# Patient Record
Sex: FEMALE | Race: WHITE | Employment: OTHER | URBAN - NONMETROPOLITAN AREA
[De-identification: names, ages, dates, MRNs, and addresses within clinical notes are randomized per-mention and may not be internally consistent; named-entity substitution may affect disease eponyms.]

---

## 2017-02-27 RX ORDER — CONJUGATED ESTROGENS 0.62 MG/1
TABLET, FILM COATED ORAL
Qty: 90 TABLET | Refills: 3 | Status: SHIPPED | OUTPATIENT
Start: 2017-02-27 | End: 2017-10-19 | Stop reason: SDUPTHER

## 2017-04-11 ENCOUNTER — HOSPITAL ENCOUNTER (OUTPATIENT)
Dept: WOMENS IMAGING | Age: 62
Discharge: HOME | End: 2017-04-11
Payer: COMMERCIAL

## 2017-04-11 ENCOUNTER — OFFICE VISIT (OUTPATIENT)
Dept: OBGYN | Age: 62
End: 2017-04-11
Payer: COMMERCIAL

## 2017-04-11 VITALS
DIASTOLIC BLOOD PRESSURE: 98 MMHG | BODY MASS INDEX: 26.5 KG/M2 | HEIGHT: 62 IN | SYSTOLIC BLOOD PRESSURE: 142 MMHG | WEIGHT: 144 LBS | TEMPERATURE: 99.1 F | HEART RATE: 80 BPM

## 2017-04-11 DIAGNOSIS — Z12.31 ENCOUNTER FOR SCREENING MAMMOGRAM FOR BREAST CANCER: ICD-10-CM

## 2017-04-11 DIAGNOSIS — Z01.419 ENCOUNTER FOR GYNECOLOGICAL EXAMINATION WITHOUT ABNORMAL FINDING: Primary | ICD-10-CM

## 2017-04-11 PROCEDURE — 77063 BREAST TOMOSYNTHESIS BI: CPT

## 2017-04-11 PROCEDURE — 99396 PREV VISIT EST AGE 40-64: CPT

## 2017-04-11 RX ORDER — LOSARTAN POTASSIUM 25 MG/1
25 TABLET ORAL DAILY
COMMUNITY
End: 2017-10-19 | Stop reason: SDUPTHER

## 2017-04-11 RX ORDER — CYCLOBENZAPRINE HYDROCHLORIDE 15 MG/1
CAPSULE, EXTENDED RELEASE ORAL
COMMUNITY
Start: 2017-02-06 | End: 2017-10-19 | Stop reason: CLARIF

## 2017-04-11 ASSESSMENT — ENCOUNTER SYMPTOMS
COLOR CHANGE: 0
SHORTNESS OF BREATH: 0
COUGH: 0
TROUBLE SWALLOWING: 0
BLOOD IN STOOL: 0
CONSTIPATION: 0
BACK PAIN: 0
DIARRHEA: 0

## 2017-06-08 RX ORDER — FLUCONAZOLE 150 MG/1
TABLET ORAL
Qty: 2 TABLET | Refills: 0 | Status: SHIPPED | OUTPATIENT
Start: 2017-06-08 | End: 2017-10-19 | Stop reason: CLARIF

## 2017-10-12 ENCOUNTER — TRANSCRIBE ORDERS (OUTPATIENT)
Dept: LAB | Facility: CLINIC | Age: 62
End: 2017-10-12

## 2017-10-12 DIAGNOSIS — N18.30 CHRONIC KIDNEY DISEASE, STAGE III (MODERATE) (HCC): Primary | ICD-10-CM

## 2017-10-12 PROCEDURE — 84550 ASSAY OF BLOOD/URIC ACID: CPT | Performed by: FAMILY MEDICINE

## 2017-10-12 PROCEDURE — 83970 ASSAY OF PARATHORMONE: CPT | Performed by: FAMILY MEDICINE

## 2017-10-12 PROCEDURE — 83735 ASSAY OF MAGNESIUM: CPT | Performed by: FAMILY MEDICINE

## 2017-10-12 PROCEDURE — 85025 COMPLETE CBC W/AUTO DIFF WBC: CPT | Performed by: FAMILY MEDICINE

## 2017-10-12 PROCEDURE — 83036 HEMOGLOBIN GLYCOSYLATED A1C: CPT | Performed by: FAMILY MEDICINE

## 2017-10-12 PROCEDURE — 80069 RENAL FUNCTION PANEL: CPT | Performed by: FAMILY MEDICINE

## 2017-10-12 PROCEDURE — 82306 VITAMIN D 25 HYDROXY: CPT | Performed by: FAMILY MEDICINE

## 2017-10-12 PROCEDURE — 80048 BASIC METABOLIC PNL TOTAL CA: CPT | Performed by: FAMILY MEDICINE

## 2017-10-19 ENCOUNTER — OFFICE VISIT (OUTPATIENT)
Dept: FAMILY MEDICINE CLINIC | Age: 62
End: 2017-10-19
Payer: COMMERCIAL

## 2017-10-19 VITALS
WEIGHT: 150 LBS | SYSTOLIC BLOOD PRESSURE: 128 MMHG | HEIGHT: 62 IN | BODY MASS INDEX: 27.6 KG/M2 | DIASTOLIC BLOOD PRESSURE: 82 MMHG | OXYGEN SATURATION: 97 % | HEART RATE: 68 BPM | TEMPERATURE: 98.1 F | RESPIRATION RATE: 18 BRPM

## 2017-10-19 DIAGNOSIS — E03.8 HYPOTHYROIDISM DUE TO HASHIMOTO'S THYROIDITIS: ICD-10-CM

## 2017-10-19 DIAGNOSIS — R30.0 DYSURIA: ICD-10-CM

## 2017-10-19 DIAGNOSIS — F51.04 CHRONIC INSOMNIA: ICD-10-CM

## 2017-10-19 DIAGNOSIS — E06.3 HYPOTHYROIDISM DUE TO HASHIMOTO'S THYROIDITIS: ICD-10-CM

## 2017-10-19 DIAGNOSIS — I10 ESSENTIAL (PRIMARY) HYPERTENSION: Primary | ICD-10-CM

## 2017-10-19 DIAGNOSIS — J30.1 CHRONIC SEASONAL ALLERGIC RHINITIS DUE TO POLLEN: ICD-10-CM

## 2017-10-19 DIAGNOSIS — G89.29 CHRONIC MIDLINE LOW BACK PAIN WITHOUT SCIATICA: ICD-10-CM

## 2017-10-19 DIAGNOSIS — M54.50 CHRONIC MIDLINE LOW BACK PAIN WITHOUT SCIATICA: ICD-10-CM

## 2017-10-19 DIAGNOSIS — E83.42 HYPOMAGNESEMIA: ICD-10-CM

## 2017-10-19 DIAGNOSIS — R73.9 HYPERGLYCEMIA: ICD-10-CM

## 2017-10-19 DIAGNOSIS — E83.52 HYPERCALCEMIA: ICD-10-CM

## 2017-10-19 DIAGNOSIS — E78.01 FAMILIAL HYPERCHOLESTEROLEMIA: ICD-10-CM

## 2017-10-19 DIAGNOSIS — N18.30 STAGE 3 CHRONIC KIDNEY DISEASE (HCC): ICD-10-CM

## 2017-10-19 DIAGNOSIS — I12.9 BENIGN HYPERTENSIVE KIDNEY DISEASE WITH CHRONIC KIDNEY DISEASE STAGE I THROUGH STAGE IV, OR UNSPECIFIED: ICD-10-CM

## 2017-10-19 DIAGNOSIS — K21.9 GASTROESOPHAGEAL REFLUX DISEASE WITHOUT ESOPHAGITIS: ICD-10-CM

## 2017-10-19 DIAGNOSIS — N95.1 SYMPTOMS, SUCH AS FLUSHING, SLEEPLESSNESS, HEADACHE, LACK OF CONCENTRATION, ASSOCIATED WITH THE MENOPAUSE: ICD-10-CM

## 2017-10-19 DIAGNOSIS — Z23 INFLUENZA VACCINE NEEDED: ICD-10-CM

## 2017-10-19 DIAGNOSIS — F41.1 GENERALIZED ANXIETY DISORDER: ICD-10-CM

## 2017-10-19 PROBLEM — G56.03 BILATERAL CARPAL TUNNEL SYNDROME: Status: ACTIVE | Noted: 2017-10-19

## 2017-10-19 LAB
BACTERIA: ABNORMAL /HPF
BILIRUBIN URINE: NEGATIVE
BLOOD, URINE: NEGATIVE
CLARITY: ABNORMAL
COLOR: YELLOW
EPITHELIAL CELLS, UA: 1 /HPF (ref 0–5)
GLUCOSE URINE: NEGATIVE MG/DL
HYALINE CASTS: 0 /HPF (ref 0–8)
KETONES, URINE: NEGATIVE MG/DL
LEUKOCYTE ESTERASE, URINE: NEGATIVE
NITRITE, URINE: NEGATIVE
PH UA: 7
PROTEIN UA: NEGATIVE MG/DL
RBC UA: 1 /HPF (ref 0–4)
SPECIFIC GRAVITY UA: 1.01
UROBILINOGEN, URINE: 0.2 E.U./DL
WBC UA: 2 /HPF (ref 0–5)

## 2017-10-19 PROCEDURE — 99214 OFFICE O/P EST MOD 30 MIN: CPT | Performed by: FAMILY MEDICINE

## 2017-10-19 PROCEDURE — 90471 IMMUNIZATION ADMIN: CPT | Performed by: FAMILY MEDICINE

## 2017-10-19 PROCEDURE — 90630 INFLUENZA, QUADV, 18-64 YRS, ID, PF, MICRO INJ, 0.1ML (FLUZONE QUADV, PF): CPT | Performed by: FAMILY MEDICINE

## 2017-10-19 RX ORDER — PROPRANOLOL HYDROCHLORIDE 80 MG/1
CAPSULE, EXTENDED RELEASE ORAL
Qty: 90 CAPSULE | Refills: 3 | Status: SHIPPED | OUTPATIENT
Start: 2017-10-19 | End: 2017-10-19 | Stop reason: SDUPTHER

## 2017-10-19 RX ORDER — LOSARTAN POTASSIUM 25 MG/1
25 TABLET ORAL DAILY
Qty: 90 TABLET | Refills: 3 | Status: SHIPPED | OUTPATIENT
Start: 2017-10-19 | End: 2018-10-03 | Stop reason: SDUPTHER

## 2017-10-19 RX ORDER — PROPRANOLOL HYDROCHLORIDE 80 MG/1
CAPSULE, EXTENDED RELEASE ORAL
Qty: 90 CAPSULE | Refills: 3 | Status: SHIPPED | OUTPATIENT
Start: 2017-10-19 | End: 2019-01-01 | Stop reason: SDUPTHER

## 2017-10-19 RX ORDER — CELECOXIB 200 MG/1
200 CAPSULE ORAL 2 TIMES DAILY
Qty: 180 CAPSULE | Refills: 3 | Status: SHIPPED | OUTPATIENT
Start: 2017-10-19 | End: 2018-10-03 | Stop reason: SDUPTHER

## 2017-10-19 RX ORDER — AMITRIPTYLINE HYDROCHLORIDE 25 MG/1
TABLET, FILM COATED ORAL
Qty: 90 TABLET | Refills: 3 | Status: SHIPPED | OUTPATIENT
Start: 2017-10-19 | End: 2017-10-19 | Stop reason: SDUPTHER

## 2017-10-19 RX ORDER — TRIAMTERENE AND HYDROCHLOROTHIAZIDE 75; 50 MG/1; MG/1
1 TABLET ORAL DAILY
Qty: 90 TABLET | Refills: 3 | Status: SHIPPED | OUTPATIENT
Start: 2017-10-19 | End: 2017-12-30 | Stop reason: SDUPTHER

## 2017-10-19 RX ORDER — MONTELUKAST SODIUM 10 MG/1
10 TABLET ORAL NIGHTLY
Qty: 90 TABLET | Refills: 3 | Status: SHIPPED | OUTPATIENT
Start: 2017-10-19 | End: 2019-01-01 | Stop reason: SDUPTHER

## 2017-10-19 RX ORDER — ALPRAZOLAM 0.25 MG/1
0.25 TABLET ORAL NIGHTLY PRN
Qty: 90 TABLET | Refills: 1 | Status: SHIPPED | OUTPATIENT
Start: 2017-10-19 | End: 2018-07-13 | Stop reason: SDUPTHER

## 2017-10-19 RX ORDER — AMITRIPTYLINE HYDROCHLORIDE 25 MG/1
TABLET, FILM COATED ORAL
Qty: 90 TABLET | Refills: 3 | Status: SHIPPED | OUTPATIENT
Start: 2017-10-19 | End: 2018-04-17 | Stop reason: SDUPTHER

## 2017-10-19 RX ORDER — TRAMADOL HYDROCHLORIDE 50 MG/1
50 TABLET ORAL EVERY 12 HOURS
Qty: 60 TABLET | Refills: 0
Start: 2017-10-19

## 2017-10-19 NOTE — PROGRESS NOTES
currently stable.           HPI    Past Medical History:   Diagnosis Date    Allergic rhinitis     Anxiety     Back pain     DDD (degenerative disc disease)     Hypertension     Hyperthyroidism     Kidney disease     Stage 3- Dr Janett Astudillo Osteopenia       Past Surgical History:   Procedure Laterality Date    ANKLE SURGERY      APPENDECTOMY      BREAST BIOPSY  3/21/13    left breast mammotome biopsy    BREAST SURGERY Right 2002    right excisional biopsy - cyst    Bernie Covert COLONOSCOPY      Dr. Angelica Costa in [facility] with BSO       Family History   Problem Relation Age of Onset    High Blood Pressure Mother     Diabetes Mother     Stroke Mother     High Cholesterol Mother     High Blood Pressure Father     Heart Disease Father     High Cholesterol Father     Cancer Father 72     prostate    High Blood Pressure Sister     High Blood Pressure Brother     Cancer Brother      prostate    Cancer Maternal Aunt 54     breast    Cancer Brother      prostate    Cancer Maternal Cousin 58     breast       Social History   Substance Use Topics    Smoking status: Current Every Day Smoker     Packs/day: 1.00     Years: 25.00    Smokeless tobacco: Never Used    Alcohol use No      Current Outpatient Prescriptions   Medication Sig Dispense Refill    propranolol (INDERAL LA) 80 MG extended release capsule TAKE 1 CAPSULE DAILY 90 capsule 3    estrogens, conjugated, (PREMARIN) 0.625 MG tablet TAKE 1 TABLET DAILY 90 tablet 3    amitriptyline (ELAVIL) 25 MG tablet TAKE 1 TABLET DAILY 90 tablet 3    triamterene-hydrochlorothiazide (MAXZIDE) 75-50 MG per tablet Take 1 tablet by mouth daily 90 tablet 3    traMADol (ULTRAM) 50 MG tablet Take 1 tablet by mouth every 12 hours 60 tablet 0    montelukast (SINGULAIR) 10 MG tablet Take 1 tablet by mouth nightly 90 tablet 3    losartan (COZAAR) 25 MG tablet Take 1 tablet by mouth daily 90 sounds are normal. He exhibits no distension. There is no tenderness. There is no rebound and no guarding. Musculoskeletal: exhibits no edema. Neurological: alert. Psychiatric: normal mood and affect. behavior is normal.       Recent Results (from the past 672 hour(s))   Urinalysis with Microscopic    Collection Time: 10/19/17 12:22 PM   Result Value Ref Range    Color, UA YELLOW Straw/Yellow    Clarity, UA CLOUDY (A) Clear    Glucose, Ur Negative Negative mg/dL    Bilirubin Urine Negative Negative    Ketones, Urine Negative Negative mg/dL    Specific Gravity, UA 1.008 1.005 - 1.030    Blood, Urine Negative Negative    pH, UA 7.0 5.0 - 8.0    Protein, UA Negative Negative mg/dL    Urobilinogen, Urine 0.2 <2.0 E.U./dL    Nitrite, Urine Negative Negative    Leukocyte Esterase, Urine Negative Negative    Bacteria, UA 1+ /HPF    Hyaline Casts, UA 0 0 - 8 /HPF    WBC, UA 2 0 - 5 /HPF    RBC, UA 1 0 - 4 /HPF    Epi Cells 1 0 - 5 /HPF                                                                               Assessment & Plan: The following diagnoses and conditions are stable with no further orders unless indicated:  1. Essential (primary) hypertension  Blood pressure is at goal today. Continue with current medication  - triamterene-hydrochlorothiazide (MAXZIDE) 75-50 MG per tablet; Take 1 tablet by mouth daily  Dispense: 90 tablet; Refill: 3  - CBC Auto Differential; Future    2. Influenza vaccine needed    - INFLUENZA, QUADV, 18-64 YRS, ID, PF, MICRO INJ, 0.1ML (FLUZONE QUADV, PF)    3. Generalized anxiety disorder  Discussed risk and benefits of taking benzodiazepines. Risks include addiction and tolerance. If develops impairment or over sedation with medication, discontinue and contact me. Do not take medication with alcohol. Advised to take sparingly. Advised to keep medication hidden and locked up as theft is high with these medications as well. - ALPRAZolam (XANAX) 0.25 MG tablet;  Take 1 tablet by mouth nightly as needed for Sleep or Anxiety  Dispense: 90 tablet; Refill: 1    4. Benign hypertensive kidney disease with chronic kidney disease stage I through stage IV, or unspecified  Renal function has improved  - propranolol (INDERAL LA) 80 MG extended release capsule; TAKE 1 CAPSULE DAILY  Dispense: 90 capsule; Refill: 3  - losartan (COZAAR) 25 MG tablet; Take 1 tablet by mouth daily  Dispense: 90 tablet; Refill: 3    5. Stage 3 chronic kidney disease  Improved she also sees nephrology    6. Gastroesophageal reflux disease without esophagitis  Symptoms are stable    7. Hypercalcemia  Continue to monitor. Recommend she not take any calcium-containing supplements    8. Hyperglycemia  Blood sugar remains elevated but improved over the past 2 years. Discussed lifestyle changes such as diet and exercise. Recommended eliminate processed food from diet such as sugar and fried foods. Recommended exercising at least 150 minutes/week. Try to do full body resistance training twice a week as well. Offered suggestions for calorie counting such as phone apps and online resources such as My fitness pal and Lose it. Discussed healthy weight.    - Hemoglobin A1C; Future    9. Familial hypercholesterolemia  Cholesterol at goal continue his current medication  - Comprehensive Metabolic Panel; Future  - Lipid Panel; Future    10. Hypomagnesemia  Recommend she increase her magnesium intake. Discussed potential side effects of diarrhea. She is currently taking 400 mg may benefit from taking 200 mg  - Magnesium; Future    11. Hypothyroidism due to Hashimoto's thyroiditis  At the following lab studies prior to next visit  - TSH without Reflex; Future    12. Chronic insomnia  Stable  - amitriptyline (ELAVIL) 25 MG tablet; TAKE 1 TABLET DAILY  Dispense: 90 tablet; Refill: 3    13. Chronic midline low back pain without sciatica  Stable  - celecoxib (CELEBREX) 200 MG capsule;  Take 1 capsule by mouth 2 times daily

## 2017-10-24 ENCOUNTER — TELEPHONE (OUTPATIENT)
Dept: FAMILY MEDICINE CLINIC | Age: 62
End: 2017-10-24

## 2017-10-25 RX ORDER — POTASSIUM CHLORIDE 750 MG/1
10 TABLET, EXTENDED RELEASE ORAL 2 TIMES DAILY
COMMUNITY
End: 2018-02-20 | Stop reason: SDUPTHER

## 2018-02-02 ENCOUNTER — TELEPHONE (OUTPATIENT)
Dept: FAMILY MEDICINE CLINIC | Age: 63
End: 2018-02-02

## 2018-02-20 RX ORDER — POTASSIUM CHLORIDE 750 MG/1
10 TABLET, EXTENDED RELEASE ORAL 3 TIMES DAILY
Qty: 360 TABLET | Refills: 3 | Status: SHIPPED | OUTPATIENT
Start: 2018-02-20 | End: 2018-02-27 | Stop reason: SDUPTHER

## 2018-02-20 RX ORDER — CYCLOBENZAPRINE HCL 10 MG
10 TABLET ORAL NIGHTLY
Qty: 90 TABLET | Refills: 3 | Status: SHIPPED | OUTPATIENT
Start: 2018-02-20 | End: 2019-02-05 | Stop reason: SDUPTHER

## 2018-02-27 RX ORDER — POTASSIUM CHLORIDE 750 MG/1
10 TABLET, EXTENDED RELEASE ORAL 3 TIMES DAILY
Qty: 42 TABLET | Refills: 0 | Status: SHIPPED | OUTPATIENT
Start: 2018-02-27 | End: 2019-02-22 | Stop reason: SDUPTHER

## 2018-04-17 ENCOUNTER — HOSPITAL ENCOUNTER (OUTPATIENT)
Dept: WOMENS IMAGING | Age: 63
Discharge: HOME | End: 2018-04-17
Payer: COMMERCIAL

## 2018-04-17 ENCOUNTER — OFFICE VISIT (OUTPATIENT)
Dept: FAMILY MEDICINE CLINIC | Age: 63
End: 2018-04-17
Payer: COMMERCIAL

## 2018-04-17 ENCOUNTER — OFFICE VISIT (OUTPATIENT)
Dept: OBGYN | Age: 63
End: 2018-04-17
Payer: COMMERCIAL

## 2018-04-17 VITALS
HEIGHT: 62 IN | OXYGEN SATURATION: 92 % | BODY MASS INDEX: 27.86 KG/M2 | HEART RATE: 53 BPM | TEMPERATURE: 98.1 F | SYSTOLIC BLOOD PRESSURE: 124 MMHG | WEIGHT: 151.38 LBS | DIASTOLIC BLOOD PRESSURE: 72 MMHG

## 2018-04-17 VITALS
SYSTOLIC BLOOD PRESSURE: 140 MMHG | DIASTOLIC BLOOD PRESSURE: 84 MMHG | HEIGHT: 62 IN | WEIGHT: 148 LBS | HEART RATE: 59 BPM | BODY MASS INDEX: 27.23 KG/M2

## 2018-04-17 DIAGNOSIS — Z78.0 POSTMENOPAUSAL: ICD-10-CM

## 2018-04-17 DIAGNOSIS — E06.3 HYPOTHYROIDISM DUE TO HASHIMOTO'S THYROIDITIS: ICD-10-CM

## 2018-04-17 DIAGNOSIS — E78.01 FAMILIAL HYPERCHOLESTEROLEMIA: ICD-10-CM

## 2018-04-17 DIAGNOSIS — M85.80 OSTEOPENIA, UNSPECIFIED LOCATION: ICD-10-CM

## 2018-04-17 DIAGNOSIS — I10 ESSENTIAL (PRIMARY) HYPERTENSION: Primary | ICD-10-CM

## 2018-04-17 DIAGNOSIS — Z90.710 SCREENING FOR MALIGNANT NEOPLASM OF VAGINA AFTER TOTAL HYSTERECTOMY: ICD-10-CM

## 2018-04-17 DIAGNOSIS — R73.9 HYPERGLYCEMIA: ICD-10-CM

## 2018-04-17 DIAGNOSIS — F41.1 GENERALIZED ANXIETY DISORDER: ICD-10-CM

## 2018-04-17 DIAGNOSIS — Z12.31 ENCOUNTER FOR SCREENING MAMMOGRAM FOR BREAST CANCER: Primary | ICD-10-CM

## 2018-04-17 DIAGNOSIS — Z01.419 ENCOUNTER FOR GYNECOLOGICAL EXAMINATION WITHOUT ABNORMAL FINDING: Primary | ICD-10-CM

## 2018-04-17 DIAGNOSIS — N18.30 STAGE 3 CHRONIC KIDNEY DISEASE (HCC): ICD-10-CM

## 2018-04-17 DIAGNOSIS — E03.8 HYPOTHYROIDISM DUE TO HASHIMOTO'S THYROIDITIS: ICD-10-CM

## 2018-04-17 DIAGNOSIS — Z12.31 SCREENING MAMMOGRAM, ENCOUNTER FOR: ICD-10-CM

## 2018-04-17 DIAGNOSIS — Z12.72 SCREENING FOR MALIGNANT NEOPLASM OF VAGINA AFTER TOTAL HYSTERECTOMY: ICD-10-CM

## 2018-04-17 DIAGNOSIS — F51.04 CHRONIC INSOMNIA: ICD-10-CM

## 2018-04-17 DIAGNOSIS — Z12.31 VISIT FOR SCREENING MAMMOGRAM: ICD-10-CM

## 2018-04-17 DIAGNOSIS — Z79.899 LONG-TERM USE OF HIGH-RISK MEDICATION: ICD-10-CM

## 2018-04-17 PROBLEM — Z12.4 SCREENING FOR CERVICAL CANCER: Status: RESOLVED | Noted: 2018-04-17 | Resolved: 2018-04-17

## 2018-04-17 PROBLEM — Z12.4 SCREENING FOR CERVICAL CANCER: Status: ACTIVE | Noted: 2018-04-17

## 2018-04-17 LAB
ALBUMIN SERPL-MCNC: 4.6 G/DL (ref 3.5–5.2)
AMPHETAMINE SCREEN, URINE: NEGATIVE
ANION GAP SERPL CALCULATED.3IONS-SCNC: 17 MMOL/L (ref 7–19)
BARBITURATE SCREEN, URINE: NEGATIVE
BASOPHILS ABSOLUTE: 0 K/UL (ref 0–0.2)
BASOPHILS RELATIVE PERCENT: 0.4 % (ref 0–1)
BENZODIAZEPINE SCREEN, URINE: POSITIVE
BILIRUBIN URINE: NEGATIVE
BLOOD, URINE: NEGATIVE
BUN BLDV-MCNC: 23 MG/DL (ref 8–23)
CALCIUM SERPL-MCNC: 10.2 MG/DL (ref 8.8–10.2)
CHLORIDE BLD-SCNC: 96 MMOL/L (ref 98–111)
CLARITY: CLEAR
CO2: 26 MMOL/L (ref 22–29)
COCAINE METABOLITE SCREEN URINE: NEGATIVE
COLOR: YELLOW
CREAT SERPL-MCNC: 1.1 MG/DL (ref 0.5–0.9)
CREATININE URINE: 34 MG/DL (ref 4.2–622)
EOSINOPHILS ABSOLUTE: 0.3 K/UL (ref 0–0.6)
EOSINOPHILS RELATIVE PERCENT: 2.8 % (ref 0–5)
GFR NON-AFRICAN AMERICAN: 50
GLUCOSE BLD-MCNC: 93 MG/DL (ref 74–109)
GLUCOSE URINE: NEGATIVE MG/DL
HCT VFR BLD CALC: 48.4 % (ref 37–47)
HEMOGLOBIN: 15.8 G/DL (ref 12–16)
KETONES, URINE: NEGATIVE MG/DL
LEUKOCYTE ESTERASE, URINE: NEGATIVE
LYMPHOCYTES ABSOLUTE: 3 K/UL (ref 1.1–4.5)
LYMPHOCYTES RELATIVE PERCENT: 28.7 % (ref 20–40)
MAGNESIUM: 1.7 MG/DL (ref 1.6–2.4)
MCH RBC QN AUTO: 30.6 PG (ref 27–31)
MCHC RBC AUTO-ENTMCNC: 32.6 G/DL (ref 33–37)
MCV RBC AUTO: 93.8 FL (ref 81–99)
MDMA URINE: NEGATIVE
METHADONE SCREEN, URINE: NEGATIVE
METHAMPHETAMINE, URINE: NEGATIVE
MONOCYTES ABSOLUTE: 0.8 K/UL (ref 0–0.9)
MONOCYTES RELATIVE PERCENT: 7.6 % (ref 0–10)
NEUTROPHILS ABSOLUTE: 6.2 K/UL (ref 1.5–7.5)
NEUTROPHILS RELATIVE PERCENT: 60.1 % (ref 50–65)
NITRITE, URINE: NEGATIVE
OPIATE SCREEN URINE: NEGATIVE
OXYCODONE SCREEN URINE: NEGATIVE
PARATHYROID HORMONE INTACT: 34.9 PG/ML (ref 15–65)
PDW BLD-RTO: 12.5 % (ref 11.5–14.5)
PH UA: 7
PHENCYCLIDINE SCREEN URINE: NEGATIVE
PHOSPHORUS: 3.7 MG/DL (ref 2.5–4.5)
PLATELET # BLD: 273 K/UL (ref 130–400)
PMV BLD AUTO: 10.2 FL (ref 9.4–12.3)
POTASSIUM SERPL-SCNC: 4 MMOL/L (ref 3.5–5)
PROPOXYPHENE SCREEN, URINE: NEGATIVE
PROTEIN PROTEIN: 9 MG/DL (ref 15–45)
PROTEIN UA: NEGATIVE MG/DL
RBC # BLD: 5.16 M/UL (ref 4.2–5.4)
SODIUM BLD-SCNC: 139 MMOL/L (ref 136–145)
SPECIFIC GRAVITY UA: 1.01
THC: NEGATIVE
TRICYCLIC ANTIDEPRESSANTS, UR: NEGATIVE
URIC ACID, SERUM: 6.3 MG/DL (ref 2.4–5.7)
UROBILINOGEN, URINE: 0.2 E.U./DL
VITAMIN D 25-HYDROXY: >60 NG/ML
WBC # BLD: 10.3 K/UL (ref 4.8–10.8)

## 2018-04-17 PROCEDURE — 99213 OFFICE O/P EST LOW 20 MIN: CPT | Performed by: NURSE PRACTITIONER

## 2018-04-17 PROCEDURE — 99396 PREV VISIT EST AGE 40-64: CPT

## 2018-04-17 PROCEDURE — 80305 DRUG TEST PRSMV DIR OPT OBS: CPT | Performed by: NURSE PRACTITIONER

## 2018-04-17 PROCEDURE — 77067 SCR MAMMO BI INCL CAD: CPT

## 2018-04-17 RX ORDER — ALPRAZOLAM 0.25 MG/1
0.25 TABLET ORAL DAILY PRN
Qty: 30 TABLET | Refills: 2 | Status: SHIPPED | OUTPATIENT
Start: 2018-04-17 | End: 2018-05-17

## 2018-04-17 RX ORDER — AMITRIPTYLINE HYDROCHLORIDE 25 MG/1
25 TABLET, FILM COATED ORAL NIGHTLY
Qty: 90 TABLET | Refills: 1 | Status: SHIPPED | OUTPATIENT
Start: 2018-04-17 | End: 2018-10-03 | Stop reason: SDUPTHER

## 2018-04-17 ASSESSMENT — ENCOUNTER SYMPTOMS
COUGH: 0
BACK PAIN: 0
DIARRHEA: 0
COUGH: 0
CHEST TIGHTNESS: 0
TROUBLE SWALLOWING: 0
SHORTNESS OF BREATH: 0
BLOOD IN STOOL: 0
NAUSEA: 0
DIARRHEA: 0
COLOR CHANGE: 0
SHORTNESS OF BREATH: 0
BACK PAIN: 1
WHEEZING: 0
SORE THROAT: 0
ABDOMINAL PAIN: 0
CONSTIPATION: 0

## 2018-04-23 ENCOUNTER — HOSPITAL ENCOUNTER (OUTPATIENT)
Dept: WOMENS IMAGING | Age: 63
Discharge: HOME | End: 2018-04-23
Payer: COMMERCIAL

## 2018-04-23 DIAGNOSIS — M85.80 OSTEOPENIA, UNSPECIFIED LOCATION: ICD-10-CM

## 2018-04-23 DIAGNOSIS — Z78.0 POSTMENOPAUSAL: ICD-10-CM

## 2018-04-23 PROCEDURE — 77080 DXA BONE DENSITY AXIAL: CPT

## 2018-05-03 ENCOUNTER — TRANSCRIBE ORDERS (OUTPATIENT)
Dept: LAB | Facility: CLINIC | Age: 63
End: 2018-05-03

## 2018-05-03 ENCOUNTER — LAB (OUTPATIENT)
Dept: LAB | Facility: CLINIC | Age: 63
End: 2018-05-03

## 2018-05-03 DIAGNOSIS — E78.01 ESSENTIAL FAMILIAL HYPERCHOLESTEROLEMIA: ICD-10-CM

## 2018-05-03 DIAGNOSIS — R73.9 BLOOD GLUCOSE ELEVATED: ICD-10-CM

## 2018-05-03 DIAGNOSIS — E03.8 HYPOTHYROIDISM DUE TO HASHIMOTO'S THYROIDITIS: ICD-10-CM

## 2018-05-03 DIAGNOSIS — E78.01 ESSENTIAL FAMILIAL HYPERCHOLESTEROLEMIA: Primary | ICD-10-CM

## 2018-05-03 DIAGNOSIS — E06.3 HYPOTHYROIDISM DUE TO HASHIMOTO'S THYROIDITIS: ICD-10-CM

## 2018-05-03 DIAGNOSIS — E83.42 HYPOMAGNESEMIA: ICD-10-CM

## 2018-05-03 DIAGNOSIS — E78.01 FAMILIAL HYPERCHOLESTEREMIA: ICD-10-CM

## 2018-05-03 PROCEDURE — 83735 ASSAY OF MAGNESIUM: CPT | Performed by: FAMILY MEDICINE

## 2018-05-03 PROCEDURE — 80053 COMPREHEN METABOLIC PANEL: CPT | Performed by: FAMILY MEDICINE

## 2018-05-03 PROCEDURE — 80061 LIPID PANEL: CPT | Performed by: FAMILY MEDICINE

## 2018-05-03 PROCEDURE — 84443 ASSAY THYROID STIM HORMONE: CPT | Performed by: FAMILY MEDICINE

## 2018-05-03 PROCEDURE — 36415 COLL VENOUS BLD VENIPUNCTURE: CPT | Performed by: FAMILY MEDICINE

## 2018-05-03 PROCEDURE — 83036 HEMOGLOBIN GLYCOSYLATED A1C: CPT | Performed by: FAMILY MEDICINE

## 2018-05-04 LAB
ALBUMIN SERPL-MCNC: 4 G/DL (ref 3.4–4.8)
ALBUMIN/GLOB SERPL: 1.5 G/DL (ref 1.1–1.8)
ALP SERPL-CCNC: 45 U/L (ref 38–126)
ALT SERPL W P-5'-P-CCNC: 24 U/L (ref 9–52)
ANION GAP SERPL CALCULATED.3IONS-SCNC: 10 MMOL/L (ref 5–15)
ARTICHOKE IGE QN: 110 MG/DL (ref 1–129)
AST SERPL-CCNC: 15 U/L (ref 14–36)
BILIRUB SERPL-MCNC: 0.4 MG/DL (ref 0.2–1.3)
BUN BLD-MCNC: 22 MG/DL (ref 7–21)
BUN/CREAT SERPL: 21.2 (ref 7–25)
CALCIUM SPEC-SCNC: 9.8 MG/DL (ref 8.4–10.2)
CHLORIDE SERPL-SCNC: 103 MMOL/L (ref 95–110)
CHOLEST SERPL-MCNC: 202 MG/DL (ref 0–199)
CO2 SERPL-SCNC: 26 MMOL/L (ref 22–31)
CREAT BLD-MCNC: 1.04 MG/DL (ref 0.5–1)
GFR SERPL CREATININE-BSD FRML MDRD: 54 ML/MIN/1.73 (ref 45–104)
GLOBULIN UR ELPH-MCNC: 2.7 GM/DL (ref 2.3–3.5)
GLUCOSE BLD-MCNC: 92 MG/DL (ref 60–100)
HBA1C MFR BLD: 6 % (ref 4–5.6)
HDLC SERPL-MCNC: 40 MG/DL (ref 60–200)
LDLC/HDLC SERPL: 2.59 {RATIO} (ref 0–3.22)
MAGNESIUM SERPL-MCNC: 1.8 MG/DL (ref 1.6–2.3)
POTASSIUM BLD-SCNC: 4.3 MMOL/L (ref 3.5–5.1)
PROT SERPL-MCNC: 6.7 G/DL (ref 6.3–8.6)
SODIUM BLD-SCNC: 139 MMOL/L (ref 137–145)
TRIGL SERPL-MCNC: 293 MG/DL (ref 20–199)
TSH SERPL DL<=0.05 MIU/L-ACNC: 0.04 MIU/ML (ref 0.46–4.68)

## 2018-05-17 PROBLEM — Z90.710 SCREENING FOR MALIGNANT NEOPLASM OF VAGINA AFTER TOTAL HYSTERECTOMY: Status: RESOLVED | Noted: 2018-04-17 | Resolved: 2018-05-17

## 2018-05-17 PROBLEM — Z12.72 SCREENING FOR MALIGNANT NEOPLASM OF VAGINA AFTER TOTAL HYSTERECTOMY: Status: RESOLVED | Noted: 2018-04-17 | Resolved: 2018-05-17

## 2018-07-13 ENCOUNTER — OFFICE VISIT (OUTPATIENT)
Dept: FAMILY MEDICINE CLINIC | Age: 63
End: 2018-07-13
Payer: COMMERCIAL

## 2018-07-13 VITALS
HEART RATE: 69 BPM | OXYGEN SATURATION: 98 % | BODY MASS INDEX: 27.25 KG/M2 | WEIGHT: 149 LBS | TEMPERATURE: 98 F | DIASTOLIC BLOOD PRESSURE: 78 MMHG | SYSTOLIC BLOOD PRESSURE: 152 MMHG

## 2018-07-13 DIAGNOSIS — R05.9 COUGH: ICD-10-CM

## 2018-07-13 DIAGNOSIS — F41.1 GENERALIZED ANXIETY DISORDER: Primary | ICD-10-CM

## 2018-07-13 DIAGNOSIS — F51.04 CHRONIC INSOMNIA: ICD-10-CM

## 2018-07-13 DIAGNOSIS — F41.1 GENERALIZED ANXIETY DISORDER: ICD-10-CM

## 2018-07-13 PROCEDURE — 99214 OFFICE O/P EST MOD 30 MIN: CPT | Performed by: CLINICAL NURSE SPECIALIST

## 2018-07-13 RX ORDER — ALPRAZOLAM 0.25 MG/1
0.25 TABLET ORAL NIGHTLY PRN
Qty: 30 TABLET | Refills: 0 | Status: SHIPPED | OUTPATIENT
Start: 2018-07-13 | End: 2018-07-16 | Stop reason: SDUPTHER

## 2018-07-13 ASSESSMENT — ENCOUNTER SYMPTOMS
COLOR CHANGE: 0
TROUBLE SWALLOWING: 0
WHEEZING: 0
SORE THROAT: 0
COUGH: 1
SINUS PRESSURE: 0
BACK PAIN: 1
CHEST TIGHTNESS: 0
SHORTNESS OF BREATH: 1

## 2018-07-13 NOTE — PROGRESS NOTES
SUBJECTIVE:  Peg Isaacs is a 58 y.o. who presents today for Medication Refill (Patient is needing a refill on her Alprazolam.)      HPI    Ms Patt Zayas presents today for medication refill and follow up on BRUNO and primary insomnia. She remains on xanax 0.25mg at night as well as elavil 25mg at night for sleep. At times she will take 1/4 xanax prn anxiety during the day with adequate relief of symptoms. She has good rest at night without any day time side effects noted. She has been on same regimen for some time. Her Vandana Simi is noted from April, UDS is UTD and appropriate as well as her controlled contract. She has been having cough with clear sputum that is blood streaked. She is a smoker. No other colored sputum. No fever or chills.      Past Medical History:   Diagnosis Date    Allergic rhinitis     Anxiety     Back pain     DDD (degenerative disc disease)     Hypertension     Hyperthyroidism     Kidney disease     Stage 3- Dr Lalo Ta Osteopenia      Past Surgical History:   Procedure Laterality Date    ANKLE SURGERY      APPENDECTOMY      BREAST BIOPSY  3/21/13    left breast mammotome biopsy    BREAST SURGERY Right 2002    right excisional biopsy - cyst    CARPAL TUNNEL RELEASE      COLONOSCOPY  2016    Dr. Sherryle Kale in [facility] with BSO, fibroids     Family History   Problem Relation Age of Onset    High Blood Pressure Mother     Diabetes Mother     Stroke Mother     High Cholesterol Mother     High Blood Pressure Father     Heart Disease Father     High Cholesterol Father     Cancer Father 72        prostate    High Blood Pressure Sister     High Blood Pressure Brother     Cancer Brother         prostate    Cancer Maternal Aunt 54        breast    Cancer Maternal Cousin 58        breast    Cancer Brother         prostate     Social History   Substance Use Topics    Smoking status: Current Every Day Smoker pain. Negative for arthralgias, joint swelling and neck pain. Skin: Negative for color change and rash. Neurological: Negative for dizziness, light-headedness and headaches. Psychiatric/Behavioral: Positive for sleep disturbance. Negative for confusion and suicidal ideas. The patient is nervous/anxious. OBJECTIVE:  BP (!) 152/78   Pulse 69   Temp 98 °F (36.7 °C) (Tympanic)   Wt 149 lb (67.6 kg)   LMP  (LMP Unknown)   SpO2 98%   BMI 27.25 kg/m²    Physical Exam   Constitutional: She is oriented to person, place, and time. She appears well-developed and well-nourished. HENT:   Head: Normocephalic and atraumatic. Mouth/Throat: Oropharynx is clear and moist.   Eyes: Conjunctivae are normal. Pupils are equal, round, and reactive to light. Right eye exhibits no discharge. Left eye exhibits no discharge. Cardiovascular: Normal rate and regular rhythm. No murmur heard. Pulmonary/Chest: Effort normal. No respiratory distress. She has no wheezes. She has rhonchi in the left middle field and the left lower field. She has no rales. Musculoskeletal: Normal range of motion. She exhibits no deformity. Neurological: She is alert and oriented to person, place, and time. No cranial nerve deficit. Skin: Skin is warm and dry. No rash noted. No erythema. Psychiatric: She has a normal mood and affect. Thought content normal.   Vitals reviewed. ASSESSMENT/PLAN:  1. Generalized anxiety disorder  Refer to [facility]  Follow up 3 months for compliance  - ALPRAZolam (XANAX) 0.25 MG tablet; Take 1 tablet by mouth nightly as needed for Sleep or Anxiety for up to 30 days. .  Dispense: 30 tablet; Refill: 0    2. Chronic insomnia  Refer to [facility]  Follow up in 3 months for compliance  - ALPRAZolam (XANAX) 0.25 MG tablet; Take 1 tablet by mouth nightly as needed for Sleep or Anxiety for up to 30 days. .  Dispense: 30 tablet; Refill: 0    3. Cough  With scant hemoptysis  - XR CHEST STANDARD (2 VW);  Future

## 2018-07-13 NOTE — PROGRESS NOTES
HPI:        HPI  Subjective:      Review of Systems      Objective:     Physical Exam    Assessment & Plan:

## 2018-07-14 NOTE — TELEPHONE ENCOUNTER
I sent in her xanax for 30 days to local pharmacy. If we could do a 90 day supply with 1 rf for Dr Elba Cummings to sign to her mail order please.

## 2018-07-16 RX ORDER — ALPRAZOLAM 0.25 MG/1
0.25 TABLET ORAL NIGHTLY PRN
Qty: 90 TABLET | Refills: 1 | Status: SHIPPED | OUTPATIENT
Start: 2018-07-16 | End: 2019-01-21 | Stop reason: SDUPTHER

## 2018-07-24 ENCOUNTER — HOSPITAL ENCOUNTER (OUTPATIENT)
Dept: GENERAL RADIOLOGY | Age: 63
Discharge: HOME | End: 2018-07-24
Payer: COMMERCIAL

## 2018-07-24 DIAGNOSIS — R05.9 COUGH: ICD-10-CM

## 2018-07-24 PROCEDURE — 71046 X-RAY EXAM CHEST 2 VIEWS: CPT

## 2018-10-03 DIAGNOSIS — M54.50 CHRONIC MIDLINE LOW BACK PAIN WITHOUT SCIATICA: ICD-10-CM

## 2018-10-03 DIAGNOSIS — G89.29 CHRONIC MIDLINE LOW BACK PAIN WITHOUT SCIATICA: ICD-10-CM

## 2018-10-03 DIAGNOSIS — F51.04 CHRONIC INSOMNIA: ICD-10-CM

## 2018-10-03 DIAGNOSIS — I12.9 BENIGN HYPERTENSIVE KIDNEY DISEASE WITH CHRONIC KIDNEY DISEASE STAGE I THROUGH STAGE IV, OR UNSPECIFIED: ICD-10-CM

## 2018-10-03 DIAGNOSIS — N95.1 SYMPTOMS, SUCH AS FLUSHING, SLEEPLESSNESS, HEADACHE, LACK OF CONCENTRATION, ASSOCIATED WITH THE MENOPAUSE: ICD-10-CM

## 2018-10-03 RX ORDER — AMITRIPTYLINE HYDROCHLORIDE 25 MG/1
TABLET, FILM COATED ORAL
Qty: 90 TABLET | Refills: 1 | Status: SHIPPED | OUTPATIENT
Start: 2018-10-03 | End: 2019-04-01 | Stop reason: SDUPTHER

## 2018-10-04 RX ORDER — CELECOXIB 200 MG/1
CAPSULE ORAL
Qty: 180 CAPSULE | Refills: 3 | Status: SHIPPED | OUTPATIENT
Start: 2018-10-04 | End: 2020-08-24 | Stop reason: SDUPTHER

## 2018-10-04 RX ORDER — CONJUGATED ESTROGENS 0.62 MG/1
TABLET, FILM COATED ORAL
Qty: 90 TABLET | Refills: 3 | Status: SHIPPED | OUTPATIENT
Start: 2018-10-04 | End: 2019-10-25 | Stop reason: SDUPTHER

## 2018-10-04 RX ORDER — LOSARTAN POTASSIUM 25 MG/1
TABLET ORAL
Qty: 90 TABLET | Refills: 3 | Status: SHIPPED | OUTPATIENT
Start: 2018-10-04 | End: 2020-09-01 | Stop reason: SDUPTHER

## 2018-10-09 ENCOUNTER — LAB (OUTPATIENT)
Dept: LAB | Facility: HOSPITAL | Age: 63
End: 2018-10-09

## 2018-10-09 ENCOUNTER — TRANSCRIBE ORDERS (OUTPATIENT)
Dept: LAB | Facility: HOSPITAL | Age: 63
End: 2018-10-09

## 2018-10-09 DIAGNOSIS — N18.30 CHRONIC KIDNEY DISEASE, STAGE III (MODERATE) (HCC): ICD-10-CM

## 2018-10-09 DIAGNOSIS — N18.30 CHRONIC KIDNEY DISEASE, STAGE III (MODERATE) (HCC): Primary | ICD-10-CM

## 2018-10-09 LAB
BILIRUB UR QL STRIP: NEGATIVE
CLARITY UR: ABNORMAL
COLOR UR: ABNORMAL
GLUCOSE UR STRIP-MCNC: NEGATIVE MG/DL
HGB UR QL STRIP.AUTO: ABNORMAL
KETONES UR QL STRIP: ABNORMAL
LEUKOCYTE ESTERASE UR QL STRIP.AUTO: NEGATIVE
NITRITE UR QL STRIP: POSITIVE
PH UR STRIP.AUTO: 7.5 [PH] (ref 5–8)
PROT UR QL STRIP: ABNORMAL
SP GR UR STRIP: 1 (ref 1–1.03)
UROBILINOGEN UR QL STRIP: ABNORMAL

## 2018-10-09 PROCEDURE — 85025 COMPLETE CBC W/AUTO DIFF WBC: CPT

## 2018-10-09 PROCEDURE — 82652 VIT D 1 25-DIHYDROXY: CPT

## 2018-10-09 PROCEDURE — 80069 RENAL FUNCTION PANEL: CPT

## 2018-10-09 PROCEDURE — 84156 ASSAY OF PROTEIN URINE: CPT | Performed by: FAMILY MEDICINE

## 2018-10-09 PROCEDURE — 83735 ASSAY OF MAGNESIUM: CPT

## 2018-10-09 PROCEDURE — 83970 ASSAY OF PARATHORMONE: CPT

## 2018-10-09 PROCEDURE — 82570 ASSAY OF URINE CREATININE: CPT | Performed by: FAMILY MEDICINE

## 2018-10-10 LAB
ALBUMIN SERPL-MCNC: 4.2 G/DL (ref 3.4–4.8)
ANION GAP SERPL CALCULATED.3IONS-SCNC: 13 MMOL/L (ref 5–15)
BASOPHILS # BLD AUTO: 0.03 10*3/MM3 (ref 0–0.2)
BASOPHILS NFR BLD AUTO: 0.3 % (ref 0–2)
BUN BLD-MCNC: 20 MG/DL (ref 7–21)
BUN/CREAT SERPL: 12.4 (ref 7–25)
CALCIUM SPEC-SCNC: 10.4 MG/DL (ref 8.4–10.2)
CHLORIDE SERPL-SCNC: 96 MMOL/L (ref 95–110)
CO2 SERPL-SCNC: 28 MMOL/L (ref 22–31)
CREAT BLD-MCNC: 1.61 MG/DL (ref 0.5–1)
DEPRECATED RDW RBC AUTO: 43.8 FL (ref 36.4–46.3)
EOSINOPHIL # BLD AUTO: 0.21 10*3/MM3 (ref 0–0.7)
EOSINOPHIL NFR BLD AUTO: 2.2 % (ref 0–7)
ERYTHROCYTE [DISTWIDTH] IN BLOOD BY AUTOMATED COUNT: 12.8 % (ref 11.5–14.5)
GFR SERPL CREATININE-BSD FRML MDRD: 32 ML/MIN/1.73 (ref 45–104)
GLUCOSE BLD-MCNC: 105 MG/DL (ref 60–100)
HCT VFR BLD AUTO: 44.4 % (ref 35–45)
HGB BLD-MCNC: 14.8 G/DL (ref 12–15.5)
IMM GRANULOCYTES # BLD: 0.03 10*3/MM3 (ref 0–0.02)
IMM GRANULOCYTES NFR BLD: 0.3 % (ref 0–0.5)
LYMPHOCYTES # BLD AUTO: 2.4 10*3/MM3 (ref 0.6–4.2)
LYMPHOCYTES NFR BLD AUTO: 24.9 % (ref 10–50)
MAGNESIUM SERPL-MCNC: 1.7 MG/DL (ref 1.6–2.3)
MCH RBC QN AUTO: 31.1 PG (ref 26.5–34)
MCHC RBC AUTO-ENTMCNC: 33.3 G/DL (ref 31.4–36)
MCV RBC AUTO: 93.3 FL (ref 80–98)
MONOCYTES # BLD AUTO: 0.91 10*3/MM3 (ref 0–0.9)
MONOCYTES NFR BLD AUTO: 9.4 % (ref 0–12)
NEUTROPHILS # BLD AUTO: 6.05 10*3/MM3 (ref 2–8.6)
NEUTROPHILS NFR BLD AUTO: 62.9 % (ref 37–80)
PHOSPHATE SERPL-MCNC: 5.5 MG/DL (ref 2.4–4.4)
PLATELET # BLD AUTO: 259 10*3/MM3 (ref 150–450)
PMV BLD AUTO: 10.7 FL (ref 8–12)
POTASSIUM BLD-SCNC: 4.4 MMOL/L (ref 3.5–5.1)
PTH-INTACT SERPL-MCNC: 20.9 PG/ML (ref 10–65)
RBC # BLD AUTO: 4.76 10*6/MM3 (ref 3.77–5.16)
SODIUM BLD-SCNC: 137 MMOL/L (ref 137–145)
WBC NRBC COR # BLD: 9.63 10*3/MM3 (ref 3.2–9.8)

## 2018-10-12 LAB — 1,25(OH)2D3 SERPL-MCNC: 42.1 PG/ML (ref 19.9–79.3)

## 2018-10-16 ENCOUNTER — LAB (OUTPATIENT)
Dept: LAB | Facility: HOSPITAL | Age: 63
End: 2018-10-16

## 2018-10-16 ENCOUNTER — TRANSCRIBE ORDERS (OUTPATIENT)
Dept: LAB | Facility: HOSPITAL | Age: 63
End: 2018-10-16

## 2018-10-16 DIAGNOSIS — I10 ESSENTIAL HYPERTENSION, MALIGNANT: ICD-10-CM

## 2018-10-16 DIAGNOSIS — N18.30 CHRONIC KIDNEY DISEASE, STAGE III (MODERATE) (HCC): ICD-10-CM

## 2018-10-16 DIAGNOSIS — N18.30 CHRONIC KIDNEY DISEASE, STAGE III (MODERATE) (HCC): Primary | ICD-10-CM

## 2018-10-16 PROCEDURE — 84443 ASSAY THYROID STIM HORMONE: CPT

## 2018-10-16 PROCEDURE — 80053 COMPREHEN METABOLIC PANEL: CPT

## 2018-10-16 PROCEDURE — 80061 LIPID PANEL: CPT

## 2018-10-16 PROCEDURE — 85025 COMPLETE CBC W/AUTO DIFF WBC: CPT

## 2018-10-16 PROCEDURE — 83036 HEMOGLOBIN GLYCOSYLATED A1C: CPT

## 2018-10-17 LAB
ALBUMIN SERPL-MCNC: 4.1 G/DL (ref 3.4–4.8)
ALBUMIN/GLOB SERPL: 1.4 G/DL (ref 1.1–1.8)
ALP SERPL-CCNC: 55 U/L (ref 38–126)
ALT SERPL W P-5'-P-CCNC: 26 U/L (ref 9–52)
ANION GAP SERPL CALCULATED.3IONS-SCNC: 9 MMOL/L (ref 5–15)
ARTICHOKE IGE QN: 110 MG/DL (ref 1–129)
AST SERPL-CCNC: 20 U/L (ref 14–36)
BASOPHILS # BLD AUTO: 0.02 10*3/MM3 (ref 0–0.2)
BASOPHILS NFR BLD AUTO: 0.2 % (ref 0–2)
BILIRUB SERPL-MCNC: 0.5 MG/DL (ref 0.2–1.3)
BUN BLD-MCNC: 22 MG/DL (ref 7–21)
BUN/CREAT SERPL: 18.5 (ref 7–25)
CALCIUM SPEC-SCNC: 10.2 MG/DL (ref 8.4–10.2)
CHLORIDE SERPL-SCNC: 99 MMOL/L (ref 95–110)
CHOLEST SERPL-MCNC: 216 MG/DL (ref 0–199)
CO2 SERPL-SCNC: 28 MMOL/L (ref 22–31)
CREAT BLD-MCNC: 1.19 MG/DL (ref 0.5–1)
DEPRECATED RDW RBC AUTO: 43.9 FL (ref 36.4–46.3)
EOSINOPHIL # BLD AUTO: 0.18 10*3/MM3 (ref 0–0.7)
EOSINOPHIL NFR BLD AUTO: 1.9 % (ref 0–7)
ERYTHROCYTE [DISTWIDTH] IN BLOOD BY AUTOMATED COUNT: 12.8 % (ref 11.5–14.5)
GFR SERPL CREATININE-BSD FRML MDRD: 46 ML/MIN/1.73 (ref 45–104)
GLOBULIN UR ELPH-MCNC: 2.9 GM/DL (ref 2.3–3.5)
GLUCOSE BLD-MCNC: 104 MG/DL (ref 60–100)
HBA1C MFR BLD: 6.1 % (ref 4–5.6)
HCT VFR BLD AUTO: 48.1 % (ref 35–45)
HDLC SERPL-MCNC: 40 MG/DL (ref 60–200)
HGB BLD-MCNC: 15.7 G/DL (ref 12–15.5)
IMM GRANULOCYTES # BLD: 0.03 10*3/MM3 (ref 0–0.02)
IMM GRANULOCYTES NFR BLD: 0.3 % (ref 0–0.5)
LDLC/HDLC SERPL: ABNORMAL {RATIO} (ref 0–3.22)
LYMPHOCYTES # BLD AUTO: 2.64 10*3/MM3 (ref 0.6–4.2)
LYMPHOCYTES NFR BLD AUTO: 28 % (ref 10–50)
MCH RBC QN AUTO: 30.9 PG (ref 26.5–34)
MCHC RBC AUTO-ENTMCNC: 32.6 G/DL (ref 31.4–36)
MCV RBC AUTO: 94.7 FL (ref 80–98)
MONOCYTES # BLD AUTO: 0.82 10*3/MM3 (ref 0–0.9)
MONOCYTES NFR BLD AUTO: 8.7 % (ref 0–12)
NEUTROPHILS # BLD AUTO: 5.73 10*3/MM3 (ref 2–8.6)
NEUTROPHILS NFR BLD AUTO: 60.9 % (ref 37–80)
PLATELET # BLD AUTO: 261 10*3/MM3 (ref 150–450)
PMV BLD AUTO: 10.8 FL (ref 8–12)
POTASSIUM BLD-SCNC: 5 MMOL/L (ref 3.5–5.1)
PROT SERPL-MCNC: 7 G/DL (ref 6.3–8.6)
RBC # BLD AUTO: 5.08 10*6/MM3 (ref 3.77–5.16)
SODIUM BLD-SCNC: 136 MMOL/L (ref 137–145)
TRIGL SERPL-MCNC: 424 MG/DL (ref 20–199)
TSH SERPL DL<=0.05 MIU/L-ACNC: 0.45 MIU/ML (ref 0.46–4.68)
WBC NRBC COR # BLD: 9.42 10*3/MM3 (ref 3.2–9.8)

## 2018-10-18 LAB
BASOPHILS ABSOLUTE: NORMAL /ΜL
BASOPHILS RELATIVE PERCENT: NORMAL %
CHOLESTEROL, TOTAL: 216 MG/DL
CHOLESTEROL/HDL RATIO: NORMAL
EOSINOPHILS ABSOLUTE: NORMAL /ΜL
EOSINOPHILS RELATIVE PERCENT: NORMAL %
HCT VFR BLD CALC: NORMAL % (ref 36–46)
HDLC SERPL-MCNC: 40 MG/DL (ref 35–70)
HEMOGLOBIN: NORMAL G/DL (ref 12–16)
LDL CHOLESTEROL CALCULATED: 110 MG/DL (ref 0–160)
LYMPHOCYTES ABSOLUTE: NORMAL /ΜL
LYMPHOCYTES RELATIVE PERCENT: NORMAL %
MCH RBC QN AUTO: NORMAL PG
MCHC RBC AUTO-ENTMCNC: NORMAL G/DL
MCV RBC AUTO: NORMAL FL
MONOCYTES ABSOLUTE: NORMAL /ΜL
MONOCYTES RELATIVE PERCENT: NORMAL %
NEUTROPHILS ABSOLUTE: NORMAL /ΜL
NEUTROPHILS RELATIVE PERCENT: NORMAL %
PLATELET # BLD: NORMAL K/ΜL
PMV BLD AUTO: NORMAL FL
RBC # BLD: NORMAL 10^6/ΜL
TRIGL SERPL-MCNC: 424 MG/DL
VLDLC SERPL CALC-MCNC: NORMAL MG/DL
WBC # BLD: NORMAL 10^3/ML

## 2018-10-23 ENCOUNTER — OFFICE VISIT (OUTPATIENT)
Dept: FAMILY MEDICINE CLINIC | Age: 63
End: 2018-10-23
Payer: COMMERCIAL

## 2018-10-23 VITALS
SYSTOLIC BLOOD PRESSURE: 132 MMHG | WEIGHT: 151 LBS | DIASTOLIC BLOOD PRESSURE: 88 MMHG | HEART RATE: 66 BPM | TEMPERATURE: 97.8 F | BODY MASS INDEX: 27.62 KG/M2 | RESPIRATION RATE: 16 BRPM | OXYGEN SATURATION: 97 %

## 2018-10-23 DIAGNOSIS — E78.01 FAMILIAL HYPERCHOLESTEROLEMIA: ICD-10-CM

## 2018-10-23 DIAGNOSIS — I12.9 BENIGN HYPERTENSIVE KIDNEY DISEASE WITH CHRONIC KIDNEY DISEASE STAGE I THROUGH STAGE IV, OR UNSPECIFIED: ICD-10-CM

## 2018-10-23 DIAGNOSIS — R73.9 HYPERGLYCEMIA: Primary | ICD-10-CM

## 2018-10-23 DIAGNOSIS — M54.50 CHRONIC MIDLINE LOW BACK PAIN WITHOUT SCIATICA: ICD-10-CM

## 2018-10-23 DIAGNOSIS — E03.8 HYPOTHYROIDISM DUE TO HASHIMOTO'S THYROIDITIS: ICD-10-CM

## 2018-10-23 DIAGNOSIS — I10 ESSENTIAL (PRIMARY) HYPERTENSION: ICD-10-CM

## 2018-10-23 DIAGNOSIS — G89.29 CHRONIC MIDLINE LOW BACK PAIN WITHOUT SCIATICA: ICD-10-CM

## 2018-10-23 DIAGNOSIS — R73.9 HYPERGLYCEMIA: ICD-10-CM

## 2018-10-23 DIAGNOSIS — R30.0 DYSURIA: ICD-10-CM

## 2018-10-23 DIAGNOSIS — F51.04 CHRONIC INSOMNIA: ICD-10-CM

## 2018-10-23 DIAGNOSIS — N18.30 STAGE 3 CHRONIC KIDNEY DISEASE (HCC): ICD-10-CM

## 2018-10-23 DIAGNOSIS — E06.3 HYPOTHYROIDISM DUE TO HASHIMOTO'S THYROIDITIS: ICD-10-CM

## 2018-10-23 DIAGNOSIS — Z23 NEEDS FLU SHOT: ICD-10-CM

## 2018-10-23 LAB
APPEARANCE FLUID: ABNORMAL
BILIRUBIN, POC: ABNORMAL
BLOOD URINE, POC: ABNORMAL
CLARITY, POC: CLEAR
COLOR, POC: YELLOW
GLUCOSE URINE, POC: ABNORMAL
KETONES, POC: ABNORMAL
LEUKOCYTE EST, POC: ABNORMAL
NITRITE, POC: ABNORMAL
PH, POC: 7
PROTEIN, POC: ABNORMAL
SPECIFIC GRAVITY, POC: 1.01
UROBILINOGEN, POC: 0.2

## 2018-10-23 PROCEDURE — 90471 IMMUNIZATION ADMIN: CPT | Performed by: FAMILY MEDICINE

## 2018-10-23 PROCEDURE — 90686 IIV4 VACC NO PRSV 0.5 ML IM: CPT | Performed by: FAMILY MEDICINE

## 2018-10-23 PROCEDURE — 99214 OFFICE O/P EST MOD 30 MIN: CPT | Performed by: FAMILY MEDICINE

## 2018-10-23 PROCEDURE — 81002 URINALYSIS NONAUTO W/O SCOPE: CPT | Performed by: FAMILY MEDICINE

## 2018-10-23 ASSESSMENT — ENCOUNTER SYMPTOMS
CONSTIPATION: 0
ABDOMINAL PAIN: 0
COUGH: 0
BACK PAIN: 1
DIARRHEA: 0
SHORTNESS OF BREATH: 0
NAUSEA: 0
ANAL BLEEDING: 0
CHEST TIGHTNESS: 0

## 2018-10-23 ASSESSMENT — PATIENT HEALTH QUESTIONNAIRE - PHQ9
SUM OF ALL RESPONSES TO PHQ QUESTIONS 1-9: 0
1. LITTLE INTEREST OR PLEASURE IN DOING THINGS: 0
2. FEELING DOWN, DEPRESSED OR HOPELESS: 0
SUM OF ALL RESPONSES TO PHQ QUESTIONS 1-9: 0
SUM OF ALL RESPONSES TO PHQ9 QUESTIONS 1 & 2: 0

## 2018-10-23 NOTE — PROGRESS NOTES
Vaccine Information Sheet, \"Influenza - Inactivated\"   given to Edmund Ames, or parent/legal guardian of  Edmund Ames and verbalized understanding. Patient responses:    Have you ever had a reaction to a flu vaccine? No  Are you able to eat eggs without adverse effects? Yes  Do you have any current illness? No  Have you ever had [place] Syndrome? No    Flu vaccine given per order. Please see immunization tab.

## 2018-11-29 ENCOUNTER — CLINICAL SUPPORT (OUTPATIENT)
Dept: FAMILY MEDICINE CLINIC | Facility: CLINIC | Age: 63
End: 2018-11-29

## 2018-11-29 ENCOUNTER — TRANSCRIBE ORDERS (OUTPATIENT)
Dept: LAB | Facility: HOSPITAL | Age: 63
End: 2018-11-29

## 2018-11-29 ENCOUNTER — LAB (OUTPATIENT)
Dept: LAB | Facility: HOSPITAL | Age: 63
End: 2018-11-29

## 2018-11-29 DIAGNOSIS — Z01.818 PRE-OP TESTING: Primary | ICD-10-CM

## 2018-11-29 DIAGNOSIS — M75.120 COMPLETE TEAR OF ROTATOR CUFF, UNSPECIFIED LATERALITY: ICD-10-CM

## 2018-11-29 DIAGNOSIS — M75.120 COMPLETE TEAR OF ROTATOR CUFF, UNSPECIFIED LATERALITY: Primary | ICD-10-CM

## 2018-11-29 PROCEDURE — 82570 ASSAY OF URINE CREATININE: CPT

## 2018-11-29 PROCEDURE — 85610 PROTHROMBIN TIME: CPT

## 2018-11-29 PROCEDURE — 82306 VITAMIN D 25 HYDROXY: CPT

## 2018-11-29 PROCEDURE — 93010 ELECTROCARDIOGRAM REPORT: CPT | Performed by: INTERNAL MEDICINE

## 2018-11-29 PROCEDURE — 85007 BL SMEAR W/DIFF WBC COUNT: CPT

## 2018-11-29 PROCEDURE — 83970 ASSAY OF PARATHORMONE: CPT

## 2018-11-29 PROCEDURE — 85025 COMPLETE CBC W/AUTO DIFF WBC: CPT

## 2018-11-29 PROCEDURE — 84100 ASSAY OF PHOSPHORUS: CPT

## 2018-11-29 PROCEDURE — 80053 COMPREHEN METABOLIC PANEL: CPT

## 2018-11-29 PROCEDURE — 83735 ASSAY OF MAGNESIUM: CPT

## 2018-11-29 PROCEDURE — 84156 ASSAY OF PROTEIN URINE: CPT

## 2018-11-29 PROCEDURE — 93005 ELECTROCARDIOGRAM TRACING: CPT | Performed by: NURSE PRACTITIONER

## 2018-11-30 LAB
25(OH)D3 SERPL-MCNC: 46.6 NG/ML (ref 30–100)
ALBUMIN SERPL-MCNC: 4.7 G/DL (ref 3.4–4.8)
ALBUMIN/GLOB SERPL: 1.7 G/DL (ref 1.1–1.8)
ALP SERPL-CCNC: 60 U/L (ref 38–126)
ALT SERPL W P-5'-P-CCNC: 23 U/L (ref 9–52)
ANION GAP SERPL CALCULATED.3IONS-SCNC: 14 MMOL/L (ref 5–15)
AST SERPL-CCNC: 24 U/L (ref 14–36)
BASOPHILS # BLD AUTO: 0.02 10*3/MM3 (ref 0–0.2)
BASOPHILS NFR BLD AUTO: 0.2 % (ref 0–2)
BILIRUB SERPL-MCNC: 0.4 MG/DL (ref 0.2–1.3)
BILIRUB UR QL STRIP: NEGATIVE
BUN BLD-MCNC: 18 MG/DL (ref 7–21)
BUN/CREAT SERPL: 12.8 (ref 7–25)
CALCIUM SPEC-SCNC: 10.4 MG/DL (ref 8.4–10.2)
CHLORIDE SERPL-SCNC: 96 MMOL/L (ref 95–110)
CLARITY UR: ABNORMAL
CO2 SERPL-SCNC: 27 MMOL/L (ref 22–31)
COLOR UR: ABNORMAL
CREAT BLD-MCNC: 1.41 MG/DL (ref 0.5–1)
CREAT UR-MCNC: 92 MG/DL
DEPRECATED RDW RBC AUTO: 42.7 FL (ref 36.4–46.3)
EOSINOPHIL # BLD AUTO: 0.17 10*3/MM3 (ref 0–0.7)
EOSINOPHIL NFR BLD AUTO: 1.6 % (ref 0–7)
ERYTHROCYTE [DISTWIDTH] IN BLOOD BY AUTOMATED COUNT: 12.9 % (ref 11.5–14.5)
GFR SERPL CREATININE-BSD FRML MDRD: 38 ML/MIN/1.73 (ref 45–104)
GLOBULIN UR ELPH-MCNC: 2.8 GM/DL (ref 2.3–3.5)
GLUCOSE BLD-MCNC: 97 MG/DL (ref 60–100)
GLUCOSE UR STRIP-MCNC: NEGATIVE MG/DL
HCT VFR BLD AUTO: 46.2 % (ref 35–45)
HGB BLD-MCNC: 15.8 G/DL (ref 12–15.5)
HGB UR QL STRIP.AUTO: ABNORMAL
IMM GRANULOCYTES # BLD: 0.05 10*3/MM3 (ref 0–0.02)
IMM GRANULOCYTES NFR BLD: 0.5 % (ref 0–0.5)
INR PPP: 0.98 (ref 0.8–1.2)
KETONES UR QL STRIP: ABNORMAL
LEUKOCYTE ESTERASE UR QL STRIP.AUTO: NEGATIVE
LYMPHOCYTES # BLD AUTO: 2.24 10*3/MM3 (ref 0.6–4.2)
LYMPHOCYTES NFR BLD AUTO: 20.9 % (ref 10–50)
MAGNESIUM SERPL-MCNC: 1.7 MG/DL (ref 1.6–2.3)
MCH RBC QN AUTO: 31.3 PG (ref 26.5–34)
MCHC RBC AUTO-ENTMCNC: 34.2 G/DL (ref 31.4–36)
MCV RBC AUTO: 91.5 FL (ref 80–98)
MONOCYTES # BLD AUTO: 1.12 10*3/MM3 (ref 0–0.9)
MONOCYTES NFR BLD AUTO: 10.4 % (ref 0–12)
NEUTROPHILS # BLD AUTO: 7.12 10*3/MM3 (ref 2–8.6)
NEUTROPHILS NFR BLD AUTO: 66.4 % (ref 37–80)
NITRITE UR QL STRIP: POSITIVE
PH UR STRIP.AUTO: 5 [PH] (ref 5–8)
PHOSPHATE SERPL-MCNC: 4.5 MG/DL (ref 2.4–4.4)
PLAT MORPH BLD: NORMAL
PLATELET # BLD AUTO: 263 10*3/MM3 (ref 150–450)
PMV BLD AUTO: 10.3 FL (ref 8–12)
POTASSIUM BLD-SCNC: 4.5 MMOL/L (ref 3.5–5.1)
PROT SERPL-MCNC: 7.5 G/DL (ref 6.3–8.6)
PROT UR QL STRIP: ABNORMAL
PROT UR-MCNC: 11.6 MG/DL
PROT/CREAT UR: 126.1 MG/G CREA (ref 0–200)
PROTHROMBIN TIME: 12.8 SECONDS (ref 11.1–15.3)
PTH-INTACT SERPL-MCNC: 11.7 PG/ML (ref 10–65)
RBC # BLD AUTO: 5.05 10*6/MM3 (ref 3.77–5.16)
RBC MORPH BLD: NORMAL
SODIUM BLD-SCNC: 137 MMOL/L (ref 137–145)
SP GR UR STRIP: 1.03 (ref 1–1.03)
UROBILINOGEN UR QL STRIP: ABNORMAL
WBC MORPH BLD: NORMAL
WBC NRBC COR # BLD: 10.72 10*3/MM3 (ref 3.2–9.8)

## 2018-11-30 RX ORDER — MONTELUKAST SODIUM 10 MG/1
10 TABLET ORAL NIGHTLY
COMMUNITY

## 2018-11-30 RX ORDER — CELECOXIB 200 MG/1
200 CAPSULE ORAL 2 TIMES DAILY
COMMUNITY
End: 2022-01-05

## 2018-11-30 RX ORDER — PROPRANOLOL HYDROCHLORIDE 80 MG/1
80 CAPSULE, EXTENDED RELEASE ORAL DAILY
COMMUNITY

## 2018-11-30 RX ORDER — ACETAMINOPHEN 500 MG
500 TABLET ORAL EVERY 6 HOURS PRN
COMMUNITY
End: 2018-12-04 | Stop reason: HOSPADM

## 2018-11-30 RX ORDER — LOSARTAN POTASSIUM 25 MG/1
25 TABLET ORAL DAILY
COMMUNITY

## 2018-11-30 RX ORDER — ALPRAZOLAM 0.25 MG/1
0.25 TABLET ORAL NIGHTLY
COMMUNITY

## 2018-11-30 RX ORDER — LANSOPRAZOLE 15 MG/1
15 CAPSULE, DELAYED RELEASE ORAL DAILY
COMMUNITY

## 2018-11-30 RX ORDER — OMEGA-3S/DHA/EPA/FISH OIL/D3 300MG-1000
1 CAPSULE ORAL 3 TIMES DAILY
COMMUNITY
End: 2022-01-05

## 2018-11-30 RX ORDER — TRIAMTERENE AND HYDROCHLOROTHIAZIDE 75; 50 MG/1; MG/1
1 TABLET ORAL DAILY
COMMUNITY
End: 2022-01-05

## 2018-11-30 RX ORDER — TRAMADOL HYDROCHLORIDE 50 MG/1
50 TABLET ORAL EVERY 8 HOURS PRN
COMMUNITY
End: 2018-12-04 | Stop reason: HOSPADM

## 2018-11-30 RX ORDER — CYCLOBENZAPRINE HCL 10 MG
10 TABLET ORAL NIGHTLY
COMMUNITY

## 2018-11-30 RX ORDER — AMITRIPTYLINE HYDROCHLORIDE 25 MG/1
25 TABLET, FILM COATED ORAL NIGHTLY
COMMUNITY
End: 2022-01-05

## 2018-12-01 PROBLEM — S46.012A TRAUMATIC COMPLETE TEAR OF LEFT ROTATOR CUFF: Status: ACTIVE | Noted: 2018-12-01

## 2018-12-01 NOTE — OP NOTE
Patient Name: Rody  : 1955  MRN: [id number]    DATE of SURGERY: 18    SURGEON: Dionicio Araiza MD    ASSISTANT: NONE    PREOPERATIVE DIAGNOSIS:  1) Acute traumatic complete MASSIVE rotator cuff tear of the Left shoulder  2) Superior glenoid labral lesion (SLAP tear), proximal biceps tendinitis/partial tear, Left Shoulder    3) Subacromial impingement syndrome, Left Shoulder     POSTOPERATIVE DIAGNOSIS:  1) Acute traumatic complete MASSIVE rotator cuff tear of the Left shoulder  2) Superior glenoid labral lesion (SLAP tear), proximal biceps tendinitis/partial tear, Left Shoulder    3) Subacromial impingement syndrome, Left Shoulder     PROCEDURE PERFORMED:  1) Left Shoulder arthroscopic rotator cuff repair   2) Left Shoulder arthroscopic biceps tenotomy, labral debridement  3) Left Shoulder arthroscopic subacromial decompression    IMPLANTS: Arthrex 4.75 mm bioswivelock anchors x 4 (speedbridge)    ANESTHESIA USED: General endotrachial anesthesia, interscalene block    ESTIMATED BLOOD LOSS: minimal    DRAINS: none     COMPLICATIONS: none    SPECIMENS: none      OPERATIVE INDICATIONS: This patient is a 63 y.o. female presented to my clinic with complaints of progressive shoulder pain after a traumatic injury to the shoulder that occurred approximately 3 weeks ago after falling down a hill while walking in a parking lot. An MRI was obtained which showed a massive superior rotator cuff tear. Pain was not relieved with conservative treatment consisting of anti-inflammatories, corticosteroid injections, physical therapy.  Due to progressive pain and loss of function, surgical evaluation was discussed and the patient wished to proceed understanding risks, benefits, and alternatives. The surgical indication were to relieve pain, improve function, and prevent future disability in regards to the shoulder pathology dictated in the aboved diagnoses.  Risks included, but were not limited to, that of  anesthesia, bleeding, infection, pain, damage to local structures, need for further surgery, failure of repair, stiffness, failure of implants, and loss of function.      OPERATIVE FINDINGS:  1) Exam under anesthesia:  Full passive ROM, joint stable without laxity, skin intact  2) Glenohumeral Joint:       A) Chondral surfaces: Intact         B) Labrum: SLAP tear, unstable superior labrum       C) Biceps: Hypertrophy with synovitis, Partial tear         D) Rotator Cuff:  Complete full thickness tear of supraspinatus and infraspinatus with retraction to joint space, bone moderate, tissue good, good excursion  3) Subacromial space:         A) Large amount of dense vascular bursa         B) Type 3 acromium, hypertrophic coracoacromial ligament         C) Bursal surface rotator cuff: Complete tear as above         D) AC joint:  Intact without arthritic change    PROCEDURE in DETAIL:  The patient was seen in the preoperative holding room, once again the informed consent was reviewed with the patient and signed.  The site of surgery was marked with the patient's agreement.  After being transported to the operating room, a timeout was performed identifying the correct patient as well as the operative site.  Dose appropriate IV antibiotics were given prior to incision.  The patient was positioned in the beach chair position, all bony prominences were protected and a sterile prep and drape was performed.  A standard posterior arthroscopy portal was established and an outside-in technique was utilized to establish an anterior portal within the rotator interval.  An arthroscopic probe was inserted and a thorough exam of the glenohumeral joint was performed.    Attention was first turned to the biceps-labral complex and the probe was used to identify a SLAP lesion of the superior glenoid that was unstable to probing.  A ramp test was performed on the biceps tendon as well idenitifying synovitis, partial tearing, and hypertrophy  of the tendon.  A biceps tenotomy was then performed at the biceps-labral anchor with a cautery device.  The surrounding labrum was debrided with a shaver and the probe was reinserted showing the remaining labrum to be stable.    Attention was then turned to the intraarticular portion of the rotator cuff.  A probe was used to identify a complete massive tear of the rotator cuff and a shaver was used to debride the tendon.    The arthroscope was then transitioned to the subacromial space and an outside in technique was used to establish a lateral portal.  The arthroscopic shaver was introduced in the subacromial space and a complete bursectomy was performed with this device.  The underlying rotator cuff was inspected showing a massive tear of the supraspinatus and infraspinatus with retraction to the joint space.    Attention was turned to the anterior and lateral edge of the acromium where soft tissue was removed with a cautery device.  Due to a prominent acromial spur causing impingement, an acromioplasty was performed with an arthroscopic ambar converting this to a flat type 1 morphology.  The coracoaromial ligament was incised with a cautery device completing the subacromial decompression.    Attention was then turned to the rotator cuff.  Due to the large tear, a double row technique was utilized.  An additional portal was created adjacent to the lateral aspect of the acromium.  4.75 mm bioswivelock anchors, prethreaded with fibertape suture, were inserted adjacent to the articular surface of the humeral head both anteriorly and posteriorly.  Sutures were passed through the anterior and posterior aspects of the tear and taken out the anterior portal, then cut into four strands.  A suture from the anterior anchor and one from the posterior anchor were then retrieved out the lateral portal and fixation was performed in knotless fashion the lateral aspect of the greater tuberosity with a knotless 4.75 bioswivelock  anchor.  An identical procedure was performed for the remaining sutures completing the repair.    Portals were then closed with monocryl and a sterile dressing was applied followed by a sling.  The patient was awakened from anesthesia, transported to the recovery room in stable condition.    POSTOP PLAN:    1) Discharge home with family  2) Follow up in 1 week for a clinical check  3) Follow the following protocol: Small/Medium RCR       Electronically signed by Dionicio Araiza MD on 12/4/2018 at 3:47 PM

## 2018-12-01 NOTE — DISCHARGE INSTRUCTIONS
YOUR NEXT PAIN MEDICATION IS DUE AT______________         General Anesthesia, Adult, Care After  Refer to this sheet in the next few weeks. These instructions provide you with information on caring for yourself after your procedure. Your health care provider may also give you more specific instructions. Your treatment has been planned according to current medical practices, but problems sometimes occur. Call your health care provider if you have any problems or questions after your procedure.  WHAT TO EXPECT AFTER THE PROCEDURE  After the procedure, it is typical to experience:  · Sleepiness.  · Nausea and vomiting.  HOME CARE INSTRUCTIONS  · For the first 24 hours after general anesthesia:  ¨ Have a responsible person with you.  ¨ Do not drive a car. If you are alone, do not take public transportation.  ¨ Do not drink alcohol.  ¨ Do not take medicine that has not been prescribed by your health care provider.  ¨ Do not sign important papers or make important decisions.  ¨ You may resume a normal diet and activities as directed by your health care provider.  · Change bandages (dressings) as directed.  · If you have questions or problems that seem related to general anesthesia, call the hospital and ask for the anesthetist or anesthesiologist on call.  SEEK MEDICAL CARE IF:  · You have nausea and vomiting that continue the day after anesthesia.  · You develop a rash.  SEEK IMMEDIATE MEDICAL CARE IF:    · You have difficulty breathing.  · You have chest pain.  · You have any allergic problems.     This information is not intended to replace advice given to you by your health care provider. Make sure you discuss any questions you have with your health care provider.     Document Released: 03/26/2002 Document Revised: 01/08/2016 Document Reviewed: 07/03/2014  [employer] Interactive Patient Education ©2016 [employer]    CALL YOUR PHYSICIAN IF YOU EXPERIENCE  INCREASED PAIN NOT HELPED BY YOUR PAIN MEDICATION.    .                                               Fall Prevention in the Home      Falls can cause injuries. They can happen to people of all ages. There are many things you can do to make your home safe and to help prevent falls.    WHAT CAN I DO ON THE OUTSIDE OF MY HOME?  · Regularly fix the edges of walkways and driveways and fix any cracks.  · Remove anything that might make you trip as you walk through a door, such as a raised step or threshold.  · Trim any bushes or trees on the path to your home.  · Use bright outdoor lighting.  · Clear any walking paths of anything that might make someone trip, such as rocks or tools.  · Regularly check to see if handrails are loose or broken. Make sure that both sides of any steps have handrails.  · Any raised decks and porches should have guardrails on the edges.  · Have any leaves, snow, or ice cleared regularly.  · Use sand or salt on walking paths during winter.  · Clean up any spills in your garage right away. This includes oil or grease spills.  WHAT CAN I DO IN THE BATHROOM?    · Use night lights.  · Install grab bars by the toilet and in the tub and shower. Do not use towel bars as grab bars.  · Use non-skid mats or decals in the tub or shower.  · If you need to sit down in the shower, use a plastic, non-slip stool.  · Keep the floor dry. Clean up any water that spills on the floor as soon as it happens.  · Remove soap buildup in the tub or shower regularly.  · Attach bath mats securely with double-sided non-slip rug tape.  · Do not have throw rugs and other things on the floor that can make you trip.  WHAT CAN I DO IN THE BEDROOM?  · Use night lights.  · Make sure that you have a light by your bed that is easy to reach.  · Do not use any sheets or blankets that are too big for your bed. They should not hang down onto the floor.  · Have a firm chair that has side arms. You can use this for support while you get dressed.  · Do not have throw rugs and other things on the floor  that can make you trip.  WHAT CAN I DO IN THE KITCHEN?  · Clean up any spills right away.  · Avoid walking on wet floors.  · Keep items that you use a lot in easy-to-reach places.  · If you need to reach something above you, use a strong step stool that has a grab bar.  · Keep electrical cords out of the way.  · Do not use floor polish or wax that makes floors slippery. If you must use wax, use non-skid floor wax.  · Do not have throw rugs and other things on the floor that can make you trip.  WHAT CAN I DO WITH MY STAIRS?  · Do not leave any items on the stairs.  · Make sure that there are handrails on both sides of the stairs and use them. Fix handrails that are broken or loose. Make sure that handrails are as long as the stairways.  · Check any carpeting to make sure that it is firmly attached to the stairs. Fix any carpet that is loose or worn.  · Avoid having throw rugs at the top or bottom of the stairs. If you do have throw rugs, attach them to the floor with carpet tape.  · Make sure that you have a light switch at the top of the stairs and the bottom of the stairs. If you do not have them, ask someone to add them for you.  WHAT ELSE CAN I DO TO HELP PREVENT FALLS?  · Wear shoes that:  ¨ Do not have high heels.  ¨ Have rubber bottoms.  ¨ Are comfortable and fit you well.  ¨ Are closed at the toe. Do not wear sandals.  · If you use a stepladder:  ¨ Make sure that it is fully opened. Do not climb a closed stepladder.  ¨ Make sure that both sides of the stepladder are locked into place.  ¨ Ask someone to hold it for you, if possible.  · Clearly rick and make sure that you can see:  ¨ Any grab bars or handrails.  ¨ First and last steps.  ¨ Where the edge of each step is.  · Use tools that help you move around (mobility aids) if they are needed. These include:  ¨ Canes.  ¨ Walkers.  ¨ Scooters.  ¨ Crutches.  · Turn on the lights when you go into a dark area. Replace any light bulbs as soon as they burn  out.  · Set up your furniture so you have a clear path. Avoid moving your furniture around.  · If any of your floors are uneven, fix them.  · If there are any pets around you, be aware of where they are.  · Review your medicines with your doctor. Some medicines can make you feel dizzy. This can increase your chance of falling.  Ask your doctor what other things that you can do to help prevent falls.     This information is not intended to replace advice given to you by your health care provider. Make sure you discuss any questions you have with your health care provider.     Document Released: 10/14/2010 Document Revised: 05/03/2016 Document Reviewed: 01/22/2016  [employer] Interactive Patient Education ©2016 [employer]     PATIENT/FAMILY/RESPONSIBLE PARTY VERBALIZES UNDERSTANDING OF ABOVE EDUCATION.  COPY OF PAIN SCALE GIVEN AND REVIEWED WITH VERBALIZED UNDERSTANDING.                              UPPER EXTREMITY POST-OP INSTRUCTIONS - DR. ATKINSON    IMPORTANT PHONE NUMBERS:  • For emergencies, please call 911  • You may reach Dr. Atkinson and clinical staff at [phone]- M-N 8:00 am-5:00 pm  • After 5pm or on the weekends, please call [phone]  • Call immediately if you have any of the following symptoms:     Elevated temperature above 101.5 degrees for more than 48 hours after surgery     Persistent drainage from wound     Severe pain around surgical site    Sling use: The sling is provided for your comfort and to ensure proper healing of your repair following surgery. Please place the abduction pillow with the curved side against your side and the sling on the side of the pillow. Your surgery requires that you wear the sling if noted below.  ____ For comfort. Remove sling 24 hours and begin range of motion exercises  __X__ At all times except bathing, dressing, and therapy. Also wear the sling during sleep.  ____ No sling required    Bathing:  ___No bandages, no restrictions!!  _X__You may remove you  dressing and shower on the 3rd day after surgery (Ex. Tues surgery, shower on Friday)  ** if you are told to it is ok to remove your dressing and shower, DO NOT SOAK your incisions in a tub.  ___Keep splint clean, dry, and intact. DO NOT place foreign objects into your splint.      Dressings: Keep dressing/splint intact unless instructed otherwise below. SOME DRAINAGE IS NORMAL!    • DO NOT touch or apply ointment to the incision.    • DO NOT remove the steri-strips over the incisions (if you have steri-strips). They will         generally fall off on their own or can be removed 1 weeksafter surgery.    • If you have yellow gauze and it comes off, do not worry about it. Leave them off.   • Signs of infection that warrant a phone call to our clinical line:     o Excessive drainage or redness     o Red streaking coming away from the incision  o Increased pain  o Increased temperature above 101 degrees      Physical Therapy:        *  Your physical therapy status will be discussed with you postoperatively and at your first post-op appointment. Some injuries will not require physical therapy.      *  If you have a shoulder manipulation, please schedule therapy for the next day      Medications: You will be discharged with the appropriate medications following your surgery. Fill these at the pharmacy and take them as directed on the label. Not all of the medications below may be prescribed. Occasionally, other medications may be prescribed with specific instructions.    Percocet/Lortab (oxycodone/hydrocodone with tylenol) - Pain Medication, will cause drowsiness, possibly itchiness (this is NOT an allergy - use benadryl or an over the counter allergy medication such as Claritin or Zyrtec)     o Take 1-2 tablets every 4-6 hours. DO NOT EXCEED 4,000mg of Tylenol in 24 hours.  **DO NOT MIX WITH ALCOHOL, DRIVE WHILE TAKING, OR TAKE with extra TYLENOL**    Colace (Docusate) - stool softener, used for constipation. Take this  only if you feel constipated.      Zofran (Ondansetron) or Phenergan - Anti-nausea medication, will cause drowsiness      **If you are running low on pain medications, please notify us if you need a refill 24-48 hours prior to when you run out, so we can make arrangements to refill the prescription for you if we determine is necessary

## 2018-12-04 ENCOUNTER — ANESTHESIA EVENT (OUTPATIENT)
Dept: PERIOP | Facility: HOSPITAL | Age: 63
End: 2018-12-04

## 2018-12-04 ENCOUNTER — HOSPITAL ENCOUNTER (OUTPATIENT)
Facility: HOSPITAL | Age: 63
Setting detail: HOSPITAL OUTPATIENT SURGERY
Discharge: HOME | End: 2018-12-04
Attending: ORTHOPAEDIC SURGERY | Admitting: ORTHOPAEDIC SURGERY

## 2018-12-04 ENCOUNTER — ANESTHESIA (OUTPATIENT)
Dept: PERIOP | Facility: HOSPITAL | Age: 63
End: 2018-12-04

## 2018-12-04 VITALS
RESPIRATION RATE: 18 BRPM | WEIGHT: 145.72 LBS | TEMPERATURE: 98.2 F | OXYGEN SATURATION: 96 % | DIASTOLIC BLOOD PRESSURE: 76 MMHG | HEART RATE: 60 BPM | SYSTOLIC BLOOD PRESSURE: 128 MMHG | BODY MASS INDEX: 28.61 KG/M2 | HEIGHT: 60 IN

## 2018-12-04 DIAGNOSIS — S46.012A TRAUMATIC COMPLETE TEAR OF LEFT ROTATOR CUFF, INITIAL ENCOUNTER: Primary | ICD-10-CM

## 2018-12-04 PROCEDURE — 25010000002 ONDANSETRON PER 1 MG: Performed by: NURSE ANESTHETIST, CERTIFIED REGISTERED

## 2018-12-04 PROCEDURE — 25010000002 PHENYLEPHRINE PER 1 ML: Performed by: NURSE ANESTHETIST, CERTIFIED REGISTERED

## 2018-12-04 PROCEDURE — 25010000002 SUCCINYLCHOLINE PER 20 MG: Performed by: NURSE ANESTHETIST, CERTIFIED REGISTERED

## 2018-12-04 PROCEDURE — 25010000002 DEXAMETHASONE PER 1 MG: Performed by: NURSE ANESTHETIST, CERTIFIED REGISTERED

## 2018-12-04 PROCEDURE — 25010000002 NEOSTIGMINE PER 0.5 MG: Performed by: NURSE ANESTHETIST, CERTIFIED REGISTERED

## 2018-12-04 PROCEDURE — 25010000002 MIDAZOLAM PER 1 MG: Performed by: ANESTHESIOLOGY

## 2018-12-04 PROCEDURE — 25010000002 PROPOFOL 10 MG/ML EMULSION: Performed by: NURSE ANESTHETIST, CERTIFIED REGISTERED

## 2018-12-04 PROCEDURE — 25010000002 EPINEPHRINE PER 0.1 MG: Performed by: ORTHOPAEDIC SURGERY

## 2018-12-04 PROCEDURE — 25010000002 ROPIVACAINE PER 1 MG: Performed by: ANESTHESIOLOGY

## 2018-12-04 PROCEDURE — 25010000002 FENTANYL CITRATE (PF) 100 MCG/2ML SOLUTION: Performed by: ANESTHESIOLOGY

## 2018-12-04 PROCEDURE — C1713 ANCHOR/SCREW BN/BN,TIS/BN: HCPCS | Performed by: ORTHOPAEDIC SURGERY

## 2018-12-04 PROCEDURE — 25010000002 DEXAMETHASONE PER 1 MG: Performed by: ANESTHESIOLOGY

## 2018-12-04 DEVICE — SYS SUT/ANCH BIOCOMP SPEEDBRIDGE SWIVELK 4.75X19.1: Type: IMPLANTABLE DEVICE | Status: FUNCTIONAL

## 2018-12-04 RX ORDER — FENTANYL CITRATE 50 UG/ML
25 INJECTION, SOLUTION INTRAMUSCULAR; INTRAVENOUS AS NEEDED
Status: DISCONTINUED | OUTPATIENT
Start: 2018-12-04 | End: 2018-12-04 | Stop reason: HOSPADM

## 2018-12-04 RX ORDER — ONDANSETRON 2 MG/ML
4 INJECTION INTRAMUSCULAR; INTRAVENOUS ONCE AS NEEDED
Status: DISCONTINUED | OUTPATIENT
Start: 2018-12-04 | End: 2018-12-04 | Stop reason: HOSPADM

## 2018-12-04 RX ORDER — IPRATROPIUM BROMIDE AND ALBUTEROL SULFATE 2.5; .5 MG/3ML; MG/3ML
3 SOLUTION RESPIRATORY (INHALATION) ONCE AS NEEDED
Status: DISCONTINUED | OUTPATIENT
Start: 2018-12-04 | End: 2018-12-04 | Stop reason: HOSPADM

## 2018-12-04 RX ORDER — SUCCINYLCHOLINE CHLORIDE 20 MG/ML
INJECTION INTRAMUSCULAR; INTRAVENOUS AS NEEDED
Status: DISCONTINUED | OUTPATIENT
Start: 2018-12-04 | End: 2018-12-04 | Stop reason: SURG

## 2018-12-04 RX ORDER — GLYCOPYRROLATE 0.2 MG/ML
INJECTION INTRAMUSCULAR; INTRAVENOUS AS NEEDED
Status: DISCONTINUED | OUTPATIENT
Start: 2018-12-04 | End: 2018-12-04 | Stop reason: SURG

## 2018-12-04 RX ORDER — NALOXONE HCL 0.4 MG/ML
0.4 VIAL (ML) INJECTION AS NEEDED
Status: DISCONTINUED | OUTPATIENT
Start: 2018-12-04 | End: 2018-12-04 | Stop reason: HOSPADM

## 2018-12-04 RX ORDER — VASOPRESSIN 20 U/ML
INJECTION PARENTERAL AS NEEDED
Status: DISCONTINUED | OUTPATIENT
Start: 2018-12-04 | End: 2018-12-04 | Stop reason: SURG

## 2018-12-04 RX ORDER — DEXAMETHASONE SODIUM PHOSPHATE 4 MG/ML
INJECTION, SOLUTION INTRA-ARTICULAR; INTRALESIONAL; INTRAMUSCULAR; INTRAVENOUS; SOFT TISSUE AS NEEDED
Status: DISCONTINUED | OUTPATIENT
Start: 2018-12-04 | End: 2018-12-04 | Stop reason: SURG

## 2018-12-04 RX ORDER — LABETALOL HYDROCHLORIDE 5 MG/ML
5 INJECTION, SOLUTION INTRAVENOUS
Status: DISCONTINUED | OUTPATIENT
Start: 2018-12-04 | End: 2018-12-04 | Stop reason: HOSPADM

## 2018-12-04 RX ORDER — HYDROCODONE BITARTRATE AND ACETAMINOPHEN 5; 325 MG/1; MG/1
1 TABLET ORAL ONCE AS NEEDED
Status: DISCONTINUED | OUTPATIENT
Start: 2018-12-04 | End: 2018-12-04 | Stop reason: HOSPADM

## 2018-12-04 RX ORDER — SODIUM CHLORIDE, SODIUM LACTATE, POTASSIUM CHLORIDE, CALCIUM CHLORIDE 600; 310; 30; 20 MG/100ML; MG/100ML; MG/100ML; MG/100ML
1000 INJECTION, SOLUTION INTRAVENOUS CONTINUOUS
Status: DISCONTINUED | OUTPATIENT
Start: 2018-12-04 | End: 2018-12-04 | Stop reason: HOSPADM

## 2018-12-04 RX ORDER — ROPIVACAINE HYDROCHLORIDE 5 MG/ML
INJECTION, SOLUTION EPIDURAL; INFILTRATION; PERINEURAL
Status: COMPLETED | OUTPATIENT
Start: 2018-12-04 | End: 2018-12-04

## 2018-12-04 RX ORDER — MEPERIDINE HYDROCHLORIDE 25 MG/ML
12.5 INJECTION INTRAMUSCULAR; INTRAVENOUS; SUBCUTANEOUS
Status: DISCONTINUED | OUTPATIENT
Start: 2018-12-04 | End: 2018-12-04 | Stop reason: HOSPADM

## 2018-12-04 RX ORDER — MIDAZOLAM HYDROCHLORIDE 1 MG/ML
2 INJECTION INTRAMUSCULAR; INTRAVENOUS
Status: DISCONTINUED | OUTPATIENT
Start: 2018-12-04 | End: 2018-12-04 | Stop reason: HOSPADM

## 2018-12-04 RX ORDER — MAGNESIUM HYDROXIDE 1200 MG/15ML
LIQUID ORAL AS NEEDED
Status: DISCONTINUED | OUTPATIENT
Start: 2018-12-04 | End: 2018-12-04 | Stop reason: HOSPADM

## 2018-12-04 RX ORDER — ONDANSETRON 4 MG/1
4 TABLET, FILM COATED ORAL EVERY 8 HOURS PRN
Qty: 10 TABLET | Refills: 0 | Status: SHIPPED | OUTPATIENT
Start: 2018-12-04 | End: 2022-01-05

## 2018-12-04 RX ORDER — MIDAZOLAM HYDROCHLORIDE 1 MG/ML
1 INJECTION INTRAMUSCULAR; INTRAVENOUS
Status: DISCONTINUED | OUTPATIENT
Start: 2018-12-04 | End: 2018-12-04 | Stop reason: HOSPADM

## 2018-12-04 RX ORDER — SODIUM CHLORIDE 0.9 % (FLUSH) 0.9 %
1-10 SYRINGE (ML) INJECTION AS NEEDED
Status: DISCONTINUED | OUTPATIENT
Start: 2018-12-04 | End: 2018-12-04 | Stop reason: HOSPADM

## 2018-12-04 RX ORDER — SODIUM CHLORIDE, SODIUM LACTATE, POTASSIUM CHLORIDE, CALCIUM CHLORIDE 600; 310; 30; 20 MG/100ML; MG/100ML; MG/100ML; MG/100ML
9 INJECTION, SOLUTION INTRAVENOUS CONTINUOUS
Status: DISCONTINUED | OUTPATIENT
Start: 2018-12-04 | End: 2018-12-04 | Stop reason: HOSPADM

## 2018-12-04 RX ORDER — METOCLOPRAMIDE HYDROCHLORIDE 5 MG/ML
5 INJECTION INTRAMUSCULAR; INTRAVENOUS
Status: DISCONTINUED | OUTPATIENT
Start: 2018-12-04 | End: 2018-12-04 | Stop reason: HOSPADM

## 2018-12-04 RX ORDER — SCOLOPAMINE TRANSDERMAL SYSTEM 1 MG/1
1 PATCH, EXTENDED RELEASE TRANSDERMAL CONTINUOUS
Status: DISCONTINUED | OUTPATIENT
Start: 2018-12-04 | End: 2018-12-04 | Stop reason: HOSPADM

## 2018-12-04 RX ORDER — ACETAMINOPHEN 500 MG
1000 TABLET ORAL ONCE
Status: COMPLETED | OUTPATIENT
Start: 2018-12-04 | End: 2018-12-04

## 2018-12-04 RX ORDER — ROCURONIUM BROMIDE 10 MG/ML
INJECTION, SOLUTION INTRAVENOUS AS NEEDED
Status: DISCONTINUED | OUTPATIENT
Start: 2018-12-04 | End: 2018-12-04 | Stop reason: SURG

## 2018-12-04 RX ORDER — DEXTROSE MONOHYDRATE 25 G/50ML
12.5 INJECTION, SOLUTION INTRAVENOUS AS NEEDED
Status: DISCONTINUED | OUTPATIENT
Start: 2018-12-04 | End: 2018-12-04 | Stop reason: HOSPADM

## 2018-12-04 RX ORDER — OXYCODONE AND ACETAMINOPHEN 10; 325 MG/1; MG/1
1 TABLET ORAL ONCE AS NEEDED
Status: DISCONTINUED | OUTPATIENT
Start: 2018-12-04 | End: 2018-12-04 | Stop reason: HOSPADM

## 2018-12-04 RX ORDER — SODIUM CHLORIDE 0.9 % (FLUSH) 0.9 %
3 SYRINGE (ML) INJECTION AS NEEDED
Status: DISCONTINUED | OUTPATIENT
Start: 2018-12-04 | End: 2018-12-04 | Stop reason: HOSPADM

## 2018-12-04 RX ORDER — LIDOCAINE HYDROCHLORIDE 20 MG/ML
INJECTION, SOLUTION INFILTRATION; PERINEURAL AS NEEDED
Status: DISCONTINUED | OUTPATIENT
Start: 2018-12-04 | End: 2018-12-04 | Stop reason: SURG

## 2018-12-04 RX ORDER — DEXAMETHASONE SODIUM PHOSPHATE 4 MG/ML
4 INJECTION, SOLUTION INTRA-ARTICULAR; INTRALESIONAL; INTRAMUSCULAR; INTRAVENOUS; SOFT TISSUE ONCE AS NEEDED
Status: COMPLETED | OUTPATIENT
Start: 2018-12-04 | End: 2018-12-04

## 2018-12-04 RX ORDER — OXYCODONE AND ACETAMINOPHEN 7.5; 325 MG/1; MG/1
2 TABLET ORAL ONCE AS NEEDED
Status: DISCONTINUED | OUTPATIENT
Start: 2018-12-04 | End: 2018-12-04 | Stop reason: HOSPADM

## 2018-12-04 RX ORDER — FENTANYL CITRATE 50 UG/ML
25 INJECTION, SOLUTION INTRAMUSCULAR; INTRAVENOUS
Status: DISCONTINUED | OUTPATIENT
Start: 2018-12-04 | End: 2018-12-04 | Stop reason: HOSPADM

## 2018-12-04 RX ORDER — PHENYLEPHRINE HCL IN 0.9% NACL 0.8MG/10ML
SYRINGE (ML) INTRAVENOUS AS NEEDED
Status: DISCONTINUED | OUTPATIENT
Start: 2018-12-04 | End: 2018-12-04 | Stop reason: SURG

## 2018-12-04 RX ORDER — ONDANSETRON 2 MG/ML
INJECTION INTRAMUSCULAR; INTRAVENOUS AS NEEDED
Status: DISCONTINUED | OUTPATIENT
Start: 2018-12-04 | End: 2018-12-04 | Stop reason: SURG

## 2018-12-04 RX ORDER — IBUPROFEN 600 MG/1
600 TABLET ORAL ONCE AS NEEDED
Status: DISCONTINUED | OUTPATIENT
Start: 2018-12-04 | End: 2018-12-04 | Stop reason: HOSPADM

## 2018-12-04 RX ORDER — OXYCODONE AND ACETAMINOPHEN 10; 325 MG/1; MG/1
TABLET ORAL
Qty: 45 TABLET | Refills: 0 | Status: SHIPPED | OUTPATIENT
Start: 2018-12-04 | End: 2022-01-05

## 2018-12-04 RX ORDER — PROPOFOL 10 MG/ML
VIAL (ML) INTRAVENOUS AS NEEDED
Status: DISCONTINUED | OUTPATIENT
Start: 2018-12-04 | End: 2018-12-04 | Stop reason: SURG

## 2018-12-04 RX ADMIN — VASOPRESSIN 1 UNITS: 20 INJECTION INTRAVENOUS at 13:35

## 2018-12-04 RX ADMIN — DEXAMETHASONE SODIUM PHOSPHATE 4 MG: 4 INJECTION, SOLUTION INTRAMUSCULAR; INTRAVENOUS at 12:39

## 2018-12-04 RX ADMIN — PHENYLEPHRINE HYDROCHLORIDE 1 MCG/KG/MIN: 10 INJECTION INTRAVENOUS at 13:56

## 2018-12-04 RX ADMIN — Medication 80 MCG: at 13:27

## 2018-12-04 RX ADMIN — ROPIVACAINE HYDROCHLORIDE 20 ML: 5 INJECTION, SOLUTION EPIDURAL; INFILTRATION; PERINEURAL at 12:41

## 2018-12-04 RX ADMIN — CEFAZOLIN 1 G: 1 INJECTION, POWDER, FOR SOLUTION INTRAMUSCULAR; INTRAVENOUS; PARENTERAL at 13:25

## 2018-12-04 RX ADMIN — MIDAZOLAM HYDROCHLORIDE 2 MG: 1 INJECTION, SOLUTION INTRAMUSCULAR; INTRAVENOUS at 12:39

## 2018-12-04 RX ADMIN — LIDOCAINE HYDROCHLORIDE 40 MG: 20 INJECTION, SOLUTION INFILTRATION; PERINEURAL at 13:20

## 2018-12-04 RX ADMIN — Medication 160 MCG: at 13:37

## 2018-12-04 RX ADMIN — ACETAMINOPHEN 1000 MG: 500 TABLET ORAL at 12:37

## 2018-12-04 RX ADMIN — VASOPRESSIN 1 UNITS: 20 INJECTION INTRAVENOUS at 13:32

## 2018-12-04 RX ADMIN — Medication 4 MG: at 14:30

## 2018-12-04 RX ADMIN — FENTANYL CITRATE 100 MCG: 50 INJECTION INTRAMUSCULAR; INTRAVENOUS at 12:40

## 2018-12-04 RX ADMIN — ROCURONIUM BROMIDE 20 MG: 10 INJECTION INTRAVENOUS at 13:42

## 2018-12-04 RX ADMIN — SUCCINYLCHOLINE CHLORIDE 120 MG: 20 INJECTION, SOLUTION INTRAMUSCULAR; INTRAVENOUS at 13:20

## 2018-12-04 RX ADMIN — SODIUM CHLORIDE, POTASSIUM CHLORIDE, SODIUM LACTATE AND CALCIUM CHLORIDE 1000 ML: 600; 310; 30; 20 INJECTION, SOLUTION INTRAVENOUS at 12:19

## 2018-12-04 RX ADMIN — EPHEDRINE SULFATE 10 MG: 50 INJECTION INTRAMUSCULAR; INTRAVENOUS; SUBCUTANEOUS at 13:39

## 2018-12-04 RX ADMIN — ROCURONIUM BROMIDE 10 MG: 10 INJECTION INTRAVENOUS at 13:20

## 2018-12-04 RX ADMIN — ONDANSETRON HYDROCHLORIDE 4 MG: 2 SOLUTION INTRAMUSCULAR; INTRAVENOUS at 14:20

## 2018-12-04 RX ADMIN — LIDOCAINE HYDROCHLORIDE 0.5 ML: 10 INJECTION, SOLUTION EPIDURAL; INFILTRATION; INTRACAUDAL; PERINEURAL at 12:19

## 2018-12-04 RX ADMIN — Medication 160 MCG: at 13:44

## 2018-12-04 RX ADMIN — DEXAMETHASONE SODIUM PHOSPHATE 4 MG: 4 INJECTION, SOLUTION INTRAMUSCULAR; INTRAVENOUS at 14:20

## 2018-12-04 RX ADMIN — SCOPALAMINE 1 PATCH: 1 PATCH, EXTENDED RELEASE TRANSDERMAL at 12:39

## 2018-12-04 RX ADMIN — PROPOFOL 160 MG: 10 INJECTION, EMULSION INTRAVENOUS at 13:20

## 2018-12-04 RX ADMIN — GLYCOPYRROLATE 0.3 MG: 0.2 INJECTION, SOLUTION INTRAMUSCULAR; INTRAVENOUS at 14:30

## 2018-12-04 RX ADMIN — EPHEDRINE SULFATE 20 MG: 50 INJECTION INTRAMUSCULAR; INTRAVENOUS; SUBCUTANEOUS at 13:42

## 2018-12-04 NOTE — ANESTHESIA PROCEDURE NOTES
ANESTHESIA INTUBATION  Urgency: elective    Airway not difficult    General Information and Staff    Patient location during procedure: OR  CRNA: Leanna Montelongo CRNA    Indications and Patient Condition  Indications for airway management: airway protection    Preoxygenated: yes  Mask difficulty assessment: 1 - vent by mask    Final Airway Details  Final airway type: endotracheal airway      Successful airway: ETT  Cuffed: yes   Successful intubation technique: direct laryngoscopy  Endotracheal tube insertion site: oral  Blade: Cassidy  Blade size: 3.5.  ETT size (mm): 7.0  Cormack-Lehane Classification: grade I - full view of glottis  Placement verified by: chest auscultation and capnometry   Cuff volume (mL): 5  Measured from: lips  ETT to lips (cm): 20  Number of attempts at approach: 1

## 2018-12-04 NOTE — ANESTHESIA PREPROCEDURE EVALUATION
Anesthesia Evaluation     Patient summary reviewed   no history of anesthetic complications:  NPO Solid Status: > 8 hours             Airway   Mallampati: II  TM distance: >3 FB  Neck ROM: full  Dental      Pulmonary    (+) a smoker,   (-) COPD, asthma, sleep apnea  Cardiovascular   Exercise tolerance: excellent (>7 METS)    Patient on routine beta blocker and Beta blocker given within 24 hours of surgery    (+) hypertension,   (-) pacemaker, past MI, angina, cardiac stents      Neuro/Psych  (-) seizures, TIA, CVA  GI/Hepatic/Renal/Endo    (+)  GERD,  renal disease CRI,   (-) liver disease, diabetes    Musculoskeletal     Abdominal    Substance History      OB/GYN          Other                        Anesthesia Plan    ASA 2     general     intravenous induction   Anesthetic plan, all risks, benefits, and alternatives have been provided, discussed and informed consent has been obtained with: patient.

## 2018-12-04 NOTE — H&P
Pt Name: Mayuri Almeida  MRN: [id number]  YOB: 1955  Date of evaluation: 12/4/2018    H&P including current review of systems was updated in the paper chart and/or the document previously scanned into the record.  There have been no significant changes or new problems since the original evaluation.  The patient's problems continue and indications for contemplated procedure have not changed.    Electronically signed by Dionicio Araiza MD on 12/4/2018 at 10:54 AM

## 2018-12-04 NOTE — BRIEF OP NOTE
SHOULDER ARTHROSCOPY WITH ROTATOR CUFF REPAIR, BICEPS TENDON REPAIR  Progress Note    Mayuri Almeida  12/4/2018    Pre-op Diagnosis:   LEFT ROTATOR CUFF TEAR       Post-Op Diagnosis Codes:     * Traumatic complete tear of left rotator cuff, initial encounter [S46.012A]    Procedure/CPT® Codes:  UT SHLDR ARTHROSCOP,SURG,W/ROTAT CUFF REPR [80010]  UT SHLDR ARTHROSCOP,PART DEBRIDE [74642]  UT SHLDR ARTHROSCOP,PART ACROMIOPLAS [98595]    Procedure(s):  LEFT SHOULDER ARTHROSCOPIC ROTATOR CUFF REPAIR, DEBRIDEMENT, SUBACROMIAL DECOMPRESSION, DISTAL CLAVICLE EXCISION  BICEPS TENODESIS, TENOTOMY - LEFT    Surgeon(s):  Dionicio Araiza MD    Anesthesia: General with Block    Staff:   Circulator: Leanna Harkins RN  Scrub Person: Angely Awan; Kaleb Gutiérrez    Estimated Blood Loss: minimal    Urine Voided: * No values recorded between 12/4/2018  1:13 PM and 12/4/2018  2:27 PM *    Specimens:                None      Drains:      Findings: see op note     Complications: none      Dionicio Araiza MD     Date: 12/4/2018  Time: 2:31 PM

## 2018-12-04 NOTE — ANESTHESIA PROCEDURE NOTES
Peripheral Block      Patient location during procedure: holding area  Start time: 12/4/2018 12:40 PM  Stop time: 12/4/2018 12:42 PM  Reason for block: at surgeon's request and post-op pain management  Performed by  Anesthesiologist: Jerrod Marcano MD  Preanesthetic Checklist  Completed: patient identified, site marked, surgical consent, pre-op evaluation, timeout performed, IV checked, risks and benefits discussed and monitors and equipment checked  Prep:  Pt Position: supine  Sterile barriers:gloves  Prep: ChloraPrep  Patient monitoring: blood pressure monitoring, continuous pulse oximetry and EKG  Procedure  Sedation:yes    Guidance:ultrasound guided and nerve stimulator  Images:still images not obtained (printer broken)  Loss of twitch: 0.5 mA  Laterality:left  Block Type:interscalene  Injection Technique:single-shot  Needle Type:echogenic  Needle Gauge:20 G    Medications Used: ropivacaine (NAROPIN) injection 0.5 %, 20 mL  Med admintered at 12/4/2018 12:41 PM  Post Assessment  Injection Assessment: negative aspiration for heme, no paresthesia on injection and incremental injection  Patient Tolerance:comfortable throughout block  Complications:no

## 2018-12-04 NOTE — ANESTHESIA POSTPROCEDURE EVALUATION
"Patient: Mayuri Almeida    Procedure Summary     Date:  12/04/18 Room / Location:   [facility] OR 06 / [facility] OR    Anesthesia Start:  1314 Anesthesia Stop:  1435    Procedure:  LEFT SHOULDER ARTHROSCOPIC ROTATOR CUFF REPAIR, DEBRIDEMENT, SUBACROMIAL DECOMPRESSION, DISTAL CLAVICLE EXCISION BICEPS TENODESIS, TENOTOMY - LEFT   (Left Shoulder) Diagnosis:       Traumatic complete tear of left rotator cuff, initial encounter      (LEFT ROTATOR CUFF TEAR)    Surgeon:  Dionicio Araiza MD Provider:  Moustapha Weston CRNA    Anesthesia Type:  general ASA Status:  2          Anesthesia Type: general  Last vitals  BP   115/68 (12/04/18 1510)   Temp   98.2 °F (36.8 °C) (12/04/18 1503)   Pulse   57 (12/04/18 1510)   Resp   18 (12/04/18 1510)     SpO2   94 % (12/04/18 1510)     Post Anesthesia Care and Evaluation    Patient location during evaluation: PACU  Patient participation: complete - patient participated  Level of consciousness: awake and alert  Pain management: adequate  Airway patency: patent  Anesthetic complications: No anesthetic complications    Cardiovascular status: acceptable  Respiratory status: acceptable  Hydration status: acceptable    Comments: Blood pressure 115/68, pulse 57, temperature 98.2 °F (36.8 °C), temperature source Temporal, resp. rate 18, height 153 cm (60.24\"), weight 66.1 kg (145 lb 11.6 oz), SpO2 94 %.    Pt discharged from PACU based on corrina score >8      "

## 2019-01-01 DIAGNOSIS — I12.9 BENIGN HYPERTENSIVE KIDNEY DISEASE WITH CHRONIC KIDNEY DISEASE STAGE I THROUGH STAGE IV, OR UNSPECIFIED: ICD-10-CM

## 2019-01-01 DIAGNOSIS — J30.1 CHRONIC SEASONAL ALLERGIC RHINITIS DUE TO POLLEN: ICD-10-CM

## 2019-01-02 RX ORDER — MONTELUKAST SODIUM 10 MG/1
TABLET ORAL
Qty: 90 TABLET | Refills: 3 | Status: SHIPPED | OUTPATIENT
Start: 2019-01-02 | End: 2021-01-21 | Stop reason: ALTCHOICE

## 2019-01-02 RX ORDER — PROPRANOLOL HYDROCHLORIDE 80 MG/1
CAPSULE, EXTENDED RELEASE ORAL
Qty: 90 CAPSULE | Refills: 3 | Status: SHIPPED | OUTPATIENT
Start: 2019-01-02 | End: 2020-09-16 | Stop reason: SDUPTHER

## 2019-01-21 DIAGNOSIS — F41.1 GENERALIZED ANXIETY DISORDER: ICD-10-CM

## 2019-01-21 DIAGNOSIS — F51.04 CHRONIC INSOMNIA: ICD-10-CM

## 2019-01-22 RX ORDER — ALPRAZOLAM 0.25 MG/1
0.25 TABLET ORAL NIGHTLY PRN
Qty: 90 TABLET | Refills: 1 | Status: SHIPPED | OUTPATIENT
Start: 2019-01-22 | End: 2019-08-14 | Stop reason: SDUPTHER

## 2019-02-05 RX ORDER — CYCLOBENZAPRINE HCL 10 MG
TABLET ORAL
Qty: 90 TABLET | Refills: 3 | Status: SHIPPED | OUTPATIENT
Start: 2019-02-05 | End: 2020-01-21

## 2019-02-22 RX ORDER — POTASSIUM CHLORIDE 750 MG/1
TABLET, EXTENDED RELEASE ORAL
Qty: 270 TABLET | Refills: 3 | Status: SHIPPED | OUTPATIENT
Start: 2019-02-22 | End: 2020-02-07

## 2019-03-15 DIAGNOSIS — I10 ESSENTIAL (PRIMARY) HYPERTENSION: ICD-10-CM

## 2019-03-15 RX ORDER — TRIAMTERENE AND HYDROCHLOROTHIAZIDE 75; 50 MG/1; MG/1
TABLET ORAL
Qty: 90 TABLET | Refills: 3 | Status: SHIPPED | OUTPATIENT
Start: 2019-03-15 | End: 2020-03-09

## 2019-04-01 DIAGNOSIS — F51.04 CHRONIC INSOMNIA: ICD-10-CM

## 2019-04-01 RX ORDER — AMITRIPTYLINE HYDROCHLORIDE 25 MG/1
TABLET, FILM COATED ORAL
Qty: 90 TABLET | Refills: 1 | Status: SHIPPED | OUTPATIENT
Start: 2019-04-01 | End: 2019-09-18 | Stop reason: SDUPTHER

## 2019-04-08 ENCOUNTER — TRANSCRIBE ORDERS (OUTPATIENT)
Dept: LAB | Facility: HOSPITAL | Age: 64
End: 2019-04-08

## 2019-04-08 ENCOUNTER — LAB (OUTPATIENT)
Dept: LAB | Facility: HOSPITAL | Age: 64
End: 2019-04-08

## 2019-04-08 DIAGNOSIS — E78.01 ESSENTIAL FAMILIAL HYPERCHOLESTEROLEMIA: ICD-10-CM

## 2019-04-08 DIAGNOSIS — E78.01 ESSENTIAL FAMILIAL HYPERCHOLESTEROLEMIA: Primary | ICD-10-CM

## 2019-04-08 LAB
BILIRUB UR QL STRIP: NEGATIVE
CLARITY UR: CLEAR
COLOR UR: YELLOW
GLUCOSE UR STRIP-MCNC: NEGATIVE MG/DL
HGB UR QL STRIP.AUTO: NEGATIVE
KETONES UR QL STRIP: NEGATIVE
LEUKOCYTE ESTERASE UR QL STRIP.AUTO: ABNORMAL
NITRITE UR QL STRIP: NEGATIVE
PH UR STRIP.AUTO: 8 [PH] (ref 5–8)
PROT UR QL STRIP: ABNORMAL
SP GR UR STRIP: 1 (ref 1–1.03)
UROBILINOGEN UR QL STRIP: ABNORMAL

## 2019-04-08 PROCEDURE — 80061 LIPID PANEL: CPT

## 2019-04-08 PROCEDURE — 84443 ASSAY THYROID STIM HORMONE: CPT

## 2019-04-08 PROCEDURE — 84156 ASSAY OF PROTEIN URINE: CPT

## 2019-04-08 PROCEDURE — 80053 COMPREHEN METABOLIC PANEL: CPT

## 2019-04-08 PROCEDURE — 82306 VITAMIN D 25 HYDROXY: CPT

## 2019-04-08 PROCEDURE — 84550 ASSAY OF BLOOD/URIC ACID: CPT

## 2019-04-08 PROCEDURE — 82570 ASSAY OF URINE CREATININE: CPT

## 2019-04-08 PROCEDURE — 85025 COMPLETE CBC W/AUTO DIFF WBC: CPT

## 2019-04-08 PROCEDURE — 83970 ASSAY OF PARATHORMONE: CPT

## 2019-04-08 PROCEDURE — 83735 ASSAY OF MAGNESIUM: CPT

## 2019-04-08 PROCEDURE — 84100 ASSAY OF PHOSPHORUS: CPT

## 2019-04-09 LAB
25(OH)D3 SERPL-MCNC: 53.3 NG/ML (ref 30–100)
ALBUMIN SERPL-MCNC: 4.5 G/DL (ref 3.5–5.2)
ALBUMIN/GLOB SERPL: 1.5 G/DL
ALP SERPL-CCNC: 53 U/L (ref 39–117)
ALT SERPL W P-5'-P-CCNC: 17 U/L (ref 1–33)
ANION GAP SERPL CALCULATED.3IONS-SCNC: 12.7 MMOL/L
AST SERPL-CCNC: 15 U/L (ref 1–32)
BASOPHILS # BLD AUTO: 0.05 10*3/MM3 (ref 0–0.2)
BASOPHILS NFR BLD AUTO: 0.5 % (ref 0–1.5)
BILIRUB SERPL-MCNC: 0.6 MG/DL (ref 0.2–1.2)
BUN BLD-MCNC: 17 MG/DL (ref 8–23)
BUN/CREAT SERPL: 14.8 (ref 7–25)
CALCIUM SPEC-SCNC: 10.1 MG/DL (ref 8.6–10.5)
CHLORIDE SERPL-SCNC: 97 MMOL/L (ref 98–107)
CHOLEST SERPL-MCNC: 233 MG/DL (ref 0–200)
CO2 SERPL-SCNC: 27.3 MMOL/L (ref 22–29)
CREAT BLD-MCNC: 1.15 MG/DL (ref 0.57–1)
CREAT UR-MCNC: 31.3 MG/DL
DEPRECATED RDW RBC AUTO: 44.7 FL (ref 37–54)
EOSINOPHIL # BLD AUTO: 0.18 10*3/MM3 (ref 0–0.4)
EOSINOPHIL NFR BLD AUTO: 1.7 % (ref 0.3–6.2)
ERYTHROCYTE [DISTWIDTH] IN BLOOD BY AUTOMATED COUNT: 13 % (ref 12.3–15.4)
GFR SERPL CREATININE-BSD FRML MDRD: 48 ML/MIN/1.73
GLOBULIN UR ELPH-MCNC: 3 GM/DL
GLUCOSE BLD-MCNC: 114 MG/DL (ref 65–99)
HCT VFR BLD AUTO: 48.6 % (ref 34–46.6)
HDLC SERPL-MCNC: 48 MG/DL (ref 40–60)
HGB BLD-MCNC: 15.8 G/DL (ref 12–15.9)
IMM GRANULOCYTES # BLD AUTO: 0.03 10*3/MM3 (ref 0–0.05)
IMM GRANULOCYTES NFR BLD AUTO: 0.3 % (ref 0–0.5)
LDLC SERPL CALC-MCNC: 114 MG/DL (ref 0–100)
LDLC/HDLC SERPL: 2.37 {RATIO}
LYMPHOCYTES # BLD AUTO: 1.62 10*3/MM3 (ref 0.7–3.1)
LYMPHOCYTES NFR BLD AUTO: 15.2 % (ref 19.6–45.3)
MAGNESIUM SERPL-MCNC: 1.8 MG/DL (ref 1.6–2.4)
MCH RBC QN AUTO: 30.4 PG (ref 26.6–33)
MCHC RBC AUTO-ENTMCNC: 32.5 G/DL (ref 31.5–35.7)
MCV RBC AUTO: 93.5 FL (ref 79–97)
MONOCYTES # BLD AUTO: 0.75 10*3/MM3 (ref 0.1–0.9)
MONOCYTES NFR BLD AUTO: 7 % (ref 5–12)
NEUTROPHILS # BLD AUTO: 8.05 10*3/MM3 (ref 1.4–7)
NEUTROPHILS NFR BLD AUTO: 75.3 % (ref 42.7–76)
NRBC BLD AUTO-RTO: 0 /100 WBC (ref 0–0)
PHOSPHATE SERPL-MCNC: 3.3 MG/DL (ref 2.5–4.5)
PLATELET # BLD AUTO: 308 10*3/MM3 (ref 140–450)
PMV BLD AUTO: 10.8 FL (ref 6–12)
POTASSIUM BLD-SCNC: 5.3 MMOL/L (ref 3.5–5.2)
PROT SERPL-MCNC: 7.5 G/DL (ref 6–8.5)
PROT UR-MCNC: NORMAL MG/DL
PROT/CREAT UR: NORMAL MG/G CREA (ref 0–200)
PTH-INTACT SERPL-MCNC: 31.9 PG/ML (ref 15–65)
RBC # BLD AUTO: 5.2 10*6/MM3 (ref 3.77–5.28)
SODIUM BLD-SCNC: 137 MMOL/L (ref 136–145)
TRIGL SERPL-MCNC: 356 MG/DL (ref 0–150)
TSH SERPL DL<=0.05 MIU/L-ACNC: 0.64 MIU/ML (ref 0.27–4.2)
URATE SERPL-MCNC: 7.1 MG/DL (ref 2.4–5.7)
VLDLC SERPL-MCNC: 71.2 MG/DL (ref 5–40)
WBC NRBC COR # BLD: 10.68 10*3/MM3 (ref 3.4–10.8)

## 2019-04-15 ENCOUNTER — HOSPITAL ENCOUNTER (OUTPATIENT)
Dept: ULTRASOUND IMAGING | Age: 64
Discharge: HOME | End: 2019-04-15
Payer: COMMERCIAL

## 2019-04-15 DIAGNOSIS — N18.30 CHRONIC KIDNEY DISEASE, STAGE III (MODERATE) (HCC): ICD-10-CM

## 2019-04-15 PROCEDURE — 76770 US EXAM ABDO BACK WALL COMP: CPT

## 2019-04-23 ENCOUNTER — HOSPITAL ENCOUNTER (OUTPATIENT)
Dept: WOMENS IMAGING | Age: 64
Discharge: HOME | End: 2019-04-23
Payer: COMMERCIAL

## 2019-04-23 ENCOUNTER — OFFICE VISIT (OUTPATIENT)
Dept: FAMILY MEDICINE CLINIC | Age: 64
End: 2019-04-23
Payer: COMMERCIAL

## 2019-04-23 VITALS
OXYGEN SATURATION: 99 % | HEART RATE: 62 BPM | TEMPERATURE: 98.1 F | SYSTOLIC BLOOD PRESSURE: 146 MMHG | HEIGHT: 62 IN | BODY MASS INDEX: 28.16 KG/M2 | WEIGHT: 153 LBS | DIASTOLIC BLOOD PRESSURE: 88 MMHG

## 2019-04-23 DIAGNOSIS — M35.3 POLYMYALGIA (HCC): ICD-10-CM

## 2019-04-23 DIAGNOSIS — E78.01 FAMILIAL HYPERCHOLESTEROLEMIA: ICD-10-CM

## 2019-04-23 DIAGNOSIS — M35.3 POLYMYALGIA (HCC): Primary | ICD-10-CM

## 2019-04-23 DIAGNOSIS — I10 ESSENTIAL (PRIMARY) HYPERTENSION: ICD-10-CM

## 2019-04-23 DIAGNOSIS — Z12.31 SCREENING MAMMOGRAM, ENCOUNTER FOR: ICD-10-CM

## 2019-04-23 DIAGNOSIS — E06.3 HYPOTHYROIDISM DUE TO HASHIMOTO'S THYROIDITIS: ICD-10-CM

## 2019-04-23 DIAGNOSIS — E03.8 HYPOTHYROIDISM DUE TO HASHIMOTO'S THYROIDITIS: ICD-10-CM

## 2019-04-23 LAB
C-REACTIVE PROTEIN: 0.53 MG/DL (ref 0–0.5)
RHEUMATOID FACTOR: <10 IU/ML
SEDIMENTATION RATE, ERYTHROCYTE: 6 MM/HR (ref 0–25)
TOTAL CK: 40 U/L (ref 26–192)

## 2019-04-23 PROCEDURE — 77063 BREAST TOMOSYNTHESIS BI: CPT

## 2019-04-23 PROCEDURE — 99214 OFFICE O/P EST MOD 30 MIN: CPT | Performed by: FAMILY MEDICINE

## 2019-04-23 ASSESSMENT — PATIENT HEALTH QUESTIONNAIRE - PHQ9
SUM OF ALL RESPONSES TO PHQ QUESTIONS 1-9: 0
1. LITTLE INTEREST OR PLEASURE IN DOING THINGS: 0
SUM OF ALL RESPONSES TO PHQ9 QUESTIONS 1 & 2: 0
2. FEELING DOWN, DEPRESSED OR HOPELESS: 0
SUM OF ALL RESPONSES TO PHQ QUESTIONS 1-9: 0

## 2019-04-23 ASSESSMENT — ENCOUNTER SYMPTOMS
COUGH: 0
CHEST TIGHTNESS: 0
ANAL BLEEDING: 0
NAUSEA: 0
DIARRHEA: 0
CONSTIPATION: 0
ABDOMINAL PAIN: 0
BACK PAIN: 1
SHORTNESS OF BREATH: 0

## 2019-04-23 NOTE — PROGRESS NOTES
[facility] [address] [place] [address]  Dept: [phone]  Dept Fax: [phone]  Loc: [phone]    Patti Lilly is a 61 y.o. female who presents today for her medical conditions/complaints as noted below. Patti Lilly is here for 6 Month Follow-Up        HPI:   CC: Here today to discuss the following:    Since her last visit with me she had sustained a fall resulting in a left rotator cuff tear and she has since had surgery performed of the [facility] by Dr. Rachel Aldrich. Unfortunately, she's continued to have limited range of motion and continual pain. She is currently on tramadol daily. She has been established with pain management through the [facility] as well. She is noticing slow improvement. Hypothyroidism  Symptoms are currently well controlled. No temperature intolerance, mood issues, or fatigue reported. Tolerating current medication without adverse effects. Hyperlipidemia  Tolerating current cholesterol medication without side effects. No body aches. Attempting to reduce processed sugar and cholesterol from diet. Hypertension  Compliant with medications. No adverse effects from medication. No lightheadedness, palpitations, or chest pain. She continues to have aches and pains in multiple joints. She's noticed pain in her neck shoulders elbows hands hips and knees. She denies any joint swelling. She does report some morning stiffness of these joints as well.     HPI    Past Medical History:   Diagnosis Date    Allergic rhinitis     Anxiety     Back pain     DDD (degenerative disc disease)     Hypertension     Hyperthyroidism     Kidney disease     Stage 3- Dr Diane Mon Osteopenia       Past Surgical History:   Procedure Laterality Date    ANKLE SURGERY      APPENDECTOMY      BREAST BIOPSY  3/21/13    left breast mammotome biopsy    BREAST SURGERY Right 2002    right excisional biopsy - cyst    CARPAL TUNNEL RELEASE      COLONOSCOPY  2016    Dr. Danyell Delgado in [facility] with BSO, fibroids       Family History   Problem Relation Age of Onset    High Blood Pressure Mother     Diabetes Mother     Stroke Mother     High Cholesterol Mother     High Blood Pressure Father     Heart Disease Father     High Cholesterol Father     Cancer Father 72        prostate    High Blood Pressure Sister     High Blood Pressure Brother     Cancer Brother         prostate    Cancer Maternal Aunt 55        breast    Cancer Maternal Cousin 58        breast    Cancer Brother         prostate       Social History     Tobacco Use    Smoking status: Current Every Day Smoker     Packs/day: 1.00     Years: 25.00     Pack years: 25.00    Smokeless tobacco: Never Used   Substance Use Topics    Alcohol use: No     Alcohol/week: 0.0 oz     Current Outpatient Medications   Medication Sig Dispense Refill    amitriptyline (ELAVIL) 25 MG tablet TAKE 1 TABLET DAILY AT     NIGHT 90 tablet 1    triamterene-hydrochlorothiazide (MAXZIDE) 75-50 MG per tablet TAKE 1 TABLET DAILY 90 tablet 3    KLOR-CON M10 10 MEQ extended release tablet TAKE 1 TABLET 3 TIMES A DAY (Patient taking differently: TAKE 1 TABLET 2 TIMES A DAY) 270 tablet 3    cyclobenzaprine (FLEXERIL) 10 MG tablet TAKE 1 TABLET NIGHTLY 90 tablet 3    montelukast (SINGULAIR) 10 MG tablet TAKE 1 TABLET NIGHTLY 90 tablet 3    propranolol (INDERAL LA) 80 MG extended release capsule TAKE 1 CAPSULE DAILY 90 capsule 3    celecoxib (CELEBREX) 200 MG capsule TAKE 1 CAPSULE TWICE DAILY 180 capsule 3    losartan (COZAAR) 25 MG tablet TAKE 1 TABLET DAILY 90 tablet 3    PREMARIN 0.625 MG tablet TAKE 1 TABLET DAILY 90 tablet 3    VITAMIN B COMPLEX-C PO Take by mouth      MAGNESIUM CARBONATE PO Take by mouth      Omega-3 Fatty Acids (FISH OIL BURP-LESS PO) Take by mouth      CRANBERRY EXTRACT PO Take by mouth      vitamin D (CHOLECALCIFEROL) 1000 UNIT TABS tablet Take 1,000 Units by mouth daily      traMADol (ULTRAM) 50 MG tablet Take 1 tablet by mouth every 12 hours 60 tablet 0    zoster recombinant adjuvanted vaccine (SHINGRIX) 50 MCG SUSR injection 1 vaccine now and repeat in 2-6 months. 0.5 mL 1     No current facility-administered medications for this visit. No Known Allergies    Health Maintenance   Topic Date Due    Hepatitis C screen  1955    Pneumococcal 0-64 years Vaccine (1 of 1 - PPSV23) 07/20/1961    HIV screen  07/20/1970    DTaP/Tdap/Td vaccine (1 - Tdap) 07/20/1974    Colon cancer screen colonoscopy  07/20/2005    Shingles Vaccine (2 of 3) 02/26/2016    Potassium monitoring  04/17/2019    Creatinine monitoring  04/17/2019    TSH testing  04/08/2020    Breast cancer screen  04/17/2020    Lipid screen  10/18/2023    Flu vaccine  Completed       Subjective:      Review of Systems   Constitutional: Negative for chills and fever. HENT: Negative for congestion. Respiratory: Negative for cough, chest tightness and shortness of breath. Cardiovascular: Negative for chest pain, palpitations and leg swelling. Gastrointestinal: Negative for abdominal pain, anal bleeding, constipation, diarrhea and nausea. Genitourinary: Negative for difficulty urinating. Musculoskeletal: Positive for arthralgias, back pain, myalgias and neck pain. Negative for gait problem and joint swelling. Psychiatric/Behavioral: Negative. SeeHPI for visit specific review of symptoms. All others negative      Objective:   BP (!) 146/88   Pulse 62   Temp 98.1 °F (36.7 °C)   Ht 5' 2\" (1.575 m)   Wt 153 lb (69.4 kg)   LMP  (LMP Unknown)   SpO2 99%   BMI 27.98 kg/m²   Physical Exam   Constitutional: She appears well-developed. She does not appear ill. Eyes: Pupils are equal, round, and reactive to light. Neck: Normal range of motion. Neck supple.    Cardiovascular: Normal rate and regular rhythm. Exam reveals no friction rub. No murmur heard. Pulmonary/Chest: Effort normal and breath sounds normal. No respiratory distress. She has no wheezes. She has no rales. Abdominal: Soft. Bowel sounds are normal. She exhibits no distension. There is no tenderness. There is no rebound and no guarding. Musculoskeletal: She exhibits no edema. Neurological: She is alert. Psychiatric: She has a normal mood and affect. Her behavior is normal.         No results found for this or any previous visit (from the past 672 hour(s)). White blood cell count 10.68  Hemoglobin 15.8  Hematocrit 40.6  Platelets 050  Glucose 114  Creatinine 1.15  Uric acid 7.1  Potassium 5.3  Total cholesterol 233  Triglycerides 356    HDL 48  TSH 0.64  urinalysis with trace protein and small leukocyte esterase       Impression   Mild cortical thinning involving both kidneys as described   above, no renal mass, hydronephrosis or acute abnormalities   identified. Signed by Dr Janett Valdez. Edin on 4/15/2019 1:45 PM             Assessment & Plan: The following diagnoses and conditions are stable with no further orders unless indicated:  1. Hypothyroidism due to Hashimoto's thyroiditis  Tsh stable    2. Familial hypercholesterolemia  Lab Results   Component Value Date    CHOL 216 10/18/2018     Lab Results   Component Value Date    TRIG 424 10/18/2018     Lab Results   Component Value Date    HDL 40 10/18/2018     Lab Results   Component Value Date    LDLCALC 110 10/18/2018     No results found for: LABVLDL, VLDL  Lab Results   Component Value Date    CHOLHDLRATIO unable to calculate 10/18/2018     Cholesterol is stable  Discussed lifestyle changes such as diet and exercise. Recommended eliminate processed food from diet such as sugar and fried foods. Recommended exercising at least 150 minutes/week. Try to do full body resistance training twice a week as well.   Offered suggestions for calorie counting such as phone apps and online resources such as My fitness pal and Lose it. Discussed healthy weight. 3. Essential (primary) hypertension  BP Readings from Last 3 Encounters:   04/23/19 (!) 146/88   10/23/18 132/88   07/13/18 (!) 152/78     Blood pressure slightly elevated today. She's been checking her blood pressure and has been better controlled lately. How to Check Blood Pressure    1. Sit in a chair with both feet flat on the ground and your back straight. It is recommended you sit for about 5 minutes before measuring your blood pressure. 2. Rest your arm on a surface (such as a table) around the level of your heart or chest.    3. Take your blood pressure according to the instructions of your machine. Stay still and do not talk as the machine is working. 4. It can sometimes be helpful to repeat another blood pressure reading 1 to 2 minutes later and use the average of the 2 readings. If your blood pressure is consistently greater than 140/90 on 3 separate readings on different days return to clinic to discuss. 4. Polymyalgia ([address]  Recommended checking the following lab studies today  - BHUPINDER Screen with Reflex; Future  - CK; Future  - C-Reactive Protein; Future  - Sedimentation Rate; Future  - Rheumatoid Factor; Future  - Comprehensive Metabolic Panel; Future  - Lipid Panel; Future  - CBC Auto Differential; Future  - TSH without Reflex; Future  - Uric Acid; Future            Return in about 6 months (around 10/23/2019) for 30 minute visit - routine follow up. Discussed use, benefit, and side effects of prescribed medications. All patient questions answered. Pt voiced understanding. Reviewed health maintenance. Instructedto continue current medications, diet and exercise. Patient agreed with treatmentplan. Follow up as directed.

## 2019-04-26 LAB — ANA IGG, ELISA: NORMAL

## 2019-07-08 ENCOUNTER — OFFICE VISIT (OUTPATIENT)
Dept: FAMILY MEDICINE CLINIC | Age: 64
End: 2019-07-08
Payer: COMMERCIAL

## 2019-07-08 VITALS
TEMPERATURE: 98 F | OXYGEN SATURATION: 97 % | DIASTOLIC BLOOD PRESSURE: 72 MMHG | SYSTOLIC BLOOD PRESSURE: 122 MMHG | WEIGHT: 145 LBS | HEART RATE: 65 BPM | BODY MASS INDEX: 26.52 KG/M2

## 2019-07-08 DIAGNOSIS — J30.1 CHRONIC SEASONAL ALLERGIC RHINITIS DUE TO POLLEN: ICD-10-CM

## 2019-07-08 DIAGNOSIS — I10 ESSENTIAL (PRIMARY) HYPERTENSION: ICD-10-CM

## 2019-07-08 DIAGNOSIS — Z72.0 TOBACCO USE: Primary | ICD-10-CM

## 2019-07-08 DIAGNOSIS — E78.01 FAMILIAL HYPERCHOLESTEROLEMIA: ICD-10-CM

## 2019-07-08 DIAGNOSIS — F51.04 CHRONIC INSOMNIA: ICD-10-CM

## 2019-07-08 DIAGNOSIS — K21.9 GASTROESOPHAGEAL REFLUX DISEASE WITHOUT ESOPHAGITIS: ICD-10-CM

## 2019-07-08 DIAGNOSIS — I25.10 CORONARY ARTERY DISEASE INVOLVING NATIVE CORONARY ARTERY OF NATIVE HEART WITHOUT ANGINA PECTORIS: ICD-10-CM

## 2019-07-08 PROCEDURE — 99214 OFFICE O/P EST MOD 30 MIN: CPT | Performed by: FAMILY MEDICINE

## 2019-07-08 RX ORDER — AMLODIPINE BESYLATE 5 MG/1
1 TABLET ORAL DAILY
Refills: 5 | COMMUNITY
Start: 2019-06-22 | End: 2020-08-24 | Stop reason: SDUPTHER

## 2019-07-08 RX ORDER — VARENICLINE TARTRATE 25 MG
KIT ORAL
Qty: 1 BOX | Refills: 0 | Status: SHIPPED | OUTPATIENT
Start: 2019-07-08 | End: 2019-10-25 | Stop reason: ALTCHOICE

## 2019-07-08 RX ORDER — ASPIRIN 81 MG/1
1 TABLET, COATED ORAL DAILY
Refills: 5 | COMMUNITY
Start: 2019-06-22

## 2019-07-08 RX ORDER — CLOPIDOGREL BISULFATE 75 MG/1
1 TABLET ORAL DAILY
Refills: 5 | COMMUNITY
Start: 2019-06-22 | End: 2021-08-09 | Stop reason: ALTCHOICE

## 2019-07-08 RX ORDER — VARENICLINE TARTRATE 1 MG/1
1 TABLET, FILM COATED ORAL 2 TIMES DAILY
Qty: 60 TABLET | Refills: 5 | Status: SHIPPED | OUTPATIENT
Start: 2019-07-08 | End: 2019-10-25 | Stop reason: ALTCHOICE

## 2019-07-08 RX ORDER — ATORVASTATIN CALCIUM 40 MG/1
1 TABLET, FILM COATED ORAL DAILY
Refills: 5 | COMMUNITY
Start: 2019-06-22 | End: 2020-08-24 | Stop reason: SDUPTHER

## 2019-07-08 NOTE — PROGRESS NOTES
Family History   Problem Relation Age of Onset    High Blood Pressure Mother     Diabetes Mother     Stroke Mother     High Cholesterol Mother     High Blood Pressure Father     Heart Disease Father     High Cholesterol Father     Cancer Father 72        prostate    High Blood Pressure Sister     High Blood Pressure Brother     Cancer Brother         prostate    Cancer Maternal Aunt 55        breast    Cancer Maternal Cousin 58        breast    Cancer Brother         prostate       Social History     Tobacco Use    Smoking status: Current Every Day Smoker     Packs/day: 1.00     Years: 25.00     Pack years: 25.00    Smokeless tobacco: Never Used   Substance Use Topics    Alcohol use: No     Alcohol/week: 0.0 oz     Current Outpatient Medications   Medication Sig Dispense Refill    omeprazole (PRILOSEC) 40 MG delayed release capsule Take 1 capsule by mouth daily 30 capsule 5    amLODIPine (NORVASC) 5 MG tablet Take 1 tablet by mouth daily  5    ASPIRIN LOW DOSE 81 MG EC tablet Take 1 tablet by mouth daily  5    atorvastatin (LIPITOR) 40 MG tablet Take 1 tablet by mouth daily  5    clopidogrel (PLAVIX) 75 MG tablet Take 1 tablet by mouth daily  5    varenicline (CHANTIX CONTINUING MONTH LUCY) 1 MG tablet Take 1 tablet by mouth 2 times daily 60 tablet 5    varenicline (CHANTIX STARTING MONTH LUCY) 0.5 MG X 11 & 1 MG X 42 tablet Take by mouth.  1 box 0    amitriptyline (ELAVIL) 25 MG tablet TAKE 1 TABLET DAILY AT     NIGHT 90 tablet 1    triamterene-hydrochlorothiazide (MAXZIDE) 75-50 MG per tablet TAKE 1 TABLET DAILY 90 tablet 3    KLOR-CON M10 10 MEQ extended release tablet TAKE 1 TABLET 3 TIMES A DAY (Patient taking differently: TAKE 1 TABLET 2 TIMES A DAY) 270 tablet 3    cyclobenzaprine (FLEXERIL) 10 MG tablet TAKE 1 TABLET NIGHTLY 90 tablet 3    montelukast (SINGULAIR) 10 MG tablet TAKE 1 TABLET NIGHTLY 90 tablet 3    propranolol (INDERAL LA) 80 MG extended release capsule TAKE 1 mouth.  Dispense: 1 box; Refill: 0    3. Gastroesophageal reflux disease without esophagitis  She is on omeprazole which may have an interaction with her Plavix. Offer switching to Protonix    4. Familial hypercholesterolemia  Continue with high intensity statin therapy    5. Essential (primary) hypertension  BP Readings from Last 3 Encounters:   07/08/19 122/72   04/23/19 (!) 146/88   10/23/18 132/88     Stable continue current medication    6. Chronic insomnia  Stable    7. Chronic seasonal allergic rhinitis due to pollen  Stable            Return in about 3 months (around 10/8/2019), or if symptoms worsen or fail to improve, for Routine follow up - 15 minute visit. Discussed use, benefit, and side effects of prescribed medications. All patient questions answered. Pt voiced understanding. Reviewed health maintenance. Instructedto continue current medications, diet and exercise. Patient agreed with treatmentplan. Follow up as directed.        Note dictated using [id number] [address]

## 2019-07-09 RX ORDER — OMEPRAZOLE 40 MG/1
40 CAPSULE, DELAYED RELEASE ORAL DAILY
Qty: 30 CAPSULE | Refills: 5 | Status: SHIPPED | OUTPATIENT
Start: 2019-07-09 | End: 2019-10-25 | Stop reason: ALTCHOICE

## 2019-07-10 PROBLEM — I25.10 CORONARY ARTERY DISEASE INVOLVING NATIVE CORONARY ARTERY OF NATIVE HEART WITHOUT ANGINA PECTORIS: Status: ACTIVE | Noted: 2019-07-10

## 2019-07-10 RX ORDER — PANTOPRAZOLE SODIUM 40 MG/1
40 TABLET, DELAYED RELEASE ORAL
Qty: 90 TABLET | Refills: 1 | Status: SHIPPED | OUTPATIENT
Start: 2019-07-10 | End: 2019-10-25 | Stop reason: ALTCHOICE

## 2019-07-11 ENCOUNTER — TELEPHONE (OUTPATIENT)
Dept: FAMILY MEDICINE CLINIC | Age: 64
End: 2019-07-11

## 2019-08-21 ENCOUNTER — TELEPHONE (OUTPATIENT)
Dept: FAMILY MEDICINE CLINIC | Age: 64
End: 2019-08-21

## 2019-09-18 DIAGNOSIS — M54.50 CHRONIC MIDLINE LOW BACK PAIN WITHOUT SCIATICA: ICD-10-CM

## 2019-09-18 DIAGNOSIS — I12.9 BENIGN HYPERTENSIVE KIDNEY DISEASE WITH CHRONIC KIDNEY DISEASE STAGE I THROUGH STAGE IV, OR UNSPECIFIED: ICD-10-CM

## 2019-09-18 DIAGNOSIS — F51.04 CHRONIC INSOMNIA: ICD-10-CM

## 2019-09-18 DIAGNOSIS — G89.29 CHRONIC MIDLINE LOW BACK PAIN WITHOUT SCIATICA: ICD-10-CM

## 2019-09-18 DIAGNOSIS — N95.1 SYMPTOMS, SUCH AS FLUSHING, SLEEPLESSNESS, HEADACHE, LACK OF CONCENTRATION, ASSOCIATED WITH THE MENOPAUSE: ICD-10-CM

## 2019-09-18 RX ORDER — AMITRIPTYLINE HYDROCHLORIDE 25 MG/1
TABLET, FILM COATED ORAL
Qty: 90 TABLET | Refills: 1 | Status: SHIPPED | OUTPATIENT
Start: 2019-09-18 | End: 2019-10-25 | Stop reason: SDUPTHER

## 2019-09-19 RX ORDER — CELECOXIB 200 MG/1
CAPSULE ORAL
Qty: 180 CAPSULE | Refills: 3 | Status: SHIPPED | OUTPATIENT
Start: 2019-09-19 | End: 2019-10-25 | Stop reason: SDUPTHER

## 2019-09-19 RX ORDER — LOSARTAN POTASSIUM 25 MG/1
TABLET ORAL
Qty: 90 TABLET | Refills: 3 | Status: SHIPPED | OUTPATIENT
Start: 2019-09-19 | End: 2019-10-25 | Stop reason: SDUPTHER

## 2019-10-16 ENCOUNTER — LAB (OUTPATIENT)
Dept: LAB | Facility: HOSPITAL | Age: 64
End: 2019-10-16

## 2019-10-16 ENCOUNTER — TRANSCRIBE ORDERS (OUTPATIENT)
Dept: LAB | Facility: HOSPITAL | Age: 64
End: 2019-10-16

## 2019-10-16 DIAGNOSIS — N18.30 CHRONIC KIDNEY DISEASE, STAGE III (MODERATE) (HCC): Primary | ICD-10-CM

## 2019-10-16 DIAGNOSIS — N18.30 CHRONIC KIDNEY DISEASE, STAGE III (MODERATE) (HCC): ICD-10-CM

## 2019-10-16 LAB
25(OH)D3 SERPL-MCNC: 46.1 NG/ML (ref 30–100)
ALBUMIN SERPL-MCNC: 3.9 G/DL (ref 3.5–5.2)
ALBUMIN/GLOB SERPL: 1.2 G/DL
ALP SERPL-CCNC: 52 U/L (ref 39–117)
ALT SERPL W P-5'-P-CCNC: 10 U/L (ref 1–33)
ANION GAP SERPL CALCULATED.3IONS-SCNC: 10.6 MMOL/L (ref 5–15)
AST SERPL-CCNC: 12 U/L (ref 1–32)
BASOPHILS # BLD AUTO: 0.05 10*3/MM3 (ref 0–0.2)
BASOPHILS NFR BLD AUTO: 0.5 % (ref 0–1.5)
BILIRUB SERPL-MCNC: 0.5 MG/DL (ref 0.2–1.2)
BILIRUB UR QL STRIP: NEGATIVE
BUN BLD-MCNC: 22 MG/DL (ref 8–23)
BUN/CREAT SERPL: 16.4 (ref 7–25)
CALCIUM SPEC-SCNC: 9.1 MG/DL (ref 8.6–10.5)
CHLORIDE SERPL-SCNC: 99 MMOL/L (ref 98–107)
CHOLEST SERPL-MCNC: 124 MG/DL (ref 0–200)
CLARITY UR: CLEAR
CO2 SERPL-SCNC: 28.4 MMOL/L (ref 22–29)
COLOR UR: YELLOW
CREAT BLD-MCNC: 1.34 MG/DL (ref 0.57–1)
CREAT UR-MCNC: 47.1 MG/DL
DEPRECATED RDW RBC AUTO: 40 FL (ref 37–54)
EOSINOPHIL # BLD AUTO: 0.52 10*3/MM3 (ref 0–0.4)
EOSINOPHIL NFR BLD AUTO: 5.5 % (ref 0.3–6.2)
ERYTHROCYTE [DISTWIDTH] IN BLOOD BY AUTOMATED COUNT: 12.1 % (ref 12.3–15.4)
GFR SERPL CREATININE-BSD FRML MDRD: 40 ML/MIN/1.73
GLOBULIN UR ELPH-MCNC: 3.3 GM/DL
GLUCOSE BLD-MCNC: 121 MG/DL (ref 65–99)
GLUCOSE UR STRIP-MCNC: NEGATIVE MG/DL
HCT VFR BLD AUTO: 39.1 % (ref 34–46.6)
HDLC SERPL-MCNC: 46 MG/DL (ref 40–60)
HGB BLD-MCNC: 13 G/DL (ref 12–15.9)
HGB UR QL STRIP.AUTO: NEGATIVE
IMM GRANULOCYTES # BLD AUTO: 0.04 10*3/MM3 (ref 0–0.05)
IMM GRANULOCYTES NFR BLD AUTO: 0.4 % (ref 0–0.5)
KETONES UR QL STRIP: NEGATIVE
LDLC SERPL CALC-MCNC: 31 MG/DL (ref 0–100)
LDLC/HDLC SERPL: 0.68 {RATIO}
LEUKOCYTE ESTERASE UR QL STRIP.AUTO: NEGATIVE
LYMPHOCYTES # BLD AUTO: 2.12 10*3/MM3 (ref 0.7–3.1)
LYMPHOCYTES NFR BLD AUTO: 22.5 % (ref 19.6–45.3)
MAGNESIUM SERPL-MCNC: 1.7 MG/DL (ref 1.6–2.4)
MCH RBC QN AUTO: 29.9 PG (ref 26.6–33)
MCHC RBC AUTO-ENTMCNC: 33.2 G/DL (ref 31.5–35.7)
MCV RBC AUTO: 89.9 FL (ref 79–97)
MONOCYTES # BLD AUTO: 1.14 10*3/MM3 (ref 0.1–0.9)
MONOCYTES NFR BLD AUTO: 12.1 % (ref 5–12)
NEUTROPHILS # BLD AUTO: 5.54 10*3/MM3 (ref 1.7–7)
NEUTROPHILS NFR BLD AUTO: 59 % (ref 42.7–76)
NITRITE UR QL STRIP: NEGATIVE
NRBC BLD AUTO-RTO: 0 /100 WBC (ref 0–0.2)
PH UR STRIP.AUTO: 6 [PH] (ref 5–8)
PHOSPHATE SERPL-MCNC: 5.1 MG/DL (ref 2.5–4.5)
PLATELET # BLD AUTO: 305 10*3/MM3 (ref 140–450)
PMV BLD AUTO: 9.5 FL (ref 6–12)
POTASSIUM BLD-SCNC: 4.7 MMOL/L (ref 3.5–5.2)
PROT SERPL-MCNC: 7.2 G/DL (ref 6–8.5)
PROT UR QL STRIP: NEGATIVE
PROT UR-MCNC: 4 MG/DL
PTH-INTACT SERPL-MCNC: 14.2 PG/ML (ref 15–65)
RBC # BLD AUTO: 4.35 10*6/MM3 (ref 3.77–5.28)
SODIUM BLD-SCNC: 138 MMOL/L (ref 136–145)
SP GR UR STRIP: 1.01 (ref 1–1.03)
TRIGL SERPL-MCNC: 233 MG/DL (ref 0–150)
TSH SERPL DL<=0.05 MIU/L-ACNC: 0.39 UIU/ML (ref 0.27–4.2)
URATE SERPL-MCNC: 7.1 MG/DL (ref 2.4–5.7)
UROBILINOGEN UR QL STRIP: NORMAL
VLDLC SERPL-MCNC: 46.6 MG/DL (ref 5–40)
WBC NRBC COR # BLD: 9.41 10*3/MM3 (ref 3.4–10.8)

## 2019-10-16 PROCEDURE — 85025 COMPLETE CBC W/AUTO DIFF WBC: CPT

## 2019-10-16 PROCEDURE — 80053 COMPREHEN METABOLIC PANEL: CPT

## 2019-10-16 PROCEDURE — 81003 URINALYSIS AUTO W/O SCOPE: CPT

## 2019-10-16 PROCEDURE — 82570 ASSAY OF URINE CREATININE: CPT

## 2019-10-16 PROCEDURE — 84156 ASSAY OF PROTEIN URINE: CPT

## 2019-10-16 PROCEDURE — 84443 ASSAY THYROID STIM HORMONE: CPT

## 2019-10-16 PROCEDURE — 80061 LIPID PANEL: CPT

## 2019-10-16 PROCEDURE — 83970 ASSAY OF PARATHORMONE: CPT

## 2019-10-16 PROCEDURE — 83735 ASSAY OF MAGNESIUM: CPT

## 2019-10-16 PROCEDURE — 84550 ASSAY OF BLOOD/URIC ACID: CPT

## 2019-10-16 PROCEDURE — 84100 ASSAY OF PHOSPHORUS: CPT

## 2019-10-16 PROCEDURE — 82306 VITAMIN D 25 HYDROXY: CPT

## 2019-10-25 ENCOUNTER — OFFICE VISIT (OUTPATIENT)
Dept: FAMILY MEDICINE CLINIC | Age: 64
End: 2019-10-25
Payer: COMMERCIAL

## 2019-10-25 VITALS
BODY MASS INDEX: 27.44 KG/M2 | DIASTOLIC BLOOD PRESSURE: 68 MMHG | WEIGHT: 150 LBS | HEART RATE: 58 BPM | OXYGEN SATURATION: 99 % | SYSTOLIC BLOOD PRESSURE: 116 MMHG | TEMPERATURE: 97.5 F

## 2019-10-25 DIAGNOSIS — N18.30 STAGE 3 CHRONIC KIDNEY DISEASE (HCC): ICD-10-CM

## 2019-10-25 DIAGNOSIS — I10 ESSENTIAL (PRIMARY) HYPERTENSION: ICD-10-CM

## 2019-10-25 DIAGNOSIS — K21.9 GASTROESOPHAGEAL REFLUX DISEASE WITHOUT ESOPHAGITIS: ICD-10-CM

## 2019-10-25 DIAGNOSIS — F51.04 CHRONIC INSOMNIA: ICD-10-CM

## 2019-10-25 DIAGNOSIS — E78.5 HYPERLIPIDEMIA, UNSPECIFIED HYPERLIPIDEMIA TYPE: ICD-10-CM

## 2019-10-25 DIAGNOSIS — Z00.00 ANNUAL PHYSICAL EXAM: Primary | ICD-10-CM

## 2019-10-25 DIAGNOSIS — Z23 NEED FOR INFLUENZA VACCINATION: ICD-10-CM

## 2019-10-25 DIAGNOSIS — I25.10 CORONARY ARTERY DISEASE INVOLVING NATIVE CORONARY ARTERY OF NATIVE HEART WITHOUT ANGINA PECTORIS: ICD-10-CM

## 2019-10-25 DIAGNOSIS — Z72.0 TOBACCO USE: ICD-10-CM

## 2019-10-25 DIAGNOSIS — E89.0 POST-SURGICAL HYPOTHYROIDISM: ICD-10-CM

## 2019-10-25 DIAGNOSIS — R73.9 HYPERGLYCEMIA: ICD-10-CM

## 2019-10-25 LAB
BASOPHILS ABSOLUTE: NORMAL /ΜL
BASOPHILS RELATIVE PERCENT: NORMAL %
CHOLESTEROL, TOTAL: NORMAL MG/DL
CHOLESTEROL/HDL RATIO: NORMAL
EOSINOPHILS ABSOLUTE: NORMAL /ΜL
EOSINOPHILS RELATIVE PERCENT: NORMAL %
HCT VFR BLD CALC: NORMAL % (ref 36–46)
HDLC SERPL-MCNC: NORMAL MG/DL (ref 35–70)
HEMOGLOBIN: NORMAL G/DL (ref 12–16)
LDL CHOLESTEROL CALCULATED: NORMAL MG/DL (ref 0–160)
LYMPHOCYTES ABSOLUTE: NORMAL /ΜL
LYMPHOCYTES RELATIVE PERCENT: NORMAL %
MCH RBC QN AUTO: NORMAL PG
MCHC RBC AUTO-ENTMCNC: NORMAL G/DL
MCV RBC AUTO: NORMAL FL
MICROSCOPIC URINE: NORMAL
MONOCYTES ABSOLUTE: NORMAL /ΜL
MONOCYTES RELATIVE PERCENT: NORMAL %
NEUTROPHILS ABSOLUTE: NORMAL /ΜL
NEUTROPHILS RELATIVE PERCENT: NORMAL %
PDW BLD-RTO: NORMAL %
PLATELET # BLD: NORMAL K/ΜL
PMV BLD AUTO: NORMAL FL
RBC # BLD: NORMAL 10^6/ΜL
TRIGL SERPL-MCNC: NORMAL MG/DL
TSH SERPL DL<=0.05 MIU/L-ACNC: 0.39 UIU/ML
URIC ACID: 7.1
VLDLC SERPL CALC-MCNC: NORMAL MG/DL
WBC # BLD: NORMAL 10^3/ML

## 2019-10-25 PROCEDURE — 90686 IIV4 VACC NO PRSV 0.5 ML IM: CPT | Performed by: FAMILY MEDICINE

## 2019-10-25 PROCEDURE — 90471 IMMUNIZATION ADMIN: CPT | Performed by: FAMILY MEDICINE

## 2019-10-25 PROCEDURE — 99386 PREV VISIT NEW AGE 40-64: CPT | Performed by: FAMILY MEDICINE

## 2019-10-25 RX ORDER — AMITRIPTYLINE HYDROCHLORIDE 50 MG/1
TABLET, FILM COATED ORAL
Qty: 30 TABLET | Refills: 11 | Status: SHIPPED | OUTPATIENT
Start: 2019-10-25 | End: 2019-12-26 | Stop reason: SDUPTHER

## 2019-10-25 RX ORDER — LEVOTHYROXINE SODIUM 112 UG/1
112 TABLET ORAL DAILY
COMMUNITY

## 2019-10-30 PROBLEM — E89.0 POST-SURGICAL HYPOTHYROIDISM: Status: ACTIVE | Noted: 2019-10-30

## 2019-10-30 ASSESSMENT — ENCOUNTER SYMPTOMS
ANAL BLEEDING: 0
SHORTNESS OF BREATH: 0
CONSTIPATION: 0
ABDOMINAL PAIN: 0
DIARRHEA: 0
CHEST TIGHTNESS: 0
NAUSEA: 0
COUGH: 0

## 2019-11-08 LAB
ANION GAP SERPL CALCULATED.3IONS-SCNC: 15 MMOL/L (ref 7–19)
BUN BLDV-MCNC: 28 MG/DL (ref 8–23)
CALCIUM SERPL-MCNC: 10 MG/DL (ref 8.8–10.2)
CHLORIDE BLD-SCNC: 97 MMOL/L (ref 98–111)
CO2: 26 MMOL/L (ref 22–29)
CREAT SERPL-MCNC: 1.6 MG/DL (ref 0.5–0.9)
GFR NON-AFRICAN AMERICAN: 32
GLUCOSE BLD-MCNC: 89 MG/DL (ref 74–109)
POTASSIUM SERPL-SCNC: 5 MMOL/L (ref 3.5–5)
SODIUM BLD-SCNC: 138 MMOL/L (ref 136–145)

## 2019-11-11 LAB
BILIRUBIN URINE: NEGATIVE
BLOOD, URINE: NEGATIVE
CLARITY: CLEAR
COLOR: YELLOW
GLUCOSE URINE: NEGATIVE MG/DL
KETONES, URINE: NEGATIVE MG/DL
LEUKOCYTE ESTERASE, URINE: NEGATIVE
NITRITE, URINE: NEGATIVE
PH UA: 7 (ref 5–8)
PROTEIN UA: NEGATIVE MG/DL
SPECIFIC GRAVITY UA: 1.01 (ref 1–1.03)
UROBILINOGEN, URINE: 0.2 E.U./DL

## 2019-12-13 ENCOUNTER — LAB (OUTPATIENT)
Dept: LAB | Facility: HOSPITAL | Age: 64
End: 2019-12-13

## 2019-12-13 ENCOUNTER — TRANSCRIBE ORDERS (OUTPATIENT)
Dept: LAB | Facility: HOSPITAL | Age: 64
End: 2019-12-13

## 2019-12-13 ENCOUNTER — OFFICE VISIT (OUTPATIENT)
Dept: FAMILY MEDICINE CLINIC | Facility: CLINIC | Age: 64
End: 2019-12-13

## 2019-12-13 VITALS
OXYGEN SATURATION: 97 % | HEIGHT: 62 IN | WEIGHT: 147.5 LBS | HEART RATE: 68 BPM | TEMPERATURE: 98.2 F | DIASTOLIC BLOOD PRESSURE: 82 MMHG | RESPIRATION RATE: 20 BRPM | SYSTOLIC BLOOD PRESSURE: 126 MMHG | BODY MASS INDEX: 27.14 KG/M2

## 2019-12-13 DIAGNOSIS — N39.0 ACUTE UTI: Primary | ICD-10-CM

## 2019-12-13 DIAGNOSIS — N18.30 CHRONIC KIDNEY DISEASE, STAGE III (MODERATE) (HCC): ICD-10-CM

## 2019-12-13 DIAGNOSIS — N18.30 CHRONIC KIDNEY DISEASE, STAGE III (MODERATE) (HCC): Primary | ICD-10-CM

## 2019-12-13 DIAGNOSIS — N18.30 STAGE 3 CHRONIC KIDNEY DISEASE (HCC): ICD-10-CM

## 2019-12-13 DIAGNOSIS — R39.9 UTI SYMPTOMS: ICD-10-CM

## 2019-12-13 LAB
BILIRUB BLD-MCNC: NEGATIVE MG/DL
CLARITY, POC: CLEAR
COLOR UR: YELLOW
GLUCOSE UR STRIP-MCNC: NEGATIVE MG/DL
KETONES UR QL: NEGATIVE
LEUKOCYTE EST, POC: ABNORMAL
NITRITE UR-MCNC: NEGATIVE MG/ML
PH UR: 7 [PH] (ref 5–8)
PROT UR STRIP-MCNC: ABNORMAL MG/DL
RBC # UR STRIP: ABNORMAL /UL
SP GR UR: 1.01 (ref 1–1.03)
UROBILINOGEN UR QL: NORMAL

## 2019-12-13 PROCEDURE — 81003 URINALYSIS AUTO W/O SCOPE: CPT | Performed by: NURSE PRACTITIONER

## 2019-12-13 PROCEDURE — 87186 SC STD MICRODIL/AGAR DIL: CPT

## 2019-12-13 PROCEDURE — 87088 URINE BACTERIA CULTURE: CPT

## 2019-12-13 PROCEDURE — 87086 URINE CULTURE/COLONY COUNT: CPT

## 2019-12-13 PROCEDURE — 84156 ASSAY OF PROTEIN URINE: CPT

## 2019-12-13 PROCEDURE — 99202 OFFICE O/P NEW SF 15 MIN: CPT | Performed by: NURSE PRACTITIONER

## 2019-12-13 PROCEDURE — 82570 ASSAY OF URINE CREATININE: CPT

## 2019-12-13 RX ORDER — LEVOTHYROXINE SODIUM 112 UG/1
112 TABLET ORAL
COMMUNITY

## 2019-12-13 RX ORDER — ASPIRIN 81 MG/1
81 TABLET, COATED ORAL DAILY
COMMUNITY
Start: 2019-12-08

## 2019-12-13 RX ORDER — CLOPIDOGREL BISULFATE 75 MG/1
TABLET ORAL
COMMUNITY
Start: 2019-06-22 | End: 2022-01-05

## 2019-12-13 RX ORDER — TRIAMTERENE AND HYDROCHLOROTHIAZIDE 75; 50 MG/1; MG/1
TABLET ORAL
COMMUNITY
Start: 2019-03-15

## 2019-12-13 RX ORDER — CELECOXIB 200 MG/1
CAPSULE ORAL
COMMUNITY
Start: 2018-10-04

## 2019-12-13 RX ORDER — TRAMADOL HYDROCHLORIDE 50 MG/1
50 TABLET ORAL EVERY 6 HOURS PRN
COMMUNITY

## 2019-12-13 RX ORDER — AMLODIPINE BESYLATE 5 MG/1
TABLET ORAL
COMMUNITY
Start: 2019-06-22

## 2019-12-13 RX ORDER — CIPROFLOXACIN 250 MG/1
250 TABLET, FILM COATED ORAL 2 TIMES DAILY
Qty: 14 TABLET | Refills: 0 | Status: SHIPPED | OUTPATIENT
Start: 2019-12-13 | End: 2022-01-05

## 2019-12-13 RX ORDER — FLUCONAZOLE 150 MG/1
TABLET ORAL
Qty: 2 TABLET | Refills: 0 | Status: SHIPPED | OUTPATIENT
Start: 2019-12-13 | End: 2022-01-05

## 2019-12-13 RX ORDER — ATORVASTATIN CALCIUM 40 MG/1
40 TABLET, FILM COATED ORAL NIGHTLY
COMMUNITY
Start: 2019-06-22

## 2019-12-13 NOTE — PROGRESS NOTES
"Subjective   Mayuri Almeida is a 64 y.o. female.     [facility] Visit    PCP: Lincoln Johnson MD--in [place]    CC: \"for urine to be tested for UTI\"    Has CKD 3, followed by nephrology in [place].     Urinary Tract Infection    This is a new problem. The current episode started yesterday. The problem occurs every urination. The problem has been gradually worsening. The quality of the pain is described as burning and aching. The pain is at a severity of 3/10. There has been no fever. Associated symptoms include flank pain (eases after urination) and frequency. Pertinent negatives include no chills, discharge, hematuria, hesitancy, nausea, possible pregnancy, sweats, urgency or vomiting. She has tried nothing for the symptoms. Recurrent UTIs:  last was about 6 months ago. +CKD        The following portions of the patient's history were reviewed and updated as appropriate: allergies, current medications, past medical history, past social history, past surgical history and problem list.    Review of Systems   Constitutional: Negative for appetite change, chills, fatigue and fever.   Respiratory: Negative.    Cardiovascular: Negative.    Gastrointestinal: Negative for nausea and vomiting.   Genitourinary: Positive for dysuria, flank pain (eases after urination) and frequency. Negative for hematuria, hesitancy and urgency.   Musculoskeletal: Negative for myalgias.   Neurological: Negative for dizziness and headache.     /82 (BP Location: Right arm, Patient Position: Sitting, Cuff Size: Adult)   Pulse 68   Temp 98.2 °F (36.8 °C) (Oral)   Resp 20   Ht 157.5 cm (62\")   Wt 66.9 kg (147 lb 8 oz)   LMP  (LMP Unknown)   SpO2 97%   BMI 26.98 kg/m²     Objective   Physical Exam   Constitutional: She is oriented to person, place, and time. She appears well-developed and well-nourished. No distress.   Cardiovascular: Normal rate and regular rhythm.   Pulmonary/Chest: Effort normal and breath sounds " normal. She has no wheezes. She has no rales.   Abdominal: Soft. Bowel sounds are normal. There is tenderness ( mild suprapubic TTP). There is no rebound and no guarding.   Genitourinary:   Genitourinary Comments: Mild right flank pain   Neurological: She is alert and oriented to person, place, and time.   Nursing note and vitals reviewed.    Recent Results (from the past 24 hour(s))   POCT urinalysis dipstick, automated    Collection Time: 12/13/19  5:07 PM   Result Value Ref Range    Color Yellow Yellow, Straw, Dark Yellow, Janell    Clarity, UA Clear Clear    Specific Gravity  1.010 1.005 - 1.030    pH, Urine 7.0 5.0 - 8.0    Leukocytes Moderate (2+) (A) Negative    Nitrite, UA Negative Negative    Protein, POC 2+ (A) Negative mg/dL    Glucose, UA Negative Negative, 1000 mg/dL (3+) mg/dL    Ketones, UA Negative Negative    Urobilinogen, UA Normal Normal    Bilirubin Negative Negative    Blood, UA 3+ (A) Negative     No Images in the past 120 days found..      Assessment/Plan   Diagnoses and all orders for this visit:    Acute UTI  -     ciprofloxacin (CIPRO) 250 MG tablet; Take 1 tablet by mouth 2 (Two) Times a Day.    UTI symptoms  -     POCT urinalysis dipstick, automated    Stage 3 chronic kidney disease (CMS/HCC)    Other orders  -     fluconazole (DIFLUCAN) 150 MG tablet; 1 tab po x 1 now, may repeat in 4 days prn yeast    Increase water  Empty bladder frequently  Rx for Cipro 250 mg BID x 7 days  Nephrologist already ordered urine culture which was also obtained today    See PCP or RTC if symptoms persist/worsen  See PCP for routine f/u visit and management of chronic medical conditions

## 2019-12-14 LAB
CREAT UR-MCNC: 71.5 MG/DL
PROT UR-MCNC: 74 MG/DL
PROT/CREAT UR: 1035 MG/G CREA (ref 0–200)

## 2019-12-15 LAB — BACTERIA SPEC AEROBE CULT: ABNORMAL

## 2019-12-17 DIAGNOSIS — I12.9 BENIGN HYPERTENSIVE KIDNEY DISEASE WITH CHRONIC KIDNEY DISEASE STAGE I THROUGH STAGE IV, OR UNSPECIFIED: ICD-10-CM

## 2019-12-17 DIAGNOSIS — J30.1 CHRONIC SEASONAL ALLERGIC RHINITIS DUE TO POLLEN: ICD-10-CM

## 2019-12-17 RX ORDER — PROPRANOLOL HYDROCHLORIDE 80 MG/1
CAPSULE, EXTENDED RELEASE ORAL
Qty: 90 CAPSULE | Refills: 3 | Status: SHIPPED | OUTPATIENT
Start: 2019-12-17 | End: 2021-03-21 | Stop reason: SDUPTHER

## 2019-12-17 RX ORDER — MONTELUKAST SODIUM 10 MG/1
TABLET ORAL
Qty: 90 TABLET | Refills: 3 | Status: SHIPPED | OUTPATIENT
Start: 2019-12-17 | End: 2020-10-05

## 2019-12-26 DIAGNOSIS — F51.04 CHRONIC INSOMNIA: ICD-10-CM

## 2019-12-26 RX ORDER — AMITRIPTYLINE HYDROCHLORIDE 50 MG/1
TABLET, FILM COATED ORAL
Qty: 90 TABLET | Refills: 3 | Status: SHIPPED | OUTPATIENT
Start: 2019-12-26 | End: 2020-04-16

## 2020-01-21 RX ORDER — CYCLOBENZAPRINE HCL 10 MG
TABLET ORAL
Qty: 90 TABLET | Refills: 3 | Status: SHIPPED | OUTPATIENT
Start: 2020-01-21 | End: 2021-02-17 | Stop reason: SDUPTHER

## 2020-02-07 RX ORDER — POTASSIUM CHLORIDE 750 MG/1
TABLET, EXTENDED RELEASE ORAL
Qty: 270 TABLET | Refills: 3 | Status: SHIPPED | OUTPATIENT
Start: 2020-02-07 | End: 2020-09-16 | Stop reason: SDUPTHER

## 2020-03-09 RX ORDER — TRIAMTERENE AND HYDROCHLOROTHIAZIDE 75; 50 MG/1; MG/1
TABLET ORAL
Qty: 90 TABLET | Refills: 3 | Status: SHIPPED | OUTPATIENT
Start: 2020-03-09 | End: 2021-03-21 | Stop reason: SDUPTHER

## 2020-04-16 ENCOUNTER — TELEPHONE (OUTPATIENT)
Dept: FAMILY MEDICINE CLINIC | Age: 65
End: 2020-04-16

## 2020-04-16 RX ORDER — AMITRIPTYLINE HYDROCHLORIDE 25 MG/1
25 TABLET, FILM COATED ORAL NIGHTLY
Qty: 90 TABLET | Refills: 3 | Status: SHIPPED | OUTPATIENT
Start: 2020-04-16 | End: 2020-08-24 | Stop reason: SDUPTHER

## 2020-06-01 ENCOUNTER — HOSPITAL ENCOUNTER (OUTPATIENT)
Dept: WOMENS IMAGING | Age: 65
Discharge: HOME | End: 2020-06-01
Payer: COMMERCIAL

## 2020-06-01 PROCEDURE — 77063 BREAST TOMOSYNTHESIS BI: CPT

## 2020-07-10 ENCOUNTER — TRANSCRIBE ORDERS (OUTPATIENT)
Dept: LAB | Facility: HOSPITAL | Age: 65
End: 2020-07-10

## 2020-07-10 ENCOUNTER — LAB (OUTPATIENT)
Dept: LAB | Facility: HOSPITAL | Age: 65
End: 2020-07-10

## 2020-07-10 DIAGNOSIS — N18.30 CHRONIC KIDNEY DISEASE, STAGE III (MODERATE) (HCC): ICD-10-CM

## 2020-07-10 DIAGNOSIS — N18.30 CHRONIC KIDNEY DISEASE, STAGE III (MODERATE) (HCC): Primary | ICD-10-CM

## 2020-07-10 LAB
25(OH)D3 SERPL-MCNC: 64.8 NG/ML (ref 30–100)
ALBUMIN SERPL-MCNC: 4.3 G/DL (ref 3.5–5.2)
ALBUMIN/GLOB SERPL: 1.5 G/DL
ALP SERPL-CCNC: 49 U/L (ref 39–117)
ALT SERPL W P-5'-P-CCNC: 19 U/L (ref 1–33)
ANION GAP SERPL CALCULATED.3IONS-SCNC: 12 MMOL/L (ref 5–15)
AST SERPL-CCNC: 20 U/L (ref 1–32)
BILIRUB SERPL-MCNC: 0.5 MG/DL (ref 0–1.2)
BILIRUB UR QL STRIP: ABNORMAL
BUN SERPL-MCNC: 20 MG/DL (ref 8–23)
BUN/CREAT SERPL: 13.9 (ref 7–25)
CALCIUM SPEC-SCNC: 9.4 MG/DL (ref 8.6–10.5)
CHLORIDE SERPL-SCNC: 101 MMOL/L (ref 98–107)
CHOLEST SERPL-MCNC: 108 MG/DL (ref 0–200)
CLARITY UR: CLEAR
CO2 SERPL-SCNC: 24 MMOL/L (ref 22–29)
COLOR UR: YELLOW
CREAT SERPL-MCNC: 1.44 MG/DL (ref 0.57–1)
CREAT UR-MCNC: 285.3 MG/DL
DEPRECATED RDW RBC AUTO: 40.8 FL (ref 37–54)
ERYTHROCYTE [DISTWIDTH] IN BLOOD BY AUTOMATED COUNT: 12.5 % (ref 12.3–15.4)
GFR SERPL CREATININE-BSD FRML MDRD: 37 ML/MIN/1.73
GLOBULIN UR ELPH-MCNC: 2.9 GM/DL
GLUCOSE SERPL-MCNC: 106 MG/DL (ref 65–99)
GLUCOSE UR STRIP-MCNC: NEGATIVE MG/DL
HBA1C MFR BLD: 6.4 % (ref 4.8–5.6)
HCT VFR BLD AUTO: 41.8 % (ref 34–46.6)
HDLC SERPL-MCNC: 53 MG/DL (ref 40–60)
HGB BLD-MCNC: 14.2 G/DL (ref 12–15.9)
HGB UR QL STRIP.AUTO: NEGATIVE
KETONES UR QL STRIP: ABNORMAL
LDLC SERPL CALC-MCNC: 16 MG/DL (ref 0–100)
LDLC/HDLC SERPL: 0.29 {RATIO}
LEUKOCYTE ESTERASE UR QL STRIP.AUTO: NEGATIVE
MAGNESIUM SERPL-MCNC: 1.7 MG/DL (ref 1.6–2.4)
MCH RBC QN AUTO: 30.3 PG (ref 26.6–33)
MCHC RBC AUTO-ENTMCNC: 34 G/DL (ref 31.5–35.7)
MCV RBC AUTO: 89.1 FL (ref 79–97)
NITRITE UR QL STRIP: NEGATIVE
PH UR STRIP.AUTO: 5 [PH] (ref 5–8)
PLATELET # BLD AUTO: 291 10*3/MM3 (ref 140–450)
PMV BLD AUTO: 10.1 FL (ref 6–12)
POTASSIUM SERPL-SCNC: 4.3 MMOL/L (ref 3.5–5.2)
PROT SERPL-MCNC: 7.2 G/DL (ref 6–8.5)
PROT UR QL STRIP: ABNORMAL
PROT UR-MCNC: 19 MG/DL
PROT/CREAT UR: 66.6 MG/G CREA (ref 0–200)
PTH-INTACT SERPL-MCNC: 45 PG/ML (ref 15–65)
RBC # BLD AUTO: 4.69 10*6/MM3 (ref 3.77–5.28)
SODIUM SERPL-SCNC: 137 MMOL/L (ref 136–145)
SP GR UR STRIP: 1.02 (ref 1–1.03)
TRIGL SERPL-MCNC: 197 MG/DL (ref 0–150)
TSH SERPL DL<=0.05 MIU/L-ACNC: 0.21 UIU/ML (ref 0.27–4.2)
URATE SERPL-MCNC: 6.8 MG/DL (ref 2.4–5.7)
UROBILINOGEN UR QL STRIP: ABNORMAL
VLDLC SERPL-MCNC: 39.4 MG/DL (ref 5–40)
WBC # BLD AUTO: 9.63 10*3/MM3 (ref 3.4–10.8)

## 2020-07-10 PROCEDURE — 82306 VITAMIN D 25 HYDROXY: CPT

## 2020-07-10 PROCEDURE — 82570 ASSAY OF URINE CREATININE: CPT

## 2020-07-10 PROCEDURE — 84443 ASSAY THYROID STIM HORMONE: CPT

## 2020-07-10 PROCEDURE — 84550 ASSAY OF BLOOD/URIC ACID: CPT

## 2020-07-10 PROCEDURE — 83970 ASSAY OF PARATHORMONE: CPT

## 2020-07-10 PROCEDURE — 80061 LIPID PANEL: CPT

## 2020-07-10 PROCEDURE — 83036 HEMOGLOBIN GLYCOSYLATED A1C: CPT

## 2020-07-10 PROCEDURE — 80053 COMPREHEN METABOLIC PANEL: CPT

## 2020-07-10 PROCEDURE — 84156 ASSAY OF PROTEIN URINE: CPT

## 2020-07-10 PROCEDURE — 81003 URINALYSIS AUTO W/O SCOPE: CPT

## 2020-07-10 PROCEDURE — 85027 COMPLETE CBC AUTOMATED: CPT

## 2020-07-10 PROCEDURE — 83735 ASSAY OF MAGNESIUM: CPT

## 2020-07-17 ENCOUNTER — TELEMEDICINE (OUTPATIENT)
Dept: FAMILY MEDICINE CLINIC | Age: 65
End: 2020-07-17
Payer: MEDICARE

## 2020-07-17 LAB
ALBUMIN SERPL-MCNC: NORMAL G/DL
ALP BLD-CCNC: NORMAL U/L
ALT SERPL-CCNC: NORMAL U/L
ANION GAP SERPL CALCULATED.3IONS-SCNC: NORMAL MMOL/L
AST SERPL-CCNC: NORMAL U/L
BASOPHILS ABSOLUTE: NORMAL
BASOPHILS RELATIVE PERCENT: NORMAL
BILIRUB SERPL-MCNC: NORMAL MG/DL
BUN BLDV-MCNC: NORMAL MG/DL
CALCIUM SERPL-MCNC: NORMAL MG/DL
CHLORIDE BLD-SCNC: NORMAL MMOL/L
CO2: NORMAL
CREAT SERPL-MCNC: NORMAL MG/DL
EOSINOPHILS ABSOLUTE: NORMAL
EOSINOPHILS RELATIVE PERCENT: NORMAL
GFR CALCULATED: NORMAL
GLUCOSE BLD-MCNC: NORMAL MG/DL
HCT VFR BLD CALC: NORMAL %
HEMOGLOBIN: NORMAL
LYMPHOCYTES ABSOLUTE: NORMAL
LYMPHOCYTES RELATIVE PERCENT: NORMAL
MCH RBC QN AUTO: NORMAL PG
MCHC RBC AUTO-ENTMCNC: NORMAL G/DL
MCV RBC AUTO: NORMAL FL
MONOCYTES ABSOLUTE: NORMAL
MONOCYTES RELATIVE PERCENT: NORMAL
NEUTROPHILS ABSOLUTE: NORMAL
NEUTROPHILS RELATIVE PERCENT: NORMAL
PDW BLD-RTO: NORMAL %
PLATELET # BLD: NORMAL 10*3/UL
PMV BLD AUTO: NORMAL FL
POTASSIUM SERPL-SCNC: NORMAL MMOL/L
RBC # BLD: NORMAL 10*6/UL
SODIUM BLD-SCNC: NORMAL MMOL/L
TOTAL PROTEIN: NORMAL
TSH SERPL DL<=0.05 MIU/L-ACNC: 0.21 UIU/ML
WBC # BLD: NORMAL 10*3/UL

## 2020-07-17 PROCEDURE — G8428 CUR MEDS NOT DOCUMENT: HCPCS | Performed by: FAMILY MEDICINE

## 2020-07-17 PROCEDURE — 99214 OFFICE O/P EST MOD 30 MIN: CPT | Performed by: FAMILY MEDICINE

## 2020-07-17 PROCEDURE — 3017F COLORECTAL CA SCREEN DOC REV: CPT | Performed by: FAMILY MEDICINE

## 2020-07-17 RX ORDER — TRAMADOL HYDROCHLORIDE 50 MG/1
100 TABLET ORAL EVERY 8 HOURS PRN
Qty: 18 TABLET | Refills: 0 | Status: SHIPPED | OUTPATIENT
Start: 2020-07-17 | End: 2020-07-20

## 2020-07-17 RX ORDER — ALPRAZOLAM 0.25 MG/1
0.25 TABLET ORAL NIGHTLY PRN
Qty: 30 TABLET | Refills: 0 | Status: SHIPPED | OUTPATIENT
Start: 2020-07-17 | End: 2020-07-17 | Stop reason: SDUPTHER

## 2020-07-17 RX ORDER — ALPRAZOLAM 0.25 MG/1
TABLET ORAL
Qty: 90 TABLET | Refills: 1 | Status: SHIPPED | OUTPATIENT
Start: 2020-07-17 | End: 2020-07-17 | Stop reason: SDUPTHER

## 2020-07-17 RX ORDER — ALPRAZOLAM 0.25 MG/1
0.25 TABLET ORAL NIGHTLY PRN
Qty: 30 TABLET | Refills: 0 | Status: SHIPPED | OUTPATIENT
Start: 2020-07-17 | End: 2020-08-24 | Stop reason: SDUPTHER

## 2020-07-17 RX ORDER — TRAMADOL HYDROCHLORIDE 50 MG/1
100 TABLET ORAL EVERY 8 HOURS PRN
Qty: 18 TABLET | Refills: 0 | Status: SHIPPED | OUTPATIENT
Start: 2020-07-17 | End: 2020-07-17 | Stop reason: SDUPTHER

## 2020-07-17 RX ORDER — ALPRAZOLAM 0.25 MG/1
0.25 TABLET ORAL NIGHTLY PRN
Qty: 90 TABLET | Refills: 1 | Status: SHIPPED | OUTPATIENT
Start: 2020-07-17 | End: 2020-08-16

## 2020-07-17 ASSESSMENT — ENCOUNTER SYMPTOMS
ABDOMINAL PAIN: 0
BACK PAIN: 1
DIARRHEA: 0
NAUSEA: 0
CHEST TIGHTNESS: 0
COUGH: 0
SHORTNESS OF BREATH: 0
ANAL BLEEDING: 0
CONSTIPATION: 0

## 2020-07-17 NOTE — PROGRESS NOTES
extended release tablet TAKE 1 TABLET 3 TIMES A DAY  Vijaya Ernst MD   cyclobenzaprine (FLEXERIL) 10 MG tablet TAKE 1 TABLET NIGHTLY  Vijaya Ernst MD   propranolol (INDERAL LA) 80 MG extended release capsule TAKE 1 CAPSULE DAILY  Vijaya Ernst MD   montelukast (SINGULAIR) 10 MG tablet TAKE 1 TABLET NIGHTLY  Vijaya Ernst MD   levothyroxine (SYNTHROID) 112 MCG tablet Take 112 mcg by mouth Daily  Historical Provider, MD   estrogens, conjugated, (PREMARIN) 0.625 MG tablet TAKE 1 TABLET DAILY  Vijaya Ernst MD   amLODIPine (NORVASC) 5 MG tablet Take 1 tablet by mouth daily  Historical Provider, MD   ASPIRIN LOW DOSE 81 MG EC tablet Take 1 tablet by mouth daily  Historical Provider, MD   atorvastatin (LIPITOR) 40 MG tablet Take 1 tablet by mouth daily  Historical Provider, MD   clopidogrel (PLAVIX) 75 MG tablet Take 1 tablet by mouth daily  Historical Provider, MD   montelukast (SINGULAIR) 10 MG tablet TAKE 1 TABLET NIGHTLY  Vijaya Ernst MD   propranolol (INDERAL LA) 80 MG extended release capsule TAKE 1 CAPSULE DAILY  Vijaya Ernst MD   celecoxib (CELEBREX) 200 MG capsule TAKE 1 CAPSULE TWICE DAILY  Vijaya Ernst MD   losartan (COZAAR) 25 MG tablet TAKE 1 TABLET DAILY  Vijaya Ernst MD   VITAMIN B COMPLEX-C PO Take by mouth  Historical Provider, MD   MAGNESIUM CARBONATE PO Take by mouth  Historical Provider, MD   Omega-3 Fatty Acids (FISH OIL BURP-LESS PO) Take by mouth  Historical Provider, MD   CRANBERRY EXTRACT PO Take by mouth  Historical Provider, MD   vitamin D (CHOLECALCIFEROL) 1000 UNIT TABS tablet Take 1,000 Units by mouth daily  Historical Provider, MD   traMADol (ULTRAM) 50 MG tablet Take 1 tablet by mouth every 12 hours  Vijaya Ernst MD       Social History     Tobacco Use    Smoking status: Current Every Day Smoker     Packs/day: 1.00     Years: 25.00     Pack years: 25.00    Smokeless tobacco: Never Used   Substance Use Topics    Alcohol use:  No Alcohol/week: 0.0 standard drinks    Drug use: No            PHYSICAL EXAMINATION:  [ INSTRUCTIONS:  \"[x]\" Indicates a positive item  \"[]\" Indicates a negative item  -- DELETE ALL ITEMS NOT EXAMINED]  Vital Signs: (As obtained by patient/caregiver or practitioner observation)    Blood pressure-  Heart rate-    Respiratory rate-    Temperature-  Pulse oximetry-     Constitutional: [x] Appears well-developed and well-nourished [x] No apparent distress      [] Abnormal-   Mental status  [x] Alert and awake  [] Oriented to person/place/time [x]Able to follow commands      Eyes:  EOM    [x]  Normal  [] Abnormal-  Sclera  [x]  Normal  [] Abnormal -         Discharge []  None visible  [] Abnormal -    HENT:   [x] Normocephalic, atraumatic.   [] Abnormal   [] Mouth/Throat: Mucous membranes are moist.     External Ears [x] Normal  [] Abnormal-     Neck: [x] No visualized mass     Pulmonary/Chest: [x] Respiratory effort normal.  [x] No visualized signs of difficulty breathing or respiratory distress        [] Abnormal-      Musculoskeletal:   [] Normal gait with no signs of ataxia         [] Normal range of motion of neck        [] Abnormal-       Neurological:        [] No Facial Asymmetry (Cranial nerve 7 motor function) (limited exam to video visit)          [] No gaze palsy        [] Abnormal-         Skin:        [x] No significant exanthematous lesions or discoloration noted on facial skin         [] Abnormal-            Psychiatric:       [x] Normal Affect [x] No Hallucinations        [] Abnormal-     Other pertinent observable physical exam findings-     Recent Results (from the past 672 hour(s))   TSH without Reflex    Collection Time: 07/10/20 12:00 AM   Result Value Ref Range    TSH 0.211 uIU/mL   Comprehensive Metabolic Panel    Collection Time: 07/10/20 12:00 AM   Result Value Ref Range    Sodium      Chloride      Potassium      BUN      CREATININE      Glucose      AST      ALT      Calcium      Total Protein CO2      Alb      Alkaline Phosphatase      Total Bilirubin      Gfr Calculated      Anion Gap     CBC Auto Differential    Collection Time: 07/10/20 12:00 AM   Result Value Ref Range    WBC      RBC      Hemoglobin      Hematocrit      MCV      MCH      MCHC      Platelets      RDW      MPV      Neutrophils %      Lymphocytes %      Monocytes %      Eosinophils %      Basophils %      Neutrophils Absolute      Lymphocytes Absolute      Monocytes Absolute      Eosinophils Absolute      Basophils Absolute           ASSESSMENT/PLAN:  1. Essential (primary) hypertension  BP Readings from Last 3 Encounters:   10/25/19 116/68   07/08/19 122/72   04/23/19 (!) 146/88     Blood pressure readings have been stable    2. Chronic insomnia  Discussed risk and benefits of taking benzodiazepines. Risks include addiction and tolerance. If develops impairment or over sedation with medication, discontinue and contact me. Do not take medication with alcohol. Advised to take sparingly. Advised to keep medication hidden and locked up as theft is high with these medications as well. - ALPRAZolam (XANAX) 0.25 MG tablet; TAKE 1 TABLET BY MOUTH EVERY NIGHT AS NEEDED FOR SLEEP OR ANXIETY  Dispense: 90 tablet; Refill: 1    3. Generalized anxiety disorder    - ALPRAZolam (XANAX) 0.25 MG tablet; TAKE 1 TABLET BY MOUTH EVERY NIGHT AS NEEDED FOR SLEEP OR ANXIETY  Dispense: 90 tablet; Refill: 1  - ALPRAZolam (XANAX) 0.25 MG tablet; Take 1 tablet by mouth nightly as needed for Anxiety for up to 30 days. Dispense: 30 tablet; Refill: 0    4. Benign hypertensive kidney disease with chronic kidney disease stage I through stage IV, or unspecified  Lab Results   Component Value Date    CREATININE 1.6 (H) 11/08/2019     Lab Results   Component Value Date    BUN 28 (H) 11/08/2019       Renal function has improved      5. Chronic seasonal allergic rhinitis due to pollen  Stable    6. Stage 3 chronic kidney disease ([facility] [address]  As above    7. Gastroesophageal reflux disease without esophagitis  Stable    8. Hyperglycemia  Discussed lifestyle changes such as diet and exercise. Recommended eliminate processed food from diet such as sugar and fried foods. Recommended exercising at least 150 minutes/week. Try to do full body resistance training twice a week as well. Offered suggestions for calorie counting such as phone apps and online resources such as My fitness pal and Lose it. Discussed healthy weight. 9. Hyperlipidemia, unspecified hyperlipidemia type  Stable    10. Coronary artery disease involving native coronary artery of native heart without angina pectoris  No chest pain or palpitations    11. Osteoarthritis of left hip, unspecified osteoarthritis type  Discussed risk and benefits of taking opioids. Risks include addiction and tolerance. If develops impairment or over sedation with medication, discontinue and contact me. Do not take medication with alcohol. Advised to take sparingly. Advised to keep medication hidden and locked up as risk of theft is high with these medications as well. - traMADol (ULTRAM) 50 MG tablet; Take 2 tablets by mouth every 8 hours as needed for Pain for up to 3 days. Intended supply: 3 days. Take lowest dose possible to manage pain  Dispense: 18 tablet; Refill: 0      Return in about 6 months (around 1/17/2021) for AWV - 30 minute visit. Miguel Wright is a 59 y.o. female being evaluated by a Virtual Visit (video visit) encounter to address concerns as mentioned above. A caregiver was present when appropriate. Due to this being a TeleHealth encounter (During ZEK-03 public health emergency), evaluation of the following organ systems was limited: Vitals/Constitutional/EENT/Resp/CV/GI//MS/Neuro/Skin/Heme-Lymph-Imm.   Pursuant to the emergency declaration under the [facility] [place], [address] waiver authority and the [employer]

## 2020-08-04 ENCOUNTER — LAB (OUTPATIENT)
Dept: LAB | Facility: HOSPITAL | Age: 65
End: 2020-08-04

## 2020-08-04 ENCOUNTER — TRANSCRIBE ORDERS (OUTPATIENT)
Dept: LAB | Facility: HOSPITAL | Age: 65
End: 2020-08-04

## 2020-08-04 DIAGNOSIS — N18.30 CHRONIC KIDNEY DISEASE, STAGE III (MODERATE) (HCC): ICD-10-CM

## 2020-08-04 DIAGNOSIS — N18.6 END STAGE RENAL DISEASE (HCC): ICD-10-CM

## 2020-08-04 DIAGNOSIS — N18.30 CHRONIC KIDNEY DISEASE, STAGE III (MODERATE) (HCC): Primary | ICD-10-CM

## 2020-08-04 LAB
BACTERIA UR QL AUTO: NORMAL /HPF
BILIRUB UR QL STRIP: NEGATIVE
CLARITY UR: ABNORMAL
COLOR UR: YELLOW
GLUCOSE UR STRIP-MCNC: NEGATIVE MG/DL
HGB UR QL STRIP.AUTO: NEGATIVE
HYALINE CASTS UR QL AUTO: NORMAL /LPF
KETONES UR QL STRIP: ABNORMAL
LEUKOCYTE ESTERASE UR QL STRIP.AUTO: NEGATIVE
NITRITE UR QL STRIP: NEGATIVE
PH UR STRIP.AUTO: 5 [PH] (ref 5–8)
PROT UR QL STRIP: ABNORMAL
RBC # UR: NORMAL /HPF
REF LAB TEST METHOD: NORMAL
SP GR UR STRIP: 1.03 (ref 1–1.03)
SQUAMOUS #/AREA URNS HPF: NORMAL /HPF
UROBILINOGEN UR QL STRIP: ABNORMAL
WBC UR QL AUTO: NORMAL /HPF

## 2020-08-04 PROCEDURE — 81015 MICROSCOPIC EXAM OF URINE: CPT

## 2020-08-04 PROCEDURE — 81003 URINALYSIS AUTO W/O SCOPE: CPT

## 2020-08-04 PROCEDURE — 87086 URINE CULTURE/COLONY COUNT: CPT

## 2020-08-05 LAB — BACTERIA SPEC AEROBE CULT: NO GROWTH

## 2020-08-24 ENCOUNTER — TELEPHONE (OUTPATIENT)
Dept: FAMILY MEDICINE CLINIC | Age: 65
End: 2020-08-24

## 2020-08-24 RX ORDER — AMITRIPTYLINE HYDROCHLORIDE 25 MG/1
25 TABLET, FILM COATED ORAL NIGHTLY
Qty: 90 TABLET | Refills: 3 | Status: SHIPPED | OUTPATIENT
Start: 2020-08-24 | End: 2020-09-01 | Stop reason: SDUPTHER

## 2020-08-24 RX ORDER — AMLODIPINE BESYLATE 5 MG/1
5 TABLET ORAL DAILY
Qty: 90 TABLET | Refills: 3 | Status: SHIPPED | OUTPATIENT
Start: 2020-08-24 | End: 2020-09-16 | Stop reason: SDUPTHER

## 2020-08-24 RX ORDER — AMITRIPTYLINE HYDROCHLORIDE 25 MG/1
25 TABLET, FILM COATED ORAL NIGHTLY
Qty: 90 TABLET | Refills: 3 | Status: SHIPPED | OUTPATIENT
Start: 2020-08-24 | End: 2020-08-24 | Stop reason: SDUPTHER

## 2020-08-24 RX ORDER — CELECOXIB 200 MG/1
CAPSULE ORAL
Qty: 180 CAPSULE | Refills: 3 | Status: SHIPPED | OUTPATIENT
Start: 2020-08-24 | End: 2020-09-16 | Stop reason: SDUPTHER

## 2020-08-24 RX ORDER — ATORVASTATIN CALCIUM 40 MG/1
40 TABLET, FILM COATED ORAL DAILY
Qty: 90 TABLET | Refills: 3 | Status: SHIPPED | OUTPATIENT
Start: 2020-08-24 | End: 2020-09-16 | Stop reason: SDUPTHER

## 2020-08-25 RX ORDER — ALPRAZOLAM 0.25 MG/1
0.25 TABLET ORAL NIGHTLY PRN
Qty: 90 TABLET | Refills: 0 | Status: SHIPPED | OUTPATIENT
Start: 2020-08-25 | End: 2020-09-01 | Stop reason: SDUPTHER

## 2020-08-25 NOTE — TELEPHONE ENCOUNTER
Refilled medication and e-scribed to pharmacy. Confirm prescription was due. Make sure KOBI and urine drug screen is up to date.

## 2020-09-01 RX ORDER — AMITRIPTYLINE HYDROCHLORIDE 25 MG/1
25 TABLET, FILM COATED ORAL NIGHTLY
Qty: 90 TABLET | Refills: 3 | Status: SHIPPED | OUTPATIENT
Start: 2020-09-01 | End: 2020-09-16 | Stop reason: SDUPTHER

## 2020-09-01 RX ORDER — LOSARTAN POTASSIUM 25 MG/1
TABLET ORAL
Qty: 90 TABLET | Refills: 3 | Status: SHIPPED | OUTPATIENT
Start: 2020-09-01 | End: 2020-09-16 | Stop reason: SDUPTHER

## 2020-09-01 RX ORDER — ALPRAZOLAM 0.25 MG/1
0.25 TABLET ORAL NIGHTLY PRN
Qty: 90 TABLET | Refills: 0 | Status: SHIPPED | OUTPATIENT
Start: 2020-09-01 | End: 2020-09-17 | Stop reason: SDUPTHER

## 2020-09-01 NOTE — TELEPHONE ENCOUNTER
PATIENT NEEDS 30 DAY FILL TO LOCAL PHARMACY UNTIL MAIL ORDER IS DELIVERED         Jennifer Bender called to request a refill on her medication. Last office visit : 7/17/2020   Next office visit : 1/21/2021     Last UDS:   Amphetamine Screen, Urine   Date Value Ref Range Status   04/17/2018 Negative  Final     Barbiturate Screen, Urine   Date Value Ref Range Status   04/17/2018 Negative  Final     Benzodiazepine Screen, Urine   Date Value Ref Range Status   04/17/2018 Positive  Final     Cocaine Metabolite Screen, Urine   Date Value Ref Range Status   04/17/2018 Negative  Final     MDMA, Urine   Date Value Ref Range Status   04/17/2018 Negative  Final     Methamphetamine, Urine   Date Value Ref Range Status   04/17/2018 Negative  Final     Opiate Scrn, Ur   Date Value Ref Range Status   04/17/2018 Negative  Final     Oxycodone Screen, Ur   Date Value Ref Range Status   04/17/2018 Negative  Final     PCP Screen, Urine   Date Value Ref Range Status   04/17/2018 Negative  Final     Propoxyphene Screen, Urine   Date Value Ref Range Status   04/17/2018 Negative  Final     Tricyclic Antidepressants, Urine   Date Value Ref Range Status   04/17/2018 Negative  Final       Last Ronaldo Sleet: 8/22/20  Medication Contract: needs update at next visit   Last Fill: 6/17/20    Requested Prescriptions     Pending Prescriptions Disp Refills    ALPRAZolam (XANAX) 0.25 MG tablet 90 tablet 0     Sig: Take 1 tablet by mouth nightly as needed for Anxiety for up to 30 days. Please approve or refuse this medication.    Keyur Kan

## 2020-09-16 RX ORDER — AMITRIPTYLINE HYDROCHLORIDE 25 MG/1
25 TABLET, FILM COATED ORAL NIGHTLY
Qty: 90 TABLET | Refills: 3 | Status: SHIPPED | OUTPATIENT
Start: 2020-09-16 | End: 2021-09-08

## 2020-09-16 RX ORDER — AMLODIPINE BESYLATE 5 MG/1
5 TABLET ORAL DAILY
Qty: 90 TABLET | Refills: 3 | Status: SHIPPED | OUTPATIENT
Start: 2020-09-16 | End: 2021-03-21 | Stop reason: SDUPTHER

## 2020-09-16 RX ORDER — POTASSIUM CHLORIDE 750 MG/1
TABLET, EXTENDED RELEASE ORAL
Qty: 270 TABLET | Refills: 3 | Status: SHIPPED | OUTPATIENT
Start: 2020-09-16 | End: 2022-04-25

## 2020-09-16 RX ORDER — PROPRANOLOL HYDROCHLORIDE 80 MG/1
CAPSULE, EXTENDED RELEASE ORAL
Qty: 90 CAPSULE | Refills: 3 | Status: SHIPPED | OUTPATIENT
Start: 2020-09-16 | End: 2021-01-21 | Stop reason: ALTCHOICE

## 2020-09-16 RX ORDER — ATORVASTATIN CALCIUM 40 MG/1
40 TABLET, FILM COATED ORAL DAILY
Qty: 90 TABLET | Refills: 3 | Status: SHIPPED | OUTPATIENT
Start: 2020-09-16

## 2020-09-16 RX ORDER — CELECOXIB 200 MG/1
CAPSULE ORAL
Qty: 180 CAPSULE | Refills: 3 | Status: SHIPPED | OUTPATIENT
Start: 2020-09-16 | End: 2021-02-17 | Stop reason: SDUPTHER

## 2020-09-16 RX ORDER — LOSARTAN POTASSIUM 25 MG/1
TABLET ORAL
Qty: 90 TABLET | Refills: 3 | Status: SHIPPED | OUTPATIENT
Start: 2020-09-16 | End: 2022-04-18

## 2020-09-16 NOTE — TELEPHONE ENCOUNTER
Iris Miguel called to request a refill on her medication. Last office visit : 7/17/2020   Next office visit : 1/21/2021     Last UDS:   Amphetamine Screen, Urine   Date Value Ref Range Status   04/17/2018 Negative  Final     Barbiturate Screen, Urine   Date Value Ref Range Status   04/17/2018 Negative  Final     Benzodiazepine Screen, Urine   Date Value Ref Range Status   04/17/2018 Positive  Final     Cocaine Metabolite Screen, Urine   Date Value Ref Range Status   04/17/2018 Negative  Final     MDMA, Urine   Date Value Ref Range Status   04/17/2018 Negative  Final     Methamphetamine, Urine   Date Value Ref Range Status   04/17/2018 Negative  Final     Opiate Scrn, Ur   Date Value Ref Range Status   04/17/2018 Negative  Final     Oxycodone Screen, Ur   Date Value Ref Range Status   04/17/2018 Negative  Final     PCP Screen, Urine   Date Value Ref Range Status   04/17/2018 Negative  Final     Propoxyphene Screen, Urine   Date Value Ref Range Status   04/17/2018 Negative  Final     Tricyclic Antidepressants, Urine   Date Value Ref Range Status   04/17/2018 Negative  Final       Last Mejia Smith: 8/22/20  Medication Contract: needs update at next visit   Last Fill: 6/17/20    Requested Prescriptions     Pending Prescriptions Disp Refills    ALPRAZolam (XANAX) 0.25 MG tablet 90 tablet 0     Sig: Take 1 tablet by mouth nightly as needed for Anxiety for up to 30 days. Please approve or refuse this medication.    Elle Chew

## 2020-09-17 RX ORDER — ALPRAZOLAM 0.25 MG/1
0.25 TABLET ORAL NIGHTLY PRN
Qty: 90 TABLET | Refills: 0 | Status: SHIPPED | OUTPATIENT
Start: 2020-09-17 | End: 2020-10-02 | Stop reason: SDUPTHER

## 2020-10-01 ENCOUNTER — TELEPHONE (OUTPATIENT)
Dept: FAMILY MEDICINE CLINIC | Age: 65
End: 2020-10-01

## 2020-10-01 NOTE — TELEPHONE ENCOUNTER
Pt called and said she has been trying for a couple of weeks to get refill on Alprazolam. In chart it says 09/17 transmission to pharm failed to [facility]. She first said she wanted a 30 day supply sent to [facility] and 90 day supply sent to mail order but I told her I didn't know if we could do both.  She said to do 90 day to [facility] mail order and the phone # they gave her to call [facility] [phone]

## 2020-10-02 RX ORDER — ALPRAZOLAM 0.25 MG/1
0.25 TABLET ORAL NIGHTLY PRN
Qty: 30 TABLET | Refills: 0 | Status: SHIPPED | OUTPATIENT
Start: 2020-10-02 | End: 2020-10-05 | Stop reason: SDUPTHER

## 2020-10-05 RX ORDER — ALPRAZOLAM 0.25 MG/1
0.25 TABLET ORAL NIGHTLY PRN
Qty: 30 TABLET | Refills: 0 | Status: SHIPPED | OUTPATIENT
Start: 2020-10-05 | End: 2020-11-04

## 2020-10-05 RX ORDER — MONTELUKAST SODIUM 10 MG/1
TABLET ORAL
Qty: 90 TABLET | Refills: 3 | Status: SHIPPED | OUTPATIENT
Start: 2020-10-05 | End: 2021-02-17 | Stop reason: SDUPTHER

## 2020-10-05 RX ORDER — ALPRAZOLAM 0.25 MG/1
0.25 TABLET ORAL NIGHTLY PRN
Qty: 30 TABLET | Refills: 0 | Status: SHIPPED | OUTPATIENT
Start: 2020-10-05 | End: 2020-10-05 | Stop reason: SDUPTHER

## 2020-10-13 ENCOUNTER — NURSE ONLY (OUTPATIENT)
Dept: FAMILY MEDICINE CLINIC | Age: 65
End: 2020-10-13
Payer: MEDICARE

## 2020-10-13 PROCEDURE — G0008 ADMIN INFLUENZA VIRUS VAC: HCPCS | Performed by: FAMILY MEDICINE

## 2020-10-13 PROCEDURE — 90694 VACC AIIV4 NO PRSRV 0.5ML IM: CPT | Performed by: FAMILY MEDICINE

## 2020-10-13 NOTE — PROGRESS NOTES
Vaccine Information Sheet, \"Influenza - Inactivated\" OR \"Live - Intranasal\"  given to Rosa Saenz, or parent/legal guardian of  Rosa Saenz and verbalized understanding. Patient responses:    Have you ever had a reaction to a flu vaccine? No  Are you able to eat eggs without adverse effects? Yes  Do you have any current illness? No  Have you ever had Guillian [place] Syndrome? No    Flu vaccine given per order. Please see immunization tab.

## 2020-11-19 RX ORDER — ALPRAZOLAM 0.25 MG/1
TABLET ORAL
Qty: 30 TABLET | Refills: 2 | Status: SHIPPED | OUTPATIENT
Start: 2020-11-19 | End: 2020-11-20

## 2020-11-19 NOTE — TELEPHONE ENCOUNTER
Rajan Tobin called to request a refill on her medication. Last office visit : 7/17/2020   Next office visit : 1/21/2021     Last UDS:   Amphetamine Screen, Urine   Date Value Ref Range Status   04/17/2018 Negative  Final     Barbiturate Screen, Urine   Date Value Ref Range Status   04/17/2018 Negative  Final     Benzodiazepine Screen, Urine   Date Value Ref Range Status   04/17/2018 Positive  Final     Cocaine Metabolite Screen, Urine   Date Value Ref Range Status   04/17/2018 Negative  Final     MDMA, Urine   Date Value Ref Range Status   04/17/2018 Negative  Final     Methamphetamine, Urine   Date Value Ref Range Status   04/17/2018 Negative  Final     Opiate Scrn, Ur   Date Value Ref Range Status   04/17/2018 Negative  Final     Oxycodone Screen, Ur   Date Value Ref Range Status   04/17/2018 Negative  Final     PCP Screen, Urine   Date Value Ref Range Status   04/17/2018 Negative  Final     Propoxyphene Screen, Urine   Date Value Ref Range Status   04/17/2018 Negative  Final     Tricyclic Antidepressants, Urine   Date Value Ref Range Status   04/17/2018 Negative  Final       Last [place]: 8/22/20  Medication Contract: needs update at next visit   Last Fill: 10/5/20    Requested Prescriptions     Pending Prescriptions Disp Refills    ALPRAZolam (XANAX) 0.25 MG tablet [Pharmacy Med Name: ALPRAZolam 0.25 MG Oral Tablet] 30 tablet 0     Sig: TAKE 1 TABLET BY MOUTH NIGHTLY AS NEEDED FOR ANXIETY         Please approve or refuse this medication.    Veronica Duggan

## 2020-11-20 ENCOUNTER — TELEPHONE (OUTPATIENT)
Dept: FAMILY MEDICINE CLINIC | Age: 65
End: 2020-11-20

## 2020-11-20 RX ORDER — ALPRAZOLAM 0.25 MG/1
TABLET ORAL
Qty: 30 TABLET | Refills: 0 | Status: SHIPPED | OUTPATIENT
Start: 2020-11-20 | End: 2020-11-20 | Stop reason: SDUPTHER

## 2020-11-20 RX ORDER — ALPRAZOLAM 0.25 MG/1
0.25 TABLET ORAL NIGHTLY PRN
Qty: 30 TABLET | Refills: 0 | Status: SHIPPED | OUTPATIENT
Start: 2020-11-20 | End: 2020-12-18

## 2020-11-20 NOTE — TELEPHONE ENCOUNTER
I have tried to send this in twice but I think it failed both times, not sure why. Please double check.

## 2020-12-18 RX ORDER — ALPRAZOLAM 0.25 MG/1
TABLET ORAL
Qty: 30 TABLET | Refills: 0 | Status: SHIPPED | OUTPATIENT
Start: 2020-12-18 | End: 2021-01-17

## 2021-01-21 ENCOUNTER — OFFICE VISIT (OUTPATIENT)
Dept: FAMILY MEDICINE CLINIC | Age: 66
End: 2021-01-21
Payer: MEDICARE

## 2021-01-21 VITALS
TEMPERATURE: 98 F | HEART RATE: 61 BPM | HEIGHT: 62 IN | OXYGEN SATURATION: 98 % | BODY MASS INDEX: 29.08 KG/M2 | DIASTOLIC BLOOD PRESSURE: 74 MMHG | WEIGHT: 158 LBS | SYSTOLIC BLOOD PRESSURE: 124 MMHG

## 2021-01-21 DIAGNOSIS — Z00.00 ANNUAL PHYSICAL EXAM: Primary | ICD-10-CM

## 2021-01-21 DIAGNOSIS — E78.5 HYPERLIPIDEMIA, UNSPECIFIED HYPERLIPIDEMIA TYPE: ICD-10-CM

## 2021-01-21 DIAGNOSIS — J30.1 CHRONIC SEASONAL ALLERGIC RHINITIS DUE TO POLLEN: ICD-10-CM

## 2021-01-21 DIAGNOSIS — E89.0 POSTSURGICAL HYPOTHYROIDISM: ICD-10-CM

## 2021-01-21 DIAGNOSIS — N95.1 SYMPTOMS, SUCH AS FLUSHING, SLEEPLESSNESS, HEADACHE, LACK OF CONCENTRATION, ASSOCIATED WITH THE MENOPAUSE: ICD-10-CM

## 2021-01-21 DIAGNOSIS — M54.50 CHRONIC MIDLINE LOW BACK PAIN WITHOUT SCIATICA: ICD-10-CM

## 2021-01-21 DIAGNOSIS — M35.3 POLYMYALGIA (HCC): ICD-10-CM

## 2021-01-21 DIAGNOSIS — G89.29 CHRONIC MIDLINE LOW BACK PAIN WITHOUT SCIATICA: ICD-10-CM

## 2021-01-21 DIAGNOSIS — R73.9 HYPERGLYCEMIA: ICD-10-CM

## 2021-01-21 DIAGNOSIS — I12.9 BENIGN HYPERTENSIVE KIDNEY DISEASE WITH CHRONIC KIDNEY DISEASE STAGE I THROUGH STAGE IV, OR UNSPECIFIED: ICD-10-CM

## 2021-01-21 DIAGNOSIS — N18.31 STAGE 3A CHRONIC KIDNEY DISEASE (HCC): ICD-10-CM

## 2021-01-21 DIAGNOSIS — F41.1 GENERALIZED ANXIETY DISORDER: ICD-10-CM

## 2021-01-21 DIAGNOSIS — I10 ESSENTIAL (PRIMARY) HYPERTENSION: ICD-10-CM

## 2021-01-21 DIAGNOSIS — Z23 ENCOUNTER FOR IMMUNIZATION: ICD-10-CM

## 2021-01-21 LAB
ALBUMIN SERPL-MCNC: 4.5 G/DL (ref 3.5–5.2)
ALP BLD-CCNC: 65 U/L (ref 35–104)
ALT SERPL-CCNC: 19 U/L (ref 5–33)
ANION GAP SERPL CALCULATED.3IONS-SCNC: 16 MMOL/L (ref 7–19)
AST SERPL-CCNC: 21 U/L (ref 5–32)
BASOPHILS ABSOLUTE: 0 K/UL (ref 0–0.2)
BASOPHILS RELATIVE PERCENT: 0.4 % (ref 0–1)
BILIRUB SERPL-MCNC: 0.7 MG/DL (ref 0.2–1.2)
BILIRUBIN URINE: NEGATIVE
BLOOD, URINE: NEGATIVE
BUN BLDV-MCNC: 22 MG/DL (ref 8–23)
CALCIUM SERPL-MCNC: 9.5 MG/DL (ref 8.8–10.2)
CHLORIDE BLD-SCNC: 99 MMOL/L (ref 98–111)
CHOLESTEROL, TOTAL: 125 MG/DL (ref 160–199)
CLARITY: CLEAR
CO2: 26 MMOL/L (ref 22–29)
COLOR: YELLOW
CREAT SERPL-MCNC: 1.2 MG/DL (ref 0.5–0.9)
CREATININE URINE: 72.2 MG/DL (ref 4.2–622)
EOSINOPHILS ABSOLUTE: 0.2 K/UL (ref 0–0.6)
EOSINOPHILS RELATIVE PERCENT: 2.3 % (ref 0–5)
GFR AFRICAN AMERICAN: 54
GFR NON-AFRICAN AMERICAN: 45
GLUCOSE BLD-MCNC: 115 MG/DL (ref 74–109)
GLUCOSE URINE: NEGATIVE MG/DL
HBA1C MFR BLD: 6.3 % (ref 4–6)
HCT VFR BLD CALC: 43.3 % (ref 37–47)
HDLC SERPL-MCNC: 52 MG/DL (ref 65–121)
HEMOGLOBIN: 13.9 G/DL (ref 12–16)
IMMATURE GRANULOCYTES #: 0 K/UL
KETONES, URINE: NEGATIVE MG/DL
LDL CHOLESTEROL CALCULATED: 39 MG/DL
LEUKOCYTE ESTERASE, URINE: NEGATIVE
LYMPHOCYTES ABSOLUTE: 2.3 K/UL (ref 1.1–4.5)
LYMPHOCYTES RELATIVE PERCENT: 22.8 % (ref 20–40)
MAGNESIUM: 1.9 MG/DL (ref 1.6–2.4)
MCH RBC QN AUTO: 29.5 PG (ref 27–31)
MCHC RBC AUTO-ENTMCNC: 32.1 G/DL (ref 33–37)
MCV RBC AUTO: 91.9 FL (ref 81–99)
MONOCYTES ABSOLUTE: 0.9 K/UL (ref 0–0.9)
MONOCYTES RELATIVE PERCENT: 9.4 % (ref 0–10)
NEUTROPHILS ABSOLUTE: 6.5 K/UL (ref 1.5–7.5)
NEUTROPHILS RELATIVE PERCENT: 64.8 % (ref 50–65)
NITRITE, URINE: NEGATIVE
PARATHYROID HORMONE INTACT: 42.9 PG/ML (ref 15–65)
PDW BLD-RTO: 12.9 % (ref 11.5–14.5)
PH UA: 7 (ref 5–8)
PHOSPHORUS: 4 MG/DL (ref 2.5–4.5)
PLATELET # BLD: 290 K/UL (ref 130–400)
PMV BLD AUTO: 9.5 FL (ref 9.4–12.3)
POTASSIUM SERPL-SCNC: 3.8 MMOL/L (ref 3.5–5)
PROTEIN PROTEIN: 6 MG/DL (ref 15–45)
PROTEIN UA: NEGATIVE MG/DL
RBC # BLD: 4.71 M/UL (ref 4.2–5.4)
SODIUM BLD-SCNC: 141 MMOL/L (ref 136–145)
SPECIFIC GRAVITY UA: 1.01 (ref 1–1.03)
T4 FREE: 1.93 NG/DL (ref 0.93–1.7)
TOTAL PROTEIN: 7.4 G/DL (ref 6.6–8.7)
TRIGL SERPL-MCNC: 169 MG/DL (ref 0–149)
TSH SERPL DL<=0.05 MIU/L-ACNC: 0.43 UIU/ML (ref 0.27–4.2)
URIC ACID, SERUM: 7.1 MG/DL (ref 2.4–5.7)
UROBILINOGEN, URINE: 0.2 E.U./DL
VITAMIN D 25-HYDROXY: 87.8 NG/ML
WBC # BLD: 10.1 K/UL (ref 4.8–10.8)

## 2021-01-21 PROCEDURE — G0009 ADMIN PNEUMOCOCCAL VACCINE: HCPCS | Performed by: FAMILY MEDICINE

## 2021-01-21 PROCEDURE — G8399 PT W/DXA RESULTS DOCUMENT: HCPCS | Performed by: FAMILY MEDICINE

## 2021-01-21 PROCEDURE — G8417 CALC BMI ABV UP PARAM F/U: HCPCS | Performed by: FAMILY MEDICINE

## 2021-01-21 PROCEDURE — 3017F COLORECTAL CA SCREEN DOC REV: CPT | Performed by: FAMILY MEDICINE

## 2021-01-21 PROCEDURE — 1123F ACP DISCUSS/DSCN MKR DOCD: CPT | Performed by: FAMILY MEDICINE

## 2021-01-21 PROCEDURE — G8427 DOCREV CUR MEDS BY ELIG CLIN: HCPCS | Performed by: FAMILY MEDICINE

## 2021-01-21 PROCEDURE — 90670 PCV13 VACCINE IM: CPT | Performed by: FAMILY MEDICINE

## 2021-01-21 PROCEDURE — 1090F PRES/ABSN URINE INCON ASSESS: CPT | Performed by: FAMILY MEDICINE

## 2021-01-21 PROCEDURE — 4004F PT TOBACCO SCREEN RCVD TLK: CPT | Performed by: FAMILY MEDICINE

## 2021-01-21 PROCEDURE — 4040F PNEUMOC VAC/ADMIN/RCVD: CPT | Performed by: FAMILY MEDICINE

## 2021-01-21 PROCEDURE — G0402 INITIAL PREVENTIVE EXAM: HCPCS | Performed by: FAMILY MEDICINE

## 2021-01-21 PROCEDURE — 99214 OFFICE O/P EST MOD 30 MIN: CPT | Performed by: FAMILY MEDICINE

## 2021-01-21 PROCEDURE — G8484 FLU IMMUNIZE NO ADMIN: HCPCS | Performed by: FAMILY MEDICINE

## 2021-01-21 RX ORDER — ALPRAZOLAM 0.25 MG/1
0.25 TABLET ORAL NIGHTLY PRN
Qty: 90 TABLET | Refills: 5 | Status: SHIPPED | OUTPATIENT
Start: 2021-01-21 | End: 2021-01-28 | Stop reason: SDUPTHER

## 2021-01-21 RX ORDER — CICLOPIROX 1 G/100ML
SHAMPOO TOPICAL
Qty: 120 ML | Refills: 0 | Status: SHIPPED | OUTPATIENT
Start: 2021-01-21 | End: 2021-01-26 | Stop reason: SDUPTHER

## 2021-01-21 ASSESSMENT — ENCOUNTER SYMPTOMS
ABDOMINAL PAIN: 0
NAUSEA: 0
COUGH: 0
ANAL BLEEDING: 0
CHEST TIGHTNESS: 0
SHORTNESS OF BREATH: 0
BACK PAIN: 1
CONSTIPATION: 0
DIARRHEA: 0

## 2021-01-21 ASSESSMENT — PATIENT HEALTH QUESTIONNAIRE - PHQ9
SUM OF ALL RESPONSES TO PHQ QUESTIONS 1-9: 0
2. FEELING DOWN, DEPRESSED OR HOPELESS: 0
SUM OF ALL RESPONSES TO PHQ QUESTIONS 1-9: 0
SUM OF ALL RESPONSES TO PHQ QUESTIONS 1-9: 0

## 2021-01-21 NOTE — PROGRESS NOTES
After obtaining consent, and per orders of Dr. Celina Pastrana, injection of Prevnar 13 given in Left deltoid by Leonela Farrell. Patient instructed to remain in clinic for 20 minutes afterwards, and to report any adverse reaction to me immediately.

## 2021-01-21 NOTE — PATIENT INSTRUCTIONS
COVID 19 Information:    [email or web address]  · Click \"learn more\" on the red banner at the top for Coronavirus Updates  · Click ZPHFV-64 Vaccine Resources  · Click [place] HTGAD-91 Vaccination Information  · If your demographic is eligible (refer to the tier), call the number listed.   · [phone]    [email or web address]    Call your local health department for vaccine availability

## 2021-01-21 NOTE — PROGRESS NOTES
Medicare Annual Wellness Visit - Subsequent    Name: Viridiana Ferro Date: 2021   MRN: [id number] Sex: Female   Age: 72 y.o. Ethnicity: Non-/Non    : 1955 Race: Aleida Wynn is here for   Chief Complaint   Patient presents with   [facility]        Screenings for behavioral, psychosocial and functional/safety risks, and cognitive dysfunction are all negative except as indicated below. These results, as well as other patient data from the [address] form, are documented in Flowsheets linked to this Encounter. No Known Allergies    Prior to Visit Medications    Medication Sig Taking? Authorizing Provider   ALPRAZolam (XANAX) 0.25 MG tablet Take 1 tablet by mouth nightly as needed for Anxiety for up to 30 days. Yes Jayashree Schreiber MD   estrogens, conjugated, (PREMARIN) 0.625 MG tablet TAKE 1 TABLET DAILY Yes Jayashree Schreiber MD   Ciclopirox 1 % SHAM Apply bid prn.  Yes Jayashree Schreiber MD   montelukast (SINGULAIR) 10 MG tablet TAKE 1 TABLET NIGHTLY Yes Jayashree Schreiber MD   amitriptyline (ELAVIL) 25 MG tablet Take 1 tablet by mouth nightly Yes Jayashree Schreiber MD   amLODIPine (NORVASC) 5 MG tablet Take 1 tablet by mouth daily Yes Jayashree Schreiber MD   atorvastatin (LIPITOR) 40 MG tablet Take 1 tablet by mouth daily Yes Jayashree Schreiber MD   celecoxib (CELEBREX) 200 MG capsule TAKE 1 CAPSULE TWICE DAILY Yes Jayashree Schreiber MD   losartan (COZAAR) 25 MG tablet TAKE 1 TABLET DAILY Yes Jayashree Schreiber MD   potassium chloride (KLOR-CON M10) 10 MEQ extended release tablet TAKE 1 TABLET 3 TIMES A DAY Yes Jayashree Schreiber MD   triamterene-hydrochlorothiazide (MAXZIDE) 75-50 MG per tablet TAKE 1 TABLET DAILY Yes Jayashree Schreiber MD   cyclobenzaprine (FLEXERIL) 10 MG tablet TAKE 1 TABLET NIGHTLY Yes Jayashree Schreiber MD   propranolol (INDERAL LA) 80 MG extended release capsule TAKE 1 CAPSULE DAILY Yes Jayashree Schreiber MD levothyroxine (SYNTHROID) 112 MCG tablet Take 112 mcg by mouth Daily Yes Historical Provider, MD   ASPIRIN LOW DOSE 81 MG EC tablet Take 1 tablet by mouth daily Yes Historical Provider, MD   VITAMIN B COMPLEX-C PO Take by mouth Yes Historical Provider, MD   MAGNESIUM CARBONATE PO Take by mouth Yes Historical Provider, MD   Omega-3 Fatty Acids (FISH OIL BURP-LESS PO) Take by mouth Yes Historical Provider, MD   CRANBERRY EXTRACT PO Take by mouth Yes Historical Provider, MD   vitamin D (CHOLECALCIFEROL) 1000 UNIT TABS tablet Take 1,000 Units by mouth daily Yes Historical Provider, MD   traMADol (ULTRAM) 50 MG tablet Take 1 tablet by mouth every 12 hours Yes Mary Gallardo MD   clopidogrel (PLAVIX) 75 MG tablet Take 1 tablet by mouth daily  Historical Provider, MD         Past Medical History:   Diagnosis Date    Allergic rhinitis     Anxiety     Back pain     DDD (degenerative disc disease)     Hypertension     Hyperthyroidism     Kidney disease     Stage 3- Dr Janee Chow Osteopenia          Past Surgical History:   Procedure Laterality Date    ANKLE SURGERY      APPENDECTOMY      BREAST BIOPSY  3/21/13    left breast mammotome biopsy    BREAST SURGERY Right 2002    right excisional biopsy - cyst   Korey Jamey COLONOSCOPY  2016    [facility] Th[facility] with BSO, fibroids       Family History   Problem Relation Age of Onset    High Blood Pressure Mother     Diabetes Mother     Stroke Mother     High Cholesterol Mother     High Blood Pressure Father     Heart Disease Father     High Cholesterol Father     Cancer Father 72        prostate    High Blood Pressure Sister     High Blood Pressure Brother     Cancer Brother         prostate    Cancer Maternal Aunt 54        breast    Cancer Maternal Cousin 58        breast    Cancer Brother         prostate Relationships    Social connections     Talks on phone: Not on file     Gets together: Not on file     Attends Worship service: Not on file     Active member of club or organization: Not on file     Attends meetings of clubs or organizations: Not on file     Relationship status: Not on file    Intimate partner violence     Fear of current or ex partner: Not on file     Emotionally abused: Not on file     Physically abused: Not on file     Forced sexual activity: Not on file   Other Topics Concern    Not on file   Social History Narrative    Not on file     Audit Questionnaire: Screen for Alcohol Misuse  How often do you have a drink containing alcohol?: Never  Substance Abuse Interventions:  · Not indicated     Health Risk Assessment:     General  In general, how would you say your health is?: Fair  In the past 7 days, have you experienced any of the following? New or Increased Pain, New or Increased Fatigue, Loneliness, Social Isolation, Stress or Anger?: None of These  Do you get the social and emotional support that you need?: Yes  Do you have a Living Will?: (!) No  General Health Risk Interventions:  · Given a handout on how to complete a living will. Directed to the website Visit:  https://[employer]ky.gov/consumer-protection/livingwills for assistance as well. ·     Health Habits/Nutrition  Do you exercise for at least 20 minutes 2-3 times per week?: (!) No  Have you lost any weight without trying in the past 3 months?: No  Do you eat fewer than 2 meals per day?: No  Have you seen a dentist within the past year?: Yes  Body mass index is 28.9 kg/m². Health Habits/Nutrition Interventions:  Recommended at least 150 minutes of exercise per week and resistance training 2 days a week. Discussed healthy diet habits. Offered suggestions for calorie counting.   ·   ·     Hearing/Vision  Do you or your family notice any trouble with your hearing?: No Do you have difficulty driving, watching TV, or doing any of your daily activities because of your eyesight?: No  Have you had an eye exam within the past year?: Yes  Hearing/Vision Interventions:  · Not indicated     Safety  Do you have working smoke detectors?: Yes  Have all throw rugs been removed or fastened?: Yes  Do you have non-slip mats or surfaces in all bathtubs/showers?: (!) No  Do all of your stairways have a railing or banister?: Yes  Are your doorways, halls and stairs free of clutter?: Yes  Do you always fasten your seatbelt when you are in a car?: Yes  Safety Interventions:  · Provided home safety tips handout  ·     ADLs  In the past 7 days, did you need help from others to perform any of the following everyday activities? Eating, dressing, grooming, bathing, toileting, or walking/balance?: None  In the past 7 days, did you need help from others to take care of any of the following?  Laundry, housekeeping, banking/finances, shopping, telephone use, food preparation, transportation, or taking medications?: None  ADL Interventions:  · Not indicated     Personalized Preventive Plan   Current Health Maintenance Status  Immunization History   Administered Date(s) Administered    Influenza, Intradermal, Quadrivalent, Preservative Free 10/19/2017    Influenza, Quadv, IM, PF (6 mo and older Fluzone, Flulaval, Fluarix, and 3 yrs and older Afluria) 10/23/2018, 10/25/2019    Influenza, Quadv, adjuvanted, 65 yrs +, IM, PF (Fluad) 10/13/2020    Zoster Live (Zostavax) 01/01/2016        Health Maintenance   Topic Date Due    Hepatitis C screen  1955    HIV screen  07/20/1970    DTaP/Tdap/Td vaccine (1 - Tdap) 07/20/1974    Colon cancer screen colonoscopy  07/20/2005    Shingles Vaccine (2 of 3) 02/26/2016    Annual Wellness Visit (AWV)  07/17/2020    Pneumococcal 65+ years Vaccine (1 of 1 - PPSV23) 07/20/2020    Lipid screen  07/10/2021    TSH testing  07/10/2021  Potassium monitoring  07/10/2021    Creatinine monitoring  07/10/2021    Breast cancer screen  06/01/2022    DEXA (modify frequency per FRAX score)  Completed    Flu vaccine  Completed    Hepatitis A vaccine  Aged Out    Hepatitis B vaccine  Aged Out    Hib vaccine  Aged Out    Meningococcal (ACWY) vaccine  Aged Out       Recommendations for [email or web address] Due: see orders.   Recommended screening schedule for the next 5-10 years is provided to the patient in written form: see Patient Instructions/AVS.

## 2021-01-21 NOTE — PROGRESS NOTES
[facility] PHYSICIAN SERVICES  [facility]  [address] [address]  [address] [address]  Dept: [phone]  Dept Fax: [phone]  Loc: [phone]    Melissa Herrera is a 72 y.o. female who presents today for her medical conditions/complaints as noted below. Melissa Herrera is here for Medicare AWV        HPI:   CC: Here today to discuss the following:    She has a history of hypertensive nephropathy. She sees nephrology for this issue. Her last appointment was in September. They had continued her with her losartan and Maxide. She also had evidence of urinary tract infection and that visit was treated with cefdinir. She remains on vitamin D as well. She continues to be followed by endocrinology at [facility] as well. She sees Dr. Nella Muñoz. She has a history of thyroid cancer with postsurgical hypothyroidism. She remains on levothyroxine. Doing well with the hip surgery. She continues to take celebrex. Anxiety  Compliant with medications. No adverse effects from medication. No panic or anxiety attacks since last visit. Symptoms are controlled. No excessive worry or insomnia. No issues with depression    Compliant with her benzodiazepine medication. She states she takes her medication as needed. She abstains from alcohol while taking her medication. She denies drowsiness and impairment on her medication. Hypertension  Compliant with medications. No adverse effects from medication. No lightheadedness, palpitations, or chest pain. Hyperlipidemia  Tolerating current cholesterol medication without side effects. No body aches. Attempting to reduce processed sugar and cholesterol from diet.           HPI    Past Medical History:   Diagnosis Date    Allergic rhinitis     Anxiety     Back pain     DDD (degenerative disc disease)     Hypertension     Hyperthyroidism     Kidney disease     Stage 3- Dr Maame Gaytan Osteopenia Past Surgical History:   Procedure Laterality Date    ANKLE SURGERY      APPENDECTOMY      BREAST BIOPSY  3/21/13    left breast mammotome biopsy    BREAST SURGERY Right 2002    right excisional biopsy - cyst    CARPAL TUNNEL RELEASE      COLONOSCOPY  2016    Dr. Janee Nobles in [facility] with BSO, fibroids       Family History   Problem Relation Age of Onset    High Blood Pressure Mother     Diabetes Mother     Stroke Mother     High Cholesterol Mother     High Blood Pressure Father     Heart Disease Father     High Cholesterol Father     Cancer Father 72        prostate    High Blood Pressure Sister     High Blood Pressure Brother     Cancer Brother         prostate    Cancer Maternal Aunt 55        breast    Cancer Maternal Cousin 58        breast    Cancer Brother         prostate       Social History     Tobacco Use    Smoking status: Current Every Day Smoker     Packs/day: 1.00     Years: 25.00     Pack years: 25.00    Smokeless tobacco: Never Used   Substance Use Topics    Alcohol use: No     Alcohol/week: 0.0 standard drinks     Current Outpatient Medications   Medication Sig Dispense Refill    ALPRAZolam (XANAX) 0.25 MG tablet Take 1 tablet by mouth nightly as needed for Anxiety for up to 30 days.  90 tablet 5    estrogens, conjugated, (PREMARIN) 0.625 MG tablet TAKE 1 TABLET DAILY 90 tablet 3    Ciclopirox 1 % SHAM Apply bid prn. 120 mL 0    montelukast (SINGULAIR) 10 MG tablet TAKE 1 TABLET NIGHTLY 90 tablet 3    amitriptyline (ELAVIL) 25 MG tablet Take 1 tablet by mouth nightly 90 tablet 3    amLODIPine (NORVASC) 5 MG tablet Take 1 tablet by mouth daily 90 tablet 3    atorvastatin (LIPITOR) 40 MG tablet Take 1 tablet by mouth daily 90 tablet 3    celecoxib (CELEBREX) 200 MG capsule TAKE 1 CAPSULE TWICE DAILY 180 capsule 3    losartan (COZAAR) 25 MG tablet TAKE 1 TABLET DAILY 90 tablet 3  potassium chloride (KLOR-CON M10) 10 MEQ extended release tablet TAKE 1 TABLET 3 TIMES A  tablet 3    triamterene-hydrochlorothiazide (MAXZIDE) 75-50 MG per tablet TAKE 1 TABLET DAILY 90 tablet 3    cyclobenzaprine (FLEXERIL) 10 MG tablet TAKE 1 TABLET NIGHTLY 90 tablet 3    propranolol (INDERAL LA) 80 MG extended release capsule TAKE 1 CAPSULE DAILY 90 capsule 3    levothyroxine (SYNTHROID) 112 MCG tablet Take 112 mcg by mouth Daily      ASPIRIN LOW DOSE 81 MG EC tablet Take 1 tablet by mouth daily  5    VITAMIN B COMPLEX-C PO Take by mouth      MAGNESIUM CARBONATE PO Take by mouth      Omega-3 Fatty Acids (FISH OIL BURP-LESS PO) Take by mouth      CRANBERRY EXTRACT PO Take by mouth      vitamin D (CHOLECALCIFEROL) 1000 UNIT TABS tablet Take 1,000 Units by mouth daily      traMADol (ULTRAM) 50 MG tablet Take 1 tablet by mouth every 12 hours 60 tablet 0    clopidogrel (PLAVIX) 75 MG tablet Take 1 tablet by mouth daily  5     No current facility-administered medications for this visit. No Known Allergies    Health Maintenance   Topic Date Due    Hepatitis C screen  1955    HIV screen  07/20/1970    DTaP/Tdap/Td vaccine (1 - Tdap) 07/20/1974    Colon cancer screen colonoscopy  07/20/2005    Shingles Vaccine (2 of 3) 02/26/2016    Annual Wellness Visit (AWV)  07/17/2020    Pneumococcal 65+ years Vaccine (1 of 1 - PPSV23) 07/20/2020    Lipid screen  07/10/2021    TSH testing  07/10/2021    Potassium monitoring  07/10/2021    Creatinine monitoring  07/10/2021    Breast cancer screen  06/01/2022    DEXA (modify frequency per FRAX score)  Completed    Flu vaccine  Completed    Hepatitis A vaccine  Aged Out    Hepatitis B vaccine  Aged Out    Hib vaccine  Aged Out    Meningococcal (ACWY) vaccine  Aged Out       Subjective:      Review of Systems   Constitutional: Negative for chills and fever. HENT: Negative for congestion. Respiratory: Negative for cough, chest tightness and shortness of breath. Cardiovascular: Negative for chest pain, palpitations and leg swelling. Gastrointestinal: Negative for abdominal pain, anal bleeding, constipation, diarrhea and nausea. Genitourinary: Negative for difficulty urinating. Musculoskeletal: Positive for arthralgias, back pain and myalgias. Negative for gait problem and joint swelling. Psychiatric/Behavioral: Negative. SeeHPI for visit specific review of symptoms. All others negative      Objective:   /74   Pulse 61   Temp 98 °F (36.7 °C)   Ht 5' 2\" (1.575 m)   Wt 158 lb (71.7 kg)   LMP  (LMP Unknown)   SpO2 98%   BMI 28.90 kg/m²   Physical Exam  Physical Exam   Constitutional: She appears well-developed. Does not appear ill. Eyes: Pupils are equal, round, and reactive to light. Conjunctiva and Lids normal.  Neck: Normal range of motion. Neck supple. No masses. Neck Symmetric. Normal tracheal position. No thyroid enlargement  Cardiovascular: Normal rate and regular rhythm. Exam reveals no friction rub. Carotid arteries: no bruit observed. No murmur heard. Respiratory:  Effort normal and breath sounds normal. No respiratory distress. No wheezes. No rales. No use of accessory muscles or intercostal retractions. Abdominal: Soft. Bowel sounds are normal. exhibits no distension. There is no tenderness. There is no rebound and no guarding. Musculoskeletal: exhibits no edema. Normal gait. Neurological: alert. Psychiatric: normal mood and affect. Her behavior is normal. Normal judgement and insight observed. No results found for this or any previous visit (from the past 672 hour(s)). Assessment & Plan: The following diagnoses and conditions are stable with no further orders unless indicated:  1.  Annual physical exam Discussed lifestyle changes such as diet and exercise. Recommended eliminate processed food from diet such as sugar and fried foods. Recommended exercising at least 150 minutes/week. Try to do full body resistance training twice a week as well. Offered suggestions for calorie counting such as phone apps and online resources such as My fitness pal and Lose it. Discussed healthy weight. Completed annual wellness today    2. Symptoms, such as flushing, sleeplessness, headache, lack of concentration, associated with the menopause  She continues to be followed by gynecology. We discussed potential risks of taking long-term hormone replacement therapy. Advised her to continue discussing it with her gynecologist as well  Risks include increased risk of breast cancer and possible endometrial cancer.  - estrogens, conjugated, (PREMARIN) 0.625 MG tablet; TAKE 1 TABLET DAILY  Dispense: 90 tablet; Refill: 3    3. Generalized anxiety disorder  Discussed risk and benefits of taking benzodiazepines. Risks include addiction and tolerance. If develops impairment or over sedation with medication, discontinue and contact me. Do not take medication with alcohol. Advised to take sparingly. Advised to keep medication hidden and locked up as theft is high with these medications as well. - ALPRAZolam (XANAX) 0.25 MG tablet; Take 1 tablet by mouth nightly as needed for Anxiety for up to 30 days. Dispense: 90 tablet; Refill: 5    4. Chronic seasonal allergic rhinitis due to pollen  Stable    5. Chronic midline low back pain without sciatica    She continues to complain of lower back pain along with other muscle aches and pains. She continues to take Celebrex and understands potential risk of worsening her kidney disease. Tylenol did not seem to help. She has taken tramadol as needed in the past as well    6.  Benign hypertensive kidney disease with chronic kidney disease stage I through stage IV, or unspecified BP Readings from Last 3 Encounters:   01/21/21 124/74   10/25/19 116/68   07/08/19 122/72     Stable  - CBC Auto Differential; Future    7. Essential (primary) hypertension  As above    8. Stage 3a chronic kidney disease  Lab Results   Component Value Date    CREATININE 1.6 (H) 11/08/2019     Lab Results   Component Value Date    BUN 28 (H) 11/08/2019         Reinforced goals of adequate hydration. Recommended he avoid NSAIDs such as naproxen and ibuprofen. 9. Hyperglycemia  No results found for: LABA1C  Lab Results   Component Value Date    LDLCALC 110 10/18/2018    CREATININE 1.6 (H) 11/08/2019     Recheck today  - Hemoglobin A1C; Future  - Comprehensive Metabolic Panel; Future  - Microalbumin / Creatinine Urine Ratio; Future    10. Hyperlipidemia, unspecified hyperlipidemia type  Lab Results   Component Value Date    CHOL 216 10/18/2018     Lab Results   Component Value Date    TRIG 424 10/18/2018     Lab Results   Component Value Date    HDL 40 10/18/2018     Lab Results   Component Value Date    LDLCALC 110 10/18/2018     No results found for: LABVLDL, VLDL  Lab Results   Component Value Date    CHOLHDLRATIO unable to calculate 10/18/2018     Recheck her LDL today  - Lipid Panel; Future    11. Postsurgical hypothyroidism  She continues to be followed by endocrinology we will recheck her labs  - T4, Free; Future  - TSH without Reflex; Future    12. Polymyalgia ([address]    - External Referral To Rheumatology    13. Encounter for immunization    - PREVNAR 13 IM (Pneumococcal conjugate vaccine 13-valent)      She gets he rmammogram and gynecologist in April. Return in about 6 months (around 7/21/2021) for 30 minute visit - routine follow up. Discussed use, benefit, and side effects of prescribed medications. All patient questions answered. Pt voiced understanding. Reviewed health maintenance. Instructedto continue current medications, diet and exercise. Patient agreed with treatmentplan. Follow up as directed. Note dictated using Dragon Dictation software  Sometimes this dictation software makes erroneous transcriptions.

## 2021-01-26 DIAGNOSIS — N95.1 SYMPTOMS, SUCH AS FLUSHING, SLEEPLESSNESS, HEADACHE, LACK OF CONCENTRATION, ASSOCIATED WITH THE MENOPAUSE: ICD-10-CM

## 2021-01-26 RX ORDER — CICLOPIROX 1 G/100ML
SHAMPOO TOPICAL
Qty: 120 ML | Refills: 0 | Status: SHIPPED | OUTPATIENT
Start: 2021-01-26 | End: 2021-01-28 | Stop reason: SDUPTHER

## 2021-01-28 ENCOUNTER — TELEPHONE (OUTPATIENT)
Dept: FAMILY MEDICINE CLINIC | Age: 66
End: 2021-01-28

## 2021-01-28 DIAGNOSIS — F41.1 GENERALIZED ANXIETY DISORDER: ICD-10-CM

## 2021-01-28 DIAGNOSIS — N95.1 SYMPTOMS, SUCH AS FLUSHING, SLEEPLESSNESS, HEADACHE, LACK OF CONCENTRATION, ASSOCIATED WITH THE MENOPAUSE: ICD-10-CM

## 2021-01-28 RX ORDER — CICLOPIROX 1 G/100ML
SHAMPOO TOPICAL
Qty: 120 ML | Refills: 0 | Status: SHIPPED | OUTPATIENT
Start: 2021-01-28 | End: 2021-05-07 | Stop reason: SDUPTHER

## 2021-01-28 RX ORDER — CICLOPIROX 1 G/100ML
SHAMPOO TOPICAL
Qty: 120 ML | Refills: 0 | Status: SHIPPED | OUTPATIENT
Start: 2021-01-28 | End: 2021-01-28 | Stop reason: SDUPTHER

## 2021-01-28 RX ORDER — ALPRAZOLAM 0.25 MG/1
0.25 TABLET ORAL NIGHTLY PRN
Qty: 90 TABLET | Refills: 5 | Status: SHIPPED | OUTPATIENT
Start: 2021-01-28 | End: 2021-07-22 | Stop reason: SDUPTHER

## 2021-01-28 RX ORDER — ALPRAZOLAM 0.25 MG/1
0.25 TABLET ORAL NIGHTLY PRN
Qty: 90 TABLET | Refills: 5 | Status: SHIPPED | OUTPATIENT
Start: 2021-01-28 | End: 2021-01-28 | Stop reason: SDUPTHER

## 2021-01-28 NOTE — TELEPHONE ENCOUNTER
Jaun Daniel called to request a refill on her medication. Last office visit : 1/21/2021   Next office visit : 7/22/2021     Requested Prescriptions     Signed Prescriptions Disp Refills    Ciclopirox 1 % SHAM 120 mL 0     Sig: Apply bid prn.      Authorizing Provider: Alessandro Gray     Ordering User: Ana Solo estrogens, conjugated, (PREMARIN) 0.625 MG tablet 90 tablet 3     Sig: TAKE 1 TABLET DAILY     Authorizing Provider: Alessandro Gray     Ordering User: Caity Reyes

## 2021-01-28 NOTE — TELEPHONE ENCOUNTER
Patient called to say she needs RX: Coclopirox and Premarin - she only has a wk left on that. Those show failed transmission. Also she needs 90 day supply for Alprazlam from [facility] - it shows failed transmission also. Please check on that.

## 2021-02-17 DIAGNOSIS — J30.1 CHRONIC SEASONAL ALLERGIC RHINITIS DUE TO POLLEN: ICD-10-CM

## 2021-02-17 DIAGNOSIS — G89.29 CHRONIC MIDLINE LOW BACK PAIN WITHOUT SCIATICA: ICD-10-CM

## 2021-02-17 DIAGNOSIS — M54.50 CHRONIC MIDLINE LOW BACK PAIN WITHOUT SCIATICA: ICD-10-CM

## 2021-02-17 RX ORDER — CELECOXIB 200 MG/1
CAPSULE ORAL
Qty: 180 CAPSULE | Refills: 3 | Status: SHIPPED | OUTPATIENT
Start: 2021-02-17 | End: 2021-03-01 | Stop reason: SDUPTHER

## 2021-02-17 RX ORDER — MONTELUKAST SODIUM 10 MG/1
10 TABLET ORAL NIGHTLY
Qty: 90 TABLET | Refills: 3 | Status: SHIPPED | OUTPATIENT
Start: 2021-02-17 | End: 2021-02-19 | Stop reason: ALTCHOICE

## 2021-02-17 RX ORDER — CYCLOBENZAPRINE HCL 10 MG
10 TABLET ORAL NIGHTLY
Qty: 90 TABLET | Refills: 3 | Status: SHIPPED | OUTPATIENT
Start: 2021-02-17 | End: 2021-03-01 | Stop reason: SDUPTHER

## 2021-02-18 ENCOUNTER — PATIENT MESSAGE (OUTPATIENT)
Dept: FAMILY MEDICINE CLINIC | Age: 66
End: 2021-02-18

## 2021-02-18 DIAGNOSIS — J30.1 CHRONIC SEASONAL ALLERGIC RHINITIS DUE TO POLLEN: ICD-10-CM

## 2021-02-18 DIAGNOSIS — G89.29 CHRONIC MIDLINE LOW BACK PAIN WITHOUT SCIATICA: ICD-10-CM

## 2021-02-18 DIAGNOSIS — M54.50 CHRONIC MIDLINE LOW BACK PAIN WITHOUT SCIATICA: ICD-10-CM

## 2021-02-19 RX ORDER — ZAFIRLUKAST 20 MG/1
20 TABLET, FILM COATED ORAL 2 TIMES DAILY
Qty: 180 TABLET | Refills: 3 | Status: SHIPPED | OUTPATIENT
Start: 2021-02-19 | End: 2021-02-19 | Stop reason: SDUPTHER

## 2021-02-19 RX ORDER — ZAFIRLUKAST 20 MG/1
20 TABLET, FILM COATED ORAL 2 TIMES DAILY
Qty: 180 TABLET | Refills: 3 | Status: SHIPPED | OUTPATIENT
Start: 2021-02-19 | End: 2021-08-09 | Stop reason: ALTCHOICE

## 2021-03-01 ENCOUNTER — TELEPHONE (OUTPATIENT)
Dept: OBGYN CLINIC | Age: 66
End: 2021-03-01

## 2021-03-01 DIAGNOSIS — Z12.31 SCREENING MAMMOGRAM, ENCOUNTER FOR: Primary | ICD-10-CM

## 2021-03-01 RX ORDER — CYCLOBENZAPRINE HCL 10 MG
10 TABLET ORAL NIGHTLY
Qty: 90 TABLET | Refills: 3 | Status: SHIPPED | OUTPATIENT
Start: 2021-03-01 | End: 2021-03-03 | Stop reason: SDUPTHER

## 2021-03-01 RX ORDER — MONTELUKAST SODIUM 10 MG/1
10 TABLET ORAL NIGHTLY
Qty: 90 TABLET | Refills: 3 | Status: SHIPPED | OUTPATIENT
Start: 2021-03-01 | End: 2021-03-03 | Stop reason: SDUPTHER

## 2021-03-01 RX ORDER — CELECOXIB 200 MG/1
CAPSULE ORAL
Qty: 180 CAPSULE | Refills: 3 | Status: SHIPPED | OUTPATIENT
Start: 2021-03-01 | End: 2021-03-03 | Stop reason: SDUPTHER

## 2021-03-01 NOTE — TELEPHONE ENCOUNTER
The pt has requested orders for a mammogram to be put in .   She would like the mammogram scheduled at the same time at her physical

## 2021-03-03 DIAGNOSIS — G89.29 CHRONIC MIDLINE LOW BACK PAIN WITHOUT SCIATICA: ICD-10-CM

## 2021-03-03 DIAGNOSIS — M54.50 CHRONIC MIDLINE LOW BACK PAIN WITHOUT SCIATICA: ICD-10-CM

## 2021-03-03 DIAGNOSIS — J30.1 CHRONIC SEASONAL ALLERGIC RHINITIS DUE TO POLLEN: ICD-10-CM

## 2021-03-03 RX ORDER — CELECOXIB 200 MG/1
CAPSULE ORAL
Qty: 180 CAPSULE | Refills: 3 | Status: SHIPPED | OUTPATIENT
Start: 2021-03-03 | End: 2022-02-03

## 2021-03-03 RX ORDER — MONTELUKAST SODIUM 10 MG/1
10 TABLET ORAL NIGHTLY
Qty: 90 TABLET | Refills: 3 | Status: SHIPPED | OUTPATIENT
Start: 2021-03-03 | End: 2022-02-03

## 2021-03-03 RX ORDER — CYCLOBENZAPRINE HCL 10 MG
10 TABLET ORAL NIGHTLY
Qty: 90 TABLET | Refills: 3 | Status: SHIPPED | OUTPATIENT
Start: 2021-03-03 | End: 2022-04-19

## 2021-03-21 DIAGNOSIS — I10 ESSENTIAL (PRIMARY) HYPERTENSION: ICD-10-CM

## 2021-03-21 DIAGNOSIS — I12.9 BENIGN HYPERTENSIVE KIDNEY DISEASE WITH CHRONIC KIDNEY DISEASE STAGE I THROUGH STAGE IV, OR UNSPECIFIED: ICD-10-CM

## 2021-03-24 RX ORDER — AMLODIPINE BESYLATE 5 MG/1
5 TABLET ORAL DAILY
Qty: 90 TABLET | Refills: 3 | Status: SHIPPED | OUTPATIENT
Start: 2021-03-24 | End: 2022-02-15

## 2021-03-24 RX ORDER — PROPRANOLOL HYDROCHLORIDE 80 MG/1
CAPSULE, EXTENDED RELEASE ORAL
Qty: 90 CAPSULE | Refills: 3 | Status: SHIPPED | OUTPATIENT
Start: 2021-03-24 | End: 2022-02-15

## 2021-03-24 RX ORDER — TRIAMTERENE AND HYDROCHLOROTHIAZIDE 75; 50 MG/1; MG/1
1 TABLET ORAL DAILY
Qty: 90 TABLET | Refills: 3 | Status: SHIPPED | OUTPATIENT
Start: 2021-03-24 | End: 2022-02-15

## 2021-04-02 DIAGNOSIS — N95.1 SYMPTOMS, SUCH AS FLUSHING, SLEEPLESSNESS, HEADACHE, LACK OF CONCENTRATION, ASSOCIATED WITH THE MENOPAUSE: ICD-10-CM

## 2021-04-02 NOTE — TELEPHONE ENCOUNTER
Bruno Fung called to request a refill on her medication.       Last office visit : 1/21/2021   Next office visit : 7/22/2021     Requested Prescriptions     Signed Prescriptions Disp Refills    estrogens, conjugated, (PREMARIN) 0.625 MG tablet 90 tablet 3     Sig: TAKE 1 TABLET DAILY     Authorizing Provider: Jim Lewis     Ordering User: Carolyn Escobar

## 2021-05-07 RX ORDER — CICLOPIROX 1 G/100ML
SHAMPOO TOPICAL
Qty: 120 ML | Refills: 0 | Status: SHIPPED | OUTPATIENT
Start: 2021-05-07 | End: 2021-05-13 | Stop reason: SDUPTHER

## 2021-05-13 RX ORDER — CICLOPIROX 1 G/100ML
SHAMPOO TOPICAL
Qty: 120 ML | Refills: 5 | Status: SHIPPED | OUTPATIENT
Start: 2021-05-13

## 2021-05-13 RX ORDER — CICLOPIROX 1 G/100ML
SHAMPOO TOPICAL
Qty: 120 ML | Refills: 0 | OUTPATIENT
Start: 2021-05-13

## 2021-05-13 NOTE — TELEPHONE ENCOUNTER
Patient will need to come  script.  Every prescription we try to send on her, it fails to go through

## 2021-06-09 DIAGNOSIS — Z78.0 POSTMENOPAUSAL: Primary | ICD-10-CM

## 2021-07-14 DIAGNOSIS — E78.5 HYPERLIPIDEMIA, UNSPECIFIED HYPERLIPIDEMIA TYPE: ICD-10-CM

## 2021-07-14 DIAGNOSIS — I10 ESSENTIAL (PRIMARY) HYPERTENSION: ICD-10-CM

## 2021-07-14 DIAGNOSIS — E89.0 POSTSURGICAL HYPOTHYROIDISM: ICD-10-CM

## 2021-07-14 LAB
ALBUMIN SERPL-MCNC: 4.1 G/DL (ref 3.5–5.2)
ALP BLD-CCNC: 53 U/L (ref 35–104)
ALT SERPL-CCNC: 14 U/L (ref 5–33)
ANION GAP SERPL CALCULATED.3IONS-SCNC: 12 MMOL/L (ref 7–19)
AST SERPL-CCNC: 17 U/L (ref 5–32)
BASOPHILS ABSOLUTE: 0 K/UL (ref 0–0.2)
BASOPHILS RELATIVE PERCENT: 0.4 % (ref 0–1)
BILIRUB SERPL-MCNC: 0.4 MG/DL (ref 0.2–1.2)
BILIRUBIN URINE: NEGATIVE
BLOOD, URINE: NEGATIVE
BUN BLDV-MCNC: 18 MG/DL (ref 8–23)
CALCIUM SERPL-MCNC: 9.3 MG/DL (ref 8.8–10.2)
CHLORIDE BLD-SCNC: 98 MMOL/L (ref 98–111)
CHOLESTEROL, TOTAL: 128 MG/DL (ref 160–199)
CLARITY: CLEAR
CO2: 28 MMOL/L (ref 22–29)
COLOR: ABNORMAL
CREAT SERPL-MCNC: 1.2 MG/DL (ref 0.5–0.9)
CREATININE URINE: 51.6 MG/DL (ref 4.2–622)
EOSINOPHILS ABSOLUTE: 0.4 K/UL (ref 0–0.6)
EOSINOPHILS RELATIVE PERCENT: 3.6 % (ref 0–5)
GFR AFRICAN AMERICAN: 54
GFR NON-AFRICAN AMERICAN: 45
GLUCOSE BLD-MCNC: 87 MG/DL (ref 74–109)
GLUCOSE URINE: NEGATIVE MG/DL
HCT VFR BLD CALC: 43.2 % (ref 37–47)
HDLC SERPL-MCNC: 48 MG/DL (ref 65–121)
HEMOGLOBIN: 13.7 G/DL (ref 12–16)
IMMATURE GRANULOCYTES #: 0 K/UL
KETONES, URINE: NEGATIVE MG/DL
LDL CHOLESTEROL CALCULATED: 30 MG/DL
LEUKOCYTE ESTERASE, URINE: NEGATIVE
LYMPHOCYTES ABSOLUTE: 2.3 K/UL (ref 1.1–4.5)
LYMPHOCYTES RELATIVE PERCENT: 22.8 % (ref 20–40)
MAGNESIUM: 2.1 MG/DL (ref 1.6–2.4)
MCH RBC QN AUTO: 30.2 PG (ref 27–31)
MCHC RBC AUTO-ENTMCNC: 31.7 G/DL (ref 33–37)
MCV RBC AUTO: 95.2 FL (ref 81–99)
MONOCYTES ABSOLUTE: 1 K/UL (ref 0–0.9)
MONOCYTES RELATIVE PERCENT: 9.5 % (ref 0–10)
NEUTROPHILS ABSOLUTE: 6.5 K/UL (ref 1.5–7.5)
NEUTROPHILS RELATIVE PERCENT: 63.5 % (ref 50–65)
NITRITE, URINE: NEGATIVE
PARATHYROID HORMONE INTACT: 50.1 PG/ML (ref 15–65)
PDW BLD-RTO: 12.3 % (ref 11.5–14.5)
PH UA: 7.5 (ref 5–8)
PHOSPHORUS: 4 MG/DL (ref 2.5–4.5)
PLATELET # BLD: 280 K/UL (ref 130–400)
PMV BLD AUTO: 9.5 FL (ref 9.4–12.3)
POTASSIUM SERPL-SCNC: 4 MMOL/L (ref 3.5–5)
PROTEIN PROTEIN: 4 MG/DL (ref 15–45)
PROTEIN UA: NEGATIVE MG/DL
RBC # BLD: 4.54 M/UL (ref 4.2–5.4)
SODIUM BLD-SCNC: 138 MMOL/L (ref 136–145)
SPECIFIC GRAVITY UA: 1.01 (ref 1–1.03)
T4 FREE: 1.66 NG/DL (ref 0.93–1.7)
TOTAL PROTEIN: 6.8 G/DL (ref 6.6–8.7)
TRIGL SERPL-MCNC: 252 MG/DL (ref 0–149)
TSH SERPL DL<=0.05 MIU/L-ACNC: 0.45 UIU/ML (ref 0.27–4.2)
URIC ACID, SERUM: 6.6 MG/DL (ref 2.4–5.7)
UROBILINOGEN, URINE: 0.2 E.U./DL
VITAMIN D 25-HYDROXY: >100 NG/ML
WBC # BLD: 10.2 K/UL (ref 4.8–10.8)

## 2021-07-22 ENCOUNTER — OFFICE VISIT (OUTPATIENT)
Dept: FAMILY MEDICINE CLINIC | Age: 66
End: 2021-07-22
Payer: MEDICARE

## 2021-07-22 VITALS
TEMPERATURE: 98.1 F | WEIGHT: 161 LBS | BODY MASS INDEX: 29.45 KG/M2 | SYSTOLIC BLOOD PRESSURE: 122 MMHG | DIASTOLIC BLOOD PRESSURE: 76 MMHG | OXYGEN SATURATION: 99 % | HEART RATE: 62 BPM

## 2021-07-22 DIAGNOSIS — R53.83 OTHER FATIGUE: ICD-10-CM

## 2021-07-22 DIAGNOSIS — F41.1 GENERALIZED ANXIETY DISORDER: ICD-10-CM

## 2021-07-22 DIAGNOSIS — G47.14 HYPERSOMNIA DUE TO MEDICAL CONDITION: ICD-10-CM

## 2021-07-22 DIAGNOSIS — Z87.891 PERSONAL HISTORY OF TOBACCO USE: ICD-10-CM

## 2021-07-22 DIAGNOSIS — E78.5 HYPERLIPIDEMIA, UNSPECIFIED HYPERLIPIDEMIA TYPE: ICD-10-CM

## 2021-07-22 DIAGNOSIS — R40.0 DAYTIME SOMNOLENCE: ICD-10-CM

## 2021-07-22 DIAGNOSIS — I10 ESSENTIAL (PRIMARY) HYPERTENSION: Primary | ICD-10-CM

## 2021-07-22 PROCEDURE — G8417 CALC BMI ABV UP PARAM F/U: HCPCS | Performed by: FAMILY MEDICINE

## 2021-07-22 PROCEDURE — G8428 CUR MEDS NOT DOCUMENT: HCPCS | Performed by: FAMILY MEDICINE

## 2021-07-22 PROCEDURE — 3017F COLORECTAL CA SCREEN DOC REV: CPT | Performed by: FAMILY MEDICINE

## 2021-07-22 PROCEDURE — 1123F ACP DISCUSS/DSCN MKR DOCD: CPT | Performed by: FAMILY MEDICINE

## 2021-07-22 PROCEDURE — 99214 OFFICE O/P EST MOD 30 MIN: CPT | Performed by: FAMILY MEDICINE

## 2021-07-22 PROCEDURE — G8399 PT W/DXA RESULTS DOCUMENT: HCPCS | Performed by: FAMILY MEDICINE

## 2021-07-22 PROCEDURE — 4040F PNEUMOC VAC/ADMIN/RCVD: CPT | Performed by: FAMILY MEDICINE

## 2021-07-22 PROCEDURE — 1090F PRES/ABSN URINE INCON ASSESS: CPT | Performed by: FAMILY MEDICINE

## 2021-07-22 PROCEDURE — 4004F PT TOBACCO SCREEN RCVD TLK: CPT | Performed by: FAMILY MEDICINE

## 2021-07-22 PROCEDURE — G0296 VISIT TO DETERM LDCT ELIG: HCPCS | Performed by: FAMILY MEDICINE

## 2021-07-22 RX ORDER — ALPRAZOLAM 0.25 MG/1
0.25 TABLET ORAL NIGHTLY PRN
Qty: 90 TABLET | Refills: 5 | Status: SHIPPED | OUTPATIENT
Start: 2021-07-22 | End: 2022-02-15

## 2021-07-22 NOTE — PATIENT INSTRUCTIONS
We are committed to providing you with the best care possible. In order to help us achieve these goals please remember to bring all medications, herbal products, and over the counter supplements with you to each visit. If your provider has ordered testing for you, please be sure to follow up with our office if you have not received results within 7 days after the testing took place. *If you receive a survey after visiting one of our offices, please take time to share your experience concerning your physician office visit. These surveys are confidential and no health information about you is shared. We are eager to improve for you and we are counting on your feedback to help make that happen. What is lung cancer screening? Lung cancer screening is a way in which doctors check the lungs for early signs of cancer in people who have no symptoms of lung cancer. A low-dose CT scan uses much less radiation than a normal CT scan and shows a more detailed image of the lungs than a standard X-ray. The goal of lung cancer screening is to find cancer early, before it has a chance to grow, spread, or cause problems. One large study found that smokers who were screened with low-dose CT scans were less likely to die of lung cancer than those who were screened with standard X-ray. Below is a summary of the things you need to know regarding screening for lung cancer with low-dose computed tomography (LDCT). This is a screening program that involves routine annual screening with LDCT studies of the lung. The LDCTs are done using low-dose radiation that is not thought to increase your cancer risk. If you have other serious medical conditions (other cancers, congestive heart failure) that limit your life expectancy to less than 10 years, you should not undergo lung cancer screening with LDCT. The chance is 20%-60% that the LDCT result will show abnormalities.   This would require additional testing which could include repeat imaging or even invasive procedures. Most (about 95%) of \"abnormal\" LDCT results are false in the sense that no lung cancer is ultimately found. Additionally, some (about 10%) of the cancers found would not affect your life expectancy, even if undetected and untreated. If you are still smoking, the single most important thing that you can do to reduce your risk of dying of lung cancer is to quit. For this screening to be covered by Medicare and most other insurers, strict criteria must be met. If you do not meet these criteria, but still wish to undergo LDCT testing, you will be required to sign a waiver indicating your willingness to pay for the scan.

## 2021-07-22 NOTE — PROGRESS NOTES
[facility] PHYSICIAN SERVICES  [facility]  [id number] [place] [address]  [address] [address]  Dept: [phone]  Dept Fax: [phone]  Loc: [phone]    Stephania Love is a 77 y.o. female who presents today for her medical conditions/complaints as noted below. Stephania Love is here for 6 Month Follow-Up        HPI:   CC: Here today to discuss the following:  She has a history of hypertensive nephropathy and continues to be followed by nephrology. Her last appointment with nephrology was in March and she was maintained on her losartan. She was also maintained on her magnesium supplement and vitamin D. Her blood pressure was controlled that visit as well. She had showed improvement in her GFR. She is also had a history of hypercalcemia. 24-hour urine showed low calcium which is consistent with [address]. She is had a normal sestamibi scan from endocrinology. She is seeing Vidhi Arroyo, dermatology. She has scalp psoriasis. Overall stable    Anxiety  Compliant with medications. No adverse effects from medication. No panic or anxiety attacks since last visit. Symptoms are controlled. No excessive worry or insomnia. No issues with depression    Compliant with her benzodiazepine medication. She states she takes her medication as needed. She abstains from alcohol while taking her medication. She denies drowsiness and impairment on her medication. Hypertension  Compliant with medications. No adverse effects from medication. No lightheadedness, palpitations, or chest pain. Coronary Artery Disease  Currently no active angina symptoms. No chest pain with exertion. No orthopnea. She continues to be followed by cardiology by Dr. Kelley Tang with [facility] last appointment was in May.     HPI    Past Medical History:   Diagnosis Date    Allergic rhinitis     Anxiety     Back pain     DDD (degenerative disc disease)     Hypertension     Hyperthyroidism     Kidney disease     Stage 3- Dr Rolando Li Osteopenia       Past Surgical History:   Procedure Laterality Date    ANKLE SURGERY      APPENDECTOMY      BREAST BIOPSY  3/21/13    left breast mammotome biopsy    BREAST SURGERY Right 2002    right excisional biopsy - cyst    CARPAL TUNNEL RELEASE      COLONOSCOPY  2016    Dr. Gordon Fraction in [facility] with BSO, fibroids       Family History   Problem Relation Age of Onset    High Blood Pressure Mother     Diabetes Mother     Stroke Mother     High Cholesterol Mother     High Blood Pressure Father     Heart Disease Father     High Cholesterol Father     Cancer Father 72        prostate    High Blood Pressure Sister     High Blood Pressure Brother     Cancer Brother         prostate    Cancer Maternal Aunt 54        breast    Cancer Maternal Cousin 58        breast    Cancer Brother         prostate       Social History     Tobacco Use    Smoking status: Current Every Day Smoker     Packs/day: 1.00     Years: 25.00     Pack years: 25.00    Smokeless tobacco: Never Used   Substance Use Topics    Alcohol use: No     Alcohol/week: 0.0 standard drinks     Current Outpatient Medications   Medication Sig Dispense Refill    ALPRAZolam (XANAX) 0.25 MG tablet Take 1 tablet by mouth nightly as needed for Anxiety for up to 30 days.  90 tablet 5    Ciclopirox 1 % SHAM Apply bid prn. 120 mL 5    estrogens, conjugated, (PREMARIN) 0.625 MG tablet TAKE 1 TABLET DAILY 90 tablet 3    propranolol (INDERAL LA) 80 MG extended release capsule TAKE 1 CAPSULE DAILY 90 capsule 3    triamterene-hydroCHLOROthiazide (MAXZIDE) 75-50 MG per tablet Take 1 tablet by mouth daily 90 tablet 3    amLODIPine (NORVASC) 5 MG tablet Take 1 tablet by mouth daily 90 tablet 3    cyclobenzaprine (FLEXERIL) 10 MG tablet Take 1 tablet by mouth nightly 90 tablet 3    celecoxib (CELEBREX) 200 MG capsule TAKE 1 CAPSULE TWICE DAILY 180 capsule 3 Aged Out    Hepatitis B vaccine  Aged Out    Hib vaccine  Aged Out    Meningococcal (ACWY) vaccine  Aged Out       Subjective:      Review of Systems  Review of Systems   Constitutional: Negative for chills and fever. HENT: Negative for congestion. Respiratory: Negative for cough, chest tightness and shortness of breath. Cardiovascular: Negative for chest pain, palpitations and leg swelling. Gastrointestinal: Negative for abdominal pain, anal bleeding, constipation, diarrhea and nausea. Genitourinary: Negative for difficulty urinating. Psychiatric/Behavioral: Negative. SeeHPI for visit specific review of symptoms. All others negative      Objective:   /76   Pulse 62   Temp 98.1 °F (36.7 °C)   Wt 161 lb (73 kg)   LMP  (LMP Unknown)   SpO2 99%   BMI 29.45 kg/m²   Physical Exam  Physical Exam   Constitutional: She appears well-developed. Does not appear ill. Neck: Normal range of motion. Neck supple. No masses. Neck Symmetric. Normal tracheal position. No thyroid enlargement  Cardiovascular: Normal rate and regular rhythm. Exam reveals no friction rub. Carotid arteries: no bruit observed. No murmur heard. Respiratory:  Effort normal and breath sounds normal. No respiratory distress. No wheezes. No rales. No use of accessory muscles or intercostal retractions. Neurological: alert. Psychiatric: normal mood and affect. Her behavior is normal. Normal judgement and insight observed.       Recent Results (from the past 672 hour(s))   CBC Auto Differential    Collection Time: 07/14/21  2:57 PM   Result Value Ref Range    WBC 10.2 4.8 - 10.8 K/uL    RBC 4.54 4.20 - 5.40 M/uL    Hemoglobin 13.7 12.0 - 16.0 g/dL    Hematocrit 43.2 37.0 - 47.0 %    MCV 95.2 81.0 - 99.0 fL    MCH 30.2 27.0 - 31.0 pg    MCHC 31.7 (L) 33.0 - 37.0 g/dL    RDW 12.3 11.5 - 14.5 %    Platelets 905 039 - 985 K/uL    MPV 9.5 9.4 - 12.3 fL    Neutrophils % 63.5 50.0 - 65.0 %    Lymphocytes % 22.8 20.0 - 40.0 % Monocytes % 9.5 0.0 - 10.0 %    Eosinophils % 3.6 0.0 - 5.0 %    Basophils % 0.4 0.0 - 1.0 %    Neutrophils Absolute 6.5 1.5 - 7.5 K/uL    Immature Granulocytes # 0.0 K/uL    Lymphocytes Absolute 2.3 1.1 - 4.5 K/uL    Monocytes Absolute 1.00 (H) 0.00 - 0.90 K/uL    Eosinophils Absolute 0.40 0.00 - 0.60 K/uL    Basophils Absolute 0.00 0.00 - 0.20 K/uL   T4, Free    Collection Time: 07/14/21  2:57 PM   Result Value Ref Range    T4 Free 1.66 0.93 - 1.70 ng/dL   TSH without Reflex    Collection Time: 07/14/21  2:57 PM   Result Value Ref Range    TSH 0.452 0.270 - 4.200 uIU/mL   Lipid Panel    Collection Time: 07/14/21  2:57 PM   Result Value Ref Range    Cholesterol, Total 128 (L) 160 - 199 mg/dL    Triglycerides 252 (H) 0 - 149 mg/dL    HDL 48 (L) 65 - 121 mg/dL    LDL Calculated 30 <100 mg/dL   Comprehensive Metabolic Panel    Collection Time: 07/14/21  2:57 PM   Result Value Ref Range    Sodium 138 136 - 145 mmol/L    Potassium 4.0 3.5 - 5.0 mmol/L    Chloride 98 98 - 111 mmol/L    CO2 28 22 - 29 mmol/L    Anion Gap 12 7 - 19 mmol/L    Glucose 87 74 - 109 mg/dL    BUN 18 8 - 23 mg/dL    CREATININE 1.2 (H) 0.5 - 0.9 mg/dL    GFR Non-African American 45 (A) >60    GFR  54 (L) >59    Calcium 9.3 8.8 - 10.2 mg/dL    Total Protein 6.8 6.6 - 8.7 g/dL    Albumin 4.1 3.5 - 5.2 g/dL    Total Bilirubin 0.4 0.2 - 1.2 mg/dL    Alkaline Phosphatase 53 35 - 104 U/L    ALT 14 5 - 33 U/L    AST 17 5 - 32 U/L   Urinalysis    Collection Time: 07/14/21  2:57 PM   Result Value Ref Range    Color, UA DARK YELLOW (A) Straw/Yellow    Clarity, UA Clear Clear    Glucose, Ur Negative Negative mg/dL    Bilirubin Urine Negative Negative    Ketones, Urine Negative Negative mg/dL    Specific Gravity, UA 1.009 1.005 - 1.030    Blood, Urine Negative Negative    pH, UA 7.5 5.0 - 8.0    Protein, UA Negative Negative mg/dL    Urobilinogen, Urine 0.2 <2.0 E.U./dL    Nitrite, Urine Negative Negative    Leukocyte Esterase, Urine Negative Negative   Phosphorus    Collection Time: 07/14/21  2:57 PM   Result Value Ref Range    Phosphorus 4.0 2.5 - 4.5 mg/dL   Magnesium    Collection Time: 07/14/21  2:57 PM   Result Value Ref Range    Magnesium 2.1 1.6 - 2.4 mg/dL   Uric Acid    Collection Time: 07/14/21  2:57 PM   Result Value Ref Range    Uric Acid, Serum 6.6 (H) 2.4 - 5.7 mg/dL   Creatinine, Random Urine    Collection Time: 07/14/21  2:57 PM   Result Value Ref Range    Creatinine, Ur 51.6 4.2 - 622.0 mg/dL   Protein, urine, random    Collection Time: 07/14/21  2:57 PM   Result Value Ref Range    Protein, Ur 4 (L) 15 - 45 mg/dL   PTH, Intact    Collection Time: 07/14/21  2:57 PM   Result Value Ref Range    PTH 50.1 15.0 - 65.0 pg/mL   Vitamin D 25 Hydroxy    Collection Time: 07/14/21  2:57 PM   Result Value Ref Range    Vit D, 25-Hydroxy >100.0 >=30 ng/mL               Assessment & Plan: The following diagnoses and conditions are stable with no further orders unless indicated:  1. Essential (primary) hypertension  *  BP Readings from Last 3 Encounters:   07/22/21 122/76   01/21/21 124/74   10/25/19 116/68     Blood pressure stable  - Comprehensive Metabolic Panel; Future    2. Generalized anxiety disorder  Discussed risk and benefits of taking benzodiazepines. Risks include addiction and tolerance. If develops impairment or over sedation with medication, discontinue and contact me. Do not take medication with alcohol. Advised to take sparingly. Advised to keep medication hidden and locked up as theft is high with these medications as well. - ALPRAZolam (XANAX) 0.25 MG tablet; Take 1 tablet by mouth nightly as needed for Anxiety for up to 30 days. Dispense: 90 tablet; Refill: 5    3.  Hyperlipidemia, unspecified hyperlipidemia type  Lab Results   Component Value Date    CHOL 128 (L) 07/14/2021    CHOL 125 (L) 01/21/2021    CHOL 216 10/18/2018     Lab Results   Component Value Date    TRIG 252 (H) 07/14/2021    TRIG 169 (H) 01/21/2021    TRIG 424 10/18/2018     Lab Results   Component Value Date    HDL 48 (L) 07/14/2021    HDL 52 (L) 01/21/2021    HDL 40 10/18/2018     Lab Results   Component Value Date    LDLCALC 30 07/14/2021    LDLCALC 39 01/21/2021    [address] 10/18/2018     No results found for: LABVLDL, VLDL  Lab Results   Component Value Date    CHOLHDLRATIO unable to calculate 10/18/2018     Continue with high intensity statin therapy  LDL goal less than 70  Triglycerides are elevated  Discussed low-fat diet recheck lipid panel next visit  - Lipid Panel; Future    4. Other fatigue    - CBC Auto Differential; Future  - T4, Free; Future  - TSH without Reflex; Future    5. Personal history of tobacco use    - ME VISIT TO DISCUSS LUNG CA SCREEN W LDCT  - CT Lung Screen (Annual); Future    6. Daytime somnolence    - Home Sleep Study; Future    7. Hypersomnia due to medical condition     - Home Sleep Study; Future        Return in about 6 months (around 1/22/2022) for AWV - 30 minute visit. Discussed use, benefit, and side effects of prescribed medications. All patient questions answered. Pt voiced understanding. Reviewed health maintenance. Instructedto continue current medications, diet and exercise. Patient agreed with treatmentplan. Follow up as directed. Note dictated using Dragon Dictation software  Sometimes this dictation software makes erroneous transcriptions. Low Dose CT (LDCT) Lung Screening criteria met   Age 50-69   Pack year smoking >30   Still smoking or less than 15 year since quit   No sign or symptoms of lung cancer   > 11 months since last LDCT     Risks and benefits of lung cancer screening with LDCT scans discussed:    Significance of positive screen - False-positive LDCT results often occur. 95% of all positive results do not lead to a diagnosis of cancer. Usually further imaging can resolve most false-positive results; however, some patients may require invasive procedures.     Over diagnosis risk - 10% to 12% of screen-detected lung cancer cases are over diagnosedthat is, the cancer would not have been detected in the patient's lifetime without the screening. Need for follow up screens annually to continue lung cancer screening effectiveness     Risks associated with radiation from annual LDCT- Radiation exposure is about the same as for a mammogram, which is about 1/3 of the annual background radiation exposure from everyday life. Starting screening at age 54 is not likely to increase cancer risk from radiation exposure. Patients with comorbidities resulting in life expectancy of < 10 years, or that would preclude treatment of an abnormality identified on CT, should not be screened due to lack of benefit.     To obtain maximal benefit from this screening, smoking cessation and long-term abstinence from smoking is critical

## 2021-07-29 DIAGNOSIS — R20.2 NUMBNESS AND TINGLING OF BOTH LOWER EXTREMITIES: Primary | ICD-10-CM

## 2021-07-29 DIAGNOSIS — R20.0 NUMBNESS AND TINGLING OF BOTH LOWER EXTREMITIES: Primary | ICD-10-CM

## 2021-08-09 ENCOUNTER — OFFICE VISIT (OUTPATIENT)
Dept: OBGYN CLINIC | Age: 66
End: 2021-08-09
Payer: MEDICARE

## 2021-08-09 ENCOUNTER — HOSPITAL ENCOUNTER (OUTPATIENT)
Dept: WOMENS IMAGING | Age: 66
Discharge: HOME | End: 2021-08-09
Payer: MEDICARE

## 2021-08-09 ENCOUNTER — APPOINTMENT (OUTPATIENT)
Dept: WOMENS IMAGING | Age: 66
End: 2021-08-09
Payer: MEDICARE

## 2021-08-09 VITALS
WEIGHT: 157 LBS | DIASTOLIC BLOOD PRESSURE: 87 MMHG | SYSTOLIC BLOOD PRESSURE: 136 MMHG | HEIGHT: 62 IN | HEART RATE: 60 BPM | BODY MASS INDEX: 28.89 KG/M2

## 2021-08-09 DIAGNOSIS — Z12.72 SCREENING FOR VAGINAL CANCER: ICD-10-CM

## 2021-08-09 DIAGNOSIS — Z01.419 ENCOUNTER FOR ROUTINE GYNECOLOGIC EXAMINATION IN MEDICARE PATIENT: Primary | ICD-10-CM

## 2021-08-09 DIAGNOSIS — Z12.31 SCREENING MAMMOGRAM, ENCOUNTER FOR: ICD-10-CM

## 2021-08-09 DIAGNOSIS — Z01.419 ENCOUNTER FOR GYNECOLOGICAL EXAMINATION WITHOUT ABNORMAL FINDING: ICD-10-CM

## 2021-08-09 DIAGNOSIS — N39.3 STRESS INCONTINENCE: ICD-10-CM

## 2021-08-09 DIAGNOSIS — Z12.39 SCREENING BREAST EXAMINATION: ICD-10-CM

## 2021-08-09 PROCEDURE — 1090F PRES/ABSN URINE INCON ASSESS: CPT | Performed by: NURSE PRACTITIONER

## 2021-08-09 PROCEDURE — G8399 PT W/DXA RESULTS DOCUMENT: HCPCS | Performed by: NURSE PRACTITIONER

## 2021-08-09 PROCEDURE — G8417 CALC BMI ABV UP PARAM F/U: HCPCS | Performed by: NURSE PRACTITIONER

## 2021-08-09 PROCEDURE — 4040F PNEUMOC VAC/ADMIN/RCVD: CPT | Performed by: NURSE PRACTITIONER

## 2021-08-09 PROCEDURE — G8427 DOCREV CUR MEDS BY ELIG CLIN: HCPCS | Performed by: NURSE PRACTITIONER

## 2021-08-09 PROCEDURE — 1123F ACP DISCUSS/DSCN MKR DOCD: CPT | Performed by: NURSE PRACTITIONER

## 2021-08-09 PROCEDURE — 3017F COLORECTAL CA SCREEN DOC REV: CPT | Performed by: NURSE PRACTITIONER

## 2021-08-09 PROCEDURE — 99204 OFFICE O/P NEW MOD 45 MIN: CPT | Performed by: NURSE PRACTITIONER

## 2021-08-09 PROCEDURE — 4004F PT TOBACCO SCREEN RCVD TLK: CPT | Performed by: NURSE PRACTITIONER

## 2021-08-09 PROCEDURE — G0101 CA SCREEN;PELVIC/BREAST EXAM: HCPCS | Performed by: NURSE PRACTITIONER

## 2021-08-09 PROCEDURE — 77067 SCR MAMMO BI INCL CAD: CPT

## 2021-08-09 ASSESSMENT — ENCOUNTER SYMPTOMS
EYES NEGATIVE: 1
DIARRHEA: 0
GASTROINTESTINAL NEGATIVE: 1
RESPIRATORY NEGATIVE: 1
ALLERGIC/IMMUNOLOGIC NEGATIVE: 1
CONSTIPATION: 0

## 2021-08-09 NOTE — PROGRESS NOTES
Dennis Cloud is a 77 y.o. female who presents today for her medical conditions/ complaints as noted below. Dennis Vegaite is c/o of Gynecologic Exam        HPI   New pt presents to reestablish care. Has appt for screening mammogram. Having to reschedule DEXA d/t machine being down. Sees Dr Juan Carlos Canada PCP and also nephrology and endocrinology. Denies any female issues. Taking Premarin and doing well. Does not want to stop at this time- no issues with vaginal dryness or hot flashes, also has had osteopenia in the past and Dr Chino Montoya told her this would help. Having some bladder leakage when she coughs or sneezes. Mammo:2018/has after this appt  Pap smear:2018  Contraception:hysterectomy     P:2  Ab:0  Bone density:2018/had one today but was r/s due to machine being broke  Colonoscopy:  No LMP recorded (lmp unknown). Patient has had a hysterectomy.   [id number]    Past Medical History:   Diagnosis Date    Allergic rhinitis     Anxiety     Back pain     DDD (degenerative disc disease)     Hypertension     Hyperthyroidism     Kidney disease     Stage 3- Dr Angel Harmon Osteopenia      Past Surgical History:   Procedure Laterality Date    ANKLE SURGERY      APPENDECTOMY      BREAST BIOPSY  2013    left breast mammotome biopsy    BREAST SURGERY Right     right excisional biopsy - cyst    CARPAL TUNNEL RELEASE      COLONOSCOPY      Dr. Mariam [facility]ron[facility] in [facility] with BSO, fibroids    THYROIDECTOMY      carcinoma     Family History   Problem Relation Age of Onset    High Blood Pressure Mother     Diabetes Mother     Stroke Mother     High Cholesterol Mother     High Blood Pressure Father     Heart Disease Father     High Cholesterol Father     Cancer Father 72        prostate    High Blood Pressure Sister     High Blood Pressure Brother     Cancer Brother         prostate    Cancer Maternal Aunt 54        breast    Cancer Maternal Cousin 58        breast    Cancer Brother         prostate     Social History     Tobacco Use    Smoking status: Current Every Day Smoker     Packs/day: 1.00     Years: 25.00     Pack years: 25.00    Smokeless tobacco: Never Used   Substance Use Topics    Alcohol use: No     Alcohol/week: 0.0 standard drinks       Current Outpatient Medications   Medication Sig Dispense Refill    ALPRAZolam (XANAX) 0.25 MG tablet Take 1 tablet by mouth nightly as needed for Anxiety for up to 30 days.  90 tablet 5    Ciclopirox 1 % SHAM Apply bid prn. 120 mL 5    estrogens, conjugated, (PREMARIN) 0.625 MG tablet TAKE 1 TABLET DAILY 90 tablet 3    propranolol (INDERAL LA) 80 MG extended release capsule TAKE 1 CAPSULE DAILY 90 capsule 3    triamterene-hydroCHLOROthiazide (MAXZIDE) 75-50 MG per tablet Take 1 tablet by mouth daily 90 tablet 3    amLODIPine (NORVASC) 5 MG tablet Take 1 tablet by mouth daily 90 tablet 3    cyclobenzaprine (FLEXERIL) 10 MG tablet Take 1 tablet by mouth nightly 90 tablet 3    celecoxib (CELEBREX) 200 MG capsule TAKE 1 CAPSULE TWICE DAILY 180 capsule 3    montelukast (SINGULAIR) 10 MG tablet Take 1 tablet by mouth nightly 90 tablet 3    amitriptyline (ELAVIL) 25 MG tablet Take 1 tablet by mouth nightly 90 tablet 3    atorvastatin (LIPITOR) 40 MG tablet Take 1 tablet by mouth daily 90 tablet 3    losartan (COZAAR) 25 MG tablet TAKE 1 TABLET DAILY 90 tablet 3    potassium chloride (KLOR-CON M10) 10 MEQ extended release tablet TAKE 1 TABLET 3 TIMES A  tablet 3    levothyroxine (SYNTHROID) 112 MCG tablet Take 112 mcg by mouth Daily      ASPIRIN LOW DOSE 81 MG EC tablet Take 1 tablet by mouth daily  5    VITAMIN B COMPLEX-C PO Take by mouth      MAGNESIUM CARBONATE PO Take by mouth      CRANBERRY EXTRACT PO Take by mouth      vitamin D (CHOLECALCIFEROL) 1000 UNIT TABS tablet Take 1,000 Units by mouth daily      traMADol (ULTRAM) 50 MG tablet Take 1 tablet by mouth every 12 hours 60 tablet 0    Omega-3 Fatty Acids (FISH OIL BURP-LESS PO) Take by mouth       No current facility-administered medications for this visit. No Known Allergies  Vitals:    08/09/21 1122   BP: 136/87   Pulse: 60     Body mass index is 28.72 kg/m². Review of Systems   Constitutional: Negative. HENT: Negative. Eyes: Negative. Respiratory: Negative. Cardiovascular: Negative. Gastrointestinal: Negative. Negative for constipation and diarrhea. Endocrine: Negative. Genitourinary: Positive for enuresis. Negative for frequency, menstrual problem and urgency. Musculoskeletal: Negative. Skin: Negative. Allergic/Immunologic: Negative. Neurological: Negative. Hematological: Negative. Psychiatric/Behavioral: Negative. All other systems reviewed and are negative. Physical Exam  Vitals and nursing note reviewed. Constitutional:       Appearance: She is well-developed. HENT:      Head: Normocephalic. Neck:      Thyroid: No thyroid mass or thyromegaly. Comments: Thyroid surgically absent  Cardiovascular:      Rate and Rhythm: Normal rate and regular rhythm. Pulmonary:      Effort: Pulmonary effort is normal.      Breath sounds: Normal breath sounds. Chest:      Breasts:         Right: No inverted nipple, mass, nipple discharge or skin change. Left: No inverted nipple, mass, nipple discharge or skin change. Abdominal:      Palpations: Abdomen is soft. There is no mass. Tenderness: There is no abdominal tenderness. Genitourinary:     General: Normal vulva. Vagina: Normal.      Uterus: Absent. Adnexa:         Right: No mass or tenderness. Left: No mass or tenderness. Rectum: Guaiac stool: g0101. Comments: Pap collected  Cervix and uterus surgically absent  Ovaries surgically absent  Vaginal cuff palpates smooth  Musculoskeletal:         General: Normal range of motion.       Cervical back: Normal range of motion and neck supple. Skin:     General: Skin is warm and dry. Neurological:      Mental Status: She is alert and oriented to person, place, and time. Psychiatric:         Attention and Perception: Attention normal.         Mood and Affect: Mood normal.         Speech: Speech normal.         Behavior: Behavior normal.         Thought Content: Thought content normal.         Cognition and Memory: Cognition normal.         Judgment: Judgment normal.          Diagnosis Orders   1. Encounter for routine gynecologic examination in Medicare patient  MS CA SCREEN;PELVIC/BREAST EXAM   2. Encounter for gynecological examination without abnormal finding     3. Screening for vaginal cancer  PAP SMEAR    Human papillomavirus (HPV) DNA probe thin prep high risk   4. Screening breast examination     5. Symptoms, such as flushing, sleeplessness, headache, lack of concentration, associated with the menopause         MEDICATIONS:  No orders of the defined types were placed in this encounter. ORDERS:  Orders Placed This Encounter   Procedures    PAP SMEAR    Human papillomavirus (HPV) DNA probe thin prep high risk    MS CA SCREEN;PELVIC/BREAST EXAM       PLAN:  Pap collected  Reschedule DEXA  Gave info on kegels exercises and bladder training    Patient Instructions     Patient Education        Breast Self-Exam: Care Instructions  Your Care Instructions     A breast self-exam is when you check your breasts for lumps or changes. This regular exam helps you learn how your breasts normally look and feel. Most breast problems or changes are not because of cancer. Breast self-exam is not a substitute for a mammogram. Having regular breast exams by your doctor and regular mammograms improve your chances of finding any problems with your breasts. Some women set a time each month to do a step-by-step breast self-exam. Other women like a less formal system.  They might look at their breasts as they brush their teeth, or feel their breasts once in a while in the shower. If you notice a change in your breast, tell your doctor. Follow-up care is a key part of your treatment and safety. Be sure to make and go to all appointments, and call your doctor if you are having problems. It's also a good idea to know your test results and keep a list of the medicines you take. How do you do a breast self-exam?  · The best time to examine your breasts is usually one week after your menstrual period begins. Your breasts should not be tender then. If you do not have periods, you might do your exam on a day of the month that is easy to remember. · To examine your breasts:  ? Remove all your clothes above the waist and lie down. When you are lying down, your breast tissue spreads evenly over your chest wall, which makes it easier to feel all your breast tissue. ? Use the padsnot the fingertipsof the 3 middle fingers of your left hand to check your right breast. Move your fingers slowly in small coin-sized circles that overlap. ? Use three levels of pressure to feel of all your breast tissue. Use light pressure to feel the tissue close to the skin surface. Use medium pressure to feel a little deeper. Use firm pressure to feel your tissue close to your breastbone and ribs. Use each pressure level to feel your breast tissue before moving on to the next spot. ? Check your entire breast, moving up and down as if following a strip from the collarbone to the bra line, and from the armpit to the ribs. Repeat until you have covered the entire breast.  ? Repeat this procedure for your left breast, using the pads of the 3 middle fingers of your right hand. · To examine your breasts while in the shower:  ? Place one arm over your head and lightly soap your breast on that side. ? Using the pads of your fingers, gently move your hand over your breast (in the strip pattern described above), feeling carefully for any lumps or changes.   ? Repeat for the other breast.  · Have your doctor inspect anything you notice to see if you need further testing. Where can you learn more? Go to https://chpepiceweb.healthTradeYa. org and sign in to your [employer] account. Enter P148 in the [facility] box to learn more about \"Breast Self-Exam: Care Instructions. \"     If you do not have an account, please click on the \"Sign Up Now\" link. Current as of: December 17, 2020               Content Version: 12.9  © 2006-2021 [employer]. Care instructions adapted under license by [facility] ([facility]). If you have questions about a medical condition or this instruction, always ask your healthcare professional. [place] [address] any warranty or liability for your use of this information. Patient Education        Well Visit, Over 72: Care Instructions  Overview     Well visits can help you stay healthy. Your doctor has checked your overall health and may have suggested ways to take good care of yourself. Your doctor also may have recommended tests. At home, you can help prevent illness with healthy eating, regular exercise, and other steps. Follow-up care is a key part of your treatment and safety. Be sure to make and go to all appointments, and call your doctor if you are having problems. It's also a good idea to know your test results and keep a list of the medicines you take. How can you care for yourself at home? · Get screening tests that you and your doctor decide on. Screening helps find diseases before any symptoms appear. · Eat healthy foods. Choose fruits, vegetables, whole grains, protein, and low-fat dairy foods. Limit fat, especially saturated fat. Reduce salt in your diet. · Limit alcohol. If you are a man, have no more than 2 drinks a day or 14 drinks a week. If you are a woman, have no more than 1 drink a day or 7 drinks a week. Since alcohol affects older adults differently, you may want to limit alcohol even more.  Or you may seat belt in the car. When should you call for help? Watch closely for changes in your health, and be sure to contact your doctor if you have any problems or symptoms that concern you. Where can you learn more? Go to https://adrienne.Armor5. org and sign in to your [employer] account. Enter N316 in the [facility] box to learn more about \"Well Visit, Over 65: Care Instructions. \"     If you do not have an account, please click on the \"Sign Up Now\" link. Current as of: May 27, 2020               Content Version: 12.9  © 2006-2021 [employer]. Care instructions adapted under license by [facility] ([facility]). If you have questions about a medical condition or this instruction, always ask your healthcare professional. [address] any warranty or liability for your use of this information. Patient Education        Bladder Training: Care Instructions  Your Care Instructions     Bladder training is used to treat urge incontinence and stress incontinence. Urge incontinence means that the need to urinate comes on so fast that you can't get to a toilet in time. Stress incontinence means that you leak urine because of pressure on your bladder. For example, it may happen when you laugh, cough, or lift something heavy. Bladder training can increase how long you can wait before you have to urinate. It can also help your bladder hold more urine. And it can give you better control over the urge to urinate. It is important to remember that bladder training takes a few weeks to a few months to make a difference. You may not see results right away, but don't give up. Follow-up care is a key part of your treatment and safety. Be sure to make and go to all appointments, and call your doctor if you are having problems. It's also a good idea to know your test results and keep a list of the medicines you take. How can you care for yourself at home?   Work with your doctor to come up with a bladder training program that is right for you. You may use one or more of the following methods. Delayed urination  · In the beginning, try to keep from urinating for 5 minutes after you first feel the need to go. · While you wait, take deep, slow breaths to relax. Kegel exercises can also help you delay the need to go to the bathroom. · After some practice, when you can easily wait 5 minutes to urinate, try to wait 10 minutes before you urinate. · Slowly increase the waiting period until you are able to control when you have to urinate. Scheduled urination  · Empty your bladder when you first wake up in the morning. · Schedule times throughout the day when you will urinate. · Start by going to the bathroom every hour, even if you don't need to go. · Slowly increase the time between trips to the bathroom. · When you have found a schedule that works well for you, keep doing it. · If you wake up during the night and have to urinate, do it. Apply your schedule to waking hours only. Kegel exercises  These tighten and strengthen pelvic muscles, which can help you control the flow of urine. To do Kegel exercises:  · Squeeze the same muscles you would use to stop your urine. Your belly and thighs should not move. · Hold the squeeze for 3 seconds, and then relax for 3 seconds. · Start with 3 seconds. Then add 1 second each week until you are able to squeeze for 10 seconds. · Repeat the exercise 10 to 15 times a session. Do three or more sessions a day. When should you call for help? Watch closely for changes in your health, and be sure to contact your doctor if:    · Your incontinence is getting worse.     · You do not get better as expected. Where can you learn more? Go to https://Reliance Globalcomdenaewjuwan.Innovational Funding. org and sign in to your [employer] account. Enter N436 in the [facility] box to learn more about \"Bladder Training: Care Instructions. \"     If you do not have an account, please click on the \"Sign Up Now\" link. Current as of: February 10, 2021               Content Version: 12.9  © 2006-2021 [employer]. Care instructions adapted under license by [facility] ([facility]). If you have questions about a medical condition or this instruction, always ask your healthcare professional. [address] any warranty or liability for your use of this information. Patient Education        Kegel Exercises: Care Instructions  Overview     Kegel exercises strengthen muscles around the bladder. These muscles control the flow of urine. Kegel exercises are sometime called \"pelvic floor\" exercises. They can help prevent urine leakage and keep the pelvic organs in place. Kegel exercises can strengthen pelvic muscles that have been weakened by age, pregnancy, childbirth, and surgery. They may help prevent or treat urine leakage. You do Kegel exercises by tightening the muscles you use when you urinate. You will likely need to do these exercises for several weeks to get better. Follow-up care is a key part of your treatment and safety. Be sure to make and go to all appointments, and call your doctor if you are having problems. It's also a good idea to know your test results and keep a list of the medicines you take. How can you care for yourself at home? · Do Kegel exercises. ? Find the muscles you need to strengthen. To do this, tighten the muscles that stop your urine while you are going to the bathroom. These are the same muscles you squeeze during Kegel exercises. ? Squeeze the muscles as hard as you can. Your belly and thighs should not move. ? Hold the squeeze for 3 seconds. Then relax for 3 seconds. ? Start with 3 seconds, and then add 1 second each week until you are able to squeeze for 10 seconds. ? Repeat the exercise 10 to 15 times for each session. Do three or more sessions each day. · You can check to see if you are using the right muscles.   ? Tighten the muscles that help you stop passing gas or keep you from urinating. Make sure you aren't using your stomach, leg, or buttock muscles. ? Place a finger in your vagina and squeeze around it. You are doing them right when you feel pressure around your finger. Your doctor may also suggest that you put special weights in your vagina while you do the exercises. · Check with your doctor if you don't notice a difference after trying these exercises for several weeks. Your doctor may suggest getting help from a physical therapist or recommend other treatment. Where can you learn more? Go to https://Quickcomm Software Solutions.Scriptick. org and sign in to your "[employer] account. Enter D783 in the [employer] box to learn more about \"Kegel Exercises: Care Instructions. \"     If you do not have an account, please click on the \"Sign Up Now\" link. Current as of: February 10, 2021               Content Version: 12.9  © 6443-7330 Healthwise, [employer]. Care instructions adapted under license by [facility] If you have questions about a medical condition or this instruction, always ask your healthcare professional. [place] [address] any warranty or liability for your use of this information.

## 2021-08-09 NOTE — PATIENT INSTRUCTIONS
Patient Education        Breast Self-Exam: Care Instructions  Your Care Instructions     A breast self-exam is when you check your breasts for lumps or changes. This regular exam helps you learn how your breasts normally look and feel. Most breast problems or changes are not because of cancer. Breast self-exam is not a substitute for a mammogram. Having regular breast exams by your doctor and regular mammograms improve your chances of finding any problems with your breasts. Some women set a time each month to do a step-by-step breast self-exam. Other women like a less formal system. They might look at their breasts as they brush their teeth, or feel their breasts once in a while in the shower. If you notice a change in your breast, tell your doctor. Follow-up care is a key part of your treatment and safety. Be sure to make and go to all appointments, and call your doctor if you are having problems. It's also a good idea to know your test results and keep a list of the medicines you take. How do you do a breast self-exam?  · The best time to examine your breasts is usually one week after your menstrual period begins. Your breasts should not be tender then. If you do not have periods, you might do your exam on a day of the month that is easy to remember. · To examine your breasts:  ? Remove all your clothes above the waist and lie down. When you are lying down, your breast tissue spreads evenly over your chest wall, which makes it easier to feel all your breast tissue. ? Use the padsnot the fingertipsof the 3 middle fingers of your left hand to check your right breast. Move your fingers slowly in small coin-sized circles that overlap. ? Use three levels of pressure to feel of all your breast tissue. Use light pressure to feel the tissue close to the skin surface. Use medium pressure to feel a little deeper. Use firm pressure to feel your tissue close to your breastbone and ribs.  Use each pressure level to a good idea to know your test results and keep a list of the medicines you take. How can you care for yourself at home? · Get screening tests that you and your doctor decide on. Screening helps find diseases before any symptoms appear. · Eat healthy foods. Choose fruits, vegetables, whole grains, protein, and low-fat dairy foods. Limit fat, especially saturated fat. Reduce salt in your diet. · Limit alcohol. If you are a man, have no more than 2 drinks a day or 14 drinks a week. If you are a woman, have no more than 1 drink a day or 7 drinks a week. Since alcohol affects older adults differently, you may want to limit alcohol even more. Or you may not want to drink at all. · Get at least 30 minutes of exercise on most days of the week. Walking is a good choice. You also may want to do other activities, such as running, swimming, cycling, or playing tennis or team sports. · Reach and stay at a healthy weight. This will lower your risk for many problems, such as obesity, diabetes, heart disease, and high blood pressure. · Do not smoke. Smoking can make health problems worse. If you need help quitting, talk to your doctor about stop-smoking programs and medicines. These can increase your chances of quitting for good. · Care for your mental health. It is easy to get weighed down by worry and stress. Learn strategies to manage stress, like deep breathing and mindfulness, and stay connected with your family and community. If you find you often feel sad or hopeless, talk with your doctor. Treatment can help. · Talk to your doctor about whether you have any risk factors for sexually transmitted infections (STIs). You can help prevent STIs if you wait to have sex with a new partner (or partners) until you've each been tested for STIs. It also helps if you use condoms (male or female condoms) and if you limit your sex partners to one person who only has sex with you. Vaccines are available for some STIs.   · If you think you may have a problem with alcohol or drug use, talk to your doctor. This includes prescription medicines (such as amphetamines and opioids) and illegal drugs (such as cocaine and methamphetamine). Your doctor can help you figure out what type of treatment is best for you. · Protect your skin from too much sun. When you're outdoors from 10 a.m. to 4 p.m., stay in the shade or cover up with clothing and a hat with a wide brim. Wear sunglasses that block UV rays. Even when it's cloudy, put broad-spectrum sunscreen (SPF 30 or higher) on any exposed skin. · See a dentist one or two times a year for checkups and to have your teeth cleaned. · Wear a seat belt in the car. When should you call for help? Watch closely for changes in your health, and be sure to contact your doctor if you have any problems or symptoms that concern you. Where can you learn more? Go to https://[employer]. org and sign in to your [employer] account. Enter W732 in the Fugoo box to learn more about \"Well Visit, Over 65: Care Instructions. \"     If you do not have an account, please click on the \"Sign Up Now\" link. Current as of: May 27, 2020               Content Version: 12.9  © 2006-2021 Healthwise, Incorporated. Care instructions adapted under license by [facility] ([facility]). If you have questions about a medical condition or this instruction, always ask your healthcare professional. [place] [address] any warranty or liability for your use of this information. Patient Education        Bladder Training: Care Instructions  Your Care Instructions     Bladder training is used to treat urge incontinence and stress incontinence. Urge incontinence means that the need to urinate comes on so fast that you can't get to a toilet in time. Stress incontinence means that you leak urine because of pressure on your bladder.  For example, it may happen when you laugh, cough, or lift something heavy.  Bladder training can increase how long you can wait before you have to urinate. It can also help your bladder hold more urine. And it can give you better control over the urge to urinate. It is important to remember that bladder training takes a few weeks to a few months to make a difference. You may not see results right away, but don't give up. Follow-up care is a key part of your treatment and safety. Be sure to make and go to all appointments, and call your doctor if you are having problems. It's also a good idea to know your test results and keep a list of the medicines you take. How can you care for yourself at home? Work with your doctor to come up with a bladder training program that is right for you. You may use one or more of the following methods. Delayed urination  · In the beginning, try to keep from urinating for 5 minutes after you first feel the need to go. · While you wait, take deep, slow breaths to relax. Kegel exercises can also help you delay the need to go to the bathroom. · After some practice, when you can easily wait 5 minutes to urinate, try to wait 10 minutes before you urinate. · Slowly increase the waiting period until you are able to control when you have to urinate. Scheduled urination  · Empty your bladder when you first wake up in the morning. · Schedule times throughout the day when you will urinate. · Start by going to the bathroom every hour, even if you don't need to go. · Slowly increase the time between trips to the bathroom. · When you have found a schedule that works well for you, keep doing it. · If you wake up during the night and have to urinate, do it. Apply your schedule to waking hours only. Kegel exercises  These tighten and strengthen pelvic muscles, which can help you control the flow of urine. To do Kegel exercises:  · Squeeze the same muscles you would use to stop your urine. Your belly and thighs should not move.   · Hold the squeeze for 3 seconds, and then relax for 3 seconds. · Start with 3 seconds. Then add 1 second each week until you are able to squeeze for 10 seconds. · Repeat the exercise 10 to 15 times a session. Do three or more sessions a day. When should you call for help? Watch closely for changes in your health, and be sure to contact your doctor if:    · Your incontinence is getting worse.     · You do not get better as expected. Where can you learn more? Go to https://[employer]gu[employer].Instablogs. org and sign in to your Venuel[employer] account. Enter A158 in the DokDok box to learn more about \"Bladder Training: Care Instructions. \"     If you do not have an account, please click on the \"Sign Up Now\" link. Current as of: February 10, 2021               Content Version: 12.9  © 8475-3132 [employer]. Care instructions adapted under license by [facility] ([facility]). If you have questions about a medical condition or this instruction, always ask your healthcare professional. [place] [address] any warranty or liability for your use of this information. Patient Education        Kegel Exercises: Care Instructions  Overview     Kegel exercises strengthen muscles around the bladder. These muscles control the flow of urine. Kegel exercises are sometime called \"pelvic floor\" exercises. They can help prevent urine leakage and keep the pelvic organs in place. Kegel exercises can strengthen pelvic muscles that have been weakened by age, pregnancy, childbirth, and surgery. They may help prevent or treat urine leakage. You do Kegel exercises by tightening the muscles you use when you urinate. You will likely need to do these exercises for several weeks to get better. Follow-up care is a key part of your treatment and safety. Be sure to make and go to all appointments, and call your doctor if you are having problems.  It's also a good idea to know your test results and keep a list of the medicines you take.  How can you care for yourself at home? · Do Kegel exercises. ? Find the muscles you need to strengthen. To do this, tighten the muscles that stop your urine while you are going to the bathroom. These are the same muscles you squeeze during Kegel exercises. ? Squeeze the muscles as hard as you can. Your belly and thighs should not move. ? Hold the squeeze for 3 seconds. Then relax for 3 seconds. ? Start with 3 seconds, and then add 1 second each week until you are able to squeeze for 10 seconds. ? Repeat the exercise 10 to 15 times for each session. Do three or more sessions each day. · You can check to see if you are using the right muscles. ? Tighten the muscles that help you stop passing gas or keep you from urinating. Make sure you aren't using your stomach, leg, or buttock muscles. ? Place a finger in your vagina and squeeze around it. You are doing them right when you feel pressure around your finger. Your doctor may also suggest that you put special weights in your vagina while you do the exercises. · Check with your doctor if you don't notice a difference after trying these exercises for several weeks. Your doctor may suggest getting help from a physical therapist or recommend other treatment. Where can you learn more? Go to https://[email or web address] org and sign in to your [employer] inc account. Enter F842 in the [facility] box to learn more about \"Kegel Exercises: Care Instructions. \"     If you do not have an account, please click on the \"Sign Up Now\" link. Current as of: February 10, 2021               Content Version: 12.9  © 4370-1186 Healthwise, threadsy. Care instructions adapted under license by [facility] ([facility]). If you have questions about a medical condition or this instruction, always ask your healthcare professional. [address] any warranty or liability for your use of this information.

## 2021-08-13 LAB
HPV COMMENT: NORMAL
HPV TYPE 16: NOT DETECTED
HPV TYPE 18: NOT DETECTED
HPVOH (OTHER TYPES): NOT DETECTED

## 2021-08-24 ENCOUNTER — HOSPITAL ENCOUNTER (OUTPATIENT)
Dept: WOMENS IMAGING | Age: 66
Discharge: HOME | End: 2021-08-24
Payer: MEDICARE

## 2021-08-24 DIAGNOSIS — Z78.0 POSTMENOPAUSAL: ICD-10-CM

## 2021-08-24 PROCEDURE — 77080 DXA BONE DENSITY AXIAL: CPT

## 2021-08-27 ENCOUNTER — HOSPITAL ENCOUNTER (OUTPATIENT)
Dept: CT IMAGING | Age: 66
Discharge: HOME | End: 2021-08-27
Payer: MEDICARE

## 2021-08-27 ENCOUNTER — HOSPITAL ENCOUNTER (OUTPATIENT)
Dept: NEUROLOGY | Age: 66
Discharge: HOME | End: 2021-08-27
Payer: MEDICARE

## 2021-08-27 DIAGNOSIS — Z87.891 PERSONAL HISTORY OF TOBACCO USE: ICD-10-CM

## 2021-08-27 DIAGNOSIS — R20.0 NUMBNESS AND TINGLING OF BOTH LOWER EXTREMITIES: ICD-10-CM

## 2021-08-27 DIAGNOSIS — R91.8 LUNG INFILTRATE ON CT: Primary | ICD-10-CM

## 2021-08-27 DIAGNOSIS — R20.2 NUMBNESS AND TINGLING OF BOTH LOWER EXTREMITIES: ICD-10-CM

## 2021-08-27 PROCEDURE — 71271 CT THORAX LUNG CANCER SCR C-: CPT

## 2021-08-27 PROCEDURE — 95909 NRV CNDJ TST 5-6 STUDIES: CPT

## 2021-08-27 PROCEDURE — 95886 MUSC TEST DONE W/N TEST COMP: CPT

## 2021-08-27 PROCEDURE — 95886 MUSC TEST DONE W/N TEST COMP: CPT | Performed by: PSYCHIATRY & NEUROLOGY

## 2021-08-27 PROCEDURE — 95909 NRV CNDJ TST 5-6 STUDIES: CPT | Performed by: PSYCHIATRY & NEUROLOGY

## 2021-09-08 ENCOUNTER — OFFICE VISIT (OUTPATIENT)
Dept: PULMONOLOGY | Age: 66
End: 2021-09-08
Payer: MEDICARE

## 2021-09-08 VITALS
HEART RATE: 68 BPM | WEIGHT: 156 LBS | HEIGHT: 62 IN | RESPIRATION RATE: 16 BRPM | SYSTOLIC BLOOD PRESSURE: 126 MMHG | BODY MASS INDEX: 28.71 KG/M2 | TEMPERATURE: 97.4 F | OXYGEN SATURATION: 96 % | DIASTOLIC BLOOD PRESSURE: 84 MMHG

## 2021-09-08 DIAGNOSIS — Z87.891 HISTORY OF TOBACCO ABUSE: ICD-10-CM

## 2021-09-08 DIAGNOSIS — F17.200 VAPING NICOTINE DEPENDENCE, NON-TOBACCO PRODUCT: ICD-10-CM

## 2021-09-08 DIAGNOSIS — R91.8 LUNG INFILTRATE: Primary | ICD-10-CM

## 2021-09-08 PROCEDURE — 3017F COLORECTAL CA SCREEN DOC REV: CPT | Performed by: INTERNAL MEDICINE

## 2021-09-08 PROCEDURE — 1123F ACP DISCUSS/DSCN MKR DOCD: CPT | Performed by: INTERNAL MEDICINE

## 2021-09-08 PROCEDURE — G8417 CALC BMI ABV UP PARAM F/U: HCPCS | Performed by: INTERNAL MEDICINE

## 2021-09-08 PROCEDURE — 4040F PNEUMOC VAC/ADMIN/RCVD: CPT | Performed by: INTERNAL MEDICINE

## 2021-09-08 PROCEDURE — 1090F PRES/ABSN URINE INCON ASSESS: CPT | Performed by: INTERNAL MEDICINE

## 2021-09-08 PROCEDURE — G8427 DOCREV CUR MEDS BY ELIG CLIN: HCPCS | Performed by: INTERNAL MEDICINE

## 2021-09-08 PROCEDURE — 4004F PT TOBACCO SCREEN RCVD TLK: CPT | Performed by: INTERNAL MEDICINE

## 2021-09-08 PROCEDURE — G8399 PT W/DXA RESULTS DOCUMENT: HCPCS | Performed by: INTERNAL MEDICINE

## 2021-09-08 PROCEDURE — 99204 OFFICE O/P NEW MOD 45 MIN: CPT | Performed by: INTERNAL MEDICINE

## 2021-09-08 RX ORDER — DULOXETIN HYDROCHLORIDE 30 MG/1
20 CAPSULE, DELAYED RELEASE ORAL 2 TIMES DAILY
COMMUNITY
Start: 2021-08-25

## 2021-09-08 ASSESSMENT — ENCOUNTER SYMPTOMS
ABDOMINAL DISTENTION: 0
SHORTNESS OF BREATH: 0
RHINORRHEA: 0
BACK PAIN: 0
WHEEZING: 0
APNEA: 0
COUGH: 0
ABDOMINAL PAIN: 0
ANAL BLEEDING: 0
CHEST TIGHTNESS: 0

## 2021-09-08 NOTE — PROGRESS NOTES
Pulmonary and Sleep Medicine    John Bauer (:  1955) is a 77 y.o. female,New patient, here for evaluation of the following chief complaint(s):  New Patient (Lung infiltrate on CT)      ASSESSMENT/PLAN:  1. Lung infiltrate  -     Full PFT Study With Bronchodilator; Future  -     CT CHEST WO CONTRAST; Future  2. Vaping nicotine dependence, non-tobacco product  3. History of tobacco abuse        Tree in bud formation that is small and not symptomatic likely caused by her vaping/history of smoking. No indication for invasive work up at this time. Will get a follow up CT of the chest in 6 months with PFT. If no change would go back to yearly LDCT until the age of 76. Hever Koehler MD, [facility]    Return in about 4 weeks (around 10/6/2021). SUBJECTIVE/OBJECTIVE:  She is here for evaluation of an abnormal CT of the chest.  She underwent low-dose CT of the chest for lung cancer screening. CT showed right lower lobe tree-in-bud formation. The patient denies cough. Denies sputum production. Denies weight loss denies hemoptysis. She quit smoking about 2 years ago. She vapes occasionally. Denies any significant environmental exposures. Prior to Visit Medications    Medication Sig Taking? Authorizing Provider   DULoxetine (CYMBALTA) 30 MG extended release capsule  Yes Historical Provider, MD   Ciclopirox 1 % SHAM Apply bid prn.  Yes Josh Medel MD   estrogens, conjugated, (PREMARIN) 0.625 MG tablet TAKE 1 TABLET DAILY Yes Josh Medel MD   propranolol (INDERAL LA) 80 MG extended release capsule TAKE 1 CAPSULE DAILY Yes Josh Medel MD   triamterene-hydroCHLOROthiazide (MAXZIDE) 75-50 MG per tablet Take 1 tablet by mouth daily Yes Josh Medel MD   amLODIPine (NORVASC) 5 MG tablet Take 1 tablet by mouth daily Yes Josh Medel MD   cyclobenzaprine (FLEXERIL) 10 MG tablet Take 1 tablet by mouth nightly Yes Josh Medel MD   celecoxib (CELEBREX) 200 MG capsule TAKE 1 CAPSULE TWICE DAILY Yes David Fuentes MD   montelukast (SINGULAIR) 10 MG tablet Take 1 tablet by mouth nightly Yes David Fuentes MD   atorvastatin (LIPITOR) 40 MG tablet Take 1 tablet by mouth daily Yes David Fuentes MD   losartan (COZAAR) 25 MG tablet TAKE 1 TABLET DAILY Yes David Fuentes MD   potassium chloride (KLOR-CON M10) 10 MEQ extended release tablet TAKE 1 TABLET 3 TIMES A DAY Yes David Fuentes MD   levothyroxine (SYNTHROID) 112 MCG tablet Take 112 mcg by mouth Daily Yes Historical Provider, MD   ASPIRIN LOW DOSE 81 MG EC tablet Take 1 tablet by mouth daily Yes Historical Provider, MD   VITAMIN B COMPLEX-C PO Take by mouth Yes Historical Provider, MD   MAGNESIUM CARBONATE PO Take by mouth Yes Historical Provider, MD   CRANBERRY EXTRACT PO Take by mouth Yes Historical Provider, MD   vitamin D (CHOLECALCIFEROL) 1000 UNIT TABS tablet Take 1,000 Units by mouth daily Yes Historical Provider, MD   traMADol (ULTRAM) 50 MG tablet Take 1 tablet by mouth every 12 hours Yes David Fuentes MD        Review of Systems   Constitutional: Negative for activity change, appetite change, chills, diaphoresis and fatigue. HENT: Negative for congestion, dental problem, drooling, ear discharge, postnasal drip and rhinorrhea. Eyes: Negative for visual disturbance. Respiratory: Negative for apnea, cough, chest tightness, shortness of breath and wheezing. Gastrointestinal: Negative for abdominal distention, abdominal pain and anal bleeding. Endocrine: Negative for cold intolerance, heat intolerance and polydipsia. Genitourinary: Negative for difficulty urinating, dysuria, enuresis and flank pain. Musculoskeletal: Negative for arthralgias, back pain and gait problem. Allergic/Immunologic: Negative for environmental allergies. Neurological: Negative for dizziness, facial asymmetry, light-headedness and headaches.        Vitals:    09/08/21 1420   BP: 126/84   Pulse: 68   Resp: 16   Temp: 97.4 °F (36.3 °C)   SpO2: 96%     Physical Exam  Vitals reviewed. Constitutional:       Appearance: Normal appearance. HENT:      Head: Normocephalic and atraumatic. Nose: Nose normal.   Eyes:      Extraocular Movements: Extraocular movements intact. Conjunctiva/sclera: Conjunctivae normal.   Cardiovascular:      Rate and Rhythm: Normal rate and regular rhythm. Heart sounds: No murmur heard. No friction rub. Pulmonary:      Effort: Pulmonary effort is normal. No respiratory distress. Breath sounds: Normal breath sounds. No stridor. No wheezing, rhonchi or rales. Abdominal:      General: There is no distension. Palpations: There is no mass. Tenderness: There is no abdominal tenderness. There is no guarding or rebound. Musculoskeletal:      Cervical back: Normal range of motion and neck supple. Neurological:      Mental Status: She is alert and oriented to person, place, and time. This note was generated used a voice recognition software. Errors in voice recognition may have occurred. An electronic signature was used to authenticate this note.     --Arley Watson MD

## 2021-09-09 DIAGNOSIS — R40.0 DAYTIME SOMNOLENCE: ICD-10-CM

## 2021-09-09 DIAGNOSIS — G47.14 HYPERSOMNIA DUE TO MEDICAL CONDITION: ICD-10-CM

## 2021-10-11 ENCOUNTER — NURSE ONLY (OUTPATIENT)
Dept: FAMILY MEDICINE CLINIC | Age: 66
End: 2021-10-11
Payer: MEDICARE

## 2021-10-11 DIAGNOSIS — Z23 FLU VACCINE NEED: Primary | ICD-10-CM

## 2021-10-11 PROCEDURE — 90674 CCIIV4 VAC NO PRSV 0.5 ML IM: CPT | Performed by: FAMILY MEDICINE

## 2021-10-11 PROCEDURE — G0008 ADMIN INFLUENZA VIRUS VAC: HCPCS | Performed by: FAMILY MEDICINE

## 2022-01-05 ENCOUNTER — TRANSCRIBE ORDERS (OUTPATIENT)
Dept: LAB | Facility: HOSPITAL | Age: 67
End: 2022-01-05

## 2022-01-05 ENCOUNTER — PRE-ADMISSION TESTING (OUTPATIENT)
Dept: PREADMISSION TESTING | Facility: HOSPITAL | Age: 67
End: 2022-01-05

## 2022-01-05 VITALS
HEART RATE: 62 BPM | WEIGHT: 164.02 LBS | DIASTOLIC BLOOD PRESSURE: 77 MMHG | SYSTOLIC BLOOD PRESSURE: 151 MMHG | OXYGEN SATURATION: 98 % | RESPIRATION RATE: 16 BRPM | HEIGHT: 61 IN | BODY MASS INDEX: 30.97 KG/M2

## 2022-01-05 DIAGNOSIS — Z11.59 SCREENING FOR VIRAL DISEASE: Primary | ICD-10-CM

## 2022-01-05 LAB
ANION GAP SERPL CALCULATED.3IONS-SCNC: 9 MMOL/L (ref 5–15)
BUN SERPL-MCNC: 20 MG/DL (ref 8–23)
BUN/CREAT SERPL: 21.5 (ref 7–25)
CALCIUM SPEC-SCNC: 9.1 MG/DL (ref 8.6–10.5)
CHLORIDE SERPL-SCNC: 100 MMOL/L (ref 98–107)
CO2 SERPL-SCNC: 30 MMOL/L (ref 22–29)
CREAT SERPL-MCNC: 0.93 MG/DL (ref 0.57–1)
DEPRECATED RDW RBC AUTO: 41.1 FL (ref 37–54)
ERYTHROCYTE [DISTWIDTH] IN BLOOD BY AUTOMATED COUNT: 12 % (ref 12.3–15.4)
GFR SERPL CREATININE-BSD FRML MDRD: 60 ML/MIN/1.73
GLUCOSE SERPL-MCNC: 105 MG/DL (ref 65–99)
HCT VFR BLD AUTO: 42.8 % (ref 34–46.6)
HGB BLD-MCNC: 13.8 G/DL (ref 12–15.9)
MCH RBC QN AUTO: 29.9 PG (ref 26.6–33)
MCHC RBC AUTO-ENTMCNC: 32.2 G/DL (ref 31.5–35.7)
MCV RBC AUTO: 92.8 FL (ref 79–97)
PLATELET # BLD AUTO: 272 10*3/MM3 (ref 140–450)
PMV BLD AUTO: 9.6 FL (ref 6–12)
POTASSIUM SERPL-SCNC: 3.7 MMOL/L (ref 3.5–5.2)
RBC # BLD AUTO: 4.61 10*6/MM3 (ref 3.77–5.28)
SODIUM SERPL-SCNC: 139 MMOL/L (ref 136–145)
WBC NRBC COR # BLD: 9.56 10*3/MM3 (ref 3.4–10.8)

## 2022-01-05 PROCEDURE — 36415 COLL VENOUS BLD VENIPUNCTURE: CPT

## 2022-01-05 PROCEDURE — 85027 COMPLETE CBC AUTOMATED: CPT

## 2022-01-05 PROCEDURE — 93010 ELECTROCARDIOGRAM REPORT: CPT | Performed by: INTERNAL MEDICINE

## 2022-01-05 PROCEDURE — 93005 ELECTROCARDIOGRAM TRACING: CPT

## 2022-01-05 PROCEDURE — 80048 BASIC METABOLIC PNL TOTAL CA: CPT

## 2022-01-05 NOTE — DISCHARGE INSTRUCTIONS
DAY OF SURGERY INSTRUCTIONS          ARRIVAL TIME: AS DIRECTED BY OFFICE    YOU MAY TAKE THE FOLLOWING MEDICATION(S) THE MORNING OF SURGERY WITH A SIP OF WATER: Propanolol and Tramadol    Hold Losartan for 24 hours prior to surgery.       ALL OTHER HOME MEDICATION CHECK WITH YOUR PHYSICIAN (ask your health care provider about changing or stopping your regular medicines, especially if you are taking diabetes medicines or blood thinners)      DO NOT TAKE ANY ERECTILE DYSFUNCTION MEDICATIONS (EX: CIALIS, VIAGRA) 24 HOURS PRIOR TO SURGERY                      MANAGING PAIN AFTER SURGERY    We know you are probably wondering what your pain will be like after surgery.  Following surgery it is unrealistic to expect you will not have pain.   Pain is how our bodies let us know that something is wrong or cautions us to be careful.  That said, our goal is to make your pain tolerable.    Methods we may use to treat your pain include (oral or IV medications, PCAs, epidurals, nerve blocks, etc.)   While some procedures require IV pain medications for a short time after surgery, transitioning to pain medications by mouth allows for better management of pain.   Your nurse will encourage you to take oral pain medications whenever possible.  IV medications work almost immediately, but only last a short while.  Taking medications by mouth allows for a more constant level of medication in your blood stream for a longer period of time.      Once your pain is out of control it is harder to get back under control.  It is important you are aware when your next dose of pain medication is due.  If you are admitted, your nurse may write the time of your next dose on the white board in your room to help you remember.      We are interested in your pain and encourage you to inform us about aggravating factors during your visit.   Many times a simple repositioning every few hours can make a big difference.    If your physician says it is okay,  do not let your pain prevent you from getting out of bed. Be sure to call your nurse for assistance prior to getting up so you do not fall.      Before surgery, please decide your tolerable pain goal.  These faces help describe the pain ratings we use on a 0-10 scale.   Be prepared to tell us your goal and whether or not you take pain or anxiety medications at home.          BEFORE YOU COME TO THE HOSPITAL  (Pre-op instructions)  • Do not eat, drink, smoke or chew gum after midnight the night before surgery.  This also includes no mints.  • Morning of surgery take only the medicines you have been instructed with a sip of water unless otherwise instructed  by your physician.  • Do not shave, wear makeup or dark nail polish.  • Remove all jewelry including rings.  • Leave anything you consider valuable at home.  • Leave your suitcase in the car until after your surgery.  • Bring the following with you if applicable:  o Picture ID and insurance, Medicare or Medicaid cards  o Co-pay/deductible required by insurance (cash, check, credit card)  o Copy of advance directive, living will or power-of- documents if not brought to pre-work  o CPAP or BIPAP mask and tubing  o Relaxation aids ( book, magazine), etc.  o Hearing aids                        ON THE DAY OF SURGERY  · On the day of surgery check in at registration located at the main entrance of the hospital. Only one family member or friend are allowed per patient.  ? You will be registered and given a beeper with instructions where to wait in the main lobby.  ? When your beeper lights up and vibrates a member of the Outpatient Surgery staff will meet you at the double doors under the stair steps and escort you to your preoperative room.   · You may have cloth compression devices placed on your legs. These help to prevent blood clots and reduce swelling in your legs.  · An IV may be inserted into one of your veins.  · In the operating room, you may be given  "one or more of the following:  ? A medicine to help you relax (sedative).  ? A medicine to numb the area (local anesthetic).  ? A medicine to make you fall asleep (general anesthetic).  ? A medicine that is injected into an area of your body to numb everything below the injection site (regional anesthetic).  · Your surgical site will be marked or identified.  · You may be given an antibiotic through your IV to help prevent infection.  Contact a health care provider if you:  · Develop a fever of more than 100.4°F (38°C) or other feelings of illness during the 48 hours before your surgery.  · Have symptoms that get worse.  Have questions or concerns about your surgery    General Anesthesia/Surgery, Adult  General anesthesia is the use of medicines to make a person \"go to sleep\" (unconscious) for a medical procedure. General anesthesia must be used for certain procedures, and is often recommended for procedures that:  · Last a long time.  · Require you to be still or in an unusual position.  · Are major and can cause blood loss.  The medicines used for general anesthesia are called general anesthetics. As well as making you unconscious for a certain amount of time, these medicines:  · Prevent pain.  · Control your blood pressure.  · Relax your muscles.  Tell a health care provider about:  · Any allergies you have.  · All medicines you are taking, including vitamins, herbs, eye drops, creams, and over-the-counter medicines.  · Any problems you or family members have had with anesthetic medicines.  · Types of anesthetics you have had in the past.  · Any blood disorders you have.  · Any surgeries you have had.  · Any medical conditions you have.  · Any recent upper respiratory, chest, or ear infections.  · Any history of:  ? Heart or lung conditions, such as heart failure, sleep apnea, asthma, or chronic obstructive pulmonary disease (COPD).  ?  service.  ? Depression or anxiety.  · Any tobacco or drug use, " including marijuana or alcohol use.  · Whether you are pregnant or may be pregnant.  What are the risks?  Generally, this is a safe procedure. However, problems may occur, including:  · Allergic reaction.  · Lung and heart problems.  · Inhaling food or liquid from the stomach into the lungs (aspiration).  · Nerve injury.  · Air in the bloodstream, which can lead to stroke.  · Extreme agitation or confusion (delirium) when you wake up from the anesthetic.  · Waking up during your procedure and being unable to move. This is rare.  These problems are more likely to develop if you are having a major surgery or if you have an advanced or serious medical condition. You can prevent some of these complications by answering all of your health care provider's questions thoroughly and by following all instructions before your procedure.  General anesthesia can cause side effects, including:  · Nausea or vomiting.  · A sore throat from the breathing tube.  · Hoarseness.  · Wheezing or coughing.  · Shaking chills.  · Tiredness.  · Body aches.  · Anxiety.  · Sleepiness or drowsiness.  · Confusion or agitation.  RISKS AND COMPLICATIONS OF SURGERY  Your health care provider will discuss possible risks and complications with you before surgery. Common risks and complications include:    · Problems due to the use of anesthetics.  · Blood loss and replacement (does not apply to minor surgical procedures).  · Temporary increase in pain due to surgery.  · Uncorrected pain or problems that the surgery was meant to correct.  · Infection.  · New damage.    What happens before the procedure?    Medicines  Ask your health care provider about:  · Changing or stopping your regular medicines. This is especially important if you are taking diabetes medicines or blood thinners.  · Taking medicines such as aspirin and ibuprofen. These medicines can thin your blood. Do not take these medicines unless your health care provider tells you to take  them.  · Taking over-the-counter medicines, vitamins, herbs, and supplements. Do not take these during the week before your procedure unless your health care provider approves them.  General instructions  · Starting 3-6 weeks before the procedure, do not use any products that contain nicotine or tobacco, such as cigarettes and e-cigarettes. If you need help quitting, ask your health care provider.  · If you brush your teeth on the morning of the procedure, make sure to spit out all of the toothpaste.  · Tell your health care provider if you become ill or develop a cold, cough, or fever.  · If instructed by your health care provider, bring your sleep apnea device with you on the day of your surgery (if applicable).  · Ask your health care provider if you will be going home the same day, the following day, or after a longer hospital stay.  ? Plan to have someone take you home from the hospital or clinic.  ? Plan to have a responsible adult care for you for at least 24 hours after you leave the hospital or clinic. This is important.  What happens during the procedure?  · You will be given anesthetics through both of the following:  ? A mask placed over your nose and mouth.  ? An IV in one of your veins.  · You may receive a medicine to help you relax (sedative).  · After you are unconscious, a breathing tube may be inserted down your throat to help you breathe. This will be removed before you wake up.  · An anesthesia specialist will stay with you throughout your procedure. He or she will:  ? Keep you comfortable and safe by continuing to give you medicines and adjusting the amount of medicine that you get.  ? Monitor your blood pressure, pulse, and oxygen levels to make sure that the anesthetics do not cause any problems.  The procedure may vary among health care providers and hospitals.  What happens after the procedure?  · Your blood pressure, temperature, heart rate, breathing rate, and blood oxygen level will be  monitored until the medicines you were given have worn off.  · You will wake up in a recovery area. You may wake up slowly.  · If you feel anxious or agitated, you may be given medicine to help you calm down.  · If you will be going home the same day, your health care provider may check to make sure you can walk, drink, and urinate.  · Your health care provider will treat any pain or side effects you have before you go home.  · Do not drive for 24 hours if you were given a sedative.  Summary  · General anesthesia is used to keep you still and prevent pain during a procedure.  · It is important to tell your healthcare provider about your medical history and any surgeries you have had, and previous experience with anesthesia.  · Follow your healthcare provider’s instructions about when to stop eating, drinking, or taking certain medicines before your procedure.  · Plan to have someone take you home from the hospital or clinic.  This information is not intended to replace advice given to you by your health care provider. Make sure you discuss any questions you have with your health care provider.  Document Released: 03/26/2009 Document Revised: 08/03/2018 Document Reviewed: 08/03/2018  [employer] Interactive Patient Education © 2019 [employer]       Fall Prevention in Hospitals, Adult  As a hospital patient, your condition and the treatments you receive can increase your risk for falls. Some additional risk factors for falls in a hospital include:  · Being in an unfamiliar environment.  · Being on bed rest.  · Your surgery.  · Taking certain medicines.  · Your tubing requirements, such as intravenous (IV) therapy or catheters.  It is important that you learn how to decrease fall risks while at the hospital. Below are important tips that can help prevent falls.  SAFETY TIPS FOR PREVENTING FALLS  Talk about your risk of falling.  · Ask your health care provider why you are at risk for falling. Is it your medicine,  illness, tubing placement, or something else?  · Make a plan with your health care provider to keep you safe from falls.  · Ask your health care provider or pharmacist about side effects of your medicines. Some medicines can make you dizzy or affect your coordination.  Ask for help.  · Ask for help before getting out of bed. You may need to press your call button.  · Ask for assistance in getting safely to the toilet.  · Ask for a walker or cane to be put at your bedside. Ask that most of the side rails on your bed be placed up before your health care provider leaves the room.  · Ask family or friends to sit with you.  · Ask for things that are out of your reach, such as your glasses, hearing aids, telephone, bedside table, or call button.  Follow these tips to avoid falling:  · Stay lying or seated, rather than standing, while waiting for help.  · Wear rubber-soled slippers or shoes whenever you walk in the hospital.  · Avoid quick, sudden movements.  ¨ Change positions slowly.  ¨ Sit on the side of your bed before standing.  ¨ Stand up slowly and wait before you start to walk.  · Let your health care provider know if there is a spill on the floor.  · Pay careful attention to the medical equipment, electrical cords, and tubes around you.  · When you need help, use your call button by your bed or in the bathroom. Wait for one of your health care providers to help you.  · If you feel dizzy or unsure of your footing, return to bed and wait for assistance.  · Avoid being distracted by the TV, telephone, or another person in your room.  · Do not lean or support yourself on rolling objects, such as IV poles or bedside tables.     This information is not intended to replace advice given to you by your health care provider. Make sure you discuss any questions you have with your health care provider.     Document Released: 12/15/2001 Document Revised: 01/08/2016 Document Reviewed: 08/25/2013  [employer] Patient  Education ©2016 [employer]       [facility]  CHG 4% Patient Instruction Sheet    Chlorhexidine Before Surgery  Chlorhexidine gluconate (CHG) is a germ-killing (antiseptic) solution that is used to clean the skin. It gets rid of the bacteria that normally live on the skin. Cleaning your skin with CHG before surgery helps lower the risk for infection after surgery.    How to use CHG solution  · You will take 2 showers, one shower the night before surgery, the second shower the morning of surgery before coming to the hospital.  · Use CHG only as told by your health care provider, and follow the instructions on the label.  · Use CHG solution while taking a shower. Follow these steps when using CHG solution (unless your health care provider gives you different instructions):  1. Start the shower.  2. Use your normal soap and shampoo to wash your face and hair.  3. Turn off the shower or move out of the shower stream.  4. Pour the CHG onto a clean washcloth. Do not use any type of brush or rough-edged sponge.  5. Starting at your neck, lather your body down to your toes. Make sure you:  6. Pay special attention to the part of your body where you will be having surgery. Scrub this area for at least 1 minute.  7. Use the full amount of CHG as directed. Usually, this is one half bottle for each shower.  8. Do not use CHG on your head or face. If the solution gets into your ears or eyes, rinse them well with water.  9. Avoid your genital area.  10. Avoid any areas of skin that have broken skin, cuts, or scrapes.  11. Scrub your back and under your arms. Make sure to wash skin folds.  12. Let the lather sit on your skin for 1-2 minutes or as long as told by your health care  provider.  13. Thoroughly rinse your entire body in the shower. Make sure that all body creases and crevices are rinsed well.  14. Dry off with a clean towel. Do not put any substances on your body afterward, such as powder, lotion, or  perfume.  15. Put on clean clothes or pajamas.  16. If it is the night before your surgery, sleep in clean sheets.    What are the risks?  Risks of using CHG include:  · A skin reaction.  · Hearing loss, if CHG gets in your ears.  · Eye injury, if CHG gets in your eyes and is not rinsed out.  · The CHG product catching fire.  Make sure that you avoid smoking and flames after applying CHG to your skin.  Do not use CHG:  · If you have a chlorhexidine allergy or have previously reacted to chlorhexidine.  · On babies younger than 2 months of age.      On the day of surgery, when you are taken to your room in Outpatient Surgery you will be given a CHG prepackaged cloth to wipe the site for your surgery.  How to use CHG prepackaged cloths  · Follow the instructions on the label.  · Use the CHG cloth on clean, dry skin. Follow these steps when using a CHG cloth (unless your health care provider gives you different instructions):  1. Using the CHG cloth, vigorously scrub the part of your body where you will be having surgery. Scrub using a back-and-forth motion for 3 minutes. The area on your body should be completely wet with CHG when you are finished scrubbing.  2. Do not rinse. Discard the cloth and let the area air-dry for 1 minute. Do not put any substances on your body afterward, such as powder, lotion, or perfume.  Contact a health care provider if:  · Your skin gets irritated after scrubbing.  · You have questions about using your solution or cloth.  Get help right away if:  · Your eyes become very red or swollen.  · Your eyes itch badly.  · Your skin itches badly and is red or swollen.  · Your hearing changes.  · You have trouble seeing.  · You have swelling or tingling in your mouth or throat.  · You have trouble breathing.  · You swallow any chlorhexidine.  Summary  · Chlorhexidine gluconate (CHG) is a germ-killing (antiseptic) solution that is used to clean the skin. Cleaning your skin with CHG before surgery  helps lower the risk for infection after surgery.  · You may be given CHG to use at home. It may be in a bottle or in a prepackaged cloth to use on your skin. Carefully follow your health care provider's instructions and the instructions on the product label.  · Do not use CHG if you have a chlorhexidine allergy.  · Contact your health care provider if your skin gets irritated after scrubbing.  This information is not intended to replace advice given to you by your health care provider. Make sure you discuss any questions you have with your health care provider.  Document Released: 09/11/2013 Document Revised: 11/15/2018 Document Reviewed: 11/15/2018  [employer] Interactive Patient Education © 2019 [employer]          PATIENT/FAMILY/RESPONSIBLE PARTY VERBALIZES UNDERSTANDING OF ABOVE EDUCATION.  COPY OF PAIN SCALE GIVEN AND REVIEWED WITH VERBALIZED UNDERSTANDING.

## 2022-01-06 ENCOUNTER — APPOINTMENT (OUTPATIENT)
Dept: PREADMISSION TESTING | Facility: HOSPITAL | Age: 67
End: 2022-01-06

## 2022-01-06 LAB
QT INTERVAL: 428 MS
QTC INTERVAL: 430 MS

## 2022-01-07 PROBLEM — M75.121 NONTRAUMATIC COMPLETE TEAR OF RIGHT ROTATOR CUFF: Status: ACTIVE | Noted: 2022-01-07

## 2022-01-07 NOTE — DISCHARGE INSTRUCTIONS
UPPER EXTREMITY POST-OP INSTRUCTIONS - DR. ATKINSON    IMPORTANT PHONE NUMBERS:  • For emergencies, please call [phone]  • You may reach Dr. Atkinson and clinical staff at [phone] 8:00 am-5:00 pm  • After 5pm or on the weekends, please call [phone]  • Call immediately if you have any of the following symptoms:     Elevated temperature above 101.5 degrees for more than 48 hours after surgery     Persistent drainage from wound     Severe pain around surgical site    Sling use: The sling is provided for your comfort and to ensure proper healing of your repair following surgery. Please place the abduction pillow with the curved side against your side and the sling on the side of the pillow. Your surgery requires that you wear the sling if noted below.  ____ For comfort. Remove sling 24 hours and begin range of motion exercises  _x___ At all times except bathing, dressing, and therapy. Also wear the sling during sleep.  ____ No sling required    Bathing:  ___No bandages, no restrictions!!  _x__You may remove you dressing and shower on the 3rd day after surgery (Ex. Tues surgery, shower on Friday)  ** if you are told to it is ok to remove your dressing and shower, DO NOT SOAK your incisions in a tub.  ___Keep splint clean, dry, and intact. DO NOT place foreign objects into your splint.      Dressings: Keep dressing/splint intact unless instructed otherwise below. SOME DRAINAGE IS NORMAL!    • DO NOT touch or apply ointment to the incision.    • DO NOT remove the steri-strips over the incisions (if you have steri-strips). They will         generally fall off on their own or can be removed 1 weeksafter surgery.    • If you have yellow gauze and it comes off, do not worry about it. Leave them off.   • Signs of infection that warrant a phone call to our clinical line:     o Excessive drainage or redness     o Red streaking coming away from the incision  o Increased pain  o Increased temperature  above 101 degrees      Physical Therapy:        *  Your physical therapy status will be discussed with you postoperatively and at your first post-op appointment. Some injuries will not require physical therapy.      *  If you have a shoulder manipulation, please schedule therapy for the next day      Medications: You will be discharged with the appropriate medications following your surgery. Fill these at the pharmacy and take them as directed on the label. Not all of the medications below may be prescribed. Occasionally, other medications may be prescribed with specific instructions.    Percocet/Lortab (oxycodone/hydrocodone with tylenol) - Pain Medication, will cause drowsiness, possibly itchiness (this is NOT an allergy - use benadryl or an over the counter allergy medication such as Claritin or Zyrtec)     o Take 1-2 tablets every 4-6 hours. DO NOT EXCEED 4,000mg of Tylenol in 24 hours.  **DO NOT MIX WITH ALCOHOL, DRIVE WHILE TAKING, OR TAKE with extra TYLENOL**    Colace (Docusate) - stool softener, used for constipation. Take this only if you feel constipated.      Zofran (Ondansetron) or Phenergan - Anti-nausea medication, will cause drowsiness      *Starting January 2021, all narcotic medication must be prescribed electronically to your pharmacy.  Be sure to notify nursing of your preferred pharmacy.  If you are running low on pain medications, please notify us if you need a refill 24-48 hours prior to when you run out, so we can make arrangements to refill the prescription for you if we determine is necessary

## 2022-01-07 NOTE — OP NOTE
Patient Name: Rody  : 1955  MRN: [id number]    DATE of SURGERY: 2022    SURGEON: Dionicio Araiza MD    ASSISTANT: NONE    PREOPERATIVE DIAGNOSIS:  1) Chronic traumatic complete rotator cuff tear of the Right shoulder  2) Nontraumatic proximal biceps tendon rupture, Right Shoulder    3) Subacromial impingement syndrome, Right Shoulder     POSTOPERATIVE DIAGNOSIS:  1) Chronic traumatic complete rotator cuff tear of the Right shoulder  2) Nontraumatic proximal biceps tendon rupture, Right Shoulder    3) Subacromial impingement syndrome, Right Shoulder     PROCEDURE PERFORMED:  1) Right Shoulder arthroscopic rotator cuff repair   2) Right Shoulder arthroscopic biceps debridement  3) Right Shoulder arthroscopic subacromial decompression    IMPLANTS: Arthrex 4.75 mm BioSwiveLock anchors x 2    ANESTHESIA USED: General endotracheal anesthesia, interscalene block    ESTIMATED BLOOD LOSS: minimal    DRAINS: none     COMPLICATIONS: none    SPECIMENS: none    OPERATIVE INDICATIONS: This patient is a 66 y.o. female who presented to my clinic with complaints of progressive shoulder pain without a traumatic injury to the shoulder.  She pulled the covers off her in bed in 2021 and had a partial rupture of her proximal biceps tendon.  An MRI was obtained which showed the above named diagnoses. Pain was not relieved with conservative treatment consisting of anti-inflammatories, corticosteroid injections, physical therapy.  Due to progressive pain and loss of function, surgical evaluation was discussed and the patient wished to proceed understanding the risks, benefits, and alternatives. The surgical indications were to relieve pain, improve function, and prevent future disability in regards to the shoulder pathology dictated in the above diagnoses.  Risks included, but were not limited to, that of anesthesia, bleeding, infection, pain, damage to local structures, need for further surgery, failure of  repair, stiffness, failure of implants, and loss of function.      OPERATIVE FINDINGS:  1) Exam under anesthesia:  Full passive ROM, joint stable without laxity, skin intact  2) Glenohumeral Joint:       A) Chondral surfaces: Intact         B) Labrum: Intact without tear            C) Biceps:  Complete tear with 1.5 cm of biceps remaining attached to the superior labrum       D) Rotator Cuff: Complete full thickness tear  3) Subacromial space:         A) Large amount of dense vascular bursa         B) Type 3 acromium, hypertrophic coracoacromial ligament         C) Bursal surface rotator cuff:   Complete tear as above         D) AC joint:  Intact without arthritic change     PROCEDURE in DETAIL:  The patient was seen in the preoperative holding room, once again the informed consent was reviewed with the patient and signed.  The site of surgery was marked with the patient's agreement.  After being transported to the operating room, a timeout was performed identifying the correct patient as well as the operative site.  Dose appropriate IV antibiotics were given prior to incision.  The patient was positioned in the beach chair position, all bony prominences were protected and a sterile prep and drape was performed.  A standard posterior arthroscopy portal was established and an outside-in technique was utilized to establish an anterior portal within the rotator interval.  An arthroscopic probe was inserted and a thorough exam of the glenohumeral joint was performed.    Attention was first turned to the biceps-labral anchor and the probe was used to identify a complete rupture of the biceps tendon with a small stump remaining.  A biceps debridement was then performed at the biceps-labral anchor with a shaver and cautery device to remove the remaining stump.    Attention was then turned to the intraarticular portion of the rotator cuff.  A probe was used to identify a complete tear of the rotator cuff and a shaver was  used to debride the tendon.    The arthroscope was then transitioned to the subacromial space and an outside in technique was used to establish a lateral portal.  The arthroscopic shaver was introduced in the subacromial space and a complete bursectomy was performed with this device.     Attention was turned to the anterior and lateral edge of the acromion where soft tissue was removed with a cautery device.  Due to a prominent acromial spur causing impingement, an acromioplasty was performed with an arthroscopic ambar converting this to a flat type 1 morphology.  The coracoacromial ligament was incised with a cautery device completing the subacromial decompression.    Attention was then turned to the rotator cuff tear which was visualized from the intra-articular space.  The tendon and greater tuberosity footprint were debrided followed by shuttling FiberTape and FiberWire suture into the tear site in a horizontal mattress fashion with a suture passing device after a medial row anchor was placed.  Fixation was then performed to the greater tuberosity footprint with a 4.75 mm Bio-SwiveLock anchor in knotless fashion. The repair appeared to be anatomic.    Portals were then closed with Monocryl and a sterile dressing was applied followed by a sling.  The patient was awakened from anesthesia, transported to the recovery room in stable condition.    POSTOP PLAN:    1) Discharge home with family  2) Follow up in 1 week for a clinical check  3) Follow the following protocol: Small/Medium RCR       Electronically signed by Dionicio Araiza MD on 1/11/2022 at 19:07 CST

## 2022-01-11 ENCOUNTER — ANESTHESIA (OUTPATIENT)
Dept: PERIOP | Facility: HOSPITAL | Age: 67
End: 2022-01-11

## 2022-01-11 ENCOUNTER — HOSPITAL ENCOUNTER (OUTPATIENT)
Facility: HOSPITAL | Age: 67
Setting detail: HOSPITAL OUTPATIENT SURGERY
Discharge: HOME | End: 2022-01-11
Attending: ORTHOPAEDIC SURGERY | Admitting: ORTHOPAEDIC SURGERY

## 2022-01-11 ENCOUNTER — ANESTHESIA EVENT (OUTPATIENT)
Dept: PERIOP | Facility: HOSPITAL | Age: 67
End: 2022-01-11

## 2022-01-11 VITALS
HEART RATE: 55 BPM | DIASTOLIC BLOOD PRESSURE: 86 MMHG | SYSTOLIC BLOOD PRESSURE: 142 MMHG | TEMPERATURE: 98.3 F | RESPIRATION RATE: 18 BRPM | OXYGEN SATURATION: 95 %

## 2022-01-11 DIAGNOSIS — M75.121 NONTRAUMATIC COMPLETE TEAR OF RIGHT ROTATOR CUFF: Primary | ICD-10-CM

## 2022-01-11 PROCEDURE — 25010000002 ONDANSETRON PER 1 MG: Performed by: NURSE ANESTHETIST, CERTIFIED REGISTERED

## 2022-01-11 PROCEDURE — 25010000002 DEXAMETHASONE PER 1 MG: Performed by: ANESTHESIOLOGY

## 2022-01-11 PROCEDURE — 76942 ECHO GUIDE FOR BIOPSY: CPT | Performed by: ORTHOPAEDIC SURGERY

## 2022-01-11 PROCEDURE — 25010000002 EPINEPHRINE PER 0.1 MG: Performed by: ORTHOPAEDIC SURGERY

## 2022-01-11 PROCEDURE — 25010000002 ROPIVACAINE PER 1 MG: Performed by: ANESTHESIOLOGY

## 2022-01-11 PROCEDURE — 25010000002 DEXAMETHASONE PER 1 MG: Performed by: NURSE ANESTHETIST, CERTIFIED REGISTERED

## 2022-01-11 PROCEDURE — 25010000002 PROPOFOL 10 MG/ML EMULSION: Performed by: NURSE ANESTHETIST, CERTIFIED REGISTERED

## 2022-01-11 PROCEDURE — 25010000002 FENTANYL CITRATE (PF) 50 MCG/ML SOLUTION: Performed by: ANESTHESIOLOGY

## 2022-01-11 PROCEDURE — C1889 IMPLANT/INSERT DEVICE, NOC: HCPCS | Performed by: ORTHOPAEDIC SURGERY

## 2022-01-11 PROCEDURE — C1713 ANCHOR/SCREW BN/BN,TIS/BN: HCPCS | Performed by: ORTHOPAEDIC SURGERY

## 2022-01-11 DEVICE — SUT/ANCH BIOCOMP SWIVELOCK/C 4.75X19.1MM: Type: IMPLANTABLE DEVICE | Site: SHOULDER | Status: FUNCTIONAL

## 2022-01-11 DEVICE — SUT FIBERTAPE TW 2 7IN WHT/BLK AR72377T: Type: IMPLANTABLE DEVICE | Site: SHOULDER | Status: FUNCTIONAL

## 2022-01-11 RX ORDER — ROCURONIUM BROMIDE 10 MG/ML
INJECTION, SOLUTION INTRAVENOUS AS NEEDED
Status: DISCONTINUED | OUTPATIENT
Start: 2022-01-11 | End: 2022-01-11 | Stop reason: SURG

## 2022-01-11 RX ORDER — FENTANYL CITRATE 50 UG/ML
25 INJECTION, SOLUTION INTRAMUSCULAR; INTRAVENOUS
Status: DISCONTINUED | OUTPATIENT
Start: 2022-01-11 | End: 2022-01-11 | Stop reason: HOSPADM

## 2022-01-11 RX ORDER — SODIUM CHLORIDE, SODIUM LACTATE, POTASSIUM CHLORIDE, CALCIUM CHLORIDE 600; 310; 30; 20 MG/100ML; MG/100ML; MG/100ML; MG/100ML
9 INJECTION, SOLUTION INTRAVENOUS CONTINUOUS
Status: DISCONTINUED | OUTPATIENT
Start: 2022-01-11 | End: 2022-01-11 | Stop reason: HOSPADM

## 2022-01-11 RX ORDER — LABETALOL HYDROCHLORIDE 5 MG/ML
5 INJECTION, SOLUTION INTRAVENOUS
Status: DISCONTINUED | OUTPATIENT
Start: 2022-01-11 | End: 2022-01-11 | Stop reason: HOSPADM

## 2022-01-11 RX ORDER — ONDANSETRON 4 MG/1
4 TABLET, FILM COATED ORAL EVERY 8 HOURS PRN
Qty: 10 TABLET | Refills: 0 | Status: SHIPPED | OUTPATIENT
Start: 2022-01-11 | End: 2022-01-11 | Stop reason: HOSPADM

## 2022-01-11 RX ORDER — DEXTROSE MONOHYDRATE 25 G/50ML
12.5 INJECTION, SOLUTION INTRAVENOUS AS NEEDED
Status: DISCONTINUED | OUTPATIENT
Start: 2022-01-11 | End: 2022-01-11 | Stop reason: HOSPADM

## 2022-01-11 RX ORDER — ONDANSETRON 2 MG/ML
4 INJECTION INTRAMUSCULAR; INTRAVENOUS ONCE AS NEEDED
Status: DISCONTINUED | OUTPATIENT
Start: 2022-01-11 | End: 2022-01-11 | Stop reason: HOSPADM

## 2022-01-11 RX ORDER — ONDANSETRON 2 MG/ML
4 INJECTION INTRAMUSCULAR; INTRAVENOUS AS NEEDED
Status: DISCONTINUED | OUTPATIENT
Start: 2022-01-11 | End: 2022-01-11 | Stop reason: HOSPADM

## 2022-01-11 RX ORDER — LIDOCAINE HYDROCHLORIDE 40 MG/ML
SOLUTION TOPICAL AS NEEDED
Status: DISCONTINUED | OUTPATIENT
Start: 2022-01-11 | End: 2022-01-11 | Stop reason: SURG

## 2022-01-11 RX ORDER — SODIUM CHLORIDE 0.9 % (FLUSH) 0.9 %
10 SYRINGE (ML) INJECTION AS NEEDED
Status: DISCONTINUED | OUTPATIENT
Start: 2022-01-11 | End: 2022-01-11 | Stop reason: HOSPADM

## 2022-01-11 RX ORDER — EPHEDRINE SULFATE 50 MG/ML
INJECTION, SOLUTION INTRAVENOUS AS NEEDED
Status: DISCONTINUED | OUTPATIENT
Start: 2022-01-11 | End: 2022-01-11 | Stop reason: SURG

## 2022-01-11 RX ORDER — DEXAMETHASONE SODIUM PHOSPHATE 4 MG/ML
4 INJECTION, SOLUTION INTRA-ARTICULAR; INTRALESIONAL; INTRAMUSCULAR; INTRAVENOUS; SOFT TISSUE ONCE AS NEEDED
Status: COMPLETED | OUTPATIENT
Start: 2022-01-11 | End: 2022-01-11

## 2022-01-11 RX ORDER — SODIUM CHLORIDE 0.9 % (FLUSH) 0.9 %
10 SYRINGE (ML) INJECTION EVERY 12 HOURS SCHEDULED
Status: DISCONTINUED | OUTPATIENT
Start: 2022-01-11 | End: 2022-01-11 | Stop reason: HOSPADM

## 2022-01-11 RX ORDER — FENTANYL CITRATE 50 UG/ML
50 INJECTION, SOLUTION INTRAMUSCULAR; INTRAVENOUS ONCE
Status: COMPLETED | OUTPATIENT
Start: 2022-01-11 | End: 2022-01-11

## 2022-01-11 RX ORDER — SODIUM CHLORIDE, SODIUM LACTATE, POTASSIUM CHLORIDE, CALCIUM CHLORIDE 600; 310; 30; 20 MG/100ML; MG/100ML; MG/100ML; MG/100ML
100 INJECTION, SOLUTION INTRAVENOUS CONTINUOUS PRN
Status: DISCONTINUED | OUTPATIENT
Start: 2022-01-11 | End: 2022-01-11 | Stop reason: HOSPADM

## 2022-01-11 RX ORDER — LIDOCAINE HYDROCHLORIDE 10 MG/ML
0.5 INJECTION, SOLUTION EPIDURAL; INFILTRATION; INTRACAUDAL; PERINEURAL ONCE AS NEEDED
Status: DISCONTINUED | OUTPATIENT
Start: 2022-01-11 | End: 2022-01-11 | Stop reason: HOSPADM

## 2022-01-11 RX ORDER — OXYCODONE AND ACETAMINOPHEN 10; 325 MG/1; MG/1
1 TABLET ORAL EVERY 6 HOURS PRN
Qty: 20 TABLET | Refills: 0 | Status: SHIPPED | OUTPATIENT
Start: 2022-01-11 | End: 2022-01-11 | Stop reason: HOSPADM

## 2022-01-11 RX ORDER — ONDANSETRON 2 MG/ML
INJECTION INTRAMUSCULAR; INTRAVENOUS AS NEEDED
Status: DISCONTINUED | OUTPATIENT
Start: 2022-01-11 | End: 2022-01-11 | Stop reason: SURG

## 2022-01-11 RX ORDER — ROPIVACAINE HYDROCHLORIDE 5 MG/ML
INJECTION, SOLUTION EPIDURAL; INFILTRATION; PERINEURAL
Status: COMPLETED | OUTPATIENT
Start: 2022-01-11 | End: 2022-01-11

## 2022-01-11 RX ORDER — FLUMAZENIL 0.1 MG/ML
0.2 INJECTION INTRAVENOUS AS NEEDED
Status: DISCONTINUED | OUTPATIENT
Start: 2022-01-11 | End: 2022-01-11 | Stop reason: HOSPADM

## 2022-01-11 RX ORDER — DEXAMETHASONE SODIUM PHOSPHATE 4 MG/ML
INJECTION, SOLUTION INTRA-ARTICULAR; INTRALESIONAL; INTRAMUSCULAR; INTRAVENOUS; SOFT TISSUE AS NEEDED
Status: DISCONTINUED | OUTPATIENT
Start: 2022-01-11 | End: 2022-01-11 | Stop reason: SURG

## 2022-01-11 RX ORDER — PROPOFOL 10 MG/ML
VIAL (ML) INTRAVENOUS AS NEEDED
Status: DISCONTINUED | OUTPATIENT
Start: 2022-01-11 | End: 2022-01-11 | Stop reason: SURG

## 2022-01-11 RX ORDER — NALOXONE HCL 0.4 MG/ML
0.04 VIAL (ML) INJECTION AS NEEDED
Status: DISCONTINUED | OUTPATIENT
Start: 2022-01-11 | End: 2022-01-11 | Stop reason: HOSPADM

## 2022-01-11 RX ORDER — LIDOCAINE HYDROCHLORIDE 20 MG/ML
INJECTION, SOLUTION EPIDURAL; INFILTRATION; INTRACAUDAL; PERINEURAL AS NEEDED
Status: DISCONTINUED | OUTPATIENT
Start: 2022-01-11 | End: 2022-01-11 | Stop reason: SURG

## 2022-01-11 RX ORDER — HYDROMORPHONE HYDROCHLORIDE 1 MG/ML
0.5 INJECTION, SOLUTION INTRAMUSCULAR; INTRAVENOUS; SUBCUTANEOUS
Status: DISCONTINUED | OUTPATIENT
Start: 2022-01-11 | End: 2022-01-11 | Stop reason: HOSPADM

## 2022-01-11 RX ORDER — OXYCODONE AND ACETAMINOPHEN 10; 325 MG/1; MG/1
1 TABLET ORAL EVERY 6 HOURS PRN
Qty: 20 TABLET | Refills: 0 | Status: SHIPPED | OUTPATIENT
Start: 2022-01-11 | End: 2022-01-16

## 2022-01-11 RX ORDER — IBUPROFEN 600 MG/1
600 TABLET ORAL ONCE AS NEEDED
Status: DISCONTINUED | OUTPATIENT
Start: 2022-01-11 | End: 2022-01-11 | Stop reason: HOSPADM

## 2022-01-11 RX ORDER — ONDANSETRON 4 MG/1
4 TABLET, FILM COATED ORAL EVERY 8 HOURS PRN
Qty: 10 TABLET | Refills: 0 | Status: SHIPPED | OUTPATIENT
Start: 2022-01-11

## 2022-01-11 RX ORDER — OXYCODONE AND ACETAMINOPHEN 10; 325 MG/1; MG/1
1 TABLET ORAL ONCE AS NEEDED
Status: DISCONTINUED | OUTPATIENT
Start: 2022-01-11 | End: 2022-01-11 | Stop reason: HOSPADM

## 2022-01-11 RX ORDER — NEOSTIGMINE METHYLSULFATE 5 MG/5 ML
SYRINGE (ML) INTRAVENOUS AS NEEDED
Status: DISCONTINUED | OUTPATIENT
Start: 2022-01-11 | End: 2022-01-11 | Stop reason: SURG

## 2022-01-11 RX ADMIN — FENTANYL CITRATE 50 MCG: 50 INJECTION INTRAMUSCULAR; INTRAVENOUS at 14:08

## 2022-01-11 RX ADMIN — SODIUM CHLORIDE, POTASSIUM CHLORIDE, SODIUM LACTATE AND CALCIUM CHLORIDE 100 ML/HR: 600; 310; 30; 20 INJECTION, SOLUTION INTRAVENOUS at 13:52

## 2022-01-11 RX ADMIN — GLYCOPYRROLATE 0.4 MG: 0.2 INJECTION, SOLUTION INTRAMUSCULAR; INTRAVENOUS at 17:31

## 2022-01-11 RX ADMIN — PROPOFOL 150 MG: 10 INJECTION, EMULSION INTRAVENOUS at 16:36

## 2022-01-11 RX ADMIN — Medication 3 MG: at 17:31

## 2022-01-11 RX ADMIN — ONDANSETRON 4 MG: 2 INJECTION INTRAMUSCULAR; INTRAVENOUS at 17:28

## 2022-01-11 RX ADMIN — DEXAMETHASONE SODIUM PHOSPHATE 4 MG: 4 INJECTION, SOLUTION INTRAMUSCULAR; INTRAVENOUS at 14:05

## 2022-01-11 RX ADMIN — LIDOCAINE HYDROCHLORIDE 40 MG: 20 INJECTION, SOLUTION EPIDURAL; INFILTRATION; INTRACAUDAL; PERINEURAL at 16:36

## 2022-01-11 RX ADMIN — EPHEDRINE SULFATE 10 MG: 50 INJECTION INTRAVENOUS at 16:56

## 2022-01-11 RX ADMIN — EPHEDRINE SULFATE 10 MG: 50 INJECTION INTRAVENOUS at 17:01

## 2022-01-11 RX ADMIN — CEFAZOLIN 1 G: 1 INJECTION, POWDER, FOR SOLUTION INTRAMUSCULAR; INTRAVENOUS; PARENTERAL at 16:41

## 2022-01-11 RX ADMIN — ROCURONIUM BROMIDE 25 MG: 10 INJECTION INTRAVENOUS at 16:36

## 2022-01-11 RX ADMIN — Medication 20 MCG: at 16:32

## 2022-01-11 RX ADMIN — EPHEDRINE SULFATE 10 MG: 50 INJECTION INTRAVENOUS at 16:50

## 2022-01-11 RX ADMIN — SODIUM CHLORIDE, POTASSIUM CHLORIDE, SODIUM LACTATE AND CALCIUM CHLORIDE: 600; 310; 30; 20 INJECTION, SOLUTION INTRAVENOUS at 17:28

## 2022-01-11 RX ADMIN — LIDOCAINE HYDROCHLORIDE 1 EACH: 40 SOLUTION TOPICAL at 16:37

## 2022-01-11 RX ADMIN — DEXAMETHASONE SODIUM PHOSPHATE 4 MG: 4 INJECTION, SOLUTION INTRA-ARTICULAR; INTRALESIONAL; INTRAMUSCULAR; INTRAVENOUS; SOFT TISSUE at 16:50

## 2022-01-11 RX ADMIN — ROPIVACAINE HYDROCHLORIDE 20 ML: 5 INJECTION, SOLUTION EPIDURAL; INFILTRATION; PERINEURAL at 14:12

## 2022-01-11 NOTE — BRIEF OP NOTE
SHOULDER ARTHROSCOPY WITH ROTATOR CUFF REPAIR, BICEPS TENDONESIS SUBPECTORALIS MINI OPEN REPAIR  Progress Note    Mayuri Almeida  1/11/2022    Pre-op Diagnosis:   M75.121       Post-Op Diagnosis Codes:     * Complete rotator cuff tear or rupture of right shoulder, not specified as traumatic [M75.121]     * Nontraumatic rupture of right proximal biceps tendon [M66.321]     * Subacromial impingement of right shoulder [M75.41]    Procedure/CPT® Codes:  KY SHLDR ARTHROSCOP,SURG,W/ROTAT CUFF REPR [79944]  [place] SURGICAL ARTHROSCOPY SHOULDER LMTD DBRDMT 1/2 [90687]  [place] SHLDR ARTHROSCOP,PART ACROMIOPLAS [75359]      Procedure(s):  RIGHT SHOULDER ROTATOR CUFF REPAIR, BICEPS TENODESIS/TENOTOMY  BICEPS TENODESIS/TENOTOMY    Surgeon(s):  Dionicio Araiza MD    Anesthesia: General with Block    Staff:   Circulator: Irina Gregory RN; Usman Coburn RN  Scrub Person: Radha Zabala; Kaleb Gutiérrez  Vendor Representative: Rolando Haider         Estimated Blood Loss: minimal    Urine Voided: * No values recorded between 1/11/2022  4:32 PM and 1/11/2022  5:33 PM *    Specimens:                None          Drains: * No LDAs found *    Findings: see op note     Complications: none          Dionicio Araiza MD     Date: 1/11/2022  Time: 17:39 CST

## 2022-01-11 NOTE — ANESTHESIA PREPROCEDURE EVALUATION
Anesthesia Evaluation     Patient summary reviewed   history of anesthetic complications: PONV  NPO Solid Status: > 8 hours             Airway   Mallampati: II  TM distance: >3 FB  Neck ROM: full  Dental      Pulmonary    (+) a smoker,   (-) COPD, asthma, sleep apnea  Cardiovascular   Exercise tolerance: good (4-7 METS)    (+) hypertension, CAD, cardiac stents more than 12 months ago hyperlipidemia,   (-) pacemaker, past MI, angina      Neuro/Psych  (-) seizures, TIA, CVA  GI/Hepatic/Renal/Endo    (+) obesity,  GERD,  renal disease CRI, thyroid problem hypothyroidism  (-) liver disease, diabetes    Musculoskeletal     Abdominal    Substance History      OB/GYN          Other                        Anesthesia Plan    ASA 3     general with block     intravenous induction     Anesthetic plan, all risks, benefits, and alternatives have been provided, discussed and informed consent has been obtained with: patient.

## 2022-01-11 NOTE — ANESTHESIA PROCEDURE NOTES
Peripheral Block      Patient reassessed immediately prior to procedure    Patient location during procedure: holding area  Start time: 1/11/2022 2:09 PM  Stop time: 1/11/2022 2:11 PM  Reason for block: procedure for pain, at surgeon's request, post-op pain management and Request by Dr. rodriguez  Performed by  Anesthesiologist: Jerrod Marcano MD  Preanesthetic Checklist  Completed: patient identified, IV checked, site marked, risks and benefits discussed, surgical consent, monitors and equipment checked, pre-op evaluation and timeout performed  Prep:  Pt Position: supine  Sterile barriers:gloves  Prep: ChloraPrep  Patient monitoring: blood pressure monitoring, continuous pulse oximetry and EKG  Procedure    Sedation: yes    Guidance:ultrasound guided, nerve stimulator and Brachial plexus identified and local anesthetic seen surrounding nerves    ULTRASOUND INTERPRETATION. Using ultrasound guidance a 22 G gauge needle was placed in close proximity to the nerve, at which point, under ultrasound guidance anesthetic was injected in the area of the nerve and spread of the anesthesia was seen on ultrasound in close proximity thereto.  There were no abnormalities seen on ultrasound; a digital image was taken; and the patient tolerated the procedure with no complications. Images:still images obtained, printed/placed on chart (picture printed and placed in patients chart)  Loss of twitch: 0.5 mA  Laterality:right  Block Type:interscalene  Injection Technique:single-shot  Needle Type:echogenic  Needle Gauge:22 G      Medications Used: ropivacaine (NAROPIN) injection 0.5 %, 20 mL  Med administered at 1/11/2022 2:12 PM      Post Assessment  Injection Assessment: negative aspiration for heme, no paresthesia on injection and incremental injection  Patient Tolerance:comfortable throughout block  Complications:no

## 2022-01-11 NOTE — ANESTHESIA PROCEDURE NOTES
Airway  Urgency: elective    Date/Time: 1/11/2022 4:38 PM  Airway not difficult    General Information and Staff    Patient location during procedure: OR  CRNA: Matheus Flores CRNA    Indications and Patient Condition  Indications for airway management: airway protection    Preoxygenated: yes  Mask difficulty assessment: 1 - vent by mask    Final Airway Details  Final airway type: endotracheal airway      Successful airway: ETT  Cuffed: yes   Successful intubation technique: video laryngoscopy  Facilitating devices/methods: intubating stylet  Endotracheal tube insertion site: oral  Blade: Monzon  Blade size: 4  ETT size (mm): 7.0  Cormack-Lehane Classification: grade I - full view of glottis  Placement verified by: chest auscultation and capnometry   Cuff volume (mL): 6  Measured from: lips  ETT/EBT  to lips (cm): 21  Number of attempts at approach: 1  Assessment: lips, teeth, and gum same as pre-op and atraumatic intubation

## 2022-01-11 NOTE — H&P
Pt Name: Mayuri Almeida  MRN: [id number]  YOB: 1955  Date of evaluation: 1/11/2022    H&P including current review of systems was updated in the paper chart and/or the document previously scanned into the record.  There have been no significant changes or new problems since the original evaluation.  The patient's problems continue and indications for contemplated procedure have not changed.    Electronically signed by Dionicio Araiza MD on 1/11/2022 at 09:54 CST

## 2022-01-12 NOTE — ANESTHESIA POSTPROCEDURE EVALUATION
Patient: Mayuri Almeida    Procedure Summary     Date: 01/11/22 Room / Location: [facility] OR  /  [facility] OR    Anesthesia Start: 1631 Anesthesia Stop: 1741    Procedures:       RIGHT SHOULDER ROTATOR CUFF REPAIR, BICEPS TENODESIS/TENOTOMY (Right Shoulder)      BICEPS TENODESIS/TENOTOMY (Right Arm Upper) Diagnosis:       Complete rotator cuff tear or rupture of right shoulder, not specified as traumatic      Nontraumatic rupture of right proximal biceps tendon      Subacromial impingement of right shoulder      (M75.121)    Surgeons: Dionicio Araiza MD Provider: Matheus Flores CRNA    Anesthesia Type: general with block ASA Status: 3          Anesthesia Type: general with block    Vitals  Vitals Value Taken Time   /74 01/11/22 1825   Temp 98.3 °F (36.8 °C) 01/11/22 1740   Pulse 58 01/11/22 1829   Resp 13 01/11/22 1825   SpO2 85 % 01/11/22 1829   Vitals shown include unvalidated device data.        Post Anesthesia Care and Evaluation    Patient location during evaluation: PACU  Patient participation: complete - patient participated  Level of consciousness: awake and alert  Pain management: adequate  Airway patency: patent  Anesthetic complications: No anesthetic complications    Cardiovascular status: acceptable  Respiratory status: acceptable  Hydration status: acceptable    Comments: Blood pressure 142/86, pulse 55, temperature 98.3 °F (36.8 °C), temperature source Temporal, resp. rate 18, SpO2 95 %, not currently breastfeeding.    Pt discharged from PACU based on corrina score >8

## 2022-02-03 DIAGNOSIS — M54.50 CHRONIC MIDLINE LOW BACK PAIN WITHOUT SCIATICA: ICD-10-CM

## 2022-02-03 DIAGNOSIS — G89.29 CHRONIC MIDLINE LOW BACK PAIN WITHOUT SCIATICA: ICD-10-CM

## 2022-02-03 DIAGNOSIS — J30.1 CHRONIC SEASONAL ALLERGIC RHINITIS DUE TO POLLEN: ICD-10-CM

## 2022-02-03 RX ORDER — CELECOXIB 200 MG/1
CAPSULE ORAL
Qty: 180 CAPSULE | Refills: 3 | Status: SHIPPED | OUTPATIENT
Start: 2022-02-03

## 2022-02-03 RX ORDER — MONTELUKAST SODIUM 10 MG/1
TABLET ORAL
Qty: 90 TABLET | Refills: 3 | Status: SHIPPED | OUTPATIENT
Start: 2022-02-03

## 2022-02-03 NOTE — TELEPHONE ENCOUNTER
Martín Basruto called requesting a refill of the below medication which has been pended for you:     Requested Prescriptions     Pending Prescriptions Disp Refills    celecoxib (CELEBREX) 200 MG capsule [Pharmacy Med Name: CELECOXIB CAPS 200MG] 180 capsule 3     Sig: TAKE 1 CAPSULE TWICE A DAY    montelukast (SINGULAIR) 10 MG tablet [Pharmacy Med Name: MONTELUKAST SODIUM TABS 10MG] 90 tablet 3     Sig: TAKE 1 TABLET EVERY NIGHT       Last Appointment Date: 7/22/2021  Next Appointment Date: 2/18/2022    No Known Allergies

## 2022-02-11 ENCOUNTER — TELEPHONE (OUTPATIENT)
Dept: FAMILY MEDICINE CLINIC | Age: 67
End: 2022-02-11

## 2022-02-11 ENCOUNTER — LAB (OUTPATIENT)
Dept: LAB | Facility: HOSPITAL | Age: 67
End: 2022-02-11

## 2022-02-11 ENCOUNTER — TRANSCRIBE ORDERS (OUTPATIENT)
Dept: LAB | Facility: HOSPITAL | Age: 67
End: 2022-02-11

## 2022-02-11 DIAGNOSIS — I10 HYPERTENSION, UNSPECIFIED TYPE: ICD-10-CM

## 2022-02-11 DIAGNOSIS — Z78.9 AMERICAN DIABETES ASSOCIATION (ADA) 1100 CALORIE DIET: ICD-10-CM

## 2022-02-11 DIAGNOSIS — E78.5 HYPERLIPIDEMIA, UNSPECIFIED HYPERLIPIDEMIA TYPE: ICD-10-CM

## 2022-02-11 DIAGNOSIS — N18.30 STAGE 3 CHRONIC KIDNEY DISEASE, UNSPECIFIED WHETHER STAGE 3A OR 3B CKD: ICD-10-CM

## 2022-02-11 DIAGNOSIS — I10 ESSENTIAL (PRIMARY) HYPERTENSION: ICD-10-CM

## 2022-02-11 DIAGNOSIS — E55.9 VITAMIN D DEFICIENCY: Primary | ICD-10-CM

## 2022-02-11 DIAGNOSIS — R53.83 FATIGUE, UNSPECIFIED TYPE: Primary | ICD-10-CM

## 2022-02-11 PROCEDURE — 80061 LIPID PANEL: CPT | Performed by: FAMILY MEDICINE

## 2022-02-11 PROCEDURE — 85025 COMPLETE CBC W/AUTO DIFF WBC: CPT | Performed by: INTERNAL MEDICINE

## 2022-02-11 PROCEDURE — 80053 COMPREHEN METABOLIC PANEL: CPT | Performed by: INTERNAL MEDICINE

## 2022-02-11 PROCEDURE — 83970 ASSAY OF PARATHORMONE: CPT | Performed by: INTERNAL MEDICINE

## 2022-02-11 PROCEDURE — 83735 ASSAY OF MAGNESIUM: CPT | Performed by: INTERNAL MEDICINE

## 2022-02-11 PROCEDURE — 84100 ASSAY OF PHOSPHORUS: CPT | Performed by: INTERNAL MEDICINE

## 2022-02-11 PROCEDURE — 84439 ASSAY OF FREE THYROXINE: CPT | Performed by: FAMILY MEDICINE

## 2022-02-11 PROCEDURE — 82570 ASSAY OF URINE CREATININE: CPT | Performed by: INTERNAL MEDICINE

## 2022-02-11 PROCEDURE — 84550 ASSAY OF BLOOD/URIC ACID: CPT | Performed by: INTERNAL MEDICINE

## 2022-02-11 PROCEDURE — 84156 ASSAY OF PROTEIN URINE: CPT | Performed by: INTERNAL MEDICINE

## 2022-02-11 PROCEDURE — 81001 URINALYSIS AUTO W/SCOPE: CPT | Performed by: INTERNAL MEDICINE

## 2022-02-11 PROCEDURE — 84443 ASSAY THYROID STIM HORMONE: CPT | Performed by: FAMILY MEDICINE

## 2022-02-11 PROCEDURE — 82306 VITAMIN D 25 HYDROXY: CPT | Performed by: INTERNAL MEDICINE

## 2022-02-12 LAB
25(OH)D3 SERPL-MCNC: 99.6 NG/ML (ref 30–100)
ALBUMIN SERPL-MCNC: 4.1 G/DL (ref 3.5–5.2)
ALBUMIN/GLOB SERPL: 1.5 G/DL
ALP SERPL-CCNC: 53 U/L (ref 39–117)
ALT SERPL W P-5'-P-CCNC: 11 U/L (ref 1–33)
ANION GAP SERPL CALCULATED.3IONS-SCNC: 14 MMOL/L (ref 5–15)
AST SERPL-CCNC: 14 U/L (ref 1–32)
BACTERIA UR QL AUTO: ABNORMAL /HPF
BASOPHILS # BLD AUTO: 0.04 10*3/MM3 (ref 0–0.2)
BASOPHILS NFR BLD AUTO: 0.4 % (ref 0–1.5)
BILIRUB SERPL-MCNC: 0.5 MG/DL (ref 0–1.2)
BILIRUB UR QL STRIP: NEGATIVE
BUN SERPL-MCNC: 16 MG/DL (ref 8–23)
BUN/CREAT SERPL: 15.4 (ref 7–25)
CALCIUM SPEC-SCNC: 8.7 MG/DL (ref 8.6–10.5)
CHLORIDE SERPL-SCNC: 99 MMOL/L (ref 98–107)
CHOLEST SERPL-MCNC: 132 MG/DL (ref 0–200)
CLARITY UR: CLEAR
CO2 SERPL-SCNC: 24 MMOL/L (ref 22–29)
COLOR UR: YELLOW
CREAT SERPL-MCNC: 1.04 MG/DL (ref 0.57–1)
CREAT UR-MCNC: 179 MG/DL
DEPRECATED RDW RBC AUTO: 40.1 FL (ref 37–54)
EOSINOPHIL # BLD AUTO: 0.46 10*3/MM3 (ref 0–0.4)
EOSINOPHIL NFR BLD AUTO: 5.1 % (ref 0.3–6.2)
ERYTHROCYTE [DISTWIDTH] IN BLOOD BY AUTOMATED COUNT: 12.2 % (ref 12.3–15.4)
GFR SERPL CREATININE-BSD FRML MDRD: 53 ML/MIN/1.73
GLOBULIN UR ELPH-MCNC: 2.8 GM/DL
GLUCOSE SERPL-MCNC: 114 MG/DL (ref 65–99)
GLUCOSE UR STRIP-MCNC: NEGATIVE MG/DL
HCT VFR BLD AUTO: 40.6 % (ref 34–46.6)
HDLC SERPL-MCNC: 47 MG/DL (ref 40–60)
HGB BLD-MCNC: 13.5 G/DL (ref 12–15.9)
HGB UR QL STRIP.AUTO: NEGATIVE
HYALINE CASTS UR QL AUTO: ABNORMAL /LPF
IMM GRANULOCYTES # BLD AUTO: 0.03 10*3/MM3 (ref 0–0.05)
IMM GRANULOCYTES NFR BLD AUTO: 0.3 % (ref 0–0.5)
KETONES UR QL STRIP: NEGATIVE
LDLC SERPL CALC-MCNC: 53 MG/DL (ref 0–100)
LDLC/HDLC SERPL: 0.96 {RATIO}
LEUKOCYTE ESTERASE UR QL STRIP.AUTO: NEGATIVE
LYMPHOCYTES # BLD AUTO: 1.14 10*3/MM3 (ref 0.7–3.1)
LYMPHOCYTES NFR BLD AUTO: 12.6 % (ref 19.6–45.3)
MAGNESIUM SERPL-MCNC: 1.5 MG/DL (ref 1.6–2.4)
MCH RBC QN AUTO: 29.7 PG (ref 26.6–33)
MCHC RBC AUTO-ENTMCNC: 33.3 G/DL (ref 31.5–35.7)
MCV RBC AUTO: 89.2 FL (ref 79–97)
MONOCYTES # BLD AUTO: 0.85 10*3/MM3 (ref 0.1–0.9)
MONOCYTES NFR BLD AUTO: 9.4 % (ref 5–12)
NEUTROPHILS NFR BLD AUTO: 6.56 10*3/MM3 (ref 1.7–7)
NEUTROPHILS NFR BLD AUTO: 72.2 % (ref 42.7–76)
NITRITE UR QL STRIP: NEGATIVE
NRBC BLD AUTO-RTO: 0 /100 WBC (ref 0–0.2)
PH UR STRIP.AUTO: 5.5 [PH] (ref 5–8)
PHOSPHATE SERPL-MCNC: 3.6 MG/DL (ref 2.5–4.5)
PLATELET # BLD AUTO: 285 10*3/MM3 (ref 140–450)
PMV BLD AUTO: 10.1 FL (ref 6–12)
POTASSIUM SERPL-SCNC: 3.6 MMOL/L (ref 3.5–5.2)
PROT ?TM UR-MCNC: 24.2 MG/DL
PROT SERPL-MCNC: 6.9 G/DL (ref 6–8.5)
PROT UR QL STRIP: ABNORMAL
PROT/CREAT UR: 135.2 MG/G CREA (ref 0–200)
PTH-INTACT SERPL-MCNC: 40.2 PG/ML (ref 15–65)
RBC # BLD AUTO: 4.55 10*6/MM3 (ref 3.77–5.28)
RBC # UR STRIP: ABNORMAL /HPF
REF LAB TEST METHOD: ABNORMAL
SODIUM SERPL-SCNC: 137 MMOL/L (ref 136–145)
SP GR UR STRIP: 1.02 (ref 1–1.03)
SQUAMOUS #/AREA URNS HPF: ABNORMAL /HPF
T4 FREE SERPL-MCNC: 1.46 NG/DL (ref 0.93–1.7)
TRIGL SERPL-MCNC: 199 MG/DL (ref 0–150)
TSH SERPL DL<=0.05 MIU/L-ACNC: 0.63 UIU/ML (ref 0.27–4.2)
URATE SERPL-MCNC: 6 MG/DL (ref 2.4–5.7)
UROBILINOGEN UR QL STRIP: ABNORMAL
VLDLC SERPL-MCNC: 32 MG/DL (ref 5–40)
WBC # UR STRIP: ABNORMAL /HPF
WBC NRBC COR # BLD: 9.08 10*3/MM3 (ref 3.4–10.8)

## 2022-02-15 DIAGNOSIS — I12.9 BENIGN HYPERTENSIVE KIDNEY DISEASE WITH CHRONIC KIDNEY DISEASE STAGE I THROUGH STAGE IV, OR UNSPECIFIED: ICD-10-CM

## 2022-02-15 DIAGNOSIS — I10 ESSENTIAL (PRIMARY) HYPERTENSION: ICD-10-CM

## 2022-02-15 RX ORDER — AMLODIPINE BESYLATE 5 MG/1
TABLET ORAL
Qty: 90 TABLET | Refills: 3 | Status: SHIPPED | OUTPATIENT
Start: 2022-02-15

## 2022-02-15 RX ORDER — PROPRANOLOL HYDROCHLORIDE 80 MG/1
CAPSULE, EXTENDED RELEASE ORAL
Qty: 90 CAPSULE | Refills: 3 | Status: SHIPPED | OUTPATIENT
Start: 2022-02-15

## 2022-02-15 RX ORDER — TRIAMTERENE AND HYDROCHLOROTHIAZIDE 75; 50 MG/1; MG/1
TABLET ORAL
Qty: 90 TABLET | Refills: 3 | Status: SHIPPED | OUTPATIENT
Start: 2022-02-15

## 2022-02-18 ENCOUNTER — OFFICE VISIT (OUTPATIENT)
Dept: FAMILY MEDICINE CLINIC | Age: 67
End: 2022-02-18
Payer: MEDICARE

## 2022-02-18 VITALS
OXYGEN SATURATION: 97 % | HEART RATE: 61 BPM | WEIGHT: 161.8 LBS | SYSTOLIC BLOOD PRESSURE: 132 MMHG | TEMPERATURE: 98.2 F | BODY MASS INDEX: 29.59 KG/M2 | DIASTOLIC BLOOD PRESSURE: 84 MMHG

## 2022-02-18 DIAGNOSIS — E78.5 HYPERLIPIDEMIA, UNSPECIFIED HYPERLIPIDEMIA TYPE: ICD-10-CM

## 2022-02-18 DIAGNOSIS — N18.31 STAGE 3A CHRONIC KIDNEY DISEASE (HCC): ICD-10-CM

## 2022-02-18 DIAGNOSIS — I12.9 BENIGN HYPERTENSIVE KIDNEY DISEASE WITH CHRONIC KIDNEY DISEASE STAGE I THROUGH STAGE IV, OR UNSPECIFIED: ICD-10-CM

## 2022-02-18 DIAGNOSIS — I10 ESSENTIAL (PRIMARY) HYPERTENSION: ICD-10-CM

## 2022-02-18 DIAGNOSIS — M54.50 CHRONIC MIDLINE LOW BACK PAIN WITHOUT SCIATICA: ICD-10-CM

## 2022-02-18 DIAGNOSIS — G89.29 CHRONIC MIDLINE LOW BACK PAIN WITHOUT SCIATICA: ICD-10-CM

## 2022-02-18 DIAGNOSIS — E89.0 POSTSURGICAL HYPOTHYROIDISM: ICD-10-CM

## 2022-02-18 DIAGNOSIS — J30.89 CHRONIC NONSEASONAL ALLERGIC RHINITIS DUE TO POLLEN: ICD-10-CM

## 2022-02-18 DIAGNOSIS — R73.9 HYPERGLYCEMIA: ICD-10-CM

## 2022-02-18 DIAGNOSIS — F41.1 GENERALIZED ANXIETY DISORDER: Primary | ICD-10-CM

## 2022-02-18 PROCEDURE — 4040F PNEUMOC VAC/ADMIN/RCVD: CPT | Performed by: FAMILY MEDICINE

## 2022-02-18 PROCEDURE — 3017F COLORECTAL CA SCREEN DOC REV: CPT | Performed by: FAMILY MEDICINE

## 2022-02-18 PROCEDURE — 1090F PRES/ABSN URINE INCON ASSESS: CPT | Performed by: FAMILY MEDICINE

## 2022-02-18 PROCEDURE — G8427 DOCREV CUR MEDS BY ELIG CLIN: HCPCS | Performed by: FAMILY MEDICINE

## 2022-02-18 PROCEDURE — 4004F PT TOBACCO SCREEN RCVD TLK: CPT | Performed by: FAMILY MEDICINE

## 2022-02-18 PROCEDURE — G8417 CALC BMI ABV UP PARAM F/U: HCPCS | Performed by: FAMILY MEDICINE

## 2022-02-18 PROCEDURE — G8482 FLU IMMUNIZE ORDER/ADMIN: HCPCS | Performed by: FAMILY MEDICINE

## 2022-02-18 PROCEDURE — 1123F ACP DISCUSS/DSCN MKR DOCD: CPT | Performed by: FAMILY MEDICINE

## 2022-02-18 PROCEDURE — G0009 ADMIN PNEUMOCOCCAL VACCINE: HCPCS | Performed by: FAMILY MEDICINE

## 2022-02-18 PROCEDURE — 99214 OFFICE O/P EST MOD 30 MIN: CPT | Performed by: FAMILY MEDICINE

## 2022-02-18 PROCEDURE — G8399 PT W/DXA RESULTS DOCUMENT: HCPCS | Performed by: FAMILY MEDICINE

## 2022-02-18 PROCEDURE — 90732 PPSV23 VACC 2 YRS+ SUBQ/IM: CPT | Performed by: FAMILY MEDICINE

## 2022-02-18 NOTE — PATIENT INSTRUCTIONS
We are committed to providing you with the best care possible. In order to help us achieve these goals please remember to bring all medications, herbal products, and over the counter supplements with you to each visit. If your provider has ordered testing for you, please be sure to follow up with our office if you have not received results within 7 days after the testing took place. *If you receive a survey after visiting one of our offices, please take time to share your experience concerning your physician office visit. These surveys are confidential and no health information about you is shared. We are eager to improve for you and we are counting on your feedback to help make that happen.      _______________________________________________________________    Choose between magnesium gluconate or magnesium oxide.

## 2022-02-18 NOTE — PROGRESS NOTES
[facility] PHYSICIAN SERVICES  [facility] FAMILY MEDICINE  [id number] [place] [address] Vincenzo Beasley52  Dept: [phone]  Dept Fax: [phone]  Loc: [phone]    Frida Desir is a 77 y.o. female who presents today for her medical conditions/complaints as noted below. Frida Desir is here for 6 Month Follow-Up        HPI:   CC: Here today to discuss the following:    Anxiety  Compliant with medications. No adverse effects from medication. No panic or anxiety attacks since last visit. Symptoms are controlled. No excessive worry or insomnia. No issues with depression    Insomnia  Currently stable. Satisfied with current treatment regimen. No adverse effects from medication. Compliant with her benzodiazepine medication. She states she takes her medication as needed. She abstains from alcohol while taking her medication. She denies drowsiness and impairment on her medication. Hypertension  Compliant with medications. No adverse effects from medication. No lightheadedness, palpitations, or chest pain. Hyperlipidemia  Tolerating current cholesterol medication without side effects. No body aches. Attempting to reduce processed sugar and cholesterol from diet. Lower Back Pain, Chronic  Compliant with medications. No adverse effects from medication. Symptoms continue to be stable. Lower back pain is described as a dull ache and occasionally sharp and stabbing. No radiation. Relieved with her current pain medication. No numbness or weakness in lower extremities. Currently satisfied with treatment regimen as it provides some relief. Allergies  Allergies are currently stable.               HPI    Past Medical History:   Diagnosis Date    Allergic rhinitis     Anxiety     Back pain     DDD (degenerative disc disease)     Hypertension     Hyperthyroidism     Kidney disease     Stage 3- Dr La Grate Osteopenia       Past Surgical History:   Procedure Laterality Date    ANKLE SURGERY      APPENDECTOMY      BREAST BIOPSY  03/21/2013    left breast mammotome biopsy    BREAST SURGERY Right 2002    right excisional biopsy - cyst    CARPAL TUNNEL RELEASE      COLONOSCOPY  2016    Dr[facility] Toby Escalera in [facility] with BSO, fibroids    THYROIDECTOMY      carcinoma    US BREAST FINE NEEDLE ASPIRATION         Family History   Problem Relation Age of Onset    High Blood Pressure Mother     Diabetes Mother     Stroke Mother     High Cholesterol Mother     High Blood Pressure Father     Heart Disease Father     High Cholesterol Father     Cancer Father 72        prostate    High Blood Pressure Sister     High Blood Pressure Brother     Cancer Brother         prostate    Breast Cancer Maternal Aunt 55        breast    Breast Cancer Maternal Cousin 58        breast    Cancer Brother         prostate       Social History     Tobacco Use    Smoking status: Current Every Day Smoker     Packs/day: 1.00     Years: 25.00     Pack years: 25.00    Smokeless tobacco: Never Used   Substance Use Topics    Alcohol use: No     Alcohol/week: 0.0 standard drinks     Current Outpatient Medications   Medication Sig Dispense Refill    propranolol (INDERAL LA) 80 MG extended release capsule TAKE 1 CAPSULE DAILY 90 capsule 3    amLODIPine (NORVASC) 5 MG tablet TAKE 1 TABLET DAILY 90 tablet 3    triamterene-hydroCHLOROthiazide (MAXZIDE) 75-50 MG per tablet TAKE 1 TABLET DAILY 90 tablet 3    ALPRAZolam (XANAX) 0.25 MG tablet TAKE 1 TABLET BY MOUTH NIGHTLY AS NEEDED FOR ANXIETY 90 tablet 0    celecoxib (CELEBREX) 200 MG capsule TAKE 1 CAPSULE TWICE A  capsule 3    montelukast (SINGULAIR) 10 MG tablet TAKE 1 TABLET EVERY NIGHT 90 tablet 3    DULoxetine (CYMBALTA) 30 MG extended release capsule 20 mg 2 times daily       Ciclopirox 1 % SHAM Apply bid prn. 120 mL 5    estrogens, conjugated, (PREMARIN) 0.625 MG tablet TAKE 1 TABLET DAILY 90 tablet 3    cyclobenzaprine (FLEXERIL) 10 MG tablet Take 1 tablet by mouth nightly 90 tablet 3    atorvastatin (LIPITOR) 40 MG tablet Take 1 tablet by mouth daily 90 tablet 3    losartan (COZAAR) 25 MG tablet TAKE 1 TABLET DAILY 90 tablet 3    potassium chloride (KLOR-CON M10) 10 MEQ extended release tablet TAKE 1 TABLET 3 TIMES A  tablet 3    levothyroxine (SYNTHROID) 112 MCG tablet Take 112 mcg by mouth Daily      ASPIRIN LOW DOSE 81 MG EC tablet Take 1 tablet by mouth daily  5    VITAMIN B COMPLEX-C PO Take by mouth      MAGNESIUM CARBONATE PO Take by mouth      CRANBERRY EXTRACT PO Take by mouth      vitamin D (CHOLECALCIFEROL) 1000 UNIT TABS tablet Take 1,000 Units by mouth daily      traMADol (ULTRAM) 50 MG tablet Take 1 tablet by mouth every 12 hours 60 tablet 0     No current facility-administered medications for this visit. No Known Allergies    Health Maintenance   Topic Date Due    Hepatitis C screen  Never done    Depression Screen  Never done    Colorectal Cancer Screen  Never done    A1C test (Diabetic or Prediabetic)  01/21/2022    Annual Wellness Visit (AWV)  01/22/2022    DTaP/Tdap/Td vaccine (1 - Tdap) 07/22/2022 (Originally 7/20/1974)    Shingles Vaccine (2 of 3) 07/22/2022 (Originally 2/26/2016)    Lipid screen  07/14/2022    TSH testing  07/14/2022    Potassium monitoring  07/14/2022    Creatinine monitoring  07/14/2022    Low dose CT lung screening  08/27/2022    Breast cancer screen  08/09/2023    DEXA (modify frequency per FRAX score)  Completed    Flu vaccine  Completed    Pneumococcal 65+ years Vaccine  Completed    COVID-19 Vaccine  Completed    Hepatitis A vaccine  Aged Out    Hepatitis B vaccine  Aged Out    Hib vaccine  Aged Out    Meningococcal (ACWY) vaccine  Aged Out       Subjective:      Review of Systems    Review of Systems   Constitutional: Negative for chills and fever. HENT: Negative for congestion. Respiratory: Negative for cough, chest tightness and shortness of breath. Cardiovascular: Negative for chest pain, palpitations and leg swelling. Gastrointestinal: Negative for abdominal pain, anal bleeding, constipation, diarrhea and nausea. Genitourinary: Negative for difficulty urinating. Psychiatric/Behavioral: Negative. SeeHPI for visit specific review of symptoms. All others negative      Objective:   /84   Pulse 61   Temp 98.2 °F (36.8 °C)   Wt 161 lb 12.8 oz (73.4 kg)   LMP  (LMP Unknown)   SpO2 97%   BMI 29.59 kg/m²   Physical Exam  Physical Exam   Constitutional: She appears well-developed. Does not appear ill. Neck: Normal range of motion. Neck supple. No masses. Neck Symmetric. Normal tracheal position. No thyroid enlargement  Cardiovascular: Normal rate and regular rhythm. Exam reveals no friction rub. Carotid arteries: no bruit observed. No murmur heard. Respiratory:  Effort normal and breath sounds normal. No respiratory distress. No wheezes. No rales. No use of accessory muscles or intercostal retractions. Neurological: alert. Psychiatric: normal mood and affect. Her behavior is normal. Normal judgement and insight observed. No results found for this or any previous visit (from the past 672 hour(s)).      Ref Range & Units 7 d ago   Glucose 65 - 99 mg/dL 114 High     BUN 8 - 23 mg/dL 16    Creatinine 0.57 - 1.00 mg/dL 1.04 High     Sodium 136 - 145 mmol/L 137    Potassium 3.5 - 5.2 mmol/L 3.6    Chloride 98 - 107 mmol/L 99    CO2 22.0 - 29.0 mmol/L 24.0    Calcium 8.6 - 10.5 mg/dL 8.7    Total Protein 6.0 - 8.5 g/dL 6.9    Albumin 3.50 - 5.20 g/dL 4.10    ALT (SGPT) 1 - 33 U/L 11    AST (SGOT) 1 - 32 U/L 14    Alkaline Phosphatase 39 - 117 U/L 53    Total Bilirubin 0.0 - 1.2 mg/dL 0.5    eGFR Non African Amer >60 mL/min/1.73 53 Low     Globulin gm/dL 2.8    A/G Ratio g/dL 1.5    BUN/Creatinine Ratio 7.0 - 25.0 15.4    Anion Gap 5.0 - 15.0 mmol/L 14.0 Toxicity >100 ng/ml     Results may be falsely increased if patient taking Biotin. Specimen Collected: 02/11/22  9:37 AM Last Resulted: 02/12/22  1:16 AM   Received From: [facility]  Result Received: 02/18/22  1:55 PM      Ref Range & Units 7 d ago   Total Cholesterol 0 - 200 mg/dL 132    Triglycerides 0 - 150 mg/dL 199 High     HDL Cholesterol 40 - 60 mg/dL 47    LDL Cholesterol  0 - 100 mg/dL 53    VLDL Cholesterol 5 - 40 mg/dL 32    LDL/HDL Ratio  0.96    Resulting [address]     Narrative      Assessment & Plan: The following diagnoses and conditions are stable with no further orders unless indicated:  1. Stage 3a chronic kidney disease ([facility] [address]  Continue with recommendations per nephrology. Lab Results   Component Value Date    CREATININE 1.2 (H) 07/14/2021     Lab Results   Component Value Date    BUN 18 07/14/2021         Reinforced goals of adequate hydration. Recommended he avoid NSAIDs such as naproxen and ibuprofen. 2. Generalized anxiety disorder  As needed Xanax. Discussed risk and benefits of taking benzodiazepines. Risks include addiction and tolerance. If develops impairment or over sedation with medication, discontinue and contact me. Do not take medication with alcohol. Advised to take sparingly. Advised to keep medication hidden and locked up as theft is high with these medications as well. She also remains on duloxetine    3. Essential (primary) hypertension  BP Readings from Last 3 Encounters:   02/18/22 132/84   09/08/21 126/84   08/09/21 136/87     Losartan 25 mg daily  Inderal 80 mg daily  Amlodipine 5 mg daily  - CBC with Auto Differential; Future  - Comprehensive Metabolic Panel; Future    4. Hyperlipidemia, unspecified hyperlipidemia type  Continue with atorvastatin 40 mg daily    5.  Postsurgical hypothyroidism  Continue monitoring thyroid  TSH and T4 within normal limits  - TSH; Future  - T4, Free; Future    6. Chronic nonseasonal allergic rhinitis due to pollen  Stable    7. Chronic midline low back pain without sciatica  Continue with Celebrex    8. Benign hypertensive kidney disease with chronic kidney disease stage I through stage IV, or unspecified  As recommended by nephrology    9. Hyperglycemia  Discussed lifestyle changes such as diet and exercise. Recommended eliminate processed food from diet such as sugar and fried foods. Recommended exercising at least 150 minutes/week. Try to do full body resistance training twice a week as well. Offered suggestions for calorie counting such as phone apps and online resources such as My fitness pal and Lose it. Discussed healthy weight. Return in about 6 months (around 8/18/2022) for AWV - 30 minute visit. Discussed use, benefit, and side effects of prescribed medications. All patient questions answered. Pt voiced understanding. Reviewed health maintenance. Instructedto continue current medications, diet and exercise. Patient agreed with treatmentplan. Follow up as directed. Note dictated using Dragon Dictation software  Sometimes this dictation software makes erroneous transcriptions.

## 2022-03-02 ENCOUNTER — TRANSCRIBE ORDERS (OUTPATIENT)
Dept: ADMINISTRATIVE | Facility: HOSPITAL | Age: 67
End: 2022-03-02

## 2022-03-02 DIAGNOSIS — M47.812 CERVICAL SPONDYLOSIS WITHOUT MYELOPATHY: Primary | ICD-10-CM

## 2022-03-02 DIAGNOSIS — M47.816 LUMBAR SPONDYLOSIS: ICD-10-CM

## 2022-03-11 ENCOUNTER — HOSPITAL ENCOUNTER (OUTPATIENT)
Dept: PULMONOLOGY | Age: 67
Discharge: HOME | End: 2022-03-11

## 2022-03-23 ENCOUNTER — APPOINTMENT (OUTPATIENT)
Dept: MRI IMAGING | Facility: HOSPITAL | Age: 67
End: 2022-03-23

## 2022-03-23 ENCOUNTER — HOSPITAL ENCOUNTER (OUTPATIENT)
Dept: MRI IMAGING | Facility: HOSPITAL | Age: 67
End: 2022-03-23

## 2022-04-07 ENCOUNTER — HOSPITAL ENCOUNTER (OUTPATIENT)
Dept: MRI IMAGING | Facility: HOSPITAL | Age: 67
Discharge: HOME | End: 2022-04-07

## 2022-04-07 DIAGNOSIS — N95.1 SYMPTOMS, SUCH AS FLUSHING, SLEEPLESSNESS, HEADACHE, LACK OF CONCENTRATION, ASSOCIATED WITH THE MENOPAUSE: ICD-10-CM

## 2022-04-07 DIAGNOSIS — M47.812 CERVICAL SPONDYLOSIS WITHOUT MYELOPATHY: ICD-10-CM

## 2022-04-07 DIAGNOSIS — M47.816 LUMBAR SPONDYLOSIS: ICD-10-CM

## 2022-04-07 PROCEDURE — 72141 MRI NECK SPINE W/O DYE: CPT

## 2022-04-07 PROCEDURE — 72148 MRI LUMBAR SPINE W/O DYE: CPT

## 2022-04-07 NOTE — TELEPHONE ENCOUNTER
Brittany Rodriguez called to request a refill on her medication.       Last office visit : 2/18/2022   Next office visit : 8/18/2022     Requested Prescriptions     Signed Prescriptions Disp Refills    estrogens, conjugated, (PREMARIN) 0.625 MG tablet 90 tablet 3     Sig: TAKE 1 TABLET DAILY     Authorizing Provider: Kayley Berrios     Ordering User: Shawnee Mazariegos

## 2022-04-18 DIAGNOSIS — I12.9 BENIGN HYPERTENSIVE KIDNEY DISEASE WITH CHRONIC KIDNEY DISEASE STAGE I THROUGH STAGE IV, OR UNSPECIFIED: ICD-10-CM

## 2022-04-18 RX ORDER — LOSARTAN POTASSIUM 25 MG/1
TABLET ORAL
Qty: 90 TABLET | Refills: 3 | Status: SHIPPED | OUTPATIENT
Start: 2022-04-18

## 2022-04-19 RX ORDER — CYCLOBENZAPRINE HCL 10 MG
TABLET ORAL
Qty: 90 TABLET | Refills: 3 | Status: SHIPPED | OUTPATIENT
Start: 2022-04-19 | End: 2022-05-03 | Stop reason: SDUPTHER

## 2022-04-19 NOTE — TELEPHONE ENCOUNTER
Dennis Pedro Luis called to request a refill on her medication.       Last office visit : 2/18/2022   Next office visit : 4/20/2022     Requested Prescriptions     Signed Prescriptions Disp Refills    cyclobenzaprine (FLEXERIL) 10 MG tablet 90 tablet 3     Sig: TAKE 1 TABLET ONCE AT NIGHT     Authorizing Provider: Princess Rosales     Ordering User: Di Valentino

## 2022-04-20 ENCOUNTER — OFFICE VISIT (OUTPATIENT)
Dept: FAMILY MEDICINE CLINIC | Age: 67
End: 2022-04-20
Payer: MEDICARE

## 2022-04-20 ENCOUNTER — TELEPHONE (OUTPATIENT)
Dept: INTERNAL MEDICINE | Age: 67
End: 2022-04-20

## 2022-04-20 VITALS
HEART RATE: 63 BPM | TEMPERATURE: 97.6 F | SYSTOLIC BLOOD PRESSURE: 124 MMHG | DIASTOLIC BLOOD PRESSURE: 72 MMHG | BODY MASS INDEX: 28.35 KG/M2 | OXYGEN SATURATION: 96 % | WEIGHT: 155 LBS

## 2022-04-20 DIAGNOSIS — N39.0 SEPSIS SECONDARY TO UTI (HCC): ICD-10-CM

## 2022-04-20 DIAGNOSIS — A41.9 SEPSIS SECONDARY TO UTI (HCC): ICD-10-CM

## 2022-04-20 DIAGNOSIS — R53.83 FATIGUE, UNSPECIFIED TYPE: ICD-10-CM

## 2022-04-20 DIAGNOSIS — R30.0 DYSURIA: ICD-10-CM

## 2022-04-20 DIAGNOSIS — I25.10 CORONARY ARTERY DISEASE INVOLVING NATIVE CORONARY ARTERY OF NATIVE HEART WITHOUT ANGINA PECTORIS: ICD-10-CM

## 2022-04-20 DIAGNOSIS — N18.31 STAGE 3A CHRONIC KIDNEY DISEASE (HCC): ICD-10-CM

## 2022-04-20 DIAGNOSIS — I10 ESSENTIAL (PRIMARY) HYPERTENSION: ICD-10-CM

## 2022-04-20 LAB
ALBUMIN SERPL-MCNC: 4.5 G/DL (ref 3.5–5.2)
ALP BLD-CCNC: 105 U/L (ref 35–104)
ALT SERPL-CCNC: 16 U/L (ref 5–33)
ANION GAP SERPL CALCULATED.3IONS-SCNC: 17 MMOL/L (ref 7–19)
AST SERPL-CCNC: 14 U/L (ref 5–32)
BASOPHILS ABSOLUTE: 0 K/UL (ref 0–0.2)
BASOPHILS RELATIVE PERCENT: 0.3 % (ref 0–1)
BILIRUB SERPL-MCNC: 0.7 MG/DL (ref 0.2–1.2)
BILIRUBIN, POC: NORMAL
BLOOD URINE, POC: NORMAL
BUN BLDV-MCNC: 23 MG/DL (ref 8–23)
CALCIUM SERPL-MCNC: 10.3 MG/DL (ref 8.8–10.2)
CHLORIDE BLD-SCNC: 93 MMOL/L (ref 98–111)
CLARITY, POC: CLEAR
CO2: 26 MMOL/L (ref 22–29)
COLOR, POC: YELLOW
CREAT SERPL-MCNC: 1.4 MG/DL (ref 0.5–0.9)
EOSINOPHILS ABSOLUTE: 0.1 K/UL (ref 0–0.6)
EOSINOPHILS RELATIVE PERCENT: 0.5 % (ref 0–5)
GFR AFRICAN AMERICAN: 45
GFR NON-AFRICAN AMERICAN: 38
GLUCOSE BLD-MCNC: 108 MG/DL (ref 74–109)
GLUCOSE URINE, POC: NORMAL
HCT VFR BLD CALC: 44.4 % (ref 37–47)
HEMOGLOBIN: 13.8 G/DL (ref 12–16)
IMMATURE GRANULOCYTES #: 0.1 K/UL
KETONES, POC: NORMAL
LEUKOCYTE EST, POC: NORMAL
LYMPHOCYTES ABSOLUTE: 2 K/UL (ref 1.1–4.5)
LYMPHOCYTES RELATIVE PERCENT: 15 % (ref 20–40)
MAGNESIUM: 1.7 MG/DL (ref 1.6–2.4)
MCH RBC QN AUTO: 29.1 PG (ref 27–31)
MCHC RBC AUTO-ENTMCNC: 31.1 G/DL (ref 33–37)
MCV RBC AUTO: 93.7 FL (ref 81–99)
MONOCYTES ABSOLUTE: 0.8 K/UL (ref 0–0.9)
MONOCYTES RELATIVE PERCENT: 5.9 % (ref 0–10)
NEUTROPHILS ABSOLUTE: 10.1 K/UL (ref 1.5–7.5)
NEUTROPHILS RELATIVE PERCENT: 77.2 % (ref 50–65)
NITRITE, POC: NORMAL
PDW BLD-RTO: 13.1 % (ref 11.5–14.5)
PH, POC: 6.5
PLATELET # BLD: 491 K/UL (ref 130–400)
PMV BLD AUTO: 9.1 FL (ref 9.4–12.3)
POTASSIUM SERPL-SCNC: 5.3 MMOL/L (ref 3.5–5)
PROTEIN, POC: NORMAL
RBC # BLD: 4.74 M/UL (ref 4.2–5.4)
SODIUM BLD-SCNC: 136 MMOL/L (ref 136–145)
SPECIFIC GRAVITY, POC: 1.02
T4 FREE: 1.58 NG/DL (ref 0.93–1.7)
TOTAL PROTEIN: 7.6 G/DL (ref 6.6–8.7)
TSH SERPL DL<=0.05 MIU/L-ACNC: 4.54 UIU/ML (ref 0.27–4.2)
UROBILINOGEN, POC: 0.2
VITAMIN D 25-HYDROXY: >100 NG/ML
WBC # BLD: 13.1 K/UL (ref 4.8–10.8)

## 2022-04-20 PROCEDURE — 3017F COLORECTAL CA SCREEN DOC REV: CPT | Performed by: NURSE PRACTITIONER

## 2022-04-20 PROCEDURE — 4004F PT TOBACCO SCREEN RCVD TLK: CPT | Performed by: NURSE PRACTITIONER

## 2022-04-20 PROCEDURE — G8399 PT W/DXA RESULTS DOCUMENT: HCPCS | Performed by: NURSE PRACTITIONER

## 2022-04-20 PROCEDURE — G8417 CALC BMI ABV UP PARAM F/U: HCPCS | Performed by: NURSE PRACTITIONER

## 2022-04-20 PROCEDURE — 1123F ACP DISCUSS/DSCN MKR DOCD: CPT | Performed by: NURSE PRACTITIONER

## 2022-04-20 PROCEDURE — 1090F PRES/ABSN URINE INCON ASSESS: CPT | Performed by: NURSE PRACTITIONER

## 2022-04-20 PROCEDURE — 4040F PNEUMOC VAC/ADMIN/RCVD: CPT | Performed by: NURSE PRACTITIONER

## 2022-04-20 PROCEDURE — 81002 URINALYSIS NONAUTO W/O SCOPE: CPT | Performed by: NURSE PRACTITIONER

## 2022-04-20 PROCEDURE — G8427 DOCREV CUR MEDS BY ELIG CLIN: HCPCS | Performed by: NURSE PRACTITIONER

## 2022-04-20 PROCEDURE — 99214 OFFICE O/P EST MOD 30 MIN: CPT | Performed by: NURSE PRACTITIONER

## 2022-04-20 RX ORDER — CEFDINIR 300 MG/1
300 CAPSULE ORAL 2 TIMES DAILY
Qty: 10 CAPSULE | Refills: 0 | Status: SHIPPED | OUTPATIENT
Start: 2022-04-20 | End: 2022-04-25

## 2022-04-20 RX ORDER — FLUCONAZOLE 150 MG/1
150 TABLET ORAL ONCE
Qty: 2 TABLET | Refills: 0 | Status: SHIPPED | OUTPATIENT
Start: 2022-04-20 | End: 2022-04-20

## 2022-04-20 RX ORDER — PHENAZOPYRIDINE HYDROCHLORIDE 200 MG/1
200 TABLET, FILM COATED ORAL 3 TIMES DAILY PRN
Qty: 9 TABLET | Refills: 0 | Status: SHIPPED | OUTPATIENT
Start: 2022-04-20 | End: 2022-04-23

## 2022-04-20 ASSESSMENT — ENCOUNTER SYMPTOMS
SHORTNESS OF BREATH: 0
DIARRHEA: 0
COUGH: 0
CHEST TIGHTNESS: 0
SORE THROAT: 0
NAUSEA: 0
ABDOMINAL PAIN: 0
WHEEZING: 0

## 2022-04-20 NOTE — PROGRESS NOTES
Rosario Ritchie is a 77 y.o. female who presents today for  Chief Complaint   Patient presents with    Follow-Up from Hospital     Pt just finished Cefdinir on 4/11 UTI       HPI:  Here for hospital f/u. She was admitted to [facility] in [place] from 4/9-4/11. We have requested records but unfortunately have not received them yet. She states she started feeling poorly on the day of admission with mild dysuria, then developed body aches, chills, rigors at home. She did not check temp. Her  states she became unresponsive but was awake, just not coherent. EMS was called and transported her to [facility]. Imaging negative for stroke. Ultimately diagnosed with UTI and sepsis per report. She improved in hospital on IV abx. She was discharged on cefdinir which she has completed. Several bp meds held due to hypotension per her report but she is now back on usual regimen with stable bp. She is feeling better but not completely well. She has had some mild dysuria. No fever. She has been fatigued and mildly sob with exertion. This precedes her hospitalization somewhat. She has a hx of CAD. She is followed by cardiology at [facility]. No recent chest pain. CKD III stable. She states renal function was stable during admission as far as she knows. Review of Systems   Constitutional: Positive for fatigue. Negative for chills and fever. HENT: Negative for congestion, ear pain and sore throat. Respiratory: Negative for cough, chest tightness, shortness of breath and wheezing. Cardiovascular: Negative for chest pain. Gastrointestinal: Negative for abdominal pain, diarrhea and nausea. Genitourinary: Positive for dysuria. Musculoskeletal: Negative for arthralgias and myalgias. Skin: Negative for rash.        Past Medical History:   Diagnosis Date    Allergic rhinitis     Anxiety     Back pain     DDD (degenerative disc disease)     Hypertension     Hyperthyroidism  Kidney disease     Stage 3- Dr Ernesto Maciass Osteopenia        Current Outpatient Medications   Medication Sig Dispense Refill    nystatin (MYCOSTATIN) 650048 UNIT/ML suspension Take 5 mLs by mouth 4 times daily 200 mL 0    fluconazole (DIFLUCAN) 150 MG tablet Take 1 tablet by mouth once for 1 dose .   May repeat in 3 days if still symptomatic. 2 tablet 0    phenazopyridine (PYRIDIUM) 200 MG tablet Take 1 tablet by mouth 3 times daily as needed for Pain 9 tablet 0    cyclobenzaprine (FLEXERIL) 10 MG tablet TAKE 1 TABLET ONCE AT NIGHT 90 tablet 3    losartan (COZAAR) 25 MG tablet TAKE 1 TABLET DAILY 90 tablet 3    estrogens, conjugated, (PREMARIN) 0.625 MG tablet TAKE 1 TABLET DAILY 90 tablet 3    propranolol (INDERAL LA) 80 MG extended release capsule TAKE 1 CAPSULE DAILY 90 capsule 3    amLODIPine (NORVASC) 5 MG tablet TAKE 1 TABLET DAILY 90 tablet 3    triamterene-hydroCHLOROthiazide (MAXZIDE) 75-50 MG per tablet TAKE 1 TABLET DAILY 90 tablet 3    celecoxib (CELEBREX) 200 MG capsule TAKE 1 CAPSULE TWICE A  capsule 3    montelukast (SINGULAIR) 10 MG tablet TAKE 1 TABLET EVERY NIGHT 90 tablet 3    DULoxetine (CYMBALTA) 30 MG extended release capsule 20 mg 2 times daily       Ciclopirox 1 % SHAM Apply bid prn. 120 mL 5    atorvastatin (LIPITOR) 40 MG tablet Take 1 tablet by mouth daily 90 tablet 3    potassium chloride (KLOR-CON M10) 10 MEQ extended release tablet TAKE 1 TABLET 3 TIMES A  tablet 3    levothyroxine (SYNTHROID) 112 MCG tablet Take 112 mcg by mouth Daily      ASPIRIN LOW DOSE 81 MG EC tablet Take 1 tablet by mouth daily  5    VITAMIN B COMPLEX-C PO Take by mouth      MAGNESIUM CARBONATE PO Take by mouth      CRANBERRY EXTRACT PO Take by mouth      vitamin D (CHOLECALCIFEROL) 1000 UNIT TABS tablet Take 1,000 Units by mouth daily      traMADol (ULTRAM) 50 MG tablet Take 1 tablet by mouth every 12 hours 60 tablet 0    cefdinir (OMNICEF) 300 MG capsule Take 1 capsule by mouth 2 times daily for 5 days 10 capsule 0     No current facility-administered medications for this visit. No Known Allergies    Past Surgical History:   Procedure Laterality Date    ANKLE SURGERY      APPENDECTOMY      BREAST BIOPSY  03/21/2013    left breast mammotome biopsy    BREAST SURGERY Right 2002    right excisional biopsy - cyst    CARPAL TUNNEL RELEASE      COLONOSCOPY  2016    Dr. Ritu Swift in [facility] with BSO, fibroids    THYROIDECTOMY      carcinoma    US BREAST FINE NEEDLE ASPIRATION         Social History     Tobacco Use    Smoking status: Current Every Day Smoker     Packs/day: 1.00     Years: 25.00     Pack years: 25.00    Smokeless tobacco: Never Used   Substance Use Topics    Alcohol use: No     Alcohol/week: 0.0 standard drinks    Drug use: No       Family History   Problem Relation Age of Onset    High Blood Pressure Mother     Diabetes Mother     Stroke Mother     High Cholesterol Mother     High Blood Pressure Father     Heart Disease Father     High Cholesterol Father     Cancer Father 72        prostate    High Blood Pressure Sister     High Blood Pressure Brother     Cancer Brother         prostate    Breast Cancer Maternal Aunt 55        breast    Breast Cancer Maternal Cousin 58        breast    Cancer Brother         prostate       /72   Pulse 63   Temp 97.6 °F (36.4 °C)   Wt 155 lb (70.3 kg)   LMP  (LMP Unknown)   SpO2 96%   BMI 28.35 kg/m²     Physical Exam  Vitals reviewed. Constitutional:       General: She is not in acute distress. Appearance: Normal appearance. She is well-developed. HENT:      Head: Normocephalic. Eyes:      Conjunctiva/sclera: Conjunctivae normal.      Pupils: Pupils are equal, round, and reactive to light. Neck:      Thyroid: No thyromegaly. Vascular: No carotid bruit or JVD. Trachea: No tracheal deviation.    Cardiovascular:      Rate and Rhythm: Normal rate and regular rhythm. Heart sounds: Normal heart sounds. No murmur heard. Pulmonary:      Effort: Pulmonary effort is normal. No respiratory distress. Breath sounds: Normal breath sounds. No wheezing or rhonchi. Musculoskeletal:         General: Normal range of motion. Cervical back: Normal range of motion and neck supple. Lymphadenopathy:      Cervical: No cervical adenopathy. Skin:     General: Skin is warm and dry. Findings: No rash. Neurological:      Mental Status: She is alert. Psychiatric:         Mood and Affect: Mood normal.         Behavior: Behavior normal.         Thought Content: Thought content normal.         ASSESSMENT/PLAN:  1. Sepsis secondary to UTI [facility])  [facility] records requested again today.  -Overall improved but still feeling poorly. Check UA, culture. 2. Dysuria  -UA with no significant findings. Check urine culture. - POCT Urinalysis no Micro  - Culture, Urine; Future    3. Stage 3a chronic kidney disease ([facility] [address]  -Check lab today as below  -Continue adequate daily hydration. Avoid NSAIDs.  - Comprehensive Metabolic Panel; Future  - Vitamin D 25 Hydroxy; Future  - Magnesium; Future    4. Essential (primary) hypertension  -Stable, controlled. Continue current medication. 5. Coronary artery disease involving native coronary artery of native heart without angina pectoris  -She has had some exertional fatigue and sob which may be multifactorial.  No chest pain. She is going to call her cardiologist to see if appt can be moved up. 6. Fatigue, unspecified type  -Likely multifactorial secondary to recent sepsis. Check labs as below. - CBC with Auto Differential; Future  - T4, Free; Future  - TSH; Future  - Vitamin D 25 Hydroxy; Future         Return for as scheduled.     Harley aLw was seen today for follow-up from hospital.    Diagnoses and all orders for this visit:    Sepsis secondary to UTI [facility])    Dysuria  -     POCT Urinalysis no Micro  -     Culture, Urine; Future    Stage 3a chronic kidney disease ([facility] [address]  -     Comprehensive Metabolic Panel; Future  -     Vitamin D 25 Hydroxy; Future  -     Magnesium; Future    Essential (primary) hypertension    Coronary artery disease involving native coronary artery of native heart without angina pectoris    Fatigue, unspecified type  -     CBC with Auto Differential; Future  -     T4, Free; Future  -     TSH; Future  -     Vitamin D 25 Hydroxy; Future    Other orders  -     nystatin (MYCOSTATIN) 403203 UNIT/ML suspension; Take 5 mLs by mouth 4 times daily  -     fluconazole (DIFLUCAN) 150 MG tablet; Take 1 tablet by mouth once for 1 dose . May repeat in 3 days if still symptomatic. -     phenazopyridine (PYRIDIUM) 200 MG tablet; Take 1 tablet by mouth 3 times daily as needed for Pain      There are no discontinued medications. There are no Patient Instructions on file for this visit. Patient voicesunderstanding and agrees to plans along with risks and benefits of plan. Counseling:  Ros Bustillo's case, medications and options were discussed in detail. Patient was instructed to call the office if she questionsregarding her treatment. Should her conditions worsen, she should return to office to be reassessed by GHASSAN Maya. she Should to go the closest Emergency Department for any emergency. They verbalizedunderstanding the above instructions. Return for as scheduled.

## 2022-04-20 NOTE — TELEPHONE ENCOUNTER
I saw that Mrs. Ronit Sanchez was added to the schedule for hospital follow up today. I spoke with her to find out details of her admission . She states she was admitted to [facility] on 4/9/22 and discharged 4/11/22. I have called to request a discharge summary be faxed to us. I am unable to get the TCM for her as we are outside the 2 business day window.      [facility] [phone] option 4  Medical Records Fax [phone]    Faxed request as STAT request.

## 2022-04-20 NOTE — TELEPHONE ENCOUNTER
Called to get update on records and was transferred to [employer] [facility]. Was advised that they had not received the request yet. [facility] is contacting her representative for Radha Hurst to try and expedite the records. Phone, fax number, and e-mail given to get records.

## 2022-04-20 NOTE — TELEPHONE ENCOUNTER
STAT REQUEST     REQUEST FOR MEDICAL RECORDS    Records needed: Discharge Summary  Facility: [facility]    Date of Admission: 4/09/22  Date of Discharge: 4/11/22    Discharge summary requested for continuation of care. Patient is scheduled for hospital follow up with PCP today, 4/20/22. Please feel free to contact me at [phone] for more information.     Fax discharge summary to [phone]

## 2022-04-22 LAB — URINE CULTURE, ROUTINE: NORMAL

## 2022-04-25 RX ORDER — POTASSIUM CHLORIDE 750 MG/1
TABLET, EXTENDED RELEASE ORAL
Qty: 270 TABLET | Refills: 3 | Status: SHIPPED | OUTPATIENT
Start: 2022-04-25

## 2022-04-29 RX ORDER — CYCLOBENZAPRINE HCL 10 MG
TABLET ORAL
Qty: 90 TABLET | Refills: 3 | OUTPATIENT
Start: 2022-04-29

## 2022-05-03 RX ORDER — CYCLOBENZAPRINE HCL 10 MG
10 TABLET ORAL DAILY
Qty: 90 TABLET | Refills: 3 | Status: SHIPPED | OUTPATIENT
Start: 2022-05-03

## 2022-05-03 NOTE — TELEPHONE ENCOUNTER
Ashish Smith called to request a refill on her medication.       Last office visit : 4/20/2022   Next office visit : 8/18/2022     Requested Prescriptions     Signed Prescriptions Disp Refills    cyclobenzaprine (FLEXERIL) 10 MG tablet 90 tablet 3     Sig: Take 1 tablet by mouth daily     Authorizing Provider: Jonnie Friedman     Ordering User: Amrit Mayorga

## 2022-05-12 DIAGNOSIS — F41.1 GENERALIZED ANXIETY DISORDER: ICD-10-CM

## 2022-05-12 RX ORDER — ALPRAZOLAM 0.25 MG/1
TABLET ORAL
Qty: 90 TABLET | Refills: 0 | Status: SHIPPED | OUTPATIENT
Start: 2022-05-12 | End: 2022-08-31

## 2022-05-12 NOTE — TELEPHONE ENCOUNTER
Kaylyn Neri called to request a refill on her medication. Last office visit : 4/20/2022   Next office visit : 8/18/2022     Last UDS:   Amphetamine Screen, Urine   Date Value Ref Range Status   04/17/2018 Negative  Final     Barbiturate Screen, Urine   Date Value Ref Range Status   04/17/2018 Negative  Final     Benzodiazepine Screen, Urine   Date Value Ref Range Status   04/17/2018 Positive  Final     Cocaine Metabolite Screen, Urine   Date Value Ref Range Status   04/17/2018 Negative  Final     MDMA, Urine   Date Value Ref Range Status   04/17/2018 Negative  Final     Methamphetamine, Urine   Date Value Ref Range Status   04/17/2018 Negative  Final     Opiate Scrn, Ur   Date Value Ref Range Status   04/17/2018 Negative  Final     Oxycodone Screen, Ur   Date Value Ref Range Status   04/17/2018 Negative  Final     PCP Screen, Urine   Date Value Ref Range Status   04/17/2018 Negative  Final     Propoxyphene Screen, Urine   Date Value Ref Range Status   04/17/2018 Negative  Final     Tricyclic Antidepressants, Urine   Date Value Ref Range Status   04/17/2018 Negative  Final       Last Everton Taylor: 2/15/22  Medication Contract: DUE   Last Fill: 2/15/22    Requested Prescriptions     Pending Prescriptions Disp Refills    ALPRAZolam (XANAX) 0.25 MG tablet [Pharmacy Med Name: ALPRAZolam 0.25 MG Oral Tablet] 90 tablet 0     Sig: TAKE 1 TABLET BY MOUTH NIGHTLY AS NEEDED FOR ANXIETY         Please approve or refuse this medication.    Arnol Milian MA

## 2022-08-11 ENCOUNTER — HOSPITAL ENCOUNTER (OUTPATIENT)
Dept: WOMENS IMAGING | Age: 67
Discharge: HOME | End: 2022-08-11
Payer: MEDICARE

## 2022-08-11 ENCOUNTER — OFFICE VISIT (OUTPATIENT)
Dept: OBGYN CLINIC | Age: 67
End: 2022-08-11
Payer: MEDICARE

## 2022-08-11 VITALS
BODY MASS INDEX: 29.08 KG/M2 | WEIGHT: 158 LBS | HEART RATE: 61 BPM | SYSTOLIC BLOOD PRESSURE: 138 MMHG | HEIGHT: 62 IN | DIASTOLIC BLOOD PRESSURE: 81 MMHG

## 2022-08-11 DIAGNOSIS — Z12.39 SCREENING BREAST EXAMINATION: ICD-10-CM

## 2022-08-11 DIAGNOSIS — Z12.72 SCREENING FOR VAGINAL CANCER: ICD-10-CM

## 2022-08-11 DIAGNOSIS — Z01.419 ENCOUNTER FOR ROUTINE GYNECOLOGIC EXAMINATION IN MEDICARE PATIENT: Primary | ICD-10-CM

## 2022-08-11 DIAGNOSIS — R92.8 ABNORMAL MAMMOGRAM OF RIGHT BREAST: ICD-10-CM

## 2022-08-11 DIAGNOSIS — Z12.31 SCREENING MAMMOGRAM FOR BREAST CANCER: ICD-10-CM

## 2022-08-11 DIAGNOSIS — N39.3 STRESS INCONTINENCE: ICD-10-CM

## 2022-08-11 DIAGNOSIS — Z12.31 BREAST CANCER SCREENING BY MAMMOGRAM: ICD-10-CM

## 2022-08-11 PROCEDURE — 4004F PT TOBACCO SCREEN RCVD TLK: CPT | Performed by: NURSE PRACTITIONER

## 2022-08-11 PROCEDURE — 1090F PRES/ABSN URINE INCON ASSESS: CPT | Performed by: NURSE PRACTITIONER

## 2022-08-11 PROCEDURE — 77063 BREAST TOMOSYNTHESIS BI: CPT | Performed by: RADIOLOGY

## 2022-08-11 PROCEDURE — G0101 CA SCREEN;PELVIC/BREAST EXAM: HCPCS | Performed by: NURSE PRACTITIONER

## 2022-08-11 PROCEDURE — 77067 SCR MAMMO BI INCL CAD: CPT | Performed by: RADIOLOGY

## 2022-08-11 PROCEDURE — G8427 DOCREV CUR MEDS BY ELIG CLIN: HCPCS | Performed by: NURSE PRACTITIONER

## 2022-08-11 PROCEDURE — G8399 PT W/DXA RESULTS DOCUMENT: HCPCS | Performed by: NURSE PRACTITIONER

## 2022-08-11 PROCEDURE — 3017F COLORECTAL CA SCREEN DOC REV: CPT | Performed by: NURSE PRACTITIONER

## 2022-08-11 PROCEDURE — G8417 CALC BMI ABV UP PARAM F/U: HCPCS | Performed by: NURSE PRACTITIONER

## 2022-08-11 PROCEDURE — 77063 BREAST TOMOSYNTHESIS BI: CPT

## 2022-08-11 PROCEDURE — 1123F ACP DISCUSS/DSCN MKR DOCD: CPT | Performed by: NURSE PRACTITIONER

## 2022-08-11 RX ORDER — ESTRADIOL 0.1 MG/G
1 CREAM VAGINAL
Qty: 1 EACH | Refills: 1 | Status: SHIPPED | OUTPATIENT
Start: 2022-08-12

## 2022-08-11 RX ORDER — ESTRADIOL 0.1 MG/G
2 CREAM VAGINAL DAILY
COMMUNITY
End: 2022-08-11 | Stop reason: SDUPTHER

## 2022-08-11 ASSESSMENT — ENCOUNTER SYMPTOMS
EYES NEGATIVE: 1
CONSTIPATION: 0
RESPIRATORY NEGATIVE: 1
ALLERGIC/IMMUNOLOGIC NEGATIVE: 1
DIARRHEA: 0
GASTROINTESTINAL NEGATIVE: 1

## 2022-08-11 NOTE — PROGRESS NOTES
Pt presents today for pap smear and breast exam. She thinks her bladder has dropped. Mammo:2022  Pap smear:  Contraception:hyst    P:  Ab:   Bone density:2021  Colonoscopy:2016

## 2022-08-11 NOTE — PROGRESS NOTES
Ida Gutierrez is a 79 y.o. female who presents today for her medical conditions/ complaints as noted below. Ida Gutierrez is c/o of Annual Exam        HPI  Pt presents for annual exam. Had screening mammogram, BIRADS 0 on the right today. Having some bladder leakage- not every day, but wears a pad in case she has any leaking when sneezing or coughing. Unsure if her bladder has dropped. Seeing Urology in [place] after becoming septic with UTI. Mammo:2022  Pap smear:  Contraception:hyst    P:2  Ab:0  Bone density:2021  Colonoscopy:  No LMP recorded (lmp unknown). Patient has had a hysterectomy.   [id number]    Past Medical History:   Diagnosis Date    Allergic rhinitis     Anxiety     Back pain     DDD (degenerative disc disease)     Hypertension     Hyperthyroidism     Kidney disease     Stage 3- Dr Ester Owens      Past Surgical History:   Procedure Laterality Date    ANKLE SURGERY      APPENDECTOMY      BREAST BIOPSY  2013    left breast mammotome biopsy    BREAST SURGERY Right 2002    right excisional biopsy - cyst    CARPAL TUNNEL RELEASE      COLONOSCOPY  2016    Dr. Galen Zabala in [address] [place] [address] (CERVIX STATUS UNKNOWN)      ARIANA with BSO, fibroids    THYROIDECTOMY      carcinoma    US BREAST FINE NEEDLE ASPIRATION       Family History   Problem Relation Age of Onset    High Blood Pressure Mother     Diabetes Mother     Stroke Mother     High Cholesterol Mother     High Blood Pressure Father     Heart Disease Father     High Cholesterol Father     Cancer Father 72        prostate    High Blood Pressure Sister     High Blood Pressure Brother     Cancer Brother         prostate    Cancer Brother         prostate    Breast Cancer Maternal Aunt 54        breast    Breast Cancer Maternal Cousin 58        breast    Breast Cancer Niece      Social History     Tobacco Use    Smoking status: Every Day     Packs/day: 1.00     Years: 25.00 Pack years: 25.00     Types: Cigarettes    Smokeless tobacco: Never   Substance Use Topics    Alcohol use: No     Alcohol/week: 0.0 standard drinks       Current Outpatient Medications   Medication Sig Dispense Refill    [START ON 8/12/2022] estradiol (ESTRACE) 0.1 MG/GM vaginal cream Place 1 g vaginally three times a week 1 each 1    cyclobenzaprine (FLEXERIL) 10 MG tablet Take 1 tablet by mouth daily 90 tablet 3    potassium chloride (KLOR-CON M) 10 MEQ extended release tablet TAKE 1 TABLET THREE TIMES A  tablet 3    nystatin (MYCOSTATIN) 064503 UNIT/ML suspension Take 5 mLs by mouth 4 times daily 200 mL 0    losartan (COZAAR) 25 MG tablet TAKE 1 TABLET DAILY 90 tablet 3    estrogens, conjugated, (PREMARIN) 0.625 MG tablet TAKE 1 TABLET DAILY 90 tablet 3    propranolol (INDERAL LA) 80 MG extended release capsule TAKE 1 CAPSULE DAILY 90 capsule 3    amLODIPine (NORVASC) 5 MG tablet TAKE 1 TABLET DAILY 90 tablet 3    triamterene-hydroCHLOROthiazide (MAXZIDE) 75-50 MG per tablet TAKE 1 TABLET DAILY 90 tablet 3    celecoxib (CELEBREX) 200 MG capsule TAKE 1 CAPSULE TWICE A  capsule 3    montelukast (SINGULAIR) 10 MG tablet TAKE 1 TABLET EVERY NIGHT 90 tablet 3    DULoxetine (CYMBALTA) 30 MG extended release capsule 20 mg 2 times daily       Ciclopirox 1 % SHAM Apply bid prn. 120 mL 5    atorvastatin (LIPITOR) 40 MG tablet Take 1 tablet by mouth daily 90 tablet 3    levothyroxine (SYNTHROID) 112 MCG tablet Take 112 mcg by mouth Daily      ASPIRIN LOW DOSE 81 MG EC tablet Take 1 tablet by mouth daily  5    VITAMIN B COMPLEX-C PO Take by mouth      MAGNESIUM CARBONATE PO Take by mouth      CRANBERRY EXTRACT PO Take by mouth      vitamin D (CHOLECALCIFEROL) 1000 UNIT TABS tablet Take 1,000 Units by mouth daily      traMADol (ULTRAM) 50 MG tablet Take 1 tablet by mouth every 12 hours 60 tablet 0     No current facility-administered medications for this visit.      No Known Allergies  Vitals:    08/11/22 1249 BP: 138/81   Pulse: 61     Body mass index is 28.9 kg/m². Review of Systems   Constitutional: Negative. HENT: Negative. Eyes: Negative. Respiratory: Negative. Cardiovascular: Negative. Gastrointestinal: Negative. Negative for constipation and diarrhea. Endocrine: Negative. Genitourinary:  Positive for enuresis. Negative for frequency, menstrual problem and urgency. Musculoskeletal: Negative. Skin: Negative. Allergic/Immunologic: Negative. Neurological: Negative. Hematological: Negative. Psychiatric/Behavioral: Negative. All other systems reviewed and are negative. Physical Exam  Vitals and nursing note reviewed. Constitutional:       Appearance: She is well-developed. HENT:      Head: Normocephalic. Neck:      Thyroid: No thyroid mass. Comments: Thyroid surgically absent  Cardiovascular:      Rate and Rhythm: Normal rate and regular rhythm. Pulmonary:      Effort: Pulmonary effort is normal.      Breath sounds: Normal breath sounds. Chest:   Breasts:     Right: No inverted nipple, mass, nipple discharge or skin change. Left: No inverted nipple, mass, nipple discharge or skin change. Abdominal:      Palpations: Abdomen is soft. There is no mass. Tenderness: There is no abdominal tenderness. Genitourinary:     General: Normal vulva. Pubic Area: No rash. Labia:         Right: No rash. Left: No rash. Urethra: No prolapse. Vagina: Normal.      Uterus: Absent. Adnexa:         Right: No mass or tenderness. Left: No mass or tenderness. Rectum: No external hemorrhoid. Comments: Cervix and uterus surgically absent  Ovaries surgically absent  Vaginal cuff palpates smooth  Atrophic vaginal changes  Grade 3 cystocele  Musculoskeletal:         General: Normal range of motion. Cervical back: Normal range of motion and neck supple. Skin:     General: Skin is warm and dry.    Neurological: Mental Status: She is alert and oriented to person, place, and time. Psychiatric:         Attention and Perception: Attention normal.         Mood and Affect: Mood normal.         Speech: Speech normal.         Behavior: Behavior normal.         Thought Content: Thought content normal.         Cognition and Memory: Cognition normal.         Judgment: Judgment normal.        Diagnosis Orders   1. Encounter for routine gynecologic examination in Medicare patient  [place] SCREEN;PELVIC/BREAST EXAM      2. Screening mammogram for breast cancer        3. Abnormal mammogram of right breast  [facility]ATERIN DIGITAL DIAGNOSTIC UNILATERAL RIGHT    [place] SCREEN;PELVIC/BREAST EXAM      4. Screening for vaginal cancer  [place] SCREEN;PELVIC/BREAST EXAM      5. Stress incontinence  [place] SCREEN;PELVIC/BREAST EXAM      6. Screening breast examination  [place] SCREEN;PELVIC/BREAST EXAM          MEDICATIONS:  Orders Placed This Encounter   Medications    estradiol (ESTRACE) 0.1 MG/GM vaginal cream     Sig: Place 1 g vaginally three times a week     Dispense:  1 each     Refill:  1       ORDERS:  Orders Placed This Encounter   Procedures    KATERIN DIGITAL DIAGNOSTIC UNILATERAL RIGHT    [place] SCREEN;PELVIC/BREAST EXAM       PLAN:  Discussed vaginal atrophy and support- starting vaginal estrogen  Ordered right diagnostic mammogram per rads recommendation    There are no Patient Instructions on file for this visit.

## 2022-08-16 ENCOUNTER — HOSPITAL ENCOUNTER (OUTPATIENT)
Dept: WOMENS IMAGING | Age: 67
End: 2022-08-16
Payer: MEDICARE

## 2022-08-16 ENCOUNTER — HOSPITAL ENCOUNTER (OUTPATIENT)
Dept: WOMENS IMAGING | Age: 67
Discharge: HOME | End: 2022-08-16
Payer: MEDICARE

## 2022-08-16 DIAGNOSIS — R92.8 ABNORMAL MAMMOGRAM OF RIGHT BREAST: ICD-10-CM

## 2022-08-16 PROCEDURE — G0279 TOMOSYNTHESIS, MAMMO: HCPCS

## 2022-08-16 PROCEDURE — 77065 DX MAMMO INCL CAD UNI: CPT | Performed by: RADIOLOGY

## 2022-08-23 ENCOUNTER — TRANSCRIBE ORDERS (OUTPATIENT)
Dept: LAB | Facility: HOSPITAL | Age: 67
End: 2022-08-23

## 2022-08-23 ENCOUNTER — LAB (OUTPATIENT)
Dept: LAB | Facility: HOSPITAL | Age: 67
End: 2022-08-23

## 2022-08-23 DIAGNOSIS — R94.6 ABNORMAL RESULTS OF THYROID FUNCTION STUDIES: ICD-10-CM

## 2022-08-23 DIAGNOSIS — I15.8 OTHER SECONDARY HYPERTENSION: ICD-10-CM

## 2022-08-23 DIAGNOSIS — I15.8 OTHER SECONDARY HYPERTENSION: Primary | ICD-10-CM

## 2022-08-23 LAB
ALBUMIN SERPL-MCNC: 4.1 G/DL (ref 3.5–5.2)
ALBUMIN/GLOB SERPL: 1.6 G/DL
ALP SERPL-CCNC: 48 U/L (ref 39–117)
ALT SERPL W P-5'-P-CCNC: 16 U/L (ref 1–33)
ANION GAP SERPL CALCULATED.3IONS-SCNC: 16.1 MMOL/L (ref 5–15)
AST SERPL-CCNC: 14 U/L (ref 1–32)
BASOPHILS # BLD AUTO: 0.03 10*3/MM3 (ref 0–0.2)
BASOPHILS NFR BLD AUTO: 0.3 % (ref 0–1.5)
BILIRUB SERPL-MCNC: 0.4 MG/DL (ref 0–1.2)
BUN SERPL-MCNC: 17 MG/DL (ref 8–23)
BUN/CREAT SERPL: 14.9 (ref 7–25)
CALCIUM SPEC-SCNC: 9.3 MG/DL (ref 8.6–10.5)
CHLORIDE SERPL-SCNC: 99 MMOL/L (ref 98–107)
CO2 SERPL-SCNC: 25.9 MMOL/L (ref 22–29)
CREAT SERPL-MCNC: 1.14 MG/DL (ref 0.57–1)
DEPRECATED RDW RBC AUTO: 38.8 FL (ref 37–54)
EGFRCR SERPLBLD CKD-EPI 2021: 52.9 ML/MIN/1.73
EOSINOPHIL # BLD AUTO: 0.19 10*3/MM3 (ref 0–0.4)
EOSINOPHIL NFR BLD AUTO: 2 % (ref 0.3–6.2)
ERYTHROCYTE [DISTWIDTH] IN BLOOD BY AUTOMATED COUNT: 12.1 % (ref 12.3–15.4)
GLOBULIN UR ELPH-MCNC: 2.5 GM/DL
GLUCOSE SERPL-MCNC: 110 MG/DL (ref 65–99)
HCT VFR BLD AUTO: 41.2 % (ref 34–46.6)
HGB BLD-MCNC: 13.6 G/DL (ref 12–15.9)
IMM GRANULOCYTES # BLD AUTO: 0.04 10*3/MM3 (ref 0–0.05)
IMM GRANULOCYTES NFR BLD AUTO: 0.4 % (ref 0–0.5)
LYMPHOCYTES # BLD AUTO: 1.93 10*3/MM3 (ref 0.7–3.1)
LYMPHOCYTES NFR BLD AUTO: 20.6 % (ref 19.6–45.3)
MCH RBC QN AUTO: 29.2 PG (ref 26.6–33)
MCHC RBC AUTO-ENTMCNC: 33 G/DL (ref 31.5–35.7)
MCV RBC AUTO: 88.6 FL (ref 79–97)
MONOCYTES # BLD AUTO: 0.99 10*3/MM3 (ref 0.1–0.9)
MONOCYTES NFR BLD AUTO: 10.6 % (ref 5–12)
NEUTROPHILS NFR BLD AUTO: 6.18 10*3/MM3 (ref 1.7–7)
NEUTROPHILS NFR BLD AUTO: 66.1 % (ref 42.7–76)
NRBC BLD AUTO-RTO: 0 /100 WBC (ref 0–0.2)
PLATELET # BLD AUTO: 301 10*3/MM3 (ref 140–450)
PMV BLD AUTO: 10.1 FL (ref 6–12)
POTASSIUM SERPL-SCNC: 4.3 MMOL/L (ref 3.5–5.2)
PROT SERPL-MCNC: 6.6 G/DL (ref 6–8.5)
RBC # BLD AUTO: 4.65 10*6/MM3 (ref 3.77–5.28)
SODIUM SERPL-SCNC: 141 MMOL/L (ref 136–145)
T4 FREE SERPL-MCNC: 1.61 NG/DL (ref 0.93–1.7)
TSH SERPL DL<=0.05 MIU/L-ACNC: 0.19 UIU/ML (ref 0.27–4.2)
WBC NRBC COR # BLD: 9.36 10*3/MM3 (ref 3.4–10.8)

## 2022-08-23 PROCEDURE — 84443 ASSAY THYROID STIM HORMONE: CPT

## 2022-08-23 PROCEDURE — 80053 COMPREHEN METABOLIC PANEL: CPT

## 2022-08-23 PROCEDURE — 84439 ASSAY OF FREE THYROXINE: CPT

## 2022-08-23 PROCEDURE — 85025 COMPLETE CBC W/AUTO DIFF WBC: CPT

## 2022-08-29 DIAGNOSIS — F41.1 GENERALIZED ANXIETY DISORDER: ICD-10-CM

## 2022-08-30 ENCOUNTER — OFFICE VISIT (OUTPATIENT)
Dept: FAMILY MEDICINE CLINIC | Age: 67
End: 2022-08-30
Payer: MEDICARE

## 2022-08-30 VITALS
TEMPERATURE: 98.7 F | SYSTOLIC BLOOD PRESSURE: 112 MMHG | HEART RATE: 70 BPM | OXYGEN SATURATION: 96 % | WEIGHT: 161 LBS | DIASTOLIC BLOOD PRESSURE: 76 MMHG | BODY MASS INDEX: 29.63 KG/M2 | HEIGHT: 62 IN

## 2022-08-30 DIAGNOSIS — Z00.00 ANNUAL PHYSICAL EXAM: Primary | ICD-10-CM

## 2022-08-30 DIAGNOSIS — J30.89 CHRONIC NONSEASONAL ALLERGIC RHINITIS DUE TO POLLEN: ICD-10-CM

## 2022-08-30 DIAGNOSIS — R53.83 OTHER FATIGUE: ICD-10-CM

## 2022-08-30 DIAGNOSIS — E78.00 HYPERCHOLESTEROLEMIA: ICD-10-CM

## 2022-08-30 DIAGNOSIS — I25.10 CORONARY ARTERY DISEASE INVOLVING NATIVE CORONARY ARTERY OF NATIVE HEART WITHOUT ANGINA PECTORIS: ICD-10-CM

## 2022-08-30 DIAGNOSIS — E89.0 POSTSURGICAL HYPOTHYROIDISM: ICD-10-CM

## 2022-08-30 DIAGNOSIS — R73.9 HYPERGLYCEMIA: ICD-10-CM

## 2022-08-30 DIAGNOSIS — N18.31 STAGE 3A CHRONIC KIDNEY DISEASE (HCC): ICD-10-CM

## 2022-08-30 DIAGNOSIS — I10 ESSENTIAL (PRIMARY) HYPERTENSION: ICD-10-CM

## 2022-08-30 DIAGNOSIS — E78.01 FAMILIAL HYPERCHOLESTEROLEMIA: ICD-10-CM

## 2022-08-30 DIAGNOSIS — F41.1 GENERALIZED ANXIETY DISORDER: ICD-10-CM

## 2022-08-30 PROCEDURE — 99214 OFFICE O/P EST MOD 30 MIN: CPT | Performed by: FAMILY MEDICINE

## 2022-08-30 PROCEDURE — 4004F PT TOBACCO SCREEN RCVD TLK: CPT | Performed by: FAMILY MEDICINE

## 2022-08-30 PROCEDURE — 3017F COLORECTAL CA SCREEN DOC REV: CPT | Performed by: FAMILY MEDICINE

## 2022-08-30 PROCEDURE — G8417 CALC BMI ABV UP PARAM F/U: HCPCS | Performed by: FAMILY MEDICINE

## 2022-08-30 PROCEDURE — G8427 DOCREV CUR MEDS BY ELIG CLIN: HCPCS | Performed by: FAMILY MEDICINE

## 2022-08-30 PROCEDURE — G8399 PT W/DXA RESULTS DOCUMENT: HCPCS | Performed by: FAMILY MEDICINE

## 2022-08-30 PROCEDURE — G0439 PPPS, SUBSEQ VISIT: HCPCS | Performed by: FAMILY MEDICINE

## 2022-08-30 PROCEDURE — 1123F ACP DISCUSS/DSCN MKR DOCD: CPT | Performed by: FAMILY MEDICINE

## 2022-08-30 PROCEDURE — 1090F PRES/ABSN URINE INCON ASSESS: CPT | Performed by: FAMILY MEDICINE

## 2022-08-30 ASSESSMENT — PATIENT HEALTH QUESTIONNAIRE - PHQ9
SUM OF ALL RESPONSES TO PHQ9 QUESTIONS 1 & 2: 0
SUM OF ALL RESPONSES TO PHQ QUESTIONS 1-9: 0
SUM OF ALL RESPONSES TO PHQ QUESTIONS 1-9: 0
2. FEELING DOWN, DEPRESSED OR HOPELESS: 0
1. LITTLE INTEREST OR PLEASURE IN DOING THINGS: 0
SUM OF ALL RESPONSES TO PHQ QUESTIONS 1-9: 0
SUM OF ALL RESPONSES TO PHQ QUESTIONS 1-9: 0

## 2022-08-30 ASSESSMENT — LIFESTYLE VARIABLES: HOW OFTEN DO YOU HAVE A DRINK CONTAINING ALCOHOL: NEVER

## 2022-08-30 NOTE — PROGRESS NOTES
Medicare Annual Wellness Visit - Subsequent    Name: Tristen Melendez Date: 2022   MRN: [id number] Sex: Female   Age: 79 y.o. Ethnicity: Non- / Non    : 1955 Race: White (non-)      Abi Freed is here for   Chief Complaint   Patient presents with    Medicare AWV        Screenings for behavioral, psychosocial and functional/safety risks, and cognitive dysfunction are all negative except as indicated below. These results, as well as other patient data from the [address] [facility] [address] form, are documented in Flowsheets linked to this Encounter. No Known Allergies    Prior to Visit Medications    Medication Sig Taking? Authorizing Provider   estradiol (ESTRACE) 0.1 MG/GM vaginal cream Place 1 g vaginally three times a week Yes GHASSAN Phillips - CNP   cyclobenzaprine (FLEXERIL) 10 MG tablet Take 1 tablet by mouth daily Yes China Redman MD   potassium chloride (KLOR-CON M) 10 MEQ extended release tablet TAKE 1 TABLET THREE TIMES A DAY Yes China Redman MD   nystatin (MYCOSTATIN) 834783 UNIT/ML suspension Take 5 mLs by mouth 4 times daily Yes GHASSAN Hinson   losartan (COZAAR) 25 MG tablet TAKE 1 TABLET DAILY Yes China Redman MD   estrogens, conjugated, (PREMARIN) 0.625 MG tablet TAKE 1 TABLET DAILY Yes China Redman MD   propranolol (INDERAL LA) 80 MG extended release capsule TAKE 1 CAPSULE DAILY Yes China Redman MD   amLODIPine (NORVASC) 5 MG tablet TAKE 1 TABLET DAILY Yes China Redman MD   triamterene-hydroCHLOROthiazide (MAXZIDE) 75-50 MG per tablet TAKE 1 TABLET DAILY Yes China Redman MD   celecoxib (CELEBREX) 200 MG capsule TAKE 1 CAPSULE TWICE A DAY Yes China Redman MD   montelukast (SINGULAIR) 10 MG tablet TAKE 1 TABLET EVERY NIGHT Yes China Redman MD   DULoxetine (CYMBALTA) 30 MG extended release capsule 20 mg 2 times daily  Yes Maikol Moctezuma MD   Ciclopirox 1 % SHAM Apply bid prn.  Yes China Redman MD atorvastatin (LIPITOR) 40 MG tablet Take 1 tablet by mouth daily Yes Suzy Craft MD   levothyroxine (SYNTHROID) 112 MCG tablet Take 112 mcg by mouth Daily Yes Historical Provider, MD   ASPIRIN LOW DOSE 81 MG EC tablet Take 1 tablet by mouth daily Yes Historical Provider, MD   VITAMIN B COMPLEX-C PO Take by mouth Yes Historical Provider, MD   MAGNESIUM CARBONATE PO Take by mouth Yes Historical Provider, MD   CRANBERRY EXTRACT PO Take by mouth Yes Historical Provider, MD   vitamin D (CHOLECALCIFEROL) 1000 UNIT TABS tablet Take 1,000 Units by mouth daily Yes Historical Provider, MD   traMADol (ULTRAM) 50 MG tablet Take 1 tablet by mouth every 12 hours Yes Suzy Craft MD         Past Medical History:   Diagnosis Date    Allergic rhinitis     Anxiety     Back pain     DDD (degenerative disc disease)     Hypertension     Hyperthyroidism     Kidney disease     Stage 3- Dr Niall Grimm          Past Surgical History:   Procedure Laterality Date    ANKLE SURGERY      APPENDECTOMY      BREAST BIOPSY  03/21/2013    left breast mammotome biopsy    BREAST SURGERY Right 2002    right excisional biopsy - cyst    [facility]      COLONOSCOPY  2016    Dr. Bradley Rowe in [address] [facility] ([address] [place] [address])      ARIANA with BSO, fibroids    THYROIDECTOMY      carcinoma    US BREAST FINE NEEDLE ASPIRATION         Family History   Problem Relation Age of Onset    High Blood Pressure Mother     Diabetes Mother     Stroke Mother     High Cholesterol Mother     High Blood Pressure Father     Heart Disease Father     High Cholesterol Father     Cancer Father 72        prostate    High Blood Pressure Sister     High Blood Pressure Brother     Cancer Brother         prostate    Cancer Brother         prostate    Breast Cancer Maternal Aunt 54        breast    Breast Cancer Maternal Cousin 58        breast    Breast Cancer Niece        CareTeam (Including outside providers/suppliers regularly involved in providing care):   Patient Care Team:  Angella Stoddard MD as PCP - General (Family Medicine)  Angella Stoddard MD as PCP - [facility] Empaneled Provider    Wt Readings from Last 3 Encounters:   08/30/22 161 lb (73 kg)   08/11/22 158 lb (71.7 kg)   04/20/22 155 lb (70.3 kg)     Vitals:    08/30/22 1504   BP: 112/76   Pulse: 70   Temp: 98.7 °F (37.1 °C)   SpO2: 96%   Weight: 161 lb (73 kg)   Height: 5' 2\" (1.575 m)           The following problems were reviewed today and where indicated follow up appointments were made and/or referrals ordered.     Risk Factor Screenings with Interventions     Fall Risk:  2 or more falls in past year?: no  Fall with injury in past year?: no  Fall Risk Interventions:    Not indicated     Depression:  PHQ-2 Score: 0  Depression Interventions:  Not indicated     Anxiety:     Anxiety Interventions:  Not indicated     Cognitive:     Cognitive Impairment Interventions:  Not indicated     Substance Abuse:  Social History     Socioeconomic History    Marital status:      Spouse name: Not on file    Number of children: Not on file    Years of education: Not on file    Highest education level: Not on file   Occupational History    Not on file   Tobacco Use    Smoking status: Every Day     Packs/day: 1.00     Years: 25.00     Pack years: 25.00     Types: Cigarettes    Smokeless tobacco: Never   Substance and Sexual Activity    Alcohol use: No     Alcohol/week: 0.0 standard drinks    Drug use: No    Sexual activity: Yes     Partners: Male   Other Topics Concern    Not on file   Social History Narrative    Not on file     Social Determinants of Health     Financial Resource Strain: Not on file   Food Insecurity: Not on file   Transportation Needs: Not on file   Physical Activity: Insufficiently Active    Days of Exercise per Week: 1 day    Minutes of Exercise per Session: 10 min   Stress: Not on file   Social Connections: Not on file   Intimate Partner Violence: Not on file   Housing Stability: Not on file        Substance Abuse Interventions:  Not indicated     Health Risk Assessment:     General  In general, how would you say your health is?: Good  In the past 7 days, have you experienced any of the following: New or Increased Pain, New or Increased Fatigue, Loneliness, Social Isolation, Stress or Anger?: No  Do you get the social and emotional support that you need?: Yes  General Health Risk Interventions:  Not indicated     Health Habits/Nutrition  On average, how many days per week do you engage in moderate to strenuous exercise (like a brisk walk)?: 1 day  On average, how many minutes do you engage in exercise at this level?: 10 min  Have you lost any weight without trying in the past 3 months?: No  Have you seen the dentist within the past year?: (!) No  Body mass index is 29.45 kg/m².   Health Habits/Nutrition Interventions:  Not indicated     Hearing/Vision  Do you or your family notice any trouble with your hearing that hasn't been managed with hearing aids?: No  Do you have difficulty driving, watching TV, or doing any of your daily activities because of your eyesight?: No  Have you had an eye exam within the past year?: Yes  Hearing/Vision Interventions:  Not indicated     Safety  Do you have working smoke detectors?: Yes  Do you have any tripping hazards - loose or unsecured carpets or rugs?: No  Do you have any tripping hazards - clutter in doorways, halls, or stairs?: No  Do you have either shower bars, grab bars, non-slip mats or non-slip surfaces in your shower or bathtub?: (!) No  Do all of your stairways have a railing or banister?: Yes  Do you always fasten your seatbelt when you are in a car?: Yes  Safety Interventions:  Provided home safety tips handout      ADLs  In the past 7 days, did you need help from others to perform any of the following everyday activities: Eating, dressing, grooming, bathing, toileting, or walking/balance?: No  In the past 7 days, did you need help from others to take care of any of the following: Laundry, housekeeping, banking/finances, shopping, telephone use, food preparation, transportation, or taking medications?: No  ADL Interventions:  Not indicated     Personalized Preventive Plan   Current Health Maintenance Status  Immunization History   Administered Date(s) Administered    COVID-19, PFIZER PURPLE top, DILUTE for use, (age 15 y+), 30mcg/0.3mL 03/05/2021, 03/26/2021, 12/18/2021    INFLUENZA, INTRADERMAL, QUADRIVALENT, PRESERVATIVE FREE 10/19/2017    Influenza, FLUAD, (age 72 y+), Adjuvanted 10/13/2020    Influenza, FLUARIX, FLULAVAL, (age 10 mo+) AND AFLURIA, FLUZONE (age Ohio y+), PF 10/23/2018, 10/25/2019    Influenza, FLUCELVAX, (age 10 mo+), MDCK, PF 10/11/2021    Pneumococcal Conjugate 13-valent ([id number]) 01/21/2021    Pneumococcal Polysaccharide ([id number]) 02/18/2022    Zoster Live (Zostavax) 01/01/2016        Health Maintenance   Topic Date Due    Hepatitis C screen  Never done    Colorectal Cancer Screen  Never done    A1C test (Diabetic or Prediabetic)  01/21/2022    Annual Wellness Visit (AWV)  01/22/2022    COVID-19 Vaccine (4 - Booster for Pfizer series) 04/18/2022    Low dose CT lung screening  08/27/2022    Shingles vaccine (2 of 3) 08/31/2022 (Originally 2/26/2016)    DTaP/Tdap/Td vaccine (1 - Tdap) 09/20/2022 (Originally 7/20/1974)    Flu vaccine (1) 09/01/2022    Lipids  02/11/2023    Depression Screen  08/30/2023    Breast cancer screen  08/16/2024    DEXA (modify frequency per FRAX score)  Completed    Pneumococcal 65+ years Vaccine  Completed    Hepatitis A vaccine  Aged Out    Hepatitis B vaccine  Aged Out    Hib vaccine  Aged Out    Meningococcal (ACWY) vaccine  Aged Out       Recommendations for [employer] Due: see orders.   Recommended screening schedule for the next 5-10 years is provided to the patient in written form: see Patient Instructions/AVS.

## 2022-08-30 NOTE — PATIENT INSTRUCTIONS
We are committed to providing you with the best care possible. In order to help us achieve these goals please remember to bring all medications, herbal products, and over the counter supplements with you to each visit. If your provider has ordered testing for you, please be sure to follow up with our office if you have not received results within 7 days after the testing took place. *If you receive a survey after visiting one of our offices, please take time to share your experience concerning your physician office visit. These surveys are confidential and no health information about you is shared. We are eager to improve for you and we are counting on your feedback to help make that happen. [email or web address]  [email or web address]  [email or web address]

## 2022-08-30 NOTE — PROGRESS NOTES
[facility] PHYSICIAN SERVICES  [facility] FAM[facility] MEDICINE  [address] [address]  [facility] [place] [address]  Dept: [phone]  Dept Fax: [phone]  Loc: [phone]    Mekhi Devine is a 79 y.o. female who presents today for her medical conditions/complaints as noted below. Mekhi Devine is here for Medicare AWV        HPI:   CC: Here today to discuss the following:    Hypertension  Compliant with medications. No adverse effects from medication. No lightheadedness, palpitations, or chest pain. Hyperlipidemia  Tolerating current cholesterol medication without side effects. . Attempting to reduce processed sugar and cholesterol from diet. Allergies  Allergies are currently stable. Hypothyroidism  Symptoms are stable. No temperature intolerance, fatigue, or mood disturbance from baseline. Complaint with current medication. Coronary Artery Disease  Currently no active angina symptoms. No chest pain with exertion. No orthopnea. Anxiety  Compliant with medications. No adverse effects from medication. No panic or anxiety attacks since last visit. Symptoms are controlled. Insomnia  Currently stable. Satisfied with current treatment regimen. No adverse effects from medication. HPI    Subjective:      Review of Systems  Review of Systems   Constitutional: Negative for chills and fever. HENT: Negative for congestion. Respiratory: Negative for cough, chest tightness and shortness of breath. Cardiovascular: Negative for chest pain, palpitations and leg swelling. Gastrointestinal: Negative for abdominal pain, anal bleeding, constipation, diarrhea and nausea. Genitourinary: Negative for difficulty urinating. Psychiatric/Behavioral: Negative. SeeHPI for visit specific review of symptoms.   All others negative      Objective:   /76   Pulse 70   Temp 98.7 °F (37.1 °C)   Ht 5' 2\" (1.575 m)   Wt 161 lb (73 kg)   LMP  (LMP Unknown)   SpO2 96%   BMI 29.45 kg/m²   Physical Exam  Physical Exam   Constitutional: She appears well-developed. Does not appear ill. Neck: Neck supple. No masses. Neck Symmetric. Normal tracheal position. No thyroid enlargement  Cardiovascular: Normal rate and regular rhythm. Exam reveals no friction rub. No murmur heard. Respiratory:  Effort normal and breath sounds normal. No respiratory distress. No wheezes. No rales. No use of accessory muscles or intercostal retractions. Neurological: alert. Psychiatric: normal mood and affect. Her behavior is normal.       No results found for this or any previous visit (from the past 672 hour(s)). Assessment & Plan: The following diagnoses and conditions are stable with no further orders unless indicated:  1. Annual physical exam  Completed annual wellness    2. Essential (primary) hypertension  BP Readings from Last 3 Encounters:   08/30/22 112/76   08/11/22 138/81   04/20/22 124/72   Chronic issue stable  :  Amlodipine 5 mg daily  Losartan 25 mg daily  Inderal LA 80 mg daily: Also helps with palpitations    - Comprehensive Metabolic Panel; Future  - CBC with Auto Differential; Future    3. Hypercholesterolemia  Stable on atorvastatin    - Lipid Panel; Future    4. Chronic nonseasonal allergic rhinitis due to pollen  Singulair  5. Other fatigue    - T4, Free; Future  - TSH; Future    6. Postsurgical hypothyroidism  TSH: 0.190  Continue with current dose of levothyroxine  - T4, Free; Future  - TSH; Future    7. Generalized anxiety disorder  Xanax as needed    8. Coronary artery disease involving native coronary artery of native heart without angina pectoris  Recommendations per cardiology  Continue with ARB and beta-blocker  Statin  Aspirin 81 mg daily    9. Stage 3a chronic kidney disease ([facility] [address]  Lab Results   Component Value Date    CREATININE 1.4 (H) 04/20/2022     Lab Results   Component Value Date    BUN 23 04/20/2022         Reinforced goals of adequate hydration. Recommended he avoid NSAIDs such as naproxen and ibuprofen. 10. Hyperglycemia  Glucose 110  Discussed lifestyle modification    11. Familial hypercholesterolemia  Last lipids: February 11, 2022  Total cholesterol 132  Triglycerides 199  LDL 53            No follow-ups on file. Discussed use, benefit, and side effects of prescribed medications. All patient questions answered. Pt voiced understanding. Reviewed health maintenance. Instructedto continue current medications, diet and exercise. Patient agreed with treatmentplan.  Follow up as directed.     _______________________________________________________________      Past Medical History:   Diagnosis Date    Allergic rhinitis     Anxiety     Back pain     DDD (degenerative disc disease)     Hypertension     Hyperthyroidism     Kidney disease     Stage 3- Dr Luís Kan       Past Surgical History:   Procedure Laterality Date    ANKLE SURGERY      APPENDECTOMY      BREAST BIOPSY  03/21/2013    left breast mammotome biopsy    BREAST SURGERY Right 2002    right excisional biopsy - cyst    CARPAL TUNNEL RELEASE      COLONOSCOPY  2016    Dr. Vannie Dubin in [facility] ([address] [place] [address])      ARIANA with BSO, fibroids    THYROIDECTOMY      carcinoma    US BREAST FINE NEEDLE ASPIRATION         Family History   Problem Relation Age of Onset    High Blood Pressure Mother     Diabetes Mother     Stroke Mother     High Cholesterol Mother     High Blood Pressure Father     Heart Disease Father     High Cholesterol Father     Cancer Father 72        prostate    High Blood Pressure Sister     High Blood Pressure Brother     Cancer Brother         prostate    Cancer Brother         prostate    Breast Cancer Maternal Aunt 54        breast    Breast Cancer Maternal Cousin 58        breast    Breast Cancer Niece        Social History     Tobacco Use    Smoking status: Every Day     Packs/day: 1.00     Years: facility-administered medications for this visit. No Known Allergies    Health Maintenance   Topic Date Due    Hepatitis C screen  Never done    Colorectal Cancer Screen  Never done    Shingles vaccine (2 of 3) 02/26/2016    A1C test (Diabetic or Prediabetic)  01/21/2022    COVID-19 Vaccine (4 - Booster for Pfizer series) 04/18/2022    Low dose CT lung screening  08/27/2022    Flu vaccine (1) 09/01/2022    DTaP/Tdap/Td vaccine (1 - Tdap) 09/20/2022 (Originally 7/20/1974)    Lipids  02/11/2023    Depression Screen  08/30/2023    Annual Wellness Visit (AWV)  08/31/2023    Breast cancer screen  08/16/2024    DEXA (modify frequency per FRAX score)  Completed    Pneumococcal 65+ years Vaccine  Completed    Hepatitis A vaccine  Aged Out    Hepatitis B vaccine  Aged Out    Hib vaccine  Aged Out    Meningococcal (ACWY) vaccine  Aged Out       _______________________________________________________________    Note dictated using Dragon Dictation software  Sometimes this dictation software makes erroneous transcriptions.

## 2022-08-31 RX ORDER — ALPRAZOLAM 0.25 MG/1
TABLET ORAL
Qty: 90 TABLET | Refills: 0 | Status: SHIPPED | OUTPATIENT
Start: 2022-08-31 | End: 2022-09-30

## 2022-09-23 ENCOUNTER — TELEPHONE (OUTPATIENT)
Dept: OTHER | Age: 67
End: 2022-09-23

## 2022-09-23 DIAGNOSIS — Z87.891 PERSONAL HISTORY OF TOBACCO USE, PRESENTING HAZARDS TO HEALTH: Primary | ICD-10-CM

## 2022-10-18 ENCOUNTER — HOSPITAL ENCOUNTER (OUTPATIENT)
Dept: CT IMAGING | Age: 67
Discharge: HOME | End: 2022-10-18
Payer: MEDICARE

## 2022-10-18 ENCOUNTER — NURSE ONLY (OUTPATIENT)
Dept: FAMILY MEDICINE CLINIC | Age: 67
End: 2022-10-18
Payer: MEDICARE

## 2022-10-18 DIAGNOSIS — Z51.81 THERAPEUTIC DRUG MONITORING: Primary | ICD-10-CM

## 2022-10-18 DIAGNOSIS — Z87.891 PERSONAL HISTORY OF TOBACCO USE, PRESENTING HAZARDS TO HEALTH: ICD-10-CM

## 2022-10-18 DIAGNOSIS — Z23 FLU VACCINE NEED: ICD-10-CM

## 2022-10-18 PROCEDURE — 71271 CT THORAX LUNG CANCER SCR C-: CPT

## 2022-10-18 PROCEDURE — 80305 DRUG TEST PRSMV DIR OPT OBS: CPT | Performed by: FAMILY MEDICINE

## 2022-10-18 PROCEDURE — 90694 VACC AIIV4 NO PRSRV 0.5ML IM: CPT | Performed by: FAMILY MEDICINE

## 2022-12-02 DIAGNOSIS — F41.1 GENERALIZED ANXIETY DISORDER: ICD-10-CM

## 2022-12-02 RX ORDER — ALPRAZOLAM 0.25 MG/1
TABLET ORAL
Qty: 90 TABLET | Refills: 0 | Status: SHIPPED | OUTPATIENT
Start: 2022-12-02 | End: 2023-01-01

## 2022-12-17 DIAGNOSIS — F41.1 GENERALIZED ANXIETY DISORDER: ICD-10-CM

## 2022-12-19 RX ORDER — ALPRAZOLAM 0.25 MG/1
TABLET ORAL
Qty: 90 TABLET | Refills: 0 | OUTPATIENT
Start: 2022-12-19 | End: 2023-01-18

## 2023-01-30 DIAGNOSIS — J30.1 CHRONIC SEASONAL ALLERGIC RHINITIS DUE TO POLLEN: ICD-10-CM

## 2023-01-30 DIAGNOSIS — M54.50 CHRONIC MIDLINE LOW BACK PAIN WITHOUT SCIATICA: ICD-10-CM

## 2023-01-30 DIAGNOSIS — G89.29 CHRONIC MIDLINE LOW BACK PAIN WITHOUT SCIATICA: ICD-10-CM

## 2023-01-31 DIAGNOSIS — F41.1 GENERALIZED ANXIETY DISORDER: ICD-10-CM

## 2023-01-31 RX ORDER — MONTELUKAST SODIUM 10 MG/1
TABLET ORAL
Qty: 90 TABLET | Refills: 3 | Status: SHIPPED | OUTPATIENT
Start: 2023-01-31

## 2023-01-31 RX ORDER — CELECOXIB 200 MG/1
CAPSULE ORAL
Qty: 180 CAPSULE | Refills: 3 | Status: SHIPPED | OUTPATIENT
Start: 2023-01-31

## 2023-02-01 RX ORDER — ALPRAZOLAM 0.25 MG/1
TABLET ORAL
Qty: 90 TABLET | Refills: 0 | Status: SHIPPED | OUTPATIENT
Start: 2023-02-01 | End: 2023-03-03

## 2023-02-01 NOTE — TELEPHONE ENCOUNTER
Ignacia Chelita called to request a refill on her medication. Last office visit : 8/30/2022   Next office visit : 2/24/23  Last UDS:   Amphetamine Screen, Urine   Date Value Ref Range Status   10/18/2022 NEG  Final     Barbiturate Screen, Urine   Date Value Ref Range Status   10/18/2022 NEG  Final     Benzodiazepine Screen, Urine   Date Value Ref Range Status   10/18/2022 NEG  Final     Buprenorphine Urine   Date Value Ref Range Status   10/18/2022 NEG  Final     Cocaine Metabolite Screen, Urine   Date Value Ref Range Status   10/18/2022 NEG  Final     Gabapentin Screen, Urine   Date Value Ref Range Status   10/18/2022 NEG  Final     MDMA, Urine   Date Value Ref Range Status   10/18/2022 NEG  Final     Methamphetamine, Urine   Date Value Ref Range Status   10/18/2022 NEG  Final     Opiate Scrn, Ur   Date Value Ref Range Status   10/18/2022 NEG  Final     Oxycodone Screen, Ur   Date Value Ref Range Status   10/18/2022 NEG  Final     PCP Screen, Urine   Date Value Ref Range Status   10/18/2022 NEG  Final     Propoxyphene Screen, Urine   Date Value Ref Range Status   10/18/2022 NEG  Final     THC Screen, Urine   Date Value Ref Range Status   10/18/2022 NEG  Final     Tricyclic Antidepressants, Urine   Date Value Ref Range Status   10/18/2022 NEG  Final       [facility]  Medication Contract: 10/22   Last Fill: 12/6/22    Requested Prescriptions     Pending Prescriptions Disp Refills    ALPRAZolam (XANAX) 0.25 MG tablet [Pharmacy Med Name: ALPRAZolam 0.25 MG Oral Tablet] 90 tablet 0     Sig: TAKE 1 TABLET BY MOUTH NIGHTLY AS NEEDED FOR ANXIETY         Please approve or refuse this medication.    Denice Hall MA

## 2023-02-02 ENCOUNTER — LAB (OUTPATIENT)
Dept: LAB | Facility: HOSPITAL | Age: 68
End: 2023-02-02
Payer: MEDICARE

## 2023-02-02 ENCOUNTER — TRANSCRIBE ORDERS (OUTPATIENT)
Dept: LAB | Facility: HOSPITAL | Age: 68
End: 2023-02-02
Payer: MEDICARE

## 2023-02-02 DIAGNOSIS — N39.0 URINARY TRACT INFECTION WITHOUT HEMATURIA, SITE UNSPECIFIED: Primary | ICD-10-CM

## 2023-02-02 DIAGNOSIS — N39.0 URINARY TRACT INFECTION WITHOUT HEMATURIA, SITE UNSPECIFIED: ICD-10-CM

## 2023-02-02 PROCEDURE — 87077 CULTURE AEROBIC IDENTIFY: CPT

## 2023-02-02 PROCEDURE — 87086 URINE CULTURE/COLONY COUNT: CPT

## 2023-02-02 PROCEDURE — 87186 SC STD MICRODIL/AGAR DIL: CPT

## 2023-02-02 NOTE — TELEPHONE ENCOUNTER
LDCT was completed and chart updated so patient will received reminder letters for ldct lung screening around 10/18/2023

## 2023-02-05 LAB — BACTERIA SPEC AEROBE CULT: ABNORMAL

## 2023-02-10 DIAGNOSIS — I12.9 BENIGN HYPERTENSIVE KIDNEY DISEASE WITH CHRONIC KIDNEY DISEASE STAGE I THROUGH STAGE IV, OR UNSPECIFIED: ICD-10-CM

## 2023-02-10 DIAGNOSIS — I10 ESSENTIAL (PRIMARY) HYPERTENSION: ICD-10-CM

## 2023-02-15 RX ORDER — AMLODIPINE BESYLATE 5 MG/1
TABLET ORAL
Qty: 90 TABLET | Refills: 1 | Status: SHIPPED | OUTPATIENT
Start: 2023-02-15

## 2023-02-15 RX ORDER — PROPRANOLOL HYDROCHLORIDE 80 MG/1
CAPSULE, EXTENDED RELEASE ORAL
Qty: 90 CAPSULE | Refills: 1 | Status: SHIPPED | OUTPATIENT
Start: 2023-02-15

## 2023-02-15 RX ORDER — TRIAMTERENE AND HYDROCHLOROTHIAZIDE 75; 50 MG/1; MG/1
TABLET ORAL
Qty: 90 TABLET | Refills: 1 | Status: SHIPPED | OUTPATIENT
Start: 2023-02-15

## 2023-02-15 NOTE — TELEPHONE ENCOUNTER
David Irene called to request a refill on her medication.       Last office visit : Visit date not found   Next office visit : 2/24/2023     Requested Prescriptions     Signed Prescriptions Disp Refills    triamterene-hydroCHLOROthiazide (MAXZIDE) 75-50 MG per tablet 90 tablet 1     Sig: TAKE 1 TABLET DAILY     Authorizing Provider: Michele Sanon     Ordering User: Billy REGAN    amLODIPine (NORVASC) 5 MG tablet 90 tablet 1     Sig: TAKE 1 TABLET DAILY     Authorizing Provider: Michele Sanon     Ordering User: Billy REGAN    propranolol (INDERAL LA) 80 MG extended release capsule 90 capsule 1     Sig: TAKE 1 CAPSULE DAILY     Authorizing Provider: Michele Sanon     Ordering User: Román Duggan LPN

## 2023-02-20 ENCOUNTER — LAB (OUTPATIENT)
Dept: LAB | Facility: HOSPITAL | Age: 68
End: 2023-02-20
Payer: MEDICARE

## 2023-02-20 ENCOUNTER — TRANSCRIBE ORDERS (OUTPATIENT)
Dept: LAB | Facility: HOSPITAL | Age: 68
End: 2023-02-20
Payer: MEDICARE

## 2023-02-20 DIAGNOSIS — N18.31 STAGE 3A CHRONIC KIDNEY DISEASE: Primary | ICD-10-CM

## 2023-02-20 DIAGNOSIS — N18.31 STAGE 3A CHRONIC KIDNEY DISEASE: ICD-10-CM

## 2023-02-20 DIAGNOSIS — R53.83 OTHER FATIGUE: ICD-10-CM

## 2023-02-20 DIAGNOSIS — E03.8 OTHER SPECIFIED HYPOTHYROIDISM: ICD-10-CM

## 2023-02-20 DIAGNOSIS — I15.8 OTHER SECONDARY HYPERTENSION: ICD-10-CM

## 2023-02-20 DIAGNOSIS — I15.8 OTHER SECONDARY HYPERTENSION: Primary | ICD-10-CM

## 2023-02-20 LAB
25(OH)D3 SERPL-MCNC: 88.4 NG/ML (ref 30–100)
ALBUMIN SERPL-MCNC: 4.5 G/DL (ref 3.5–5.2)
ALBUMIN/GLOB SERPL: 1.5 G/DL
ALP SERPL-CCNC: 52 U/L (ref 39–117)
ALT SERPL W P-5'-P-CCNC: 14 U/L (ref 1–33)
ANION GAP SERPL CALCULATED.3IONS-SCNC: 15 MMOL/L (ref 5–15)
AST SERPL-CCNC: 16 U/L (ref 1–32)
BASOPHILS # BLD AUTO: 0.05 10*3/MM3 (ref 0–0.2)
BASOPHILS NFR BLD AUTO: 0.5 % (ref 0–1.5)
BILIRUB SERPL-MCNC: 0.7 MG/DL (ref 0–1.2)
BILIRUB UR QL STRIP: NEGATIVE
BUN SERPL-MCNC: 21 MG/DL (ref 8–23)
BUN/CREAT SERPL: 18.6 (ref 7–25)
CALCIUM SPEC-SCNC: 9.8 MG/DL (ref 8.6–10.5)
CHLORIDE SERPL-SCNC: 97 MMOL/L (ref 98–107)
CHOLEST SERPL-MCNC: 147 MG/DL (ref 0–200)
CLARITY UR: CLEAR
CO2 SERPL-SCNC: 27 MMOL/L (ref 22–29)
COLOR UR: YELLOW
CREAT SERPL-MCNC: 1.13 MG/DL (ref 0.57–1)
CREAT UR-MCNC: 61.8 MG/DL
DEPRECATED RDW RBC AUTO: 37.8 FL (ref 37–54)
EGFRCR SERPLBLD CKD-EPI 2021: 53.4 ML/MIN/1.73
EOSINOPHIL # BLD AUTO: 0.25 10*3/MM3 (ref 0–0.4)
EOSINOPHIL NFR BLD AUTO: 2.7 % (ref 0.3–6.2)
ERYTHROCYTE [DISTWIDTH] IN BLOOD BY AUTOMATED COUNT: 11.7 % (ref 12.3–15.4)
GLOBULIN UR ELPH-MCNC: 3 GM/DL
GLUCOSE SERPL-MCNC: 118 MG/DL (ref 65–99)
GLUCOSE UR STRIP-MCNC: NEGATIVE MG/DL
HCT VFR BLD AUTO: 42.8 % (ref 34–46.6)
HDLC SERPL-MCNC: 48 MG/DL (ref 40–60)
HGB BLD-MCNC: 14.5 G/DL (ref 12–15.9)
HGB UR QL STRIP.AUTO: NEGATIVE
IMM GRANULOCYTES # BLD AUTO: 0.03 10*3/MM3 (ref 0–0.05)
IMM GRANULOCYTES NFR BLD AUTO: 0.3 % (ref 0–0.5)
KETONES UR QL STRIP: NEGATIVE
LDLC SERPL CALC-MCNC: 56 MG/DL (ref 0–100)
LDLC/HDLC SERPL: 0.92 {RATIO}
LEUKOCYTE ESTERASE UR QL STRIP.AUTO: ABNORMAL
LYMPHOCYTES # BLD AUTO: 2.14 10*3/MM3 (ref 0.7–3.1)
LYMPHOCYTES NFR BLD AUTO: 23.4 % (ref 19.6–45.3)
MAGNESIUM SERPL-MCNC: 1.9 MG/DL (ref 1.6–2.4)
MCH RBC QN AUTO: 29.7 PG (ref 26.6–33)
MCHC RBC AUTO-ENTMCNC: 33.9 G/DL (ref 31.5–35.7)
MCV RBC AUTO: 87.5 FL (ref 79–97)
MONOCYTES # BLD AUTO: 1.02 10*3/MM3 (ref 0.1–0.9)
MONOCYTES NFR BLD AUTO: 11.1 % (ref 5–12)
NEUTROPHILS NFR BLD AUTO: 5.67 10*3/MM3 (ref 1.7–7)
NEUTROPHILS NFR BLD AUTO: 62 % (ref 42.7–76)
NITRITE UR QL STRIP: NEGATIVE
NRBC BLD AUTO-RTO: 0 /100 WBC (ref 0–0.2)
PH UR STRIP.AUTO: 5.5 [PH] (ref 5–8)
PHOSPHATE SERPL-MCNC: 4.7 MG/DL (ref 2.5–4.5)
PLATELET # BLD AUTO: 329 10*3/MM3 (ref 140–450)
PMV BLD AUTO: 10.2 FL (ref 6–12)
POTASSIUM SERPL-SCNC: 4.1 MMOL/L (ref 3.5–5.2)
PROT ?TM UR-MCNC: 10 MG/DL
PROT SERPL-MCNC: 7.5 G/DL (ref 6–8.5)
PROT UR QL STRIP: NEGATIVE
PROT/CREAT UR: 161.8 MG/G CREA (ref 0–200)
PTH-INTACT SERPL-MCNC: 49.7 PG/ML (ref 15–65)
RBC # BLD AUTO: 4.89 10*6/MM3 (ref 3.77–5.28)
SODIUM SERPL-SCNC: 139 MMOL/L (ref 136–145)
SP GR UR STRIP: 1.02 (ref 1–1.03)
T4 FREE SERPL-MCNC: 1.55 NG/DL (ref 0.93–1.7)
TRIGL SERPL-MCNC: 274 MG/DL (ref 0–150)
TSH SERPL DL<=0.05 MIU/L-ACNC: 0.47 UIU/ML (ref 0.27–4.2)
URATE SERPL-MCNC: 6.5 MG/DL (ref 2.4–5.7)
UROBILINOGEN UR QL STRIP: ABNORMAL
VLDLC SERPL-MCNC: 43 MG/DL (ref 5–40)
WBC NRBC COR # BLD: 9.16 10*3/MM3 (ref 3.4–10.8)

## 2023-02-20 PROCEDURE — 83970 ASSAY OF PARATHORMONE: CPT

## 2023-02-20 PROCEDURE — 80053 COMPREHEN METABOLIC PANEL: CPT

## 2023-02-20 PROCEDURE — 84550 ASSAY OF BLOOD/URIC ACID: CPT

## 2023-02-20 PROCEDURE — 83735 ASSAY OF MAGNESIUM: CPT

## 2023-02-20 PROCEDURE — 84439 ASSAY OF FREE THYROXINE: CPT

## 2023-02-20 PROCEDURE — 80061 LIPID PANEL: CPT

## 2023-02-20 PROCEDURE — 84156 ASSAY OF PROTEIN URINE: CPT

## 2023-02-20 PROCEDURE — 85025 COMPLETE CBC W/AUTO DIFF WBC: CPT

## 2023-02-20 PROCEDURE — 81003 URINALYSIS AUTO W/O SCOPE: CPT

## 2023-02-20 PROCEDURE — 84100 ASSAY OF PHOSPHORUS: CPT

## 2023-02-20 PROCEDURE — 84443 ASSAY THYROID STIM HORMONE: CPT

## 2023-02-20 PROCEDURE — 82306 VITAMIN D 25 HYDROXY: CPT

## 2023-02-20 PROCEDURE — 82570 ASSAY OF URINE CREATININE: CPT

## 2023-02-21 LAB
BASOPHILS ABSOLUTE: NORMAL
BASOPHILS RELATIVE PERCENT: NORMAL
EOSINOPHILS ABSOLUTE: NORMAL
EOSINOPHILS RELATIVE PERCENT: NORMAL
HCT VFR BLD CALC: NORMAL %
HEMOGLOBIN: NORMAL
LYMPHOCYTES ABSOLUTE: NORMAL
LYMPHOCYTES RELATIVE PERCENT: NORMAL
MCH RBC QN AUTO: NORMAL PG
MCHC RBC AUTO-ENTMCNC: NORMAL G/DL
MCV RBC AUTO: NORMAL FL
MONOCYTES ABSOLUTE: NORMAL
MONOCYTES RELATIVE PERCENT: NORMAL
NEUTROPHILS ABSOLUTE: NORMAL
NEUTROPHILS RELATIVE PERCENT: NORMAL
PDW BLD-RTO: NORMAL %
PLATELET # BLD: NORMAL 10*3/UL
PMV BLD AUTO: NORMAL FL
RBC # BLD: NORMAL 10*6/UL
WBC # BLD: NORMAL 10*3/UL

## 2023-02-22 NOTE — PROGRESS NOTES
[address] [facility]  [address] [address]  [address] [address]  Dept: [phone]  Dept Fax: [phone]  Loc: [phone]    Iris Leslie is a 79 y.o. female who presents today for her medical conditions/complaints as noted below. Iris Leslie is here for 6 Month Follow-Up        HPI:   CC: Here today to discuss the following:    Hypertension  Compliant with medications. No adverse effects from medication. No lightheadedness, palpitations, or chest pain. She has been followed by nephrology for history of chronic kidney disease stage III. Anxiety  Compliant with medications. No adverse effects from medication. No panic or anxiety attacks since last visit. Symptoms are controlled. Compliant with her benzodiazepine medication. She states she takes her medication as needed. She abstains from alcohol while taking her medication. She denies drowsiness and impairment on her medication. Lower Back Pain, Chronic  Compliant with medications. No adverse effects from medication. Symptoms continue to be stable. Lower back pain is described as a dull ache and occasionally sharp and stabbing. No radiation. Relieved with her current pain medication. No numbness or weakness in lower extremities. Currently satisfied with treatment regimen as it provides some relief. She recently reduced her Celebrex from twice a day to once a day due to her history of chronic kidney disease   - She increase her Cymbalta to 60 mg twice a day    Allergies  Allergies are currently stable.        _______________________________________________________________          HPI    Subjective:      Review of Systems  Review of Systems   Constitutional: Negative for chills and fever. HENT: Negative for congestion. Respiratory: Negative for cough, chest tightness and shortness of breath. Cardiovascular: Negative for chest pain, palpitations and leg swelling. Gastrointestinal: Negative for abdominal pain, anal bleeding, constipation, diarrhea and nausea. SeeHPI for visit specific review of symptoms. All others negative      Objective:   /81   Pulse 66   Temp 97 °F (36.1 °C)   Wt 167 lb (75.8 kg)   LMP  (LMP Unknown)   SpO2 98%   BMI 30.54 kg/m²   Physical Exam  Physical Exam   Constitutional: She appears well. Does not appear ill. Neck: Neck supple. No masses. Neck Symmetric. Normal tracheal position. No thyroid enlargement  Cardiovascular: Normal rate and regular rhythm. Exam reveals no friction rub. No murmur heard. Respiratory:  Effort normal and breath sounds normal. No respiratory distress. No wheezes. No rales. No use of accessory muscles or intercostal retractions. Neurological: alert. Psychiatric: normal mood and affect.  Her behavior is normal.       Recent Results (from the past 672 hour(s))   CBC with Auto Differential    Collection Time: 02/20/23 12:00 AM   Result Value Ref Range    WBC      RBC      Hemoglobin      Hematocrit      MCV      MCH      MCHC      Platelets      RDW      MPV      Neutrophils %      Lymphocytes %      Monocytes %      Eosinophils %      Basophils %      Neutrophils Absolute      Lymphocytes Absolute      Monocytes Absolute      Eosinophils Absolute      Basophils Absolute         Specimen:  Blood   Ref Range & Units 2 d ago   Glucose 65 - 99 mg/dL 118 High     BUN 8 - 23 mg/dL 21    Creatinine 0.57 - 1.00 mg/dL 1.13 High     Sodium 136 - 145 mmol/L 139    Potassium 3.5 - 5.2 mmol/L 4.1    Chloride 98 - 107 mmol/L 97 Low     CO2 22.0 - 29.0 mmol/L 27.0    Calcium 8.6 - 10.5 mg/dL 9.8    Total Protein 6.0 - 8.5 g/dL 7.5    Albumin 3.5 - 5.2 g/dL 4.5    ALT (SGPT) 1 - 33 U/L 14    AST (SGOT) 1 - 32 U/L 16    Alkaline Phosphatase 39 - 117 U/L 52    Total Bilirubin 0.0 - 1.2 mg/dL 0.7    Globulin gm/dL 3.0    A/G Ratio g/dL 1.5    BUN/Creatinine Ratio 7.0 - 25.0 18.6    Anion Gap 5.0 - 15.0 mmol/L 15.0 eGFR >60.0 mL/min/1.73 53.4 [address]   Narrative  Performed by Celeste    Specimen:  Blood   Ref Range & Units 2 d ago   Phosphorus 2.5 - 4.5 mg/dL 4.7 High     Resulting [facility]   Specimen Collected: 02/20/23 09:20 Last Resulted: 02/20/23 21:09   Received From: Melba  Result Received: 02/21/23 10:38     Specimen:  Blood   Ref Range & Units 2 d ago   Magnesium 1.6 - 2.4 mg/dL 1.9    Resulting [facility]   Specimen Collected: 02/20/23 09:20 Last Resulted: 02/20/23 21:09   Received From: Melba  Result Received: 02/21/23 10:38   Specimen:  Blood   Ref Range & Units 2 d ago   Uric Acid 2.4 - 5.7 mg/dL 6.5 High     Resulting [facility]   Specimen Collected: 02/20/23 09:20 Last Resulted: 02/20/23 21:09   Received From: Melba  Result Received: 02/21/23 10:38    View Encounter   Specimen:  Blood   Ref Range & Units 2 d ago   25 Hydroxy, Vitamin D 30.0 - 100.0 ng/ml 88.4    Resulting [facility]   Narrative  Performed by Celeste  Reference Range for Total Vitamin D 25(OH)      Specimen:  Blood   Ref Range & Units 2 d ago   PTH, Intact 15.0 - 65.0 pg/mL 49.7    Resulting [facility]   Narrative  Performed by Celeste  Results may be falsely decreased if patient taking Biotin.   Specimen Collected: 02/20/23 09:20 Last Resulted: 02/20/23 20:17   Received From: Melba  Result Received: 02/21/23 10:38     Specimen:  Blood   Ref Range & Units 2 d ago   Total Cholesterol 0 - 200 mg/dL 147    Triglycerides 0 - 150 mg/dL 274 High     HDL Cholesterol 40 - 60 mg/dL 48    LDL Cholesterol  0 - 100 mg/dL 56    VLDL Cholesterol 5 - 40 mg/dL 43 High LDL/HDL Ratio  0.92    Resulting [facility]   Narrative  Performed by Celeste  Cholesterol Reference Ranges   (U.S. Department of Health and Human Services ATP III Classifications)     Desirable          <200 mg/dL   Borderline High    200-239 mg/dL   High Risk          >240 mg/dL       Triglyceride Reference Ranges   (U.S. Department of Health and Human Services ATP III Classifications)     Normal           <150 mg/dL   Borderline High  150-199 mg/dL   High             200-499 mg/dL   Very High        >500 mg/dL     HDL Reference Ranges   (U.S. Department of Health and Human Services ATP III Classifications)     Low     <40 mg/dl (major risk factor for CHD)   High    >60 mg/dl ('negative' risk factor for CHD)         LDL Reference Ranges   (U.S. Department of Health and Human Services ATP III Classifications)     Optimal          <100 mg/dL   Near Optimal     100-129 mg/dL   Borderline High  130-159 mg/dL   High             160-189 mg/dL   Very High        >189 mg/dL  Specimen Collected: 02/20/23 09:20 Last Resulted: 02/20/23 21:09   Received From: Melba  Result Received: 02/21/23 10:38   Specimen:  Blood   Ref Range & Units 2 d ago   Free T4 0.93 - 1.70 ng/dL 1.55    Resulting [facility]   Narrative  Performed by Celeste  Results may be falsely increased if patient taking Biotin.   Specimen Collected: 02/20/23 09:20 Last Resulted: 02/20/23 21:12   Received From: Melba  Result Received: 02/21/23 10:38     Specimen:  Blood   Ref Range & Units 2 d ago   TSH 0.270 - 4.200 uIU/mL 0.469    Resulting [facility]   Specimen Collected: 02/20/23 09:20 Last Resulted: 02/20/23 21:12   Received From: Melba  Result Received: 02/21/23 10:38   Specimen:  Blood   Ref Range & Units 2 d ago   WBC 3.40 - 10.80 10*3/mm3 9.16    RBC 3.77 - 5.28 10*6/mm3 4.89    Hemoglobin 12.0 - 15.9 g/dL 14.5    Hematocrit 34.0 - 46.6 % 42.8    MCV 79.0 - 97.0 fL 87.5    MCH 26.6 - 33.0 pg 29.7    MCHC 31.5 - 35.7 g/dL 33.9    RDW 12.3 - 15.4 % 11.7 Low     RDW-SD 37.0 - 54.0 fl 37.8    MPV 6.0 - 12.0 fL 10.2    Platelets 300 - 339 41*9/VR8 329    Neutrophil % 42.7 - 76.0 % 62.0    Lymphocyte % 19.6 - 45.3 % 23.4    Monocyte % 5.0 - 12.0 % 11.1    Eosinophil % 0.3 - 6.2 % 2.7    Basophil % 0.0 - 1.5 % 0.5    Immature Grans % 0.0 - 0.5 % 0.3    Neutrophils, Absolute 1.70 - 7.00 10*3/mm3 5.67    Lymphocytes, Absolute 0.70 - 3.10 10*3/mm3 2.14    Monocytes, Absolute 0.10 - 0.90 10*3/mm3 1.02 High     Eosinophils, Absolute 0.00 - 0.40 10*3/mm3 0.25    Basophils, Absolute 0.00 - 0.20 10*3/mm3 0.05    Immature Grans, Absolute 0.00 - 0.05 10*3/mm3 0.03    nRBC 0.0 - 0.2 /100 WBC 0.0    Resulting Agency  [facility]   Specimen Collected: 02/20/23 09:20 Last Resulted: 02/20/23 19:44   Received From: Melba  Result Received: 02/21/23 10:38    View Encounter     Assessment & Plan: The following diagnoses and conditions are stable with no further orders unless indicated:  1. Essential (primary) hypertension  - Current medication:  Amlodipine 5 mg daily  Losartan 25 mg daily  Maxide daily    BP Readings from Last 3 Encounters:   02/24/23 130/81   08/30/22 112/76   08/11/22 138/81     Blood pressure stable today continue with current medication    2. Stage 3a chronic kidney disease ([address]  Continues to be followed by nephrology  Renal function stable    3. Generalized anxiety disorder  Xanax as needed  He primarily takes her Xanax at night. She is on Cymbalta for both chronic back painAnd anxiety. Stable  4. Familial hypercholesterolemia  Current medication:  Atorvastatin 40 mg daily  LDL at goal    5.  Chronic midline low back pain without sciatica  Current medication:  Celebrex 200 mg twice a day  Flexeril 10 mg daily  Cymbalta 60 mg twice a day  Pain management      6. Chronic seasonal allergic rhinitis due to pollen  Singulair 10 mg daily  Stable    7. Hypothyroidism  - She remains on levothyroxine  TSH stable  No orders of the defined types were placed in this encounter. Resting  Update colonoscopy report. She goes to [facility] gastroenterology with Dr. Jovanny Wiley    Return in about 6 months (around 8/24/2023) for AWV - 20 minute visit. Discussed use, benefit, and side effects of prescribed medications. All patient questions answered. Pt voiced understanding. Reviewed health maintenance. Instructedto continue current medications, diet and exercise. Patient agreed with treatmentplan.  Follow up as directed.     _______________________________________________________________      Past Medical History:   Diagnosis Date    Allergic rhinitis     Anxiety     Back pain     DDD (degenerative disc disease)     Hypertension     Hyperthyroidism     Kidney disease     Stage 3- Dr Rishi Tobias       Past Surgical History:   Procedure Laterality Date    ANKLE SURGERY      APPENDECTOMY      BREAST BIOPSY  03/21/2013    left breast mammotome biopsy    BREAST SURGERY Right 2002    right excisional biopsy - cyst    CARPAL TUNNEL RELEASE      COLONOSCOPY  2016    Dr. Jovanny Wiley in [facility] ([address] [place] [address])      [facility] with BSO, fibroids    THYROIDECTOMY      carcinoma    US BREAST FINE NEEDLE ASPIRATION         Family History   Problem Relation Age of Onset    High Blood Pressure Mother     Diabetes Mother     Stroke Mother     High Cholesterol Mother     High Blood Pressure Father     Heart Disease Father     High Cholesterol Father     Cancer Father 72        prostate    High Blood Pressure Sister     High Blood Pressure Brother     Cancer Brother         prostate    Cancer Brother         prostate    Breast Cancer Maternal Aunt 54        breast Breast Cancer Maternal Cousin 58        breast    Breast Cancer Niece        Social History     Tobacco Use    Smoking status: Every Day     Packs/day: 1.00     Years: 25.00     Pack years: 25.00     Types: Cigarettes    Smokeless tobacco: Never   Substance Use Topics    Alcohol use: No     Alcohol/week: 0.0 standard drinks     Current Outpatient Medications   Medication Sig Dispense Refill    vibegron (GEMTESA) 75 MG TABS tablet Take by mouth daily      triamterene-hydroCHLOROthiazide (MAXZIDE) 75-50 MG per tablet TAKE 1 TABLET DAILY 90 tablet 1    amLODIPine (NORVASC) 5 MG tablet TAKE 1 TABLET DAILY 90 tablet 1    propranolol (INDERAL LA) 80 MG extended release capsule TAKE 1 CAPSULE DAILY 90 capsule 1    ALPRAZolam (XANAX) 0.25 MG tablet TAKE 1 TABLET BY MOUTH NIGHTLY AS NEEDED FOR ANXIETY 90 tablet 0    montelukast (SINGULAIR) 10 MG tablet TAKE 1 TABLET EVERY NIGHT 90 tablet 3    celecoxib (CELEBREX) 200 MG capsule TAKE 1 CAPSULE TWICE A DAY (Patient taking differently: TAKE 1 CAPSULE ONCE A DAY) 180 capsule 3    estradiol (ESTRACE) 0.1 MG/GM vaginal cream Place 1 g vaginally three times a week 1 each 1    cyclobenzaprine (FLEXERIL) 10 MG tablet Take 1 tablet by mouth daily 90 tablet 3    potassium chloride (KLOR-CON M) 10 MEQ extended release tablet TAKE 1 TABLET THREE TIMES A  tablet 3    nystatin (MYCOSTATIN) 417723 UNIT/ML suspension Take 5 mLs by mouth 4 times daily 200 mL 0    losartan (COZAAR) 25 MG tablet TAKE 1 TABLET DAILY 90 tablet 3    estrogens, conjugated, (PREMARIN) 0.625 MG tablet TAKE 1 TABLET DAILY 90 tablet 3    DULoxetine (CYMBALTA) 30 MG extended release capsule 60 mg 2 times daily      atorvastatin (LIPITOR) 40 MG tablet Take 1 tablet by mouth daily 90 tablet 3    levothyroxine (SYNTHROID) 112 MCG tablet Take 112 mcg by mouth Daily      ASPIRIN LOW DOSE 81 MG EC tablet Take 1 tablet by mouth daily  5    VITAMIN B COMPLEX-C PO Take by mouth      MAGNESIUM CARBONATE PO Take by mouth CRANBERRY EXTRACT PO Take by mouth      vitamin D (CHOLECALCIFEROL) 1000 UNIT TABS tablet Take 1,000 Units by mouth daily      traMADol (ULTRAM) 50 MG tablet Take 1 tablet by mouth every 12 hours 60 tablet 0    Ciclopirox 1 % SHAM Apply bid prn. 120 mL 5     No current facility-administered medications for this visit. No Known Allergies    Health Maintenance   Topic Date Due    Hepatitis C screen  Never done    DTaP/Tdap/Td vaccine (1 - Tdap) Never done    Colorectal Cancer Screen  Never done    Shingles vaccine (2 of 3) 12/27/2016    A1C test (Diabetic or Prediabetic)  01/21/2022    COVID-19 Vaccine (4 - Booster for Pfizer series) 02/12/2022    GFR test (Diabetes, CKD 3-4, OR last GFR 15-59)  04/20/2023    Depression Screen  08/30/2023    Annual Wellness Visit (AWV)  08/31/2023    Low dose CT lung screening  10/18/2023    Lipids  02/20/2024    Breast cancer screen  08/16/2024    DEXA (modify frequency per FRAX score)  Completed    Flu vaccine  Completed    Pneumococcal 65+ years Vaccine  Completed    Hepatitis A vaccine  Aged Out    Hib vaccine  Aged Out    Meningococcal (ACWY) vaccine  Aged Out       _______________________________________________________________    Note dictated using Dragon Dictation software  Sometimes this dictation software makes erroneous transcriptions.

## 2023-02-24 ENCOUNTER — OFFICE VISIT (OUTPATIENT)
Dept: PRIMARY CARE CLINIC | Age: 68
End: 2023-02-24

## 2023-02-24 VITALS
DIASTOLIC BLOOD PRESSURE: 81 MMHG | SYSTOLIC BLOOD PRESSURE: 130 MMHG | BODY MASS INDEX: 30.54 KG/M2 | WEIGHT: 167 LBS | OXYGEN SATURATION: 98 % | HEART RATE: 66 BPM | TEMPERATURE: 97 F

## 2023-02-24 DIAGNOSIS — E78.01 FAMILIAL HYPERCHOLESTEROLEMIA: ICD-10-CM

## 2023-02-24 DIAGNOSIS — N18.31 STAGE 3A CHRONIC KIDNEY DISEASE (HCC): ICD-10-CM

## 2023-02-24 DIAGNOSIS — R73.9 HYPERGLYCEMIA: ICD-10-CM

## 2023-02-24 DIAGNOSIS — I10 ESSENTIAL (PRIMARY) HYPERTENSION: Primary | ICD-10-CM

## 2023-02-24 DIAGNOSIS — J30.1 CHRONIC SEASONAL ALLERGIC RHINITIS DUE TO POLLEN: ICD-10-CM

## 2023-02-24 DIAGNOSIS — G89.29 CHRONIC MIDLINE LOW BACK PAIN WITHOUT SCIATICA: ICD-10-CM

## 2023-02-24 DIAGNOSIS — F41.1 GENERALIZED ANXIETY DISORDER: ICD-10-CM

## 2023-02-24 DIAGNOSIS — M54.50 CHRONIC MIDLINE LOW BACK PAIN WITHOUT SCIATICA: ICD-10-CM

## 2023-02-24 DIAGNOSIS — E03.9 PRIMARY HYPOTHYROIDISM: ICD-10-CM

## 2023-02-24 RX ORDER — VIBEGRON 75 MG/1
TABLET, FILM COATED ORAL DAILY
COMMUNITY

## 2023-02-24 NOTE — PATIENT INSTRUCTIONS
What Everyone Should Know about Shingles Vaccine (Shingrix)    CONTACT YOUR INSURANCE TO SEE IF YOUR POLICY COVERS THIS VACCINE    One of the Recommended Vaccines  Shingles vaccination is the only way to protect against shingles and postherpetic neuralgia (PHN), the most common complication from shingles. CDC recommends that healthy adults 50 years and older get two doses of the shingles vaccine called Shingrix®,  by 2 to 6 months, to prevent shingles and the complications from the disease. Your doctor or pharmacist can give you Shingrix as a shot in your upper arm. Shingrix provides strong protection against shingles and PHN. Two doses of Shingrix is more than 90% effective at preventing shingles and PHN. Protection stays above 85% for at least the first four years after you get vaccinated. Shingrix is the preferred vaccine, over Zostavax®, a shingles vaccine in use since 2006. Who Should Get Shingrix? Healthy adults 50 years and older should get two doses of Shingrix,  by 2 to 6 months. You should get Shingrix even if in the past you  had shingles  received Zostavax  are not sure if you had chickenpox  Vaccine for Those 50 Years and Older  Shingrix reduces the risk of shingles and PHN by more than 90% in people 48 and older. CDC recommends the vaccine for healthy adults 48 and older. There is no maximum age for getting Shingrix. If you had shingles in the past, you can get Shingrix to help prevent future occurrences of the disease. There is no specific length of time that you need to wait after having shingles before you can receive Shingrix, but generally you should make sure the shingles rash has gone away before getting vaccinated. You can get Shingrix whether or not you remember having had chickenpox in the past. Studies show that more than 99% of Americans 40 years and older have had chickenpox, even if they dont remember having the disease.  Chickenpox and shingles are related because they are caused by the same virus (varicella zoster virus). After a person recovers from chickenpox, the virus stays dormant (inactive) in the body. It can reactivate years later and cause shingles. If you had Zostavax in the recent past, you should wait at least eight weeks before getting Shingrix. Talk to your healthcare provider to determine the best time to get Shingrix. Shingrix is available in [facility] and pharmacies. If you have questions about Shingrix, talk with your healthcare provider. Who Should Not Get Shingrix? The side effects of the Shingrix are temporary, and usually last 2 to 3 days. While you may experience pain for a few days after getting Shingrix, the pain will be less severe than having shingles and the complications from the disease. You should not get Shingrix if you:  have ever had a severe allergic reaction to any component of the vaccine or after a dose of Shingrix  tested negative for immunity to varicella zoster virus. If you test negative, you should get chickenpox vaccine. currently have shingles  currently are pregnant or breastfeeding. Women who are pregnant or breastfeeding should wait to get Shingrix. If you have a minor acute (starts suddenly) illness, such as a cold, you may get Shingrix. But if you have a moderate or severe acute illness, you should usually wait until you recover before getting the vaccine. This includes anyone with a temperature of 101.3°F or higher. How Well Does Shingrix Work? Two doses of Shingrix provides strong protection against shingles and postherpetic neuralgia (PHN), the most common complication of shingles. In adults 48to 71years old who got two doses, Shingrix was 97% effective in preventing shingles; among adults 70 years and older, Shingrix was 91% effective. In adults 48to 71years old who got two doses, Shingrix was 91% effective in preventing PHN; among adults 70 years and older, Shingrix was 89% effective.   Shingrix protection remained high (more than 85%) in people 70 years and older throughout the four years following vaccination. Since your risk of shingles and PHN increases as you get older, it is important to have strong protection against shingles in your older years. What are the possible side effects of Shingrix? Studies show that Shingrix is safe. The vaccine helps your body create a strong defense against shingles. As a result, you are likely to have temporary side effects from getting the shots. The side effects may affect your ability to do normal daily activities for 2 to 3 days. Most people got a sore arm with mild or moderate pain after getting Shingrix, and some also had redness and swelling where they got the shot. Some people felt tired, had muscle pain, a headache, shivering, fever, stomach pain, or nausea. About 1 out of 6 people who got Shingrix experienced side effects that prevented them from doing regular activities. Symptoms went away on their own in about 2 to 3 days. Side effects were more common in younger people. You might have a reaction to the first or second dose of Shingrix, or both doses. If you experience side effects, you may choose to take over-the-counter pain medicine such as ibuprofen or acetaminophen. Severe allergic reactions to any vaccine are very rare. Signs of a severe allergic reaction can include hives, swelling of the face and throat, difficulty breathing, a fast heartbeat, dizziness, and weakness. These would start a few minutes to a few hours after the vaccination. If you have a severe allergic reaction or other emergency that cant wait, call 9-1-1 or go to the nearest hospital. Otherwise, call your doctor. If you experience side effects from Shingrix, you should report them to the Vaccine Adverse Event Reporting System (VAERS). Your doctor might file this report, or you can do it yourself through the VAERS website, or by calling [phone].   If you have any questions about side effects from Shingrix, talk with your doctor. The shingles vaccine does not contain thimerosal (a preservative containing mercury). How Can I Pay For Shingrix? There are several ways shingles vaccine may be paid for:   Medicare  Medicare Part D plans cover the shingles vaccine, but there may be a cost to you depending on your plan. There may be a copay for the vaccine, or you may need to pay in full then get reimbursed for a certain amount. Medicare Part B does not cover the shingles vaccine. Medicaid  Medicaid may or may not cover the vaccine. Contact your insurer to find out. Private health insurance  Many private health insurance plans will cover the vaccine. Contact your insurer to find out. Vaccine assistance programs  Some pharmaceutical companies provide vaccines to eligible adults who cannot afford them. You may want to check with the vaccine , [employer], about Shingrix.

## 2023-03-23 DIAGNOSIS — N95.1 SYMPTOMS, SUCH AS FLUSHING, SLEEPLESSNESS, HEADACHE, LACK OF CONCENTRATION, ASSOCIATED WITH THE MENOPAUSE: ICD-10-CM

## 2023-03-24 NOTE — TELEPHONE ENCOUNTER
Cathy Juanshelby called to request a refill on her medication.       Last office visit : 2/24/2023   Next office visit : 8/31/2023     Requested Prescriptions     Pending Prescriptions Disp Refills    estrogens, conjugated, (PREMARIN) 0.625 MG tablet [Pharmacy Med Name: PREMARIN TABS 0.625MG] 90 tablet 3     Sig: TAKE 1 TABLET DAILY            Shivani Mccullough LPN PT STATES THAT SHE TOOK MEDROL PACK AND HAD TO STOP BECAUSE IT INCREASED BLOOD PRESSURE. IT IS LISTED AS AN ALLERGY. DR. LEYVA TALKED WITH PT AND IS AWARE OF THIS AND IS OK TO PRECEDE

## 2023-05-01 RX ORDER — CYCLOBENZAPRINE HCL 10 MG
TABLET ORAL
Qty: 90 TABLET | Refills: 1 | Status: SHIPPED | OUTPATIENT
Start: 2023-05-01

## 2023-06-21 DIAGNOSIS — F41.1 GENERALIZED ANXIETY DISORDER: ICD-10-CM

## 2023-06-22 RX ORDER — ALPRAZOLAM 0.25 MG/1
TABLET ORAL
Qty: 90 TABLET | Refills: 0 | Status: SHIPPED | OUTPATIENT
Start: 2023-06-22 | End: 2023-07-22

## 2023-07-10 RX ORDER — POTASSIUM CHLORIDE 750 MG/1
TABLET, EXTENDED RELEASE ORAL
Qty: 270 TABLET | Refills: 3 | Status: SHIPPED | OUTPATIENT
Start: 2023-07-10

## 2023-08-03 ENCOUNTER — TELEPHONE (OUTPATIENT)
Dept: PRIMARY CARE CLINIC | Age: 68
End: 2023-08-03

## 2023-08-03 NOTE — TELEPHONE ENCOUNTER
----- Message from Jim Simmons sent at 7/26/2023  1:41 PM CDT -----  Subject: Message to Provider    QUESTIONS  Information for Provider? Patient requests her lab results be sent to her   Cardiologist in [address] [place] [id number] [address]?   [phone]  ---------------------------------------------------------------------------  --------------  [address] [place]  [phone]; OK to leave message on voicemail  ---------------------------------------------------------------------------  --------------  SCRIPT ANSWERS  Relationship to Patient?  Self

## 2023-08-03 NOTE — TELEPHONE ENCOUNTER
This [facility] faxed the labs to [phone] to Dr. Eliezer Welch at [facility].

## 2023-08-09 DIAGNOSIS — I10 ESSENTIAL (PRIMARY) HYPERTENSION: ICD-10-CM

## 2023-08-09 DIAGNOSIS — I12.9 BENIGN HYPERTENSIVE KIDNEY DISEASE WITH CHRONIC KIDNEY DISEASE STAGE I THROUGH STAGE IV, OR UNSPECIFIED: ICD-10-CM

## 2023-08-11 RX ORDER — PROPRANOLOL HYDROCHLORIDE 80 MG/1
CAPSULE, EXTENDED RELEASE ORAL
Qty: 90 CAPSULE | Refills: 3 | Status: SHIPPED | OUTPATIENT
Start: 2023-08-11

## 2023-08-11 RX ORDER — TRIAMTERENE AND HYDROCHLOROTHIAZIDE 75; 50 MG/1; MG/1
TABLET ORAL
Qty: 90 TABLET | Refills: 3 | Status: SHIPPED | OUTPATIENT
Start: 2023-08-11

## 2023-08-11 RX ORDER — AMLODIPINE BESYLATE 5 MG/1
TABLET ORAL
Qty: 90 TABLET | Refills: 3 | Status: SHIPPED | OUTPATIENT
Start: 2023-08-11

## 2023-08-16 RX ORDER — ESTRADIOL 0.1 MG/G
CREAM VAGINAL
Qty: 43 G | Refills: 0 | Status: SHIPPED | OUTPATIENT
Start: 2023-08-16

## 2023-08-23 ENCOUNTER — LAB (OUTPATIENT)
Dept: LAB | Facility: HOSPITAL | Age: 68
End: 2023-08-23
Payer: MEDICARE

## 2023-08-23 ENCOUNTER — TRANSCRIBE ORDERS (OUTPATIENT)
Dept: LAB | Facility: HOSPITAL | Age: 68
End: 2023-08-23
Payer: MEDICARE

## 2023-08-23 DIAGNOSIS — E78.01 ESSENTIAL FAMILIAL HYPERCHOLESTEROLEMIA: ICD-10-CM

## 2023-08-23 DIAGNOSIS — N18.31 CHRONIC KIDNEY DISEASE (CKD) STAGE G3A/A1, MODERATELY DECREASED GLOMERULAR FILTRATION RATE (GFR) BETWEEN 45-59 ML/MIN/1.73 SQUARE METER AND ALBUMINURIA CREATININE RATIO LESS THAN 30 MG/G (CMS/H*: ICD-10-CM

## 2023-08-23 DIAGNOSIS — I10 ESSENTIAL HYPERTENSION, MALIGNANT: ICD-10-CM

## 2023-08-23 DIAGNOSIS — I10 ESSENTIAL HYPERTENSION, MALIGNANT: Primary | ICD-10-CM

## 2023-08-23 DIAGNOSIS — R73.9 HYPERGLYCEMIA: ICD-10-CM

## 2023-08-23 LAB
BASOPHILS # BLD AUTO: 0.05 10*3/MM3 (ref 0–0.2)
BASOPHILS NFR BLD AUTO: 0.5 % (ref 0–1.5)
CREAT UR-MCNC: 52.6 MG/DL
DEPRECATED RDW RBC AUTO: 38.3 FL (ref 37–54)
EOSINOPHIL # BLD AUTO: 0.29 10*3/MM3 (ref 0–0.4)
EOSINOPHIL NFR BLD AUTO: 2.9 % (ref 0.3–6.2)
ERYTHROCYTE [DISTWIDTH] IN BLOOD BY AUTOMATED COUNT: 12.4 % (ref 12.3–15.4)
HCT VFR BLD AUTO: 38.6 % (ref 34–46.6)
HGB BLD-MCNC: 13.4 G/DL (ref 12–15.9)
IMM GRANULOCYTES # BLD AUTO: 0.04 10*3/MM3 (ref 0–0.05)
IMM GRANULOCYTES NFR BLD AUTO: 0.4 % (ref 0–0.5)
LYMPHOCYTES # BLD AUTO: 2.02 10*3/MM3 (ref 0.7–3.1)
LYMPHOCYTES NFR BLD AUTO: 20.3 % (ref 19.6–45.3)
MCH RBC QN AUTO: 29.8 PG (ref 26.6–33)
MCHC RBC AUTO-ENTMCNC: 34.7 G/DL (ref 31.5–35.7)
MCV RBC AUTO: 86 FL (ref 79–97)
MONOCYTES # BLD AUTO: 0.85 10*3/MM3 (ref 0.1–0.9)
MONOCYTES NFR BLD AUTO: 8.6 % (ref 5–12)
NEUTROPHILS NFR BLD AUTO: 6.68 10*3/MM3 (ref 1.7–7)
NEUTROPHILS NFR BLD AUTO: 67.3 % (ref 42.7–76)
NRBC BLD AUTO-RTO: 0 /100 WBC (ref 0–0.2)
PLATELET # BLD AUTO: 310 10*3/MM3 (ref 140–450)
PMV BLD AUTO: 10.1 FL (ref 6–12)
PROT ?TM UR-MCNC: 18.3 MG/DL
PROT/CREAT UR: 347.9 MG/G CREA (ref 0–200)
RBC # BLD AUTO: 4.49 10*6/MM3 (ref 3.77–5.28)
WBC NRBC COR # BLD: 9.93 10*3/MM3 (ref 3.4–10.8)

## 2023-08-23 PROCEDURE — 85025 COMPLETE CBC W/AUTO DIFF WBC: CPT

## 2023-08-23 PROCEDURE — 84156 ASSAY OF PROTEIN URINE: CPT

## 2023-08-23 PROCEDURE — 83970 ASSAY OF PARATHORMONE: CPT

## 2023-08-23 PROCEDURE — 80061 LIPID PANEL: CPT

## 2023-08-23 PROCEDURE — 83036 HEMOGLOBIN GLYCOSYLATED A1C: CPT

## 2023-08-23 PROCEDURE — 82570 ASSAY OF URINE CREATININE: CPT

## 2023-08-23 PROCEDURE — 84550 ASSAY OF BLOOD/URIC ACID: CPT

## 2023-08-23 PROCEDURE — 80053 COMPREHEN METABOLIC PANEL: CPT

## 2023-08-23 PROCEDURE — 81003 URINALYSIS AUTO W/O SCOPE: CPT

## 2023-08-23 PROCEDURE — 83735 ASSAY OF MAGNESIUM: CPT

## 2023-08-23 PROCEDURE — 82306 VITAMIN D 25 HYDROXY: CPT

## 2023-08-23 PROCEDURE — 84100 ASSAY OF PHOSPHORUS: CPT

## 2023-08-24 LAB
25(OH)D3 SERPL-MCNC: 68.6 NG/ML (ref 30–100)
ALBUMIN SERPL-MCNC: 4.3 G/DL (ref 3.5–5.2)
ALBUMIN/GLOB SERPL: 1.6 G/DL
ALP SERPL-CCNC: 42 U/L (ref 39–117)
ALT SERPL W P-5'-P-CCNC: 15 U/L (ref 1–33)
ANION GAP SERPL CALCULATED.3IONS-SCNC: 16.4 MMOL/L (ref 5–15)
AST SERPL-CCNC: 17 U/L (ref 1–32)
BASOPHILS ABSOLUTE: NORMAL
BASOPHILS RELATIVE PERCENT: NORMAL
BILIRUB SERPL-MCNC: 0.7 MG/DL (ref 0–1.2)
BILIRUB UR QL STRIP: NEGATIVE
BUN SERPL-MCNC: 19 MG/DL (ref 8–23)
BUN/CREAT SERPL: 20.4 (ref 7–25)
CALCIUM SPEC-SCNC: 9.2 MG/DL (ref 8.6–10.5)
CHLORIDE SERPL-SCNC: 96 MMOL/L (ref 98–107)
CHOLEST SERPL-MCNC: 132 MG/DL (ref 0–200)
CLARITY UR: CLEAR
CO2 SERPL-SCNC: 23.6 MMOL/L (ref 22–29)
COLOR UR: YELLOW
CREAT SERPL-MCNC: 0.93 MG/DL (ref 0.57–1)
EGFRCR SERPLBLD CKD-EPI 2021: 67.1 ML/MIN/1.73
EOSINOPHILS ABSOLUTE: NORMAL
EOSINOPHILS RELATIVE PERCENT: NORMAL
GLOBULIN UR ELPH-MCNC: 2.7 GM/DL
GLUCOSE SERPL-MCNC: 98 MG/DL (ref 65–99)
GLUCOSE UR STRIP-MCNC: NEGATIVE MG/DL
HBA1C MFR BLD: 6.6 % (ref 4.8–5.6)
HCT VFR BLD CALC: NORMAL %
HDLC SERPL-MCNC: 50 MG/DL (ref 40–60)
HEMOGLOBIN: NORMAL
HGB UR QL STRIP.AUTO: NEGATIVE
KETONES UR QL STRIP: NEGATIVE
LDLC SERPL CALC-MCNC: 47 MG/DL (ref 0–100)
LDLC/HDLC SERPL: 0.74 {RATIO}
LEUKOCYTE ESTERASE UR QL STRIP.AUTO: NEGATIVE
LYMPHOCYTES ABSOLUTE: NORMAL
LYMPHOCYTES RELATIVE PERCENT: NORMAL
MAGNESIUM SERPL-MCNC: 1.8 MG/DL (ref 1.6–2.4)
MCH RBC QN AUTO: NORMAL PG
MCHC RBC AUTO-ENTMCNC: NORMAL G/DL
MCV RBC AUTO: NORMAL FL
MONOCYTES ABSOLUTE: NORMAL
MONOCYTES RELATIVE PERCENT: NORMAL
NEUTROPHILS ABSOLUTE: NORMAL
NEUTROPHILS RELATIVE PERCENT: NORMAL
NITRITE UR QL STRIP: NEGATIVE
PDW BLD-RTO: NORMAL %
PH UR STRIP.AUTO: 5.5 [PH] (ref 5–8)
PHOSPHATE SERPL-MCNC: 3.9 MG/DL (ref 2.5–4.5)
PLATELET # BLD: NORMAL 10*3/UL
PMV BLD AUTO: NORMAL FL
POTASSIUM SERPL-SCNC: 3.4 MMOL/L (ref 3.5–5.2)
PROT SERPL-MCNC: 7 G/DL (ref 6–8.5)
PROT UR QL STRIP: NEGATIVE
PTH-INTACT SERPL-MCNC: 41.7 PG/ML (ref 15–65)
RBC # BLD: NORMAL 10*6/UL
SODIUM SERPL-SCNC: 136 MMOL/L (ref 136–145)
SP GR UR STRIP: 1.01 (ref 1–1.03)
TRIGL SERPL-MCNC: 224 MG/DL (ref 0–150)
URATE SERPL-MCNC: 6 MG/DL (ref 2.4–5.7)
UROBILINOGEN UR QL STRIP: NORMAL
VLDLC SERPL-MCNC: 35 MG/DL (ref 5–40)
WBC # BLD: NORMAL 10*3/UL

## 2023-08-30 SDOH — ECONOMIC STABILITY: FOOD INSECURITY: WITHIN THE PAST 12 MONTHS, THE FOOD YOU BOUGHT JUST DIDN'T LAST AND YOU DIDN'T HAVE MONEY TO GET MORE.: NEVER TRUE

## 2023-08-30 SDOH — HEALTH STABILITY: PHYSICAL HEALTH: ON AVERAGE, HOW MANY MINUTES DO YOU ENGAGE IN EXERCISE AT THIS LEVEL?: 10 MIN

## 2023-08-30 SDOH — ECONOMIC STABILITY: HOUSING INSECURITY
IN THE LAST 12 MONTHS, WAS THERE A TIME WHEN YOU DID NOT HAVE A STEADY PLACE TO SLEEP OR SLEPT IN A SHELTER (INCLUDING NOW)?: NO

## 2023-08-30 SDOH — HEALTH STABILITY: PHYSICAL HEALTH: ON AVERAGE, HOW MANY DAYS PER WEEK DO YOU ENGAGE IN MODERATE TO STRENUOUS EXERCISE (LIKE A BRISK WALK)?: 2 DAYS

## 2023-08-30 SDOH — ECONOMIC STABILITY: TRANSPORTATION INSECURITY
IN THE PAST 12 MONTHS, HAS LACK OF TRANSPORTATION KEPT YOU FROM MEETINGS, WORK, OR FROM GETTING THINGS NEEDED FOR DAILY LIVING?: NO

## 2023-08-30 SDOH — ECONOMIC STABILITY: INCOME INSECURITY: HOW HARD IS IT FOR YOU TO PAY FOR THE VERY BASICS LIKE FOOD, HOUSING, MEDICAL CARE, AND HEATING?: NOT VERY HARD

## 2023-08-30 SDOH — ECONOMIC STABILITY: FOOD INSECURITY: WITHIN THE PAST 12 MONTHS, YOU WORRIED THAT YOUR FOOD WOULD RUN OUT BEFORE YOU GOT MONEY TO BUY MORE.: NEVER TRUE

## 2023-08-30 ASSESSMENT — PATIENT HEALTH QUESTIONNAIRE - PHQ9
SUM OF ALL RESPONSES TO PHQ9 QUESTIONS 1 & 2: 0
SUM OF ALL RESPONSES TO PHQ QUESTIONS 1-9: 0
2. FEELING DOWN, DEPRESSED OR HOPELESS: 0
1. LITTLE INTEREST OR PLEASURE IN DOING THINGS: 0
SUM OF ALL RESPONSES TO PHQ QUESTIONS 1-9: 0

## 2023-08-30 ASSESSMENT — LIFESTYLE VARIABLES
HOW OFTEN DO YOU HAVE A DRINK CONTAINING ALCOHOL: 1
HOW MANY STANDARD DRINKS CONTAINING ALCOHOL DO YOU HAVE ON A TYPICAL DAY: PATIENT DOES NOT DRINK
HOW MANY STANDARD DRINKS CONTAINING ALCOHOL DO YOU HAVE ON A TYPICAL DAY: 0
HOW OFTEN DO YOU HAVE A DRINK CONTAINING ALCOHOL: NEVER
HOW OFTEN DO YOU HAVE SIX OR MORE DRINKS ON ONE OCCASION: 1

## 2023-08-31 ENCOUNTER — OFFICE VISIT (OUTPATIENT)
Dept: PRIMARY CARE CLINIC | Age: 68
End: 2023-08-31
Payer: MEDICARE

## 2023-08-31 VITALS
HEART RATE: 60 BPM | WEIGHT: 166 LBS | BODY MASS INDEX: 30.55 KG/M2 | TEMPERATURE: 97.7 F | DIASTOLIC BLOOD PRESSURE: 62 MMHG | SYSTOLIC BLOOD PRESSURE: 108 MMHG | OXYGEN SATURATION: 97 % | HEIGHT: 62 IN

## 2023-08-31 DIAGNOSIS — E78.00 HYPERCHOLESTEROLEMIA: ICD-10-CM

## 2023-08-31 DIAGNOSIS — N95.9 POSTMENOPAUSAL SYMPTOMS: ICD-10-CM

## 2023-08-31 DIAGNOSIS — E87.6 HYPOKALEMIA: ICD-10-CM

## 2023-08-31 DIAGNOSIS — J30.89 CHRONIC NONSEASONAL ALLERGIC RHINITIS DUE TO POLLEN: ICD-10-CM

## 2023-08-31 DIAGNOSIS — I10 ESSENTIAL (PRIMARY) HYPERTENSION: ICD-10-CM

## 2023-08-31 DIAGNOSIS — Z23 NEED FOR INFLUENZA VACCINATION: ICD-10-CM

## 2023-08-31 DIAGNOSIS — G89.29 CHRONIC MIDLINE LOW BACK PAIN WITHOUT SCIATICA: ICD-10-CM

## 2023-08-31 DIAGNOSIS — F41.1 GENERALIZED ANXIETY DISORDER: ICD-10-CM

## 2023-08-31 DIAGNOSIS — N18.31 STAGE 3A CHRONIC KIDNEY DISEASE (HCC): ICD-10-CM

## 2023-08-31 DIAGNOSIS — Z00.00 ANNUAL PHYSICAL EXAM: Primary | ICD-10-CM

## 2023-08-31 DIAGNOSIS — M54.50 CHRONIC MIDLINE LOW BACK PAIN WITHOUT SCIATICA: ICD-10-CM

## 2023-08-31 DIAGNOSIS — E03.9 PRIMARY HYPOTHYROIDISM: ICD-10-CM

## 2023-08-31 DIAGNOSIS — E11.9 TYPE 2 DIABETES MELLITUS WITHOUT COMPLICATION, WITHOUT LONG-TERM CURRENT USE OF INSULIN (HCC): ICD-10-CM

## 2023-08-31 PROCEDURE — G0008 ADMIN INFLUENZA VIRUS VAC: HCPCS | Performed by: FAMILY MEDICINE

## 2023-08-31 PROCEDURE — 1123F ACP DISCUSS/DSCN MKR DOCD: CPT | Performed by: FAMILY MEDICINE

## 2023-08-31 PROCEDURE — 99215 OFFICE O/P EST HI 40 MIN: CPT | Performed by: FAMILY MEDICINE

## 2023-08-31 PROCEDURE — 90694 VACC AIIV4 NO PRSRV 0.5ML IM: CPT | Performed by: FAMILY MEDICINE

## 2023-08-31 PROCEDURE — G8427 DOCREV CUR MEDS BY ELIG CLIN: HCPCS | Performed by: FAMILY MEDICINE

## 2023-08-31 PROCEDURE — 3074F SYST BP LT 130 MM HG: CPT | Performed by: FAMILY MEDICINE

## 2023-08-31 PROCEDURE — 3078F DIAST BP <80 MM HG: CPT | Performed by: FAMILY MEDICINE

## 2023-08-31 PROCEDURE — 3046F HEMOGLOBIN A1C LEVEL >9.0%: CPT | Performed by: FAMILY MEDICINE

## 2023-08-31 PROCEDURE — 2022F DILAT RTA XM EVC RTNOPTHY: CPT | Performed by: FAMILY MEDICINE

## 2023-08-31 PROCEDURE — G8417 CALC BMI ABV UP PARAM F/U: HCPCS | Performed by: FAMILY MEDICINE

## 2023-08-31 PROCEDURE — G0439 PPPS, SUBSEQ VISIT: HCPCS | Performed by: FAMILY MEDICINE

## 2023-08-31 PROCEDURE — G8399 PT W/DXA RESULTS DOCUMENT: HCPCS | Performed by: FAMILY MEDICINE

## 2023-08-31 PROCEDURE — 3017F COLORECTAL CA SCREEN DOC REV: CPT | Performed by: FAMILY MEDICINE

## 2023-08-31 PROCEDURE — 1090F PRES/ABSN URINE INCON ASSESS: CPT | Performed by: FAMILY MEDICINE

## 2023-08-31 PROCEDURE — 4004F PT TOBACCO SCREEN RCVD TLK: CPT | Performed by: FAMILY MEDICINE

## 2023-08-31 RX ORDER — GLUCOSAMINE HCL/CHONDROITIN SU 500-400 MG
1 CAPSULE ORAL DAILY
Qty: 30 STRIP | Refills: 11 | Status: SHIPPED | OUTPATIENT
Start: 2023-08-31

## 2023-08-31 RX ORDER — SYRING-NEEDL,DISP,INSUL,0.3 ML 30 GX5/16"
SYRINGE, EMPTY DISPOSABLE MISCELLANEOUS
Qty: 30 EACH | Refills: 11 | Status: SHIPPED | OUTPATIENT
Start: 2023-08-31

## 2023-08-31 ASSESSMENT — ENCOUNTER SYMPTOMS
CHEST TIGHTNESS: 0
BACK PAIN: 1
NAUSEA: 0
CONSTIPATION: 0
ABDOMINAL PAIN: 0
COUGH: 0
ANAL BLEEDING: 0
SHORTNESS OF BREATH: 0
DIARRHEA: 0

## 2023-08-31 NOTE — PROGRESS NOTES
(CYMBALTA) 30 MG extended release capsule 2 capsules 2 times daily Yes Historical Provider, MD   Ciclopirox 1 % SHAM Apply bid prn.  Yes James Talamantes MD   atorvastatin (LIPITOR) 40 MG tablet Take 1 tablet by mouth daily Yes James Talamantes MD   levothyroxine (SYNTHROID) 112 MCG tablet Take 1 tablet by mouth Daily Yes Historical Provider, MD   ASPIRIN LOW DOSE 81 MG EC tablet Take 1 tablet by mouth daily Yes Historical Provider, MD   VITAMIN B COMPLEX-C PO Take by mouth Yes Historical Provider, MD   MAGNESIUM CARBONATE PO Take by mouth Yes Historical Provider, MD   CRANBERRY EXTRACT PO Take by mouth Yes Historical Provider, MD   vitamin D (CHOLECALCIFEROL) 1000 UNIT TABS tablet Take 1 tablet by mouth daily Yes Historical Provider, MD   traMADol (ULTRAM) 50 MG tablet Take 1 tablet by mouth every 12 hours Yes James Talamantes MD         Past Medical History:   Diagnosis Date    Allergic rhinitis     Anxiety     Back pain     DDD (degenerative disc disease)     Hypertension     Hyperthyroidism     Kidney disease     Stage 3- Dr Morteza Hernandez          Past Surgical History:   Procedure Laterality Date    ANKLE SURGERY      APPENDECTOMY      BREAST BIOPSY  03/21/2013    left breast mammotome biopsy    BREAST SURGERY Right 2002    right excisional biopsy - cyst    Levonne Dauer      COLONOSCOPY  2016    Dr. Freeman Si in [address] [place] [address] (CERVIX STATUS UNKNOWN)      ARIANA with BSO, fibroids    THYROIDECTOMY      carcinoma    US BREAST FINE NEEDLE ASPIRATION         Family History   Problem Relation Age of Onset    High Blood Pressure Mother     Diabetes Mother     Stroke Mother     High Cholesterol Mother     High Blood Pressure Father     Heart Disease Father     High Cholesterol Father     Cancer Father 72        prostate    High Blood Pressure Sister     High Blood Pressure Brother     Cancer Brother         prostate    Cancer Brother         prostate    Breast
Historical Provider, MD   Ciclopirox 1 % SHAM Apply bid prn.   Azucena Rollins MD   atorvastatin (LIPITOR) 40 MG tablet Take 1 tablet by mouth daily  Azucena Rollins MD   levothyroxine (SYNTHROID) 112 MCG tablet Take 112 mcg by mouth Daily  Historical Provider, MD   ASPIRIN LOW DOSE 81 MG EC tablet Take 1 tablet by mouth daily  Historical Provider, MD   VITAMIN B COMPLEX-C PO Take by mouth  Historical Provider, MD   MAGNESIUM CARBONATE PO Take by mouth  Historical Provider, MD   CRANBERRY EXTRACT PO Take by mouth  Historical Provider, MD   vitamin D (CHOLECALCIFEROL) 1000 UNIT TABS tablet Take 1,000 Units by mouth daily  Historical Provider, MD   traMADol (ULTRAM) 50 MG tablet Take 1 tablet by mouth every 12 hours  Azucena Rollins MD         Past Medical History:   Diagnosis Date    Allergic rhinitis     Anxiety     Back pain     DDD (degenerative disc disease)     Hypertension     Hyperthyroidism     Kidney disease     Stage 3- Dr Marleny Carlton          Past Surgical History:   Procedure Laterality Date    ANKLE SURGERY      APPENDECTOMY      BREAST BIOPSY  03/21/2013    left breast mammotome biopsy    BREAST SURGERY Right 2002    right excisional biopsy - cyst    Mickie Junito      COLONOSCOPY  2016    Dr. Delmar Merlos in [address] [place] [address] (CERVIX STATUS UNKNOWN)      ARIANA with BSO, fibroids    THYROIDECTOMY      carcinoma    US BREAST FINE NEEDLE ASPIRATION         Family History   Problem Relation Age of Onset    High Blood Pressure Mother     Diabetes Mother     Stroke Mother     High Cholesterol Mother     High Blood Pressure Father     Heart Disease Father     High Cholesterol Father     Cancer Father 72        prostate    High Blood Pressure Sister     High Blood Pressure Brother     Cancer Brother         prostate    Cancer Brother         prostate    Breast Cancer Maternal Aunt 54        breast    Breast Cancer Maternal Cousin 58        breast    Breast
vitamin D (CHOLECALCIFEROL) 1000 UNIT TABS tablet Take 1 tablet by mouth daily      traMADol (ULTRAM) 50 MG tablet Take 1 tablet by mouth every 12 hours 60 tablet 0     No current facility-administered medications for this visit. Return in about 6 months (around 2/29/2024) for Follow Up - 20 minutes. Discussed use, benefit, and side effects of prescribed medications.          History, Medications, Allergies     No Known Allergies  _______________________________________________________________  Past Medical History:   Diagnosis Date    Allergic rhinitis     Anxiety     Back pain     DDD (degenerative disc disease)     Hypertension     Hyperthyroidism     Kidney disease     Stage 3- Dr Manpreet Sylvester      Past Surgical History:   Procedure Laterality Date    ANKLE SURGERY      APPENDECTOMY      BREAST BIOPSY  03/21/2013    left breast mammotome biopsy    BREAST SURGERY Right 2002    right excisional biopsy - cyst    CARPAL TUNNEL RELEASE      COLONOSCOPY  2016    Dr. Bridget Phelps in [address] [place] [address] (CERVIX STATUS UNKNOWN)      ARIANA with BSO, fibroids    THYROIDECTOMY      carcinoma    US BREAST FINE NEEDLE ASPIRATION        Family History   Problem Relation Age of Onset    High Blood Pressure Mother     Diabetes Mother     Stroke Mother     High Cholesterol Mother     High Blood Pressure Father     Heart Disease Father     High Cholesterol Father     Cancer Father 72        prostate    High Blood Pressure Sister     High Blood Pressure Brother     Cancer Brother         prostate    Cancer Brother         prostate    Breast Cancer Maternal Aunt 54        breast    Breast Cancer Maternal Cousin 58        breast    Breast Cancer Niece     Social History     Tobacco Use    Smoking status: Every Day     Packs/day: 1.00     Years: 25.00     Pack years: 25.00     Types: Cigarettes    Smokeless tobacco: Never   Substance Use Topics    Alcohol use: No     Alcohol/week: 0.0

## 2023-08-31 NOTE — PATIENT INSTRUCTIONS
foods and processed carbohydrates. Utilize apps to track your food:  Examples: [employer], or CalorieCounter by [employer] to follow a program.   Examples: Weight Watchers, Aarti Escobedo or [employer] apps identify and improve eating habits:   Examples: Eat Right Now, Noom    5)  You can choose to drink less alcohol: Drink less than 1-2 drinks/day no more than 7 drinks per week. Alcohol will disrupt your sleep and add calories to your day    6)  You can choose to Practice Mindfulness and work on stress reduction. Utilize apps such as Calm, Headspace, Abide  Contact local behavioral health specialists      Small changes every day will add up             Advance Care Planning: Care Instructions  Your Care Instructions    It can be hard to live with an illness that cannot be cured. But if your health is getting worse, you may want to make decisions about end-of-life care. Planning for the end of your life does not mean that you are giving up. It is a way to make sure that your wishes are met. Clearly stating your wishes can make it easier for your loved ones. Making plans while you are still able may also ease your mind and make your final days less stressful and more meaningful. Follow-up care is a key part of your treatment and safety. Be sure to make and go to all appointments, and call your doctor if you are having problems. It's also a good idea to know your test results and keep a list of the medicines you take. What can you do to plan for the end of life? Visit:  https://ag.[place].gov/consumer-protection/livingwills  You can bring these issues up with your doctor. You do not need to wait until your doctor starts the conversation. You might start with \"I would not be willing to live with . Cristo Number Cristo Number Cristo Number \" When you complete this sentence it helps your doctor understand your wishes. Talk openly and honestly with your doctor.  This is the best way to understand the decisions you will

## 2023-09-01 DIAGNOSIS — E11.9 TYPE 2 DIABETES MELLITUS WITHOUT COMPLICATION, WITHOUT LONG-TERM CURRENT USE OF INSULIN (HCC): ICD-10-CM

## 2023-09-18 DIAGNOSIS — F41.1 GENERALIZED ANXIETY DISORDER: ICD-10-CM

## 2023-09-18 RX ORDER — ALPRAZOLAM 0.25 MG/1
TABLET ORAL
Qty: 90 TABLET | Refills: 0 | Status: SHIPPED | OUTPATIENT
Start: 2023-09-18 | End: 2023-10-18

## 2023-09-18 NOTE — TELEPHONE ENCOUNTER
Matthew Chakraborty called to request a refill on her medication. Last office visit : 8/31/2023   Next office visit : 3/1/2024     Last UDS:   Amphetamine Screen, Urine   Date Value Ref Range Status   10/18/2022 NEG  Final     Barbiturate Screen, Urine   Date Value Ref Range Status   10/18/2022 NEG  Final     Benzodiazepine Screen, Urine   Date Value Ref Range Status   10/18/2022 NEG  Final     Buprenorphine Urine   Date Value Ref Range Status   10/18/2022 NEG  Final     Cocaine Metabolite Screen, Urine   Date Value Ref Range Status   10/18/2022 NEG  Final     Gabapentin Screen, Urine   Date Value Ref Range Status   10/18/2022 NEG  Final     MDMA, Urine   Date Value Ref Range Status   10/18/2022 NEG  Final     Methamphetamine, Urine   Date Value Ref Range Status   10/18/2022 NEG  Final     Opiate Scrn, Ur   Date Value Ref Range Status   10/18/2022 NEG  Final     Oxycodone Screen, Ur   Date Value Ref Range Status   10/18/2022 NEG  Final     PCP Screen, Urine   Date Value Ref Range Status   10/18/2022 NEG  Final     Propoxyphene Screen, Urine   Date Value Ref Range Status   10/18/2022 NEG  Final     THC Screen, Urine   Date Value Ref Range Status   10/18/2022 NEG  Final     Tricyclic Antidepressants, Urine   Date Value Ref Range Status   10/18/2022 NEG  Final       Last Eugenio Baker: 9/18/23  Medication Contract: 10/22   Last Fill: 6/23/23    Requested Prescriptions     Pending Prescriptions Disp Refills    ALPRAZolam (XANAX) 0.25 MG tablet [Pharmacy Med Name: ALPRAZolam 0.25 MG Oral Tablet] 90 tablet 0     Sig: TAKE 1 TABLET BY MOUTH NIGHTLY AS NEEDED FOR ANXIETY         Please approve or refuse this medication.    Princess Garcia MA

## 2023-10-10 ENCOUNTER — PRE-ADMISSION TESTING (OUTPATIENT)
Dept: PREADMISSION TESTING | Facility: HOSPITAL | Age: 68
End: 2023-10-10
Payer: MEDICARE

## 2023-10-10 ENCOUNTER — HOSPITAL ENCOUNTER (OUTPATIENT)
Dept: GENERAL RADIOLOGY | Facility: HOSPITAL | Age: 68
Discharge: HOME | End: 2023-10-10
Payer: MEDICARE

## 2023-10-10 VITALS
OXYGEN SATURATION: 95 % | BODY MASS INDEX: 30.88 KG/M2 | HEART RATE: 68 BPM | DIASTOLIC BLOOD PRESSURE: 92 MMHG | WEIGHT: 163.58 LBS | SYSTOLIC BLOOD PRESSURE: 130 MMHG | HEIGHT: 61 IN | RESPIRATION RATE: 20 BRPM

## 2023-10-10 LAB
ALBUMIN SERPL-MCNC: 4.4 G/DL (ref 3.5–5.2)
ALBUMIN/GLOB SERPL: 1.6 G/DL
ALP SERPL-CCNC: 43 U/L (ref 39–117)
ALT SERPL W P-5'-P-CCNC: 16 U/L (ref 1–33)
ANION GAP SERPL CALCULATED.3IONS-SCNC: 13 MMOL/L (ref 5–15)
AST SERPL-CCNC: 17 U/L (ref 1–32)
BILIRUB SERPL-MCNC: 0.5 MG/DL (ref 0–1.2)
BILIRUB UR QL STRIP: NEGATIVE
BUN SERPL-MCNC: 20 MG/DL (ref 8–23)
BUN/CREAT SERPL: 17.4 (ref 7–25)
CALCIUM SPEC-SCNC: 9.5 MG/DL (ref 8.6–10.5)
CHLORIDE SERPL-SCNC: 98 MMOL/L (ref 98–107)
CLARITY UR: CLEAR
CO2 SERPL-SCNC: 27 MMOL/L (ref 22–29)
COLOR UR: YELLOW
CREAT SERPL-MCNC: 1.15 MG/DL (ref 0.57–1)
DEPRECATED RDW RBC AUTO: 44.7 FL (ref 37–54)
EGFRCR SERPLBLD CKD-EPI 2021: 52 ML/MIN/1.73
ERYTHROCYTE [DISTWIDTH] IN BLOOD BY AUTOMATED COUNT: 12.8 % (ref 12.3–15.4)
GLOBULIN UR ELPH-MCNC: 2.7 GM/DL
GLUCOSE SERPL-MCNC: 107 MG/DL (ref 65–99)
GLUCOSE UR STRIP-MCNC: NEGATIVE MG/DL
HCT VFR BLD AUTO: 44.1 % (ref 34–46.6)
HGB BLD-MCNC: 13.4 G/DL (ref 12–15.9)
HGB UR QL STRIP.AUTO: NEGATIVE
INR PPP: 0.88 (ref 0.91–1.09)
KETONES UR QL STRIP: NEGATIVE
LEUKOCYTE ESTERASE UR QL STRIP.AUTO: NEGATIVE
MCH RBC QN AUTO: 28.9 PG (ref 26.6–33)
MCHC RBC AUTO-ENTMCNC: 30.4 G/DL (ref 31.5–35.7)
MCV RBC AUTO: 95.2 FL (ref 79–97)
NITRITE UR QL STRIP: NEGATIVE
PH UR STRIP.AUTO: 6 [PH] (ref 5–8)
PLATELET # BLD AUTO: 321 10*3/MM3 (ref 140–450)
PMV BLD AUTO: 9.5 FL (ref 6–12)
POTASSIUM SERPL-SCNC: 3.6 MMOL/L (ref 3.5–5.2)
PROT SERPL-MCNC: 7.1 G/DL (ref 6–8.5)
PROT UR QL STRIP: NEGATIVE
PROTHROMBIN TIME: 12 SECONDS (ref 11.8–14.8)
RBC # BLD AUTO: 4.63 10*6/MM3 (ref 3.77–5.28)
SODIUM SERPL-SCNC: 138 MMOL/L (ref 136–145)
SP GR UR STRIP: 1.02 (ref 1–1.03)
UROBILINOGEN UR QL STRIP: NORMAL
WBC NRBC COR # BLD: 9.8 10*3/MM3 (ref 3.4–10.8)

## 2023-10-10 PROCEDURE — 85027 COMPLETE CBC AUTOMATED: CPT

## 2023-10-10 PROCEDURE — 85610 PROTHROMBIN TIME: CPT

## 2023-10-10 PROCEDURE — 71045 X-RAY EXAM CHEST 1 VIEW: CPT

## 2023-10-10 PROCEDURE — 81003 URINALYSIS AUTO W/O SCOPE: CPT

## 2023-10-10 PROCEDURE — 36415 COLL VENOUS BLD VENIPUNCTURE: CPT

## 2023-10-10 PROCEDURE — 80053 COMPREHEN METABOLIC PANEL: CPT

## 2023-10-10 PROCEDURE — 93005 ELECTROCARDIOGRAM TRACING: CPT

## 2023-10-10 RX ORDER — SENNOSIDES 8.6 MG
CAPSULE ORAL
COMMUNITY

## 2023-10-10 RX ORDER — VIBEGRON 75 MG/1
1 TABLET, FILM COATED ORAL DAILY
COMMUNITY

## 2023-10-10 RX ORDER — ESTRADIOL 0.1 MG/G
CREAM VAGINAL 3 TIMES WEEKLY
COMMUNITY
Start: 2023-08-16

## 2023-10-10 NOTE — DISCHARGE INSTRUCTIONS
Before you come to the hospital        Arrival time: AS DIRECTED BY OFFICE     YOU MAY TAKE THE FOLLOWING MEDICATION(S) THE MORNING OF SURGERY WITH A SIP OF WATER: PROPANOLOL, TRAMADOL    ****HOLD YOUR LOSARTAN FOR 24 HOURS PRIOR TO SURGERY****           ALL OTHER HOME MEDICATION CHECK WITH YOUR PHYSICIAN (especially if   you are taking diabetes medicines or blood thinners)    Do not take any Erectile Dysfunction medications (EX: CIALIS, VIAGRA) 24 hours prior to surgery.      If you were given and instructed to use a germ- killing soap, use as directed the night before surgery and again the morning of surgery or as directed by your surgeon. (Use one-half of the bottle with each shower.)   See attached information for How to Use Chlorhexidine for Bathing if applicable.            Eating and drinking restrictions prior to scheduled arrival time    2 Hours before arrival time STOP   Drinking Clear liquids (water, black coffee-NO CREAM,  apple juice-no pulp)      8 Hours before arrival time STOP   All food, full liquids, and dairy products    (It is extremely important that you follow these guidelines to prevent delay or cancelation of your procedure)     Clear Liquids  Water and flavored water                                                                      Clear Fruit juices, such as cranberry juice and apple juice.  Black coffee (NO cream of any kind, including powdered).  Plain tea  Clear bouillon or broth.  Flavored gelatin.  Soda.  Gatorade or Powerade.  Full liquid examples  Juices that have pulp.  Frozen ice pops that contain fruit pieces.  Coffee with creamer  Milk.  Yogurt.                MANAGING PAIN AFTER SURGERY    We know you are probably wondering what your pain will be like after surgery.  Following surgery it is unrealistic to expect you will not have pain.   Pain is how our bodies let us know that something is wrong or cautions us to be careful.  That said, our goal is to make your pain  tolerable.    Methods we may use to treat your pain include (oral or IV medications, PCAs, epidurals, nerve blocks, etc.)   While some procedures require IV pain medications for a short time after surgery, transitioning to pain medications by mouth allows for better management of pain.   Your nurse will encourage you to take oral pain medications whenever possible.  IV medications work almost immediately, but only last a short while.  Taking medications by mouth allows for a more constant level of medication in your blood stream for a longer period of time.      Once your pain is out of control it is harder to get back under control.  It is important you are aware when your next dose of pain medication is due.  If you are admitted, your nurse may write the time of your next dose on the white board in your room to help you remember.      We are interested in your pain and encourage you to inform us about aggravating factors during your visit.   Many times a simple repositioning every few hours can make a big difference.    If your physician says it is okay, do not let your pain prevent you from getting out of bed. Be sure to call your nurse for assistance prior to getting up so you do not fall.      Before surgery, please decide your tolerable pain goal.  These faces help describe the pain ratings we use on a 0-10 scale.   Be prepared to tell us your goal and whether or not you take pain or anxiety medications at home.          Preparing for Surgery  Preparing for surgery is an important part of your care. It can make things go more smoothly and help you avoid complications. The steps leading up to surgery may vary among hospitals. Follow all instructions given to you by your health care providers. Ask questions if you do not understand something. Talk about any concerns that you have.  Here are some questions to consider asking before your surgery:  If my surgery is not an emergency (is elective), when would be the  best time to have the surgery?  What arrangements do I need to make for work, home, or school?  What will my recovery be like? How long will it be before I can return to normal activities?  Will I need to prepare my home? Will I need to arrange care for me or my children?  Should I expect to have pain after surgery? What are my pain management options? Are there nonmedical options that I can try for pain?  Tell a health care provider about:  Any allergies you have.  All medicines you are taking, including vitamins, herbs, eye drops, creams, and over-the-counter medicines.  Any problems you or family members have had with anesthetic medicines.  Any blood disorders you have.  Any surgeries you have had.  Any medical conditions you have.  Whether you are pregnant or may be pregnant.  What are the risks?  The risks and complications of surgery depend on the specific procedure that you have. Discuss all the risks with your health care providers before your surgery. Ask about common surgical complications, which may include:  Infection.  Bleeding or a need for blood replacement (transfusion).  Allergic reactions to medicines.  Damage to surrounding nerves, tissues, or structures.  A blood clot.  Scarring.  Failure of the surgery to correct the problem.  Follow these instructions before the procedure:  Several days or weeks before your procedure  You may have a physical exam by your primary health care provider to make sure it is safe for you to have surgery.  You may have testing. This may include a chest X-ray, blood and urine tests, electrocardiogram (ECG), or other testing.  Ask your health care provider about:  Changing or stopping your regular medicines. This is especially important if you are taking diabetes medicines or blood thinners.  Taking medicines such as aspirin and ibuprofen. These medicines can thin your blood. Do not take these medicines unless your health care provider tells you to take them.  Taking  over-the-counter medicines, vitamins, herbs, and supplements.  Do not use any products that contain nicotine or tobacco, such as cigarettes and e-cigarettes. If you need help quitting, ask your health care provider.  Avoid alcohol.  Ask your health care provider if there are exercises you can do to prepare for surgery.  Eat a healthy diet.   Plan to have someone 18 years of age or older to take you home from the hospital. We will need to verify your ride on the morning of surgery if you are being discharged home on the same day. Tell your ride to be expecting a call from the hospital prior to your procedure.   Plan to have a responsible adult care for you for at least 24 hours after you leave the hospital or clinic. This is important.  The day before your procedure  You may be given antibiotic medicine to take by mouth to help prevent infection. Take it as told by your health care provider.  You may be asked to shower with a germ-killing soap.  Follow instructions from your health care provider about eating and drinking restrictions. This includes gum, mints and hard candy.  Pack comfortable clothes according to your procedure.   The day of your procedure  You may need to take another shower with a germ-killing soap before you leave home in the morning.  With a small sip of water, take only the medicines that you are told to take.  Remove all jewelry including rings.   Leave anything you consider valuable at home except hearing aids if needed.  You do not need to bring your home medications into the hospital.   Do not wear any makeup, nail polish, powder, deodorant, lotion, hair accessories, or anything on your skin or body except your clothes.  If you will be staying in the hospital, bring a case to hold your glasses, contacts, or dentures. You may also want to bring your robe and non-skid footwear.       (Do not use denture adhesives since you will be asked to remove them during  surgery).   If you wear oxygen at  home, bring it with you the day of surgery.  If instructed by your health care provider, bring your sleep apnea device with you on the day of your surgery (if this applies to you).  You may want to leave your suitcase and sleep apnea device in the car until after surgery.   Arrive at the hospital as scheduled.  Bring a friend or family member with you who can help to answer questions and be present while you meet with your health care provider.  At the hospital  When you arrive at the hospital:  Go to registration located at the main entrance of the hospital. You will be registered and given a beeper and a sticker sheet. Take the stickers to the Outpatient nurses desk and place in the black tray. This is to notify staff that you have arrived. Then return to the lobby to wait.   When your beeper lights up and vibrates proceed through the double doors, under the stairs, and a member of the Outpatient Surgery staff will escort you to your preoperative room.  You may have to wear compression sleeves. These help to prevent blood clots and reduce swelling in your legs.  An IV may be inserted into one of your veins.              In the operating room, you may be given one or more of the following:        A medicine to help you relax (sedative).        A medicine to numb the area (local anesthetic).        A medicine to make you fall asleep (general anesthetic).        A medicine that is injected into an area of your body to numb everything below the                      injection site (regional anesthetic).  You may be given an antibiotic through your IV to help prevent infection.  Your surgical site will be marked or identified.    Contact a health care provider if you:  Develop a fever of more than 100.4øF (38øC) or other feelings of illness during the 48 hours before your surgery.  Have symptoms that get worse.  Have questions or concerns about your surgery.  Summary  Preparing for surgery can make the procedure go more  smoothly and lower your risk of complications.  Before surgery, make a list of questions and concerns to discuss with your surgeon. Ask about the risks and possible complications.  In the days or weeks before your surgery, follow all instructions from your health care provider. You may need to stop smoking, avoid alcohol, follow eating restrictions, and change or stop your regular medicines.  Contact your surgeon if you develop a fever or other signs of illness during the few days before your surgery.  This information is not intended to replace advice given to you by your health care provider. Make sure you discuss any questions you have with your health care provider.  Document Revised: 12/21/2018 Document Reviewed: 10/23/2018  [employer] Patient Education c 2021 [employer]

## 2023-10-11 DIAGNOSIS — E11.9 TYPE 2 DIABETES MELLITUS WITHOUT COMPLICATION, WITHOUT LONG-TERM CURRENT USE OF INSULIN (HCC): ICD-10-CM

## 2023-10-11 NOTE — TELEPHONE ENCOUNTER
Mark Osorio called to request a refill on her medication. Last office visit : 2023   Next office visit : 3/1/2024     Requested Prescriptions     Pending Prescriptions Disp Refills    blood glucose monitor strips 30 strip 11     Si strip by Other route daily       Patient Comment: Need a new prescription to check glucose twice a day . Please advise.      Tessei Reyes LPN

## 2023-10-12 LAB
QT INTERVAL: 430 MS
QTC INTERVAL: 422 MS

## 2023-10-12 RX ORDER — GLUCOSAMINE HCL/CHONDROITIN SU 500-400 MG
1 CAPSULE ORAL 2 TIMES DAILY
Qty: 180 STRIP | Refills: 3 | Status: SHIPPED | OUTPATIENT
Start: 2023-10-12

## 2023-10-13 PROBLEM — M12.811 RIGHT ROTATOR CUFF TEAR ARTHROPATHY: Status: ACTIVE | Noted: 2023-10-13

## 2023-10-13 PROBLEM — M75.101 RIGHT ROTATOR CUFF TEAR ARTHROPATHY: Status: ACTIVE | Noted: 2023-10-13

## 2023-10-13 NOTE — DISCHARGE INSTRUCTIONS
UPPER EXTREMITY POST-OP INSTRUCTIONS - DR. ATKINSON    IMPORTANT PHONE NUMBERS:   For emergencies, please call [phone]   You may reach Dr. Atkinson and clinical staff at [phone]- M-F 8:00 am-5:00 pm   After 5pm or on the weekends, please call [phone]   Call immediately if you have any of the following symptoms:     Elevated temperature above 101.5 degrees for more than 48 hours after surgery     Persistent drainage from wound     Severe pain around surgical site    Sling use: The sling is provided for your comfort and to ensure proper healing of your repair following surgery. Please place the abduction pillow with the curved side against your side and the sling on the side of the pillow. Your surgery requires that you wear the sling if noted below.  ____ For comfort. Remove sling 24 hours and begin range of motion exercises  __x__ At all times except bathing, dressing, and therapy. Also wear the sling during sleep.  ____ No sling required    Bathing:  ___No bandages, no restrictions!!  _x_You may remove you dressing and shower on the 3rd day after surgery (Ex. Tues surgery, shower on Friday)  ** if you are told to it is ok to remove your dressing and shower, DO NOT SOAK your incisions in a tub.  ___Keep splint clean, dry, and intact. DO NOT place foreign objects into your splint.      Dressings: Keep dressing/splint intact unless instructed otherwise below. SOME DRAINAGE IS NORMAL!     DO NOT touch or apply ointment to the incision.     DO NOT remove the steri-strips over the incisions (if you have steri-strips). They will         generally fall off on their own or can be removed 1 weeksafter surgery.     If you have yellow gauze and it comes off, do not worry about it. Leave them off.    Signs of infection that warrant a phone call to our clinical line:     o Excessive drainage or redness     o Red streaking coming away from the incision  o Increased pain  o Increased temperature above 101  degrees      Physical Therapy:        *  Your physical therapy status will be discussed with you postoperatively and at your first post-op appointment. Some injuries will not require physical therapy.      *  If you have a shoulder manipulation, please schedule therapy for the next day      Medications: You will be discharged with the appropriate medications following your surgery. Fill these at the pharmacy and take them as directed on the label. Not all of the medications below may be prescribed. Occasionally, other medications may be prescribed with specific instructions.    Percocet/Lortab (oxycodone/hydrocodone with tylenol) - Pain Medication, will cause drowsiness, possibly itchiness (this is NOT an allergy - use benadryl or an over the counter allergy medication such as Claritin or Zyrtec)     o Take 1-2 tablets every 4-6 hours. DO NOT EXCEED 4,000mg of Tylenol in 24 hours.  **DO NOT MIX WITH ALCOHOL, DRIVE WHILE TAKING, OR TAKE with extra TYLENOL**    Colace (Docusate) - stool softener, used for constipation. Take this only if you feel constipated.      Zofran (Ondansetron) or Phenergan - Anti-nausea medication, will cause drowsiness      *Starting January 2021, all narcotic medication must be prescribed electronically to your pharmacy.  Be sure to notify nursing of your preferred pharmacy.  If you are running low on pain medications, please notify us if you need a refill 24-48 hours prior to when you run out, so we can make arrangements to refill the prescription for you if we determine is necessary    What to expect after a Nerve Block  Nerve blocks administered to block pain affect many types of nerves, including those nerves that control movement, pain, and normal sensation.  Following a nerve block, you may notice some bruising at the site where the block was given.  You may experience sensations such as:  numbness of the affected area or limb, tingling, heaviness (that is the limb feels heavy to you),  weakness or inability to move the affected arm or leg, or a feeling as if your arm or leg has “fallen asleep”.    A nerve block can last from 9-18 hours depending on the medications used.  Certain medications can last up to 72 hours, your anesthesiologist may have discussed this with you pre-op. Usually the weakness wears off first followed by the tingling and heaviness.  As the block wears off, you may begin to notice pain; however, this sequence of events may occur in any order.  Typically, you will be able to move your limb before you will feel it.  Once a nerve block begins to wear off, the effects are usually completely gone within 60 minutes.    If you experience continued side effects that you believe are block related, please call your healthcare provider.  Please see block-specific instructions below.      Instructions for any block involving the shoulder or arm  If you have had any kind of shoulder/arm block, you will go home with your arm in a sling.  Wear the sling until the block has completely worn off or as directed by your doctor.  You may be required to wear it for a longer period of time per your surgeon’s recommendations.  I you have had a shoulder/arm block; it is a good idea to sleep on a recliner with pillows under your arm.    Note:  If you have severe or prolonged shortness of breath, please seek medical assistance as soon as possible.    Protection of a “blocked” arm (limb)  After a nerve block, you cannot feel pain, pressure, or extremes of temperature in the affected limb.  And because of this, your blocked limb is at more risk for injury.  For example, it is possible to burn your limb on an extremely hot surface without feeling it.  When resting, it is important to reposition your limb periodically to avoid prolonged pressure on it.  This may require the use of pillows and padding.  While sleeping, you should avoid rolling onto the affected limb or putting too much pressure on it.  If you  have a cast or tight dressing, check the color of your fingers of the affected limb.  Call your surgeon if they look discolored (that is, dusky, dark colored)  Use caution in cold weather.  Cover your limb appropriately to protect it from the cold.  Pain Management  Your surgeon will give you a prescription for pain medication.  Begin taking this before the nerve block wears off.  Bear in mind that sometimes the block can wear off in the middle of the night.

## 2023-10-13 NOTE — OP NOTE
Patient Name: Rody  MRN: [id number]  : 1955      DATE of SURGERY: 10/17/2023    SURGEON: Dionicio Araiza MD    ASSISTANT: NONE    PREOPERATIVE DIAGNOSIS: Right Shoulder Rotator Cuff Tear Arthropathy     POSTOPERATIVE DIAGNOSIS:  Right Shoulder Rotator Cuff Tear Arthropathy     PROCEDURE PERFORMED:  Right Reverse Total Shoulder Arthroplasty    SURGICAL APPROACH: Deltopectoral    SURGICAL TECHNIQUE: Peel    IMPLANTS: [employer] Revers                Baseplate: small                Glenosphere: 36 + 4                Poly: + 6 metal spacer, + 3 poly                Suture Cup: 33 neutral                              Stem: 8 mm Bristol pressfit    ANESTHESIA USED: General endotracheal anesthesia, interscalene block    OPERATIVE INDICATIONS: 68 y.o. female with progressive loss of function and increasing pain of the upper extremity due to a massive irreparable tear of the rotator cuff.  She had a previous repair of a massive cuff tear in 2022 and was at the beach a few months ago when pain increased from lifting suitcases.  Due to loss of function and progressive pain, a reverse shoulder arthroplasty is planned to improve function and decrease pain.  An MRI showed atrophy of the rotator cuff musculature.  The patient had an intact axillary nerve and functioning deltoid. Surgical evaluation was discussed and the patient wished to proceed understanding risks, benefits, and alternatives. The surgical indications were to relieve pain, improve function, and prevent future disability in regards to the shoulder pathology dictated in the above diagnoses.  Risks included, but were not limited to, that of anesthesia, bleeding, infection, pain, damage to local structures, postoperative dislocation, need for further surgery, instability, stiffness, failure of implants, and loss of function.    The patient has failed a combination of the following to improve pain and function: physical therapy >12 weeks,  "corticosteroid injections, NSAID’s, activity modification.     ESTIMATED BLOOD LOSS: 150 mL    DRAINS: none     COMPLICATIONS: none    SPECIMENS: none    PROCEDURE IN DETAIL:  The patient was seen in the preoperative holding room, once again the informed consent was reviewed with the patient and signed.  The site of surgery was marked with the patient's agreement.  After being transported to the operating room, a timeout was performed identifying the correct patient as well as the operative site.  Dose appropriate IV antibiotics were given prior to incision.  The patient was positioned in the beach chair position, all bony prominences were protected and a sterile prep and drape was performed.  The surgical site was draped with loban dressing.    A deltopectoral approach to the shoulder joint was utilized as soft tissue was dissected down the level of the cephalic vein which was taken laterally along with the deltoid.  The biceps tendon was located, tenodesed to the superior border of the pectoralis major tendon insertion.  The rotator interval was opened.  A tagging stitch was placed in the subscapularis and with progressive external rotation of the shoulder, the tendon and underlying capsule were peeled from the lesser tuberosity.  The humeral head was dislocated from the glenoid and strategic retractors were placed to protect the surrounding soft tissue.  The axillary nerve was palpated and protected, verified with a \"tug test.\"    Beginning at the apex of the humeral head, a starting reamer was introduced into the shaft of the humerus, followed by reaming with a 5 and 6 mm reamer.  The proximal humeral osteotomy guide was inserted and an osteotomy was performed at 135 degrees and 30 degrees of retroversion.  A protective plate was placed on the osteotomy site and attention was turned to the glenoid.      Again, strategic retractors were placed surrounding the glenoid, the axillary nerve was protected, and a " complete capsulectomy was performed.  The labrum was excised.  A guidepin was placed in the inferior-central aspect of the glenoid, following by a reaming device, and drilling of the central peg hole.  A baseplate was impacted and superior, central, and inferior locking screws were inserted.  The glenosphere was then impacted without complication.    Attention was turned back to the humeral side where progressively sized broaches were inserted until a stable fit was achieved, followed by the metaphyseal reaming guide.  The metaphysis was reamed and a trial stem inserted.  Trial polyethylenes were placed in the suture cup until range of motion and stability were adequate.  The conjoined tendon showed increased tension. With traction of the shoulder, the entire scapula was translating without dissociation of the polyethylene.    Trial implants were removed, final implants impacted, and the shoulder was once again reduced showing excellent stability and range of motion.    The incision was thoroughly irrigated, followed by closure in layers.  The skin was closed with adhesive glue.  A sterile dressing and sling were placed.  Counts were correct.    The patient was awakened by anesthesia, transported to the recovery room in stable condition.    POSTOPERATIVE PLAN:  1) Discharge home once pain is controlled  2) Reverse total shoulder protocol    Electronically signed by Dionicio Araiza MD on 10/17/2023 at 14:49 CDT

## 2023-10-17 ENCOUNTER — ANESTHESIA (OUTPATIENT)
Dept: PERIOP | Facility: HOSPITAL | Age: 68
End: 2023-10-17
Payer: MEDICARE

## 2023-10-17 ENCOUNTER — HOSPITAL ENCOUNTER (OUTPATIENT)
Facility: HOSPITAL | Age: 68
Setting detail: HOSPITAL OUTPATIENT SURGERY
Discharge: HOME | End: 2023-10-17
Attending: ORTHOPAEDIC SURGERY | Admitting: ORTHOPAEDIC SURGERY
Payer: MEDICARE

## 2023-10-17 ENCOUNTER — APPOINTMENT (OUTPATIENT)
Dept: GENERAL RADIOLOGY | Facility: HOSPITAL | Age: 68
End: 2023-10-17
Payer: MEDICARE

## 2023-10-17 ENCOUNTER — ANESTHESIA EVENT (OUTPATIENT)
Dept: PERIOP | Facility: HOSPITAL | Age: 68
End: 2023-10-17
Payer: MEDICARE

## 2023-10-17 VITALS
OXYGEN SATURATION: 94 % | DIASTOLIC BLOOD PRESSURE: 74 MMHG | BODY MASS INDEX: 32.12 KG/M2 | RESPIRATION RATE: 18 BRPM | TEMPERATURE: 98 F | WEIGHT: 163.58 LBS | HEIGHT: 60 IN | SYSTOLIC BLOOD PRESSURE: 136 MMHG | HEART RATE: 44 BPM

## 2023-10-17 DIAGNOSIS — M12.811 RIGHT ROTATOR CUFF TEAR ARTHROPATHY: Primary | ICD-10-CM

## 2023-10-17 DIAGNOSIS — M75.101 RIGHT ROTATOR CUFF TEAR ARTHROPATHY: Primary | ICD-10-CM

## 2023-10-17 LAB
ABO GROUP BLD: NORMAL
BLD GP AB SCN SERPL QL: NEGATIVE
GLUCOSE BLDC GLUCOMTR-MCNC: 101 MG/DL (ref 70–130)
GLUCOSE BLDC GLUCOMTR-MCNC: 125 MG/DL (ref 70–130)
RH BLD: POSITIVE
T&S EXPIRATION DATE: NORMAL

## 2023-10-17 PROCEDURE — 25010000002 FENTANYL CITRATE (PF) 50 MCG/ML SOLUTION: Performed by: ANESTHESIOLOGY

## 2023-10-17 PROCEDURE — 25010000002 MIDAZOLAM PER 1 MG: Performed by: ANESTHESIOLOGY

## 2023-10-17 PROCEDURE — 86900 BLOOD TYPING SEROLOGIC ABO: CPT | Performed by: ORTHOPAEDIC SURGERY

## 2023-10-17 PROCEDURE — C9290 INJ, BUPIVACAINE LIPOSOME: HCPCS | Performed by: ANESTHESIOLOGY

## 2023-10-17 PROCEDURE — 86901 BLOOD TYPING SEROLOGIC RH(D): CPT | Performed by: ORTHOPAEDIC SURGERY

## 2023-10-17 PROCEDURE — C1713 ANCHOR/SCREW BN/BN,TIS/BN: HCPCS | Performed by: ORTHOPAEDIC SURGERY

## 2023-10-17 PROCEDURE — C1776 JOINT DEVICE (IMPLANTABLE): HCPCS | Performed by: ORTHOPAEDIC SURGERY

## 2023-10-17 PROCEDURE — 25010000002 PROPOFOL 10 MG/ML EMULSION: Performed by: NURSE ANESTHETIST, CERTIFIED REGISTERED

## 2023-10-17 PROCEDURE — 73030 X-RAY EXAM OF SHOULDER: CPT

## 2023-10-17 PROCEDURE — 86850 RBC ANTIBODY SCREEN: CPT | Performed by: ORTHOPAEDIC SURGERY

## 2023-10-17 PROCEDURE — 0 BUPIVACAINE LIPOSOME 1.3 % SUSPENSION: Performed by: ANESTHESIOLOGY

## 2023-10-17 PROCEDURE — 25810000003 LACTATED RINGERS PER 1000 ML: Performed by: ORTHOPAEDIC SURGERY

## 2023-10-17 PROCEDURE — 25010000002 ONDANSETRON PER 1 MG: Performed by: ANESTHESIOLOGY

## 2023-10-17 PROCEDURE — 25810000003 SODIUM CHLORIDE 0.9 % SOLUTION: Performed by: ORTHOPAEDIC SURGERY

## 2023-10-17 PROCEDURE — 25010000002 CEFAZOLIN 1-4 GM/50ML-% SOLUTION: Performed by: ORTHOPAEDIC SURGERY

## 2023-10-17 PROCEDURE — 25010000002 DEXAMETHASONE PER 1 MG: Performed by: ANESTHESIOLOGY

## 2023-10-17 PROCEDURE — 82948 REAGENT STRIP/BLOOD GLUCOSE: CPT

## 2023-10-17 PROCEDURE — 25010000002 FENTANYL CITRATE (PF) 100 MCG/2ML SOLUTION: Performed by: NURSE ANESTHETIST, CERTIFIED REGISTERED

## 2023-10-17 PROCEDURE — 25010000002 BUPIVACAINE (PF) 0.5 % SOLUTION: Performed by: ANESTHESIOLOGY

## 2023-10-17 DEVICE — GLENOSPHERE UNIVERS REVERS S/36 PLS4 LAT: Type: IMPLANTABLE DEVICE | Site: SHOULDER | Status: FUNCTIONAL

## 2023-10-17 DEVICE — IMPLANTABLE DEVICE: Type: IMPLANTABLE DEVICE | Site: SHOULDER | Status: FUNCTIONAL

## 2023-10-17 DEVICE — CUP SUT UNIVERS REVERS 33MM NTRL: Type: IMPLANTABLE DEVICE | Site: SHOULDER | Status: FUNCTIONAL

## 2023-10-17 DEVICE — SCRW GLEN UNIVERS REVERS PERIPH 4.5X30MM: Type: IMPLANTABLE DEVICE | Site: SHOULDER | Status: FUNCTIONAL

## 2023-10-17 DEVICE — SCRW GLEN UNIVERS REVERS CENTRL 6.5X20MM: Type: IMPLANTABLE DEVICE | Site: SHOULDER | Status: FUNCTIONAL

## 2023-10-17 RX ORDER — LABETALOL HYDROCHLORIDE 5 MG/ML
5 INJECTION, SOLUTION INTRAVENOUS
Status: DISCONTINUED | OUTPATIENT
Start: 2023-10-17 | End: 2023-10-17 | Stop reason: HOSPADM

## 2023-10-17 RX ORDER — EPHEDRINE SULFATE 50 MG/ML
INJECTION, SOLUTION INTRAVENOUS AS NEEDED
Status: DISCONTINUED | OUTPATIENT
Start: 2023-10-17 | End: 2023-10-17 | Stop reason: SURG

## 2023-10-17 RX ORDER — LIDOCAINE HYDROCHLORIDE 10 MG/ML
0.5 INJECTION, SOLUTION EPIDURAL; INFILTRATION; INTRACAUDAL; PERINEURAL ONCE AS NEEDED
Status: DISCONTINUED | OUTPATIENT
Start: 2023-10-17 | End: 2023-10-17 | Stop reason: HOSPADM

## 2023-10-17 RX ORDER — ONDANSETRON 2 MG/ML
4 INJECTION INTRAMUSCULAR; INTRAVENOUS ONCE AS NEEDED
Status: COMPLETED | OUTPATIENT
Start: 2023-10-17 | End: 2023-10-17

## 2023-10-17 RX ORDER — SODIUM CHLORIDE 9 MG/ML
40 INJECTION, SOLUTION INTRAVENOUS AS NEEDED
Status: DISCONTINUED | OUTPATIENT
Start: 2023-10-17 | End: 2023-10-17 | Stop reason: HOSPADM

## 2023-10-17 RX ORDER — DEXAMETHASONE SODIUM PHOSPHATE 4 MG/ML
4 INJECTION, SOLUTION INTRA-ARTICULAR; INTRALESIONAL; INTRAMUSCULAR; INTRAVENOUS; SOFT TISSUE ONCE AS NEEDED
Status: COMPLETED | OUTPATIENT
Start: 2023-10-17 | End: 2023-10-17

## 2023-10-17 RX ORDER — BUPIVACAINE HYDROCHLORIDE 5 MG/ML
INJECTION, SOLUTION EPIDURAL; INTRACAUDAL
Status: COMPLETED | OUTPATIENT
Start: 2023-10-17 | End: 2023-10-17

## 2023-10-17 RX ORDER — SODIUM CHLORIDE 0.9 % (FLUSH) 0.9 %
3-10 SYRINGE (ML) INJECTION AS NEEDED
Status: DISCONTINUED | OUTPATIENT
Start: 2023-10-17 | End: 2023-10-17 | Stop reason: HOSPADM

## 2023-10-17 RX ORDER — ROCURONIUM BROMIDE 10 MG/ML
INJECTION, SOLUTION INTRAVENOUS AS NEEDED
Status: DISCONTINUED | OUTPATIENT
Start: 2023-10-17 | End: 2023-10-17 | Stop reason: SURG

## 2023-10-17 RX ORDER — PROPOFOL 10 MG/ML
VIAL (ML) INTRAVENOUS AS NEEDED
Status: DISCONTINUED | OUTPATIENT
Start: 2023-10-17 | End: 2023-10-17 | Stop reason: SURG

## 2023-10-17 RX ORDER — FENTANYL CITRATE 50 UG/ML
25 INJECTION, SOLUTION INTRAMUSCULAR; INTRAVENOUS
Status: DISCONTINUED | OUTPATIENT
Start: 2023-10-17 | End: 2023-10-17 | Stop reason: HOSPADM

## 2023-10-17 RX ORDER — SODIUM CHLORIDE 0.9 % (FLUSH) 0.9 %
10 SYRINGE (ML) INJECTION EVERY 12 HOURS SCHEDULED
Status: DISCONTINUED | OUTPATIENT
Start: 2023-10-17 | End: 2023-10-17 | Stop reason: HOSPADM

## 2023-10-17 RX ORDER — SODIUM CHLORIDE, SODIUM LACTATE, POTASSIUM CHLORIDE, CALCIUM CHLORIDE 600; 310; 30; 20 MG/100ML; MG/100ML; MG/100ML; MG/100ML
100 INJECTION, SOLUTION INTRAVENOUS CONTINUOUS
Status: DISCONTINUED | OUTPATIENT
Start: 2023-10-17 | End: 2023-10-17 | Stop reason: HOSPADM

## 2023-10-17 RX ORDER — FLUMAZENIL 0.1 MG/ML
0.2 INJECTION INTRAVENOUS AS NEEDED
Status: DISCONTINUED | OUTPATIENT
Start: 2023-10-17 | End: 2023-10-17 | Stop reason: HOSPADM

## 2023-10-17 RX ORDER — MIDAZOLAM HYDROCHLORIDE 1 MG/ML
2 INJECTION INTRAMUSCULAR; INTRAVENOUS ONCE
Status: COMPLETED | OUTPATIENT
Start: 2023-10-17 | End: 2023-10-17

## 2023-10-17 RX ORDER — DROPERIDOL 2.5 MG/ML
0.62 INJECTION, SOLUTION INTRAMUSCULAR; INTRAVENOUS ONCE AS NEEDED
Status: DISCONTINUED | OUTPATIENT
Start: 2023-10-17 | End: 2023-10-17 | Stop reason: HOSPADM

## 2023-10-17 RX ORDER — ASPIRIN 81 MG/1
81 TABLET, CHEWABLE ORAL ONCE
Status: COMPLETED | OUTPATIENT
Start: 2023-10-17 | End: 2023-10-17

## 2023-10-17 RX ORDER — SODIUM CHLORIDE 9 MG/ML
INJECTION, SOLUTION INTRAVENOUS CONTINUOUS PRN
Status: COMPLETED | OUTPATIENT
Start: 2023-10-17 | End: 2023-10-17

## 2023-10-17 RX ORDER — SODIUM CHLORIDE, SODIUM LACTATE, POTASSIUM CHLORIDE, CALCIUM CHLORIDE 600; 310; 30; 20 MG/100ML; MG/100ML; MG/100ML; MG/100ML
1000 INJECTION, SOLUTION INTRAVENOUS CONTINUOUS
Status: DISCONTINUED | OUTPATIENT
Start: 2023-10-17 | End: 2023-10-17 | Stop reason: HOSPADM

## 2023-10-17 RX ORDER — MAGNESIUM HYDROXIDE 1200 MG/15ML
LIQUID ORAL AS NEEDED
Status: DISCONTINUED | OUTPATIENT
Start: 2023-10-17 | End: 2023-10-17 | Stop reason: HOSPADM

## 2023-10-17 RX ORDER — SODIUM CHLORIDE 0.9 % (FLUSH) 0.9 %
3 SYRINGE (ML) INJECTION EVERY 12 HOURS SCHEDULED
Status: DISCONTINUED | OUTPATIENT
Start: 2023-10-17 | End: 2023-10-17 | Stop reason: HOSPADM

## 2023-10-17 RX ORDER — HYDROCODONE BITARTRATE AND ACETAMINOPHEN 5; 325 MG/1; MG/1
1 TABLET ORAL ONCE AS NEEDED
Status: DISCONTINUED | OUTPATIENT
Start: 2023-10-17 | End: 2023-10-17 | Stop reason: HOSPADM

## 2023-10-17 RX ORDER — SODIUM CHLORIDE 0.9 % (FLUSH) 0.9 %
10 SYRINGE (ML) INJECTION AS NEEDED
Status: DISCONTINUED | OUTPATIENT
Start: 2023-10-17 | End: 2023-10-17 | Stop reason: HOSPADM

## 2023-10-17 RX ORDER — ACETAMINOPHEN 500 MG
1000 TABLET ORAL ONCE
Status: DISCONTINUED | OUTPATIENT
Start: 2023-10-17 | End: 2023-10-17 | Stop reason: HOSPADM

## 2023-10-17 RX ORDER — ONDANSETRON 2 MG/ML
4 INJECTION INTRAMUSCULAR; INTRAVENOUS ONCE AS NEEDED
Status: DISCONTINUED | OUTPATIENT
Start: 2023-10-17 | End: 2023-10-17 | Stop reason: HOSPADM

## 2023-10-17 RX ORDER — SODIUM CHLORIDE, SODIUM LACTATE, POTASSIUM CHLORIDE, CALCIUM CHLORIDE 600; 310; 30; 20 MG/100ML; MG/100ML; MG/100ML; MG/100ML
100 INJECTION, SOLUTION INTRAVENOUS CONTINUOUS PRN
Status: DISCONTINUED | OUTPATIENT
Start: 2023-10-17 | End: 2023-10-17 | Stop reason: HOSPADM

## 2023-10-17 RX ORDER — NEOSTIGMINE METHYLSULFATE 5 MG/5 ML
SYRINGE (ML) INTRAVENOUS AS NEEDED
Status: DISCONTINUED | OUTPATIENT
Start: 2023-10-17 | End: 2023-10-17 | Stop reason: SURG

## 2023-10-17 RX ORDER — CEFAZOLIN SODIUM 1 G/50ML
1000 INJECTION, SOLUTION INTRAVENOUS ONCE
Status: COMPLETED | OUTPATIENT
Start: 2023-10-17 | End: 2023-10-17

## 2023-10-17 RX ORDER — FENTANYL CITRATE 50 UG/ML
50 INJECTION, SOLUTION INTRAMUSCULAR; INTRAVENOUS ONCE
Status: COMPLETED | OUTPATIENT
Start: 2023-10-17 | End: 2023-10-17

## 2023-10-17 RX ORDER — FENTANYL CITRATE 50 UG/ML
INJECTION, SOLUTION INTRAMUSCULAR; INTRAVENOUS AS NEEDED
Status: DISCONTINUED | OUTPATIENT
Start: 2023-10-17 | End: 2023-10-17 | Stop reason: SURG

## 2023-10-17 RX ORDER — NALOXONE HCL 0.4 MG/ML
0.4 VIAL (ML) INJECTION AS NEEDED
Status: DISCONTINUED | OUTPATIENT
Start: 2023-10-17 | End: 2023-10-17 | Stop reason: HOSPADM

## 2023-10-17 RX ORDER — ONDANSETRON 4 MG/1
4 TABLET, FILM COATED ORAL EVERY 8 HOURS PRN
Qty: 10 TABLET | Refills: 0 | Status: SHIPPED | OUTPATIENT
Start: 2023-10-17

## 2023-10-17 RX ORDER — HYDROCODONE BITARTRATE AND ACETAMINOPHEN 10; 325 MG/1; MG/1
1 TABLET ORAL EVERY 4 HOURS PRN
Status: DISCONTINUED | OUTPATIENT
Start: 2023-10-17 | End: 2023-10-17 | Stop reason: HOSPADM

## 2023-10-17 RX ORDER — SODIUM CHLORIDE 0.9 % (FLUSH) 0.9 %
3 SYRINGE (ML) INJECTION AS NEEDED
Status: DISCONTINUED | OUTPATIENT
Start: 2023-10-17 | End: 2023-10-17 | Stop reason: HOSPADM

## 2023-10-17 RX ORDER — OXYCODONE AND ACETAMINOPHEN 10; 325 MG/1; MG/1
1 TABLET ORAL EVERY 8 HOURS PRN
Qty: 20 TABLET | Refills: 0 | Status: SHIPPED | OUTPATIENT
Start: 2023-10-17

## 2023-10-17 RX ADMIN — BUPIVACAINE HYDROCHLORIDE 10 ML: 5 INJECTION, SOLUTION EPIDURAL; INTRACAUDAL; PERINEURAL at 11:59

## 2023-10-17 RX ADMIN — EPHEDRINE SULFATE 10 MG: 50 INJECTION INTRAVENOUS at 14:09

## 2023-10-17 RX ADMIN — MIDAZOLAM 2 MG: 1 INJECTION INTRAMUSCULAR; INTRAVENOUS at 11:57

## 2023-10-17 RX ADMIN — HYDROCODONE BITARTRATE AND ACETAMINOPHEN 1 TABLET: 10; 325 TABLET ORAL at 15:00

## 2023-10-17 RX ADMIN — ROCURONIUM BROMIDE 30 MG: 10 SOLUTION INTRAVENOUS at 13:33

## 2023-10-17 RX ADMIN — CEFAZOLIN SODIUM 1000 MG: 1 INJECTION, SOLUTION INTRAVENOUS at 13:38

## 2023-10-17 RX ADMIN — GLYCOPYRROLATE 0.4 MG: 0.2 INJECTION INTRAMUSCULAR; INTRAVENOUS at 14:37

## 2023-10-17 RX ADMIN — DEXAMETHASONE SODIUM PHOSPHATE 4 MG: 4 INJECTION INTRA-ARTICULAR; INTRALESIONAL; INTRAMUSCULAR; INTRAVENOUS; SOFT TISSUE at 11:51

## 2023-10-17 RX ADMIN — EPHEDRINE SULFATE 10 MG: 50 INJECTION INTRAVENOUS at 14:06

## 2023-10-17 RX ADMIN — SODIUM CHLORIDE, POTASSIUM CHLORIDE, SODIUM LACTATE AND CALCIUM CHLORIDE 1000 ML: 600; 310; 30; 20 INJECTION, SOLUTION INTRAVENOUS at 09:24

## 2023-10-17 RX ADMIN — BUPIVACAINE 10 ML: 13.3 INJECTION, SUSPENSION, LIPOSOMAL INFILTRATION at 11:59

## 2023-10-17 RX ADMIN — EPHEDRINE SULFATE 10 MG: 50 INJECTION INTRAVENOUS at 13:48

## 2023-10-17 RX ADMIN — FENTANYL CITRATE 100 MCG: 50 INJECTION, SOLUTION INTRAMUSCULAR; INTRAVENOUS at 13:28

## 2023-10-17 RX ADMIN — Medication 3 MG: at 14:37

## 2023-10-17 RX ADMIN — FENTANYL CITRATE 50 MCG: 50 INJECTION, SOLUTION INTRAMUSCULAR; INTRAVENOUS at 11:57

## 2023-10-17 RX ADMIN — ASPIRIN 81 MG: 81 TABLET, CHEWABLE ORAL at 11:51

## 2023-10-17 RX ADMIN — ONDANSETRON 4 MG: 2 INJECTION INTRAMUSCULAR; INTRAVENOUS at 15:03

## 2023-10-17 RX ADMIN — PROPOFOL 100 MG: 10 INJECTION, EMULSION INTRAVENOUS at 13:33

## 2023-10-17 NOTE — ANESTHESIA POSTPROCEDURE EVALUATION
"Patient: Mayuri Almeida    Procedure Summary       Date: 10/17/23 Room / Location:  [facility] OR 09 /  [facility]    Anesthesia Start: 1328 Anesthesia Stop: 1447    Procedure: RIGHT REVERSE TOTAL SHOULDER ARTHROPLASTY (Right: Shoulder) Diagnosis:       Right rotator cuff tear arthropathy      (M96.89)    Surgeons: Dionicio Araiza MD Provider: Julio C Lynn CRNA    Anesthesia Type: general with block ASA Status: 3            Anesthesia Type: general with block    Vitals  Vitals Value Taken Time   /71 10/17/23 1502   Temp 98 °F (36.7 °C) 10/17/23 1511   Pulse 68 10/17/23 1511   Resp 15 10/17/23 1511   SpO2 92 % 10/17/23 1511           Post Anesthesia Care and Evaluation    PONV Status: none  Comments: Patient d/c from PACU prior to anes eval based on Temi score.  Please see RN notes for details of d/c criteria.    Blood pressure 136/74, pulse (!) 44, temperature 98 °F (36.7 °C), temperature source Temporal, resp. rate 18, height 152.3 cm (59.96\"), weight 74.2 kg (163 lb 9.3 oz), SpO2 94%, not currently breastfeeding.        "

## 2023-10-17 NOTE — ANESTHESIA PROCEDURE NOTES
Peripheral Block    Pre-sedation assessment completed: 10/17/2023 11:56 AM    Patient reassessed immediately prior to procedure    Patient location during procedure: holding area  Start time: 10/17/2023 11:59 AM  Stop time: 10/17/2023 12:02 PM  Reason for block: procedure for pain, at surgeon's request, post-op pain management and Request by Dr. Araiza  Performed by  Anesthesiologist: Glendy Ruiz MD  Preanesthetic Checklist  Completed: patient identified, IV checked, site marked, risks and benefits discussed, surgical consent, monitors and equipment checked, pre-op evaluation and timeout performed  Prep:  Pt Position: supine  Sterile barriers:gloves, cap and washed/disinfected hands  Prep: ChloraPrep  Patient monitoring: blood pressure monitoring, continuous pulse oximetry and EKG  Procedure    Sedation: yes    Guidance:ultrasound guided and Brachial plexus identified and local anesthetic seen surrounding nerves  Images:still images obtained (picture printed and placed in patients chart)    Block Type:interscalene  Injection Technique:single-shot  Needle Type:echogenic  Needle Gauge:20 G  Resistance on Injection: none    Medications Used: bupivacaine liposome (EXPAREL) 1.3 % injection - Peripheral Nerve   10 mL - 10/17/2023 11:59:00 AM  bupivacaine PF (MARCAINE) injection 0.5% - Injection   10 mL - 10/17/2023 11:59:00 AM      Post Assessment  Injection Assessment: negative aspiration for heme, no paresthesia on injection and incremental injection  Patient Tolerance:comfortable throughout block  Complications:no

## 2023-10-17 NOTE — ANESTHESIA PREPROCEDURE EVALUATION
Anesthesia Evaluation     Patient summary reviewed   history of anesthetic complications:  PONV  NPO Solid Status: > 8 hours             Airway   Mallampati: II  TM distance: >3 FB  Neck ROM: full  Dental      Pulmonary    (+) a smoker (vapes),  (-) COPD, asthma, sleep apnea  Cardiovascular   Exercise tolerance: good (4-7 METS)    (+) hypertension, CAD, cardiac stents (x3, 2019) more than 12 months ago , hyperlipidemia  (-) pacemaker, past MI, angina      Neuro/Psych  (-) seizures, TIA, CVA  GI/Hepatic/Renal/Endo    (+) obesity, GERD, renal disease- CRI, diabetes mellitus, thyroid problem hypothyroidism and thyroid cancer  (-) liver disease    Musculoskeletal     Abdominal    Substance History      OB/GYN          Other                      Anesthesia Plan    ASA 3     general with block     (Asa preop)  intravenous induction     Anesthetic plan, risks, benefits, and alternatives have been provided, discussed and informed consent has been obtained with: patient.

## 2023-10-17 NOTE — H&P
Pt Name: Mayuri Almeida  MRN: [id number]  YOB: 1955  Date of evaluation: 10/17/2023    H&P including current review of systems was updated in the paper chart and/or the document previously scanned into the record.  There have been no significant changes or new problems since the original evaluation.  The patient's problems continue and indications for contemplated procedure have not changed.    Electronically signed by Dionicio Araiza MD on 10/17/2023 at 09:04 CDT

## 2023-10-17 NOTE — BRIEF OP NOTE
TOTAL SHOULDER REVERSE ARTHROPLASTY  Progress Note    Mayuri Almeida  10/17/2023    Pre-op Diagnosis:   M96.89       Post-Op Diagnosis Codes:     * Right rotator cuff tear arthropathy [M75.101, M12.811]    Procedure/CPT® Codes:  [place] ARTHROPLASTY GLENOHUMERAL JOINT TOTAL SHOULDER [25492]      Procedure(s):  RIGHT REVERSE TOTAL SHOULDER ARTHROPLASTY        SURGICAL APPROACH: Deltopectoral    SURGICAL TECHNIQUE: Peel off      Surgeon(s):  Dionicio Araiza MD    Anesthesia: General with Block    Staff:   Circulator: Samantha Jimenez RN  Scrub Person: Rachel Rosado; Erica Wilkes; Kaleb Gutiérrez  Vendor Representative: Rolando Haider; Hebert Mathias         Estimated Blood Loss: <500ml    Urine Voided: * No values recorded between 10/17/2023  1:26 PM and 10/17/2023  2:25 PM *    Specimens:                None          Drains: * No LDAs found *    Findings: see op note         Complications: none          Dionicio Araiza MD     Date: 10/17/2023  Time: 14:48 CDT

## 2023-10-17 NOTE — ANESTHESIA PROCEDURE NOTES
Airway  Urgency: elective    Date/Time: 10/17/2023 1:37 PM  Airway not difficult    General Information and Staff    Patient location during procedure: OR  CRNA/CAA: Julio C Lynn CRNA    Indications and Patient Condition  Indications for airway management: airway protection    Preoxygenated: yes  MILS maintained throughout  Mask difficulty assessment: 1 - vent by mask    Final Airway Details  Final airway type: endotracheal airway      Successful airway: ETT  Cuffed: yes   Successful intubation technique: direct laryngoscopy  Endotracheal tube insertion site: oral  Blade: Plaza  Blade size: 2  ETT size (mm): 7.0  Cormack-Lehane Classification: grade I - full view of glottis  Placement verified by: chest auscultation and capnometry   Cuff volume (mL): 5  Measured from: gums  ETT/EBT to gums (cm): 20  Number of attempts at approach: 1  Assessment: lips, teeth, and gum same as pre-op and atraumatic intubation

## 2023-10-30 ENCOUNTER — TELEPHONE (OUTPATIENT)
Dept: OBGYN CLINIC | Age: 68
End: 2023-10-30

## 2023-10-30 DIAGNOSIS — Z12.31 SCREENING MAMMOGRAM FOR BREAST CANCER: Primary | ICD-10-CM

## 2023-10-30 NOTE — TELEPHONE ENCOUNTER
Clari Barlow called to schedule an appointment for Physical. [facility] was unable to accommodate in the time frame needed. Please be advised that the best time to call Anytime. Clari Barlow is needing to reschedule her physical into the new year due to her recently having shoulder surgery. Patient has Medicare, does she need to wait until the end of August 2022 before she is due? Please advise. Thank you.

## 2023-11-07 RX ORDER — CYCLOBENZAPRINE HCL 10 MG
TABLET ORAL
Qty: 90 TABLET | Refills: 3 | Status: SHIPPED | OUTPATIENT
Start: 2023-11-07

## 2023-11-16 ENCOUNTER — HOSPITAL ENCOUNTER (INPATIENT)
Facility: HOSPITAL | Age: 68
LOS: 5 days | Discharge: HOME | End: 2023-11-21
Attending: STUDENT IN AN ORGANIZED HEALTH CARE EDUCATION/TRAINING PROGRAM | Admitting: ORTHOPAEDIC SURGERY
Payer: MEDICARE

## 2023-11-16 DIAGNOSIS — L08.9 INFECTED HEMATOMA: ICD-10-CM

## 2023-11-16 DIAGNOSIS — T14.8XXA INFECTED HEMATOMA: ICD-10-CM

## 2023-11-16 DIAGNOSIS — L02.91 ABSCESS: Primary | ICD-10-CM

## 2023-11-16 PROBLEM — T81.40XA POST OP INFECTION: Status: ACTIVE | Noted: 2023-11-16

## 2023-11-16 LAB
ANION GAP SERPL CALCULATED.3IONS-SCNC: 12 MMOL/L (ref 5–15)
BASOPHILS # BLD AUTO: 0.04 10*3/MM3 (ref 0–0.2)
BASOPHILS NFR BLD AUTO: 0.3 % (ref 0–1.5)
BUN SERPL-MCNC: 16 MG/DL (ref 8–23)
BUN/CREAT SERPL: 16 (ref 7–25)
CALCIUM SPEC-SCNC: 9.4 MG/DL (ref 8.6–10.5)
CHLORIDE SERPL-SCNC: 98 MMOL/L (ref 98–107)
CO2 SERPL-SCNC: 27 MMOL/L (ref 22–29)
CREAT SERPL-MCNC: 1 MG/DL (ref 0.57–1)
CRP SERPL-MCNC: 4.69 MG/DL (ref 0–0.5)
DEPRECATED RDW RBC AUTO: 44.5 FL (ref 37–54)
EGFRCR SERPLBLD CKD-EPI 2021: 61.5 ML/MIN/1.73
EOSINOPHIL # BLD AUTO: 0.32 10*3/MM3 (ref 0–0.4)
EOSINOPHIL NFR BLD AUTO: 2.7 % (ref 0.3–6.2)
ERYTHROCYTE [DISTWIDTH] IN BLOOD BY AUTOMATED COUNT: 12.8 % (ref 12.3–15.4)
ERYTHROCYTE [SEDIMENTATION RATE] IN BLOOD: 49 MM/HR (ref 0–30)
GLUCOSE BLDC GLUCOMTR-MCNC: 119 MG/DL (ref 70–130)
GLUCOSE SERPL-MCNC: 103 MG/DL (ref 65–99)
HCT VFR BLD AUTO: 37.4 % (ref 34–46.6)
HGB BLD-MCNC: 11.3 G/DL (ref 12–15.9)
IMM GRANULOCYTES # BLD AUTO: 0.07 10*3/MM3 (ref 0–0.05)
IMM GRANULOCYTES NFR BLD AUTO: 0.6 % (ref 0–0.5)
LYMPHOCYTES # BLD AUTO: 1.88 10*3/MM3 (ref 0.7–3.1)
LYMPHOCYTES NFR BLD AUTO: 15.7 % (ref 19.6–45.3)
MCH RBC QN AUTO: 28.5 PG (ref 26.6–33)
MCHC RBC AUTO-ENTMCNC: 30.2 G/DL (ref 31.5–35.7)
MCV RBC AUTO: 94.2 FL (ref 79–97)
MONOCYTES # BLD AUTO: 1.25 10*3/MM3 (ref 0.1–0.9)
MONOCYTES NFR BLD AUTO: 10.4 % (ref 5–12)
MRSA DNA SPEC QL NAA+PROBE: NORMAL
NEUTROPHILS NFR BLD AUTO: 70.3 % (ref 42.7–76)
NEUTROPHILS NFR BLD AUTO: 8.41 10*3/MM3 (ref 1.7–7)
NRBC BLD AUTO-RTO: 0 /100 WBC (ref 0–0.2)
PLATELET # BLD AUTO: 410 10*3/MM3 (ref 140–450)
PMV BLD AUTO: 8.6 FL (ref 6–12)
POTASSIUM SERPL-SCNC: 3.7 MMOL/L (ref 3.5–5.2)
RBC # BLD AUTO: 3.97 10*6/MM3 (ref 3.77–5.28)
SODIUM SERPL-SCNC: 137 MMOL/L (ref 136–145)
WBC NRBC COR # BLD: 11.97 10*3/MM3 (ref 3.4–10.8)

## 2023-11-16 PROCEDURE — 25010000002 VANCOMYCIN 10 G RECONSTITUTED SOLUTION: Performed by: STUDENT IN AN ORGANIZED HEALTH CARE EDUCATION/TRAINING PROGRAM

## 2023-11-16 PROCEDURE — 25810000003 SODIUM CHLORIDE 0.9 % SOLUTION: Performed by: STUDENT IN AN ORGANIZED HEALTH CARE EDUCATION/TRAINING PROGRAM

## 2023-11-16 PROCEDURE — 87077 CULTURE AEROBIC IDENTIFY: CPT | Performed by: ORTHOPAEDIC SURGERY

## 2023-11-16 PROCEDURE — 85652 RBC SED RATE AUTOMATED: CPT | Performed by: STUDENT IN AN ORGANIZED HEALTH CARE EDUCATION/TRAINING PROGRAM

## 2023-11-16 PROCEDURE — 82948 REAGENT STRIP/BLOOD GLUCOSE: CPT

## 2023-11-16 PROCEDURE — 87205 SMEAR GRAM STAIN: CPT | Performed by: ORTHOPAEDIC SURGERY

## 2023-11-16 PROCEDURE — 80048 BASIC METABOLIC PNL TOTAL CA: CPT | Performed by: STUDENT IN AN ORGANIZED HEALTH CARE EDUCATION/TRAINING PROGRAM

## 2023-11-16 PROCEDURE — 89051 BODY FLUID CELL COUNT: CPT | Performed by: ORTHOPAEDIC SURGERY

## 2023-11-16 PROCEDURE — 87641 MR-STAPH DNA AMP PROBE: CPT | Performed by: ORTHOPAEDIC SURGERY

## 2023-11-16 PROCEDURE — 85025 COMPLETE CBC W/AUTO DIFF WBC: CPT | Performed by: STUDENT IN AN ORGANIZED HEALTH CARE EDUCATION/TRAINING PROGRAM

## 2023-11-16 PROCEDURE — 87186 SC STD MICRODIL/AGAR DIL: CPT | Performed by: ORTHOPAEDIC SURGERY

## 2023-11-16 PROCEDURE — 87070 CULTURE OTHR SPECIMN AEROBIC: CPT | Performed by: ORTHOPAEDIC SURGERY

## 2023-11-16 PROCEDURE — 0R9J3ZX DRAINAGE OF RIGHT SHOULDER JOINT, PERCUTANEOUS APPROACH, DIAGNOSTIC: ICD-10-PCS | Performed by: ORTHOPAEDIC SURGERY

## 2023-11-16 PROCEDURE — 99285 EMERGENCY DEPT VISIT HI MDM: CPT

## 2023-11-16 PROCEDURE — 86140 C-REACTIVE PROTEIN: CPT | Performed by: STUDENT IN AN ORGANIZED HEALTH CARE EDUCATION/TRAINING PROGRAM

## 2023-11-16 RX ORDER — SODIUM CHLORIDE 0.9 % (FLUSH) 0.9 %
10 SYRINGE (ML) INJECTION EVERY 12 HOURS SCHEDULED
Status: DISCONTINUED | OUTPATIENT
Start: 2023-11-16 | End: 2023-11-21 | Stop reason: HOSPADM

## 2023-11-16 RX ORDER — SODIUM CHLORIDE 0.9 % (FLUSH) 0.9 %
10 SYRINGE (ML) INJECTION AS NEEDED
Status: DISCONTINUED | OUTPATIENT
Start: 2023-11-16 | End: 2023-11-21 | Stop reason: HOSPADM

## 2023-11-16 RX ORDER — MONTELUKAST SODIUM 10 MG/1
10 TABLET ORAL NIGHTLY
Status: DISCONTINUED | OUTPATIENT
Start: 2023-11-16 | End: 2023-11-21 | Stop reason: HOSPADM

## 2023-11-16 RX ORDER — DULOXETIN HYDROCHLORIDE 30 MG/1
30 CAPSULE, DELAYED RELEASE ORAL 2 TIMES DAILY
Status: DISCONTINUED | OUTPATIENT
Start: 2023-11-16 | End: 2023-11-21 | Stop reason: HOSPADM

## 2023-11-16 RX ORDER — AMLODIPINE BESYLATE 5 MG/1
5 TABLET ORAL DAILY
Status: DISCONTINUED | OUTPATIENT
Start: 2023-11-17 | End: 2023-11-21 | Stop reason: HOSPADM

## 2023-11-16 RX ORDER — BISACODYL 5 MG/1
5 TABLET, DELAYED RELEASE ORAL DAILY PRN
Status: DISCONTINUED | OUTPATIENT
Start: 2023-11-16 | End: 2023-11-21 | Stop reason: HOSPADM

## 2023-11-16 RX ORDER — MORPHINE SULFATE 2 MG/ML
1 INJECTION, SOLUTION INTRAMUSCULAR; INTRAVENOUS EVERY 4 HOURS PRN
Status: DISCONTINUED | OUTPATIENT
Start: 2023-11-16 | End: 2023-11-18

## 2023-11-16 RX ORDER — POLYETHYLENE GLYCOL 3350 17 G/17G
17 POWDER, FOR SOLUTION ORAL DAILY PRN
Status: DISCONTINUED | OUTPATIENT
Start: 2023-11-16 | End: 2023-11-21 | Stop reason: HOSPADM

## 2023-11-16 RX ORDER — HYDROCODONE BITARTRATE AND ACETAMINOPHEN 7.5; 325 MG/1; MG/1
1 TABLET ORAL EVERY 6 HOURS PRN
Status: DISCONTINUED | OUTPATIENT
Start: 2023-11-16 | End: 2023-11-16 | Stop reason: SDUPTHER

## 2023-11-16 RX ORDER — PANTOPRAZOLE SODIUM 40 MG/1
40 TABLET, DELAYED RELEASE ORAL
Status: DISCONTINUED | OUTPATIENT
Start: 2023-11-17 | End: 2023-11-21 | Stop reason: HOSPADM

## 2023-11-16 RX ORDER — HYDROCODONE BITARTRATE AND ACETAMINOPHEN 7.5; 325 MG/1; MG/1
1 TABLET ORAL EVERY 4 HOURS PRN
Status: DISCONTINUED | OUTPATIENT
Start: 2023-11-16 | End: 2023-11-17

## 2023-11-16 RX ORDER — NALOXONE HCL 0.4 MG/ML
0.4 VIAL (ML) INJECTION
Status: DISCONTINUED | OUTPATIENT
Start: 2023-11-16 | End: 2023-11-18

## 2023-11-16 RX ORDER — MAGNESIUM OXIDE 400 MG/1
400 TABLET ORAL 2 TIMES DAILY
COMMUNITY

## 2023-11-16 RX ORDER — SODIUM CHLORIDE 9 MG/ML
40 INJECTION, SOLUTION INTRAVENOUS AS NEEDED
Status: DISCONTINUED | OUTPATIENT
Start: 2023-11-16 | End: 2023-11-21 | Stop reason: HOSPADM

## 2023-11-16 RX ORDER — BISACODYL 10 MG
10 SUPPOSITORY, RECTAL RECTAL DAILY PRN
Status: DISCONTINUED | OUTPATIENT
Start: 2023-11-16 | End: 2023-11-21 | Stop reason: HOSPADM

## 2023-11-16 RX ORDER — VANCOMYCIN/0.9 % SOD CHLORIDE 1.5G/250ML
1500 PLASTIC BAG, INJECTION (ML) INTRAVENOUS EVERY 24 HOURS
Qty: 2500 ML | Refills: 0 | Status: DISCONTINUED | OUTPATIENT
Start: 2023-11-17 | End: 2023-11-18

## 2023-11-16 RX ORDER — AMOXICILLIN 250 MG
2 CAPSULE ORAL 2 TIMES DAILY
Status: DISCONTINUED | OUTPATIENT
Start: 2023-11-16 | End: 2023-11-21 | Stop reason: HOSPADM

## 2023-11-16 RX ORDER — ONDANSETRON 4 MG/1
4 TABLET, FILM COATED ORAL EVERY 6 HOURS PRN
Status: DISCONTINUED | OUTPATIENT
Start: 2023-11-16 | End: 2023-11-17

## 2023-11-16 RX ORDER — LOSARTAN POTASSIUM 50 MG/1
25 TABLET ORAL DAILY
Status: DISCONTINUED | OUTPATIENT
Start: 2023-11-17 | End: 2023-11-21 | Stop reason: HOSPADM

## 2023-11-16 RX ORDER — CEPHALEXIN 500 MG/1
1 CAPSULE ORAL EVERY 12 HOURS SCHEDULED
Status: ON HOLD | COMMUNITY
Start: 2023-11-11 | End: 2023-11-16

## 2023-11-16 RX ORDER — ATORVASTATIN CALCIUM 40 MG/1
40 TABLET, FILM COATED ORAL NIGHTLY
Status: DISCONTINUED | OUTPATIENT
Start: 2023-11-16 | End: 2023-11-21 | Stop reason: HOSPADM

## 2023-11-16 RX ORDER — VANCOMYCIN/0.9 % SOD CHLORIDE 1.5G/250ML
20 PLASTIC BAG, INJECTION (ML) INTRAVENOUS ONCE
Status: COMPLETED | OUTPATIENT
Start: 2023-11-16 | End: 2023-11-16

## 2023-11-16 RX ORDER — ONDANSETRON 2 MG/ML
4 INJECTION INTRAMUSCULAR; INTRAVENOUS EVERY 6 HOURS PRN
Status: DISCONTINUED | OUTPATIENT
Start: 2023-11-16 | End: 2023-11-17

## 2023-11-16 RX ORDER — LEVOTHYROXINE SODIUM 0.1 MG/1
100 TABLET ORAL
Status: DISCONTINUED | OUTPATIENT
Start: 2023-11-17 | End: 2023-11-21 | Stop reason: HOSPADM

## 2023-11-16 RX ORDER — CEFDINIR 300 MG/1
CAPSULE ORAL
Status: ON HOLD | COMMUNITY
End: 2023-11-16

## 2023-11-16 RX ORDER — PROPRANOLOL HYDROCHLORIDE 80 MG/1
80 CAPSULE, EXTENDED RELEASE ORAL DAILY
Status: DISCONTINUED | OUTPATIENT
Start: 2023-11-17 | End: 2023-11-19

## 2023-11-16 RX ORDER — TRAMADOL HYDROCHLORIDE 50 MG/1
1 TABLET ORAL EVERY 8 HOURS PRN
COMMUNITY
Start: 2023-10-23 | End: 2023-11-21

## 2023-11-16 RX ORDER — CLINDAMYCIN HYDROCHLORIDE 300 MG/1
300 CAPSULE ORAL 3 TIMES DAILY
COMMUNITY

## 2023-11-16 RX ADMIN — HYDROCODONE BITARTRATE AND ACETAMINOPHEN 1 TABLET: 7.5; 325 TABLET ORAL at 20:06

## 2023-11-16 RX ADMIN — VANCOMYCIN HYDROCHLORIDE 1500 MG: 10 INJECTION, POWDER, LYOPHILIZED, FOR SOLUTION INTRAVENOUS at 06:54

## 2023-11-16 RX ADMIN — ATORVASTATIN CALCIUM 40 MG: 40 TABLET ORAL at 20:06

## 2023-11-16 RX ADMIN — DULOXETINE HYDROCHLORIDE 30 MG: 30 CAPSULE, DELAYED RELEASE ORAL at 20:06

## 2023-11-16 RX ADMIN — HYDROCODONE BITARTRATE AND ACETAMINOPHEN 1 TABLET: 7.5; 325 TABLET ORAL at 14:23

## 2023-11-16 RX ADMIN — MONTELUKAST SODIUM 10 MG: 10 TABLET ORAL at 20:06

## 2023-11-16 RX ADMIN — Medication 10 ML: at 20:07

## 2023-11-16 RX ADMIN — Medication 10 ML: at 14:23

## 2023-11-16 NOTE — ED PROVIDER NOTES
Subjective   History of Present Illness  Patient presents due to right shoulder pain.  She had shoulder surgery 3 weeks ago and has had redness pain and swelling for the past week.  She has been on an antibiotic but her redness and swelling is worsened.  She has put a forehead thrombin her on the area and says that it sometimes will say something high like 101.  Her oral temperature has only been up to 99, not above 100.  No other systemic symptoms; no fevers, chills, abdominal pain, vomiting, diarrhea, nausea, vomiting, shortness of breath, chest pain.  Recently diagnosed with diabetes.  Has been taking her antibiotic as prescribed.    Review of Systems   Constitutional:  Negative for chills and fever.   Respiratory:  Negative for cough and shortness of breath.    Cardiovascular:  Negative for chest pain and palpitations.   Gastrointestinal:  Negative for abdominal pain and vomiting.   Neurological:  Negative for syncope and light-headedness.       Past Medical History:   Diagnosis Date    Anxiety     Arthritis     Bilateral cataracts     EARLY STAGE    Bladder spasms     Cancer     thyroid    Coronary artery disease     Degenerative joint disease of low back     Diabetes mellitus     Disease of thyroid gland     Fibromyalgia     GERD (gastroesophageal reflux disease)     History of bad fall     w/ damage to Left shoulder.    History of detached retina repair     Hypertension     PONV (postoperative nausea and vomiting)     Psoriasis     Stage 3 chronic kidney disease        Allergies   Allergen Reactions    Aleve [Naproxen] Other (See Comments)     Pt has stage # 3 kidney disease.    Codeine Nausea And Vomiting       Past Surgical History:   Procedure Laterality Date    APPENDECTOMY      BICEPS TENDONESIS SUBPECTORALIS REPAIR Right 1/11/2022    Procedure: BICEPS TENODESIS/TENOTOMY;  Surgeon: Dionicio Araiza MD;  Location: [facility];  Service: Orthopedics;  Laterality: Right;    CARPAL TUNNEL RELEASE  Bilateral     CORONARY STENT PLACEMENT  06/21/2019    x 3     FINGER/THUMB ARTHROPLASTY Bilateral     Trigger thumb release    HYSTERECTOMY      ORIF ANKLE FRACTURE Left     RETINAL DETACHMENT REPAIR Right     SHOULDER ARTHROSCOPY W/ ROTATOR CUFF REPAIR Left 12/4/2018    Procedure: LEFT SHOULDER ARTHROSCOPIC ROTATOR CUFF REPAIR, DEBRIDEMENT, SUBACROMIAL DECOMPRESSION, DISTAL CLAVICLE EXCISION BICEPS TENODESIS, TENOTOMY - LEFT  ;  Surgeon: Dionicio Araiza MD;  Location:  [facility];  Service: Orthopedics    SHOULDER ARTHROSCOPY W/ ROTATOR CUFF REPAIR Right 1/11/2022    Procedure: RIGHT SHOULDER ROTATOR CUFF REPAIR, BICEPS TENODESIS/TENOTOMY;  Surgeon: Dionicio Araiza MD;  Location:  [facility];  Service: Orthopedics;  Laterality: Right;    THYROID CYST EXCISION      TOTAL HIP ARTHROPLASTY      2020    TOTAL SHOULDER ARTHROPLASTY W/ DISTAL CLAVICLE EXCISION Right 10/17/2023    Procedure: RIGHT REVERSE TOTAL SHOULDER ARTHROPLASTY;  Surgeon: Dionicio Araiza MD;  Location:  [facility];  Service: Orthopedics;  Laterality: Right;    WRIST TENDON REPAIR LENGTHENING Right        History reviewed. No pertinent family history.    Social History     Socioeconomic History    Marital status:    Tobacco Use    Smoking status: Former     Packs/day: 0.50     Years: 28.00     Additional pack years: 0.00     Total pack years: 14.00     Types: Cigarettes    Smokeless tobacco: Never   Vaping Use    Vaping Use: Every day    Substances: Nicotine   Substance and Sexual Activity    Alcohol use: No    Drug use: No    Sexual activity: Defer           Objective   Physical Exam  Vitals reviewed.   Constitutional:       General: She is not in acute distress.  HENT:      Head: Normocephalic and atraumatic.   Eyes:      Extraocular Movements: Extraocular movements intact.      Conjunctiva/sclera: Conjunctivae normal.   Cardiovascular:      Pulses: Normal pulses.      Heart sounds: Normal heart sounds.   Pulmonary:       Effort: Pulmonary effort is normal. No respiratory distress.      Breath sounds: No wheezing.   Abdominal:      General: Abdomen is flat. There is no distension.      Tenderness: There is no abdominal tenderness. There is no guarding.   Musculoskeletal:      Cervical back: Normal range of motion and neck supple.      Comments: Right shoulder anterior incision has redness and swelling at the superior and inferior margins, most markedly at the inferior margin, with associated fluctuation and induration.  Right upper extremity has palpable ulnar and radial pulses.  Examination of the hand shows full sensation intact with 5 out of 5 handgrip.   Skin:     General: Skin is warm and dry.   Neurological:      General: No focal deficit present.      Mental Status: She is alert. Mental status is at baseline.   Psychiatric:         Behavior: Behavior normal.         Thought Content: Thought content normal.         Procedures           ED Course  ED Course as of 11/16/23 0629   Thu Nov 16, 2023   0527 Asked Jesus to page Dr. Rodriguez to discuss. [AS]      ED Course User Index  [AS] Woo Mathews MD                                           Medical Decision Making  Problems Addressed:  Abscess: complicated acute illness or injury    Amount and/or Complexity of Data Reviewed  Labs: ordered.    Risk  Prescription drug management.  Decision regarding hospitalization.      Mayuri Almeida is a 68 y.o. female with PMH above who presents to the Emergency Department with postop wound infection; delayed, possible pretreated with abx. Limb NVI.  Bedside ultrasound does confirm an abscess.  Plan to get inflammatory markers and discussed with orthopedics for management. No signs of sepsis, H&P without other acute focal process.  Admitted to [facility] under Dr. Araiza.  Vancomycin given due to postoperative nature of infection with failing outpatient antibiotics.     Final diagnosis: abscess    All questions answered. Patient/family was  understanding and in agreement with today's assessment and plan. The patient was monitored during their stay in the ED and dispositioned without acute event.    Electronically signed by:  Woo Mathews MD 11/16/2023 06:29 CST      Note: Dragon medical dictation software was used in the creation of this note.      Final diagnoses:   Abscess       ED Disposition  ED Disposition       ED Disposition   Decision to Admit    Condition   --    Comment   Level of Care: Med/Surg [1]   Diagnosis: Post op infection [[id number]]   Admitting Physician: JACOB ATKINSON [[id number]]   Attending Physician: JACOB ATKINSON [[id number]]                 No follow-up provider specified.       Medication List      No changes were made to your prescriptions during this visit.            Woo Mathews MD  11/16/23 3671

## 2023-11-16 NOTE — Clinical Note
Level of Care: Med/Surg [1]   Diagnosis: Post op infection [[id number]]   Admitting Physician: JACOB ATKINSON [[id number]]   Attending Physician: JACOB ATKINSON [[id number]]

## 2023-11-16 NOTE — PLAN OF CARE
Goal Outcome Evaluation:  Plan of Care Reviewed With: patient        Progress: no change  Outcome Evaluation: Pt alert and oriented x4, c/o pain prn meds given. Pt up in chair and has a sling on her right arm. Surgery tomorrow. Continue to monitor.

## 2023-11-16 NOTE — ED NOTES
Nursing report ED to floor  Mayuri Almeida  68 y.o.  female    HPI:   Chief Complaint   Patient presents with    Wound Infection       Admitting doctor:   Dionicio Araiza MD    Consulting provider(s):  Consults       No orders found from 10/18/2023 to 11/17/2023.             Admitting diagnosis:   The encounter diagnosis was Abscess.    Code status:   Current Code Status       Date Active Code Status Order ID Comments User Context       Not on file            Allergies:   Aleve [naproxen] and Codeine    Intake and Output  No intake or output data in the 24 hours ending 11/16/23 0722    Weight:       11/16/23  0346   Weight: 74.4 kg (164 lb)       Most recent vitals:   Vitals:    11/16/23 0529 11/16/23 0603 11/16/23 0631 11/16/23 0659   BP: 124/75 110/82 132/82 127/77   BP Location:       Patient Position:       Pulse: 61 60 60 59   Resp:    18   Temp:       TempSrc:       SpO2: 94% 95% 95% 99%   Weight:       Height:         Oxygen Therapy: .    Active LDAs/IV Access:   Lines, Drains & Airways       Active LDAs       Name Placement date Placement time Site Days    Peripheral IV 11/16/23 0425 Left Antecubital 11/16/23 0425  Antecubital  less than 1                    Labs (abnormal labs have a star):   Labs Reviewed   BASIC METABOLIC PANEL - Abnormal; Notable for the following components:       Result Value    Glucose 103 (*)     All other components within normal limits    Narrative:     GFR Normal >60  Chronic Kidney Disease <60  Kidney Failure <15     SEDIMENTATION RATE - Abnormal; Notable for the following components:    Sed Rate 49 (*)     All other components within normal limits   C-REACTIVE PROTEIN - Abnormal; Notable for the following components:    C-Reactive Protein 4.69 (*)     All other components within normal limits   CBC WITH AUTO DIFFERENTIAL - Abnormal; Notable for the following components:    WBC 11.97 (*)     Hemoglobin 11.3 (*)     MCHC 30.2 (*)     Lymphocyte % 15.7 (*)     Immature  Grans % 0.6 (*)     Neutrophils, Absolute 8.41 (*)     Monocytes, Absolute 1.25 (*)     Immature Grans, Absolute 0.07 (*)     All other components within normal limits   CBC AND DIFFERENTIAL    Narrative:     The following orders were created for panel order CBC & Differential.  Procedure                               Abnormality         Status                     ---------                               -----------         ------                     CBC Auto Differential[[id number]]        Abnormal            Final result                 Please view results for these tests on the individual orders.       Meds given in ED:   Medications   vancomycin IVPB 1500 mg in 0.9% NaCl (Premix) 500 mL (1,500 mg Intravenous New Bag 11/16/23 0654)           NIH Stroke Scale:       Isolation/Infection(s):  No active isolations   No active infections         Nursing report ED to floor:  Mental status: .AxO  Ambulatory status: .  Precautions: .    ED nurse phone extentsion- .. [phone]

## 2023-11-16 NOTE — H&P
Orthopaedic Inpatient H&P  NAME:  Mayuri Almeida   : 1955  MRN: [id number]    2023  3:49 AM      CHIEF COMPLAINT:  right shoulder pain      HISTORY OF PRESENT ILLNESS:   The patient is a 68 y.o. female who underwent a Right Reverse TSA on 10/17/23. She developed redness and swelling about 10 days ago. She was started on Keflex on 23 and Clindamycin was added on 23. She presented to the ER with increased pain and redness over her surgical site.  Pain is located in the right shouler, rated a 3/5, dull and constant, worse with movement, better with rest and medication.  There are no associated symptoms.      Past Medical History:    Past Medical History:   Diagnosis Date    Anxiety     Arthritis     Bilateral cataracts     EARLY STAGE    Bladder spasms     Cancer     thyroid    Coronary artery disease     Degenerative joint disease of low back     Diabetes mellitus     Disease of thyroid gland     Fibromyalgia     GERD (gastroesophageal reflux disease)     History of bad fall     w/ damage to Left shoulder.    History of detached retina repair     Hypertension     PONV (postoperative nausea and vomiting)     Psoriasis     Stage 3 chronic kidney disease        Past Surgical History:    Past Surgical History:   Procedure Laterality Date    APPENDECTOMY      BICEPS TENDONESIS SUBPECTORALIS REPAIR Right 2022    Procedure: BICEPS TENODESIS/TENOTOMY;  Surgeon: Dionicio Araiza MD;  Location: [facility];  Service: Orthopedics;  Laterality: Right;    CARPAL TUNNEL RELEASE Bilateral     CORONARY STENT PLACEMENT  06/21/2019    x 3     FINGER/THUMB ARTHROPLASTY Bilateral     Trigger thumb release    HYSTERECTOMY      ORIF ANKLE FRACTURE Left     RETINAL DETACHMENT REPAIR Right     SHOULDER ARTHROSCOPY W/ ROTATOR CUFF REPAIR Left 2018    Procedure: LEFT SHOULDER ARTHROSCOPIC ROTATOR CUFF REPAIR, DEBRIDEMENT, SUBACROMIAL DECOMPRESSION, DISTAL CLAVICLE EXCISION BICEPS TENODESIS, TENOTOMY -  LEFT  ;  Surgeon: Dionicio Araiza MD;  Location:  [facility];  Service: Orthopedics    SHOULDER ARTHROSCOPY W/ ROTATOR CUFF REPAIR Right 1/11/2022    Procedure: RIGHT SHOULDER ROTATOR CUFF REPAIR, BICEPS TENODESIS/TENOTOMY;  Surgeon: Dionicio Araiza MD;  Location:  [facility];  Service: Orthopedics;  Laterality: Right;    THYROID CYST EXCISION      TOTAL HIP ARTHROPLASTY      2020    TOTAL SHOULDER ARTHROPLASTY W/ DISTAL CLAVICLE EXCISION Right 10/17/2023    Procedure: RIGHT REVERSE TOTAL SHOULDER ARTHROPLASTY;  Surgeon: Dionicio Araiza MD;  Location:  [facility];  Service: Orthopedics;  Laterality: Right;    WRIST TENDON REPAIR LENGTHENING Right        Current Medications:   Prior to Admission medications    Medication Sig Start Date End Date Taking? Authorizing Provider   ALPRAZolam (XANAX) 0.25 MG tablet Take 1 tablet by mouth Every Night.   Yes ProviderAdeline MD   amLODIPine (NORVASC) 5 MG tablet Take 1 tablet by mouth Daily. 6/22/19  Yes ProviderAdeline MD   aspirin 81 MG EC tablet Take 1 tablet by mouth Daily. 12/8/19  Yes ProviderAdeline MD   atorvastatin (LIPITOR) 40 MG tablet Take 1 tablet by mouth Every Night. 6/22/19  Yes ProviderAdeline MD   B Complex Vitamins (VITAMIN-B COMPLEX PO) Take 1 tablet by mouth Daily.   Yes ProviderAdeline MD   celecoxib (CeleBREX) 200 MG capsule Take 1 capsule by mouth 2 (Two) Times a Day. 10/4/18  Yes Adeline Richter MD   Cholecalciferol 1000 units capsule Take 1 capsule by mouth Daily.   Yes ProviderAdeline MD   clindamycin (CLEOCIN) 300 MG capsule Take 1 capsule by mouth 3 (Three) Times a Day.   Yes ProviderAdeline MD   CRANBERRY EXTRACT PO Take 1 tablet by mouth Daily.   Yes Adeline Richter MD   cyclobenzaprine (FLEXERIL) 10 MG tablet Take 1 tablet by mouth Every Night.   Yes Adeline Richter MD   DULoxetine (CYMBALTA) 30 MG capsule Take 1 capsule by mouth 2 (Two) Times a Day.   Yes Rober  MD Adeline   estradiol (ESTRACE) 0.1 MG/GM vaginal cream Insert 1 applicator into the vagina 3 (Three) Times a Week. 8/16/23  Yes Adeline Richter MD   estrogens, conjugated, (PREMARIN) 0.625 MG tablet Take 1 tablet by mouth Every Night.   Yes Adeline Richter MD   lansoprazole (PREVACID) 15 MG capsule Take 1 capsule by mouth Daily.   Yes Adeline Richter MD   levothyroxine (SYNTHROID, LEVOTHROID) 100 MCG tablet Take 1 tablet by mouth Every Morning.   Yes Adeline Richter MD   losartan (COZAAR) 25 MG tablet Take 1 tablet by mouth Daily.   Yes Adeline Richter MD   magnesium oxide (MAG-OX) 400 MG tablet Take 1 tablet by mouth 2 (Two) Times a Day.   Yes Adeline Richter MD   metFORMIN (GLUCOPHAGE) 500 MG tablet Take 1 tablet by mouth 2 (Two) Times a Day With Meals. 8/31/23  Yes Adeline Richter MD   montelukast (SINGULAIR) 10 MG tablet Take 1 tablet by mouth Every Night.   Yes Adeline Richter MD   Potassium Chloride (KLOR-CON 10 PO) Take 10 mcg by mouth 3 (Three) Times a Day.   Yes Adeline Richter MD   propranolol LA (INDERAL LA) 80 MG 24 hr capsule Take 1 capsule by mouth Daily.   Yes Adeline Richter MD   traMADol (ULTRAM) 50 MG tablet Take 1 tablet by mouth Every 8 (Eight) Hours As Needed for Moderate Pain. 10/23/23  Yes Adeline iRchter MD   triamterene-hydrochlorothiazide (MAXZIDE) 75-50 MG per tablet Take 1 tablet by mouth Daily. 3/15/19  Yes Adeline Richter MD   Vibegron 75 MG tablet Take 1 tablet by mouth Daily.   Yes Adeline Richter MD   MAGNESIUM CITRATE PO Take 1 tablet by mouth 2 (Two) Times a Day.  11/16/23 Yes Adeline Richter MD   cefdinir (OMNICEF) 300 MG capsule Take 1 capsule every 12 hours by oral route as needed.  Patient not taking: Reported on 11/16/2023    Adeline Richter MD   cephalexin (KEFLEX) 500 MG capsule Take 1 capsule by mouth Every 12 (Twelve) Hours.  Patient not taking: Reported on 11/16/2023 11/11/23    Provider, MD Adeline   ondansetron (Zofran) 4 MG tablet Take 1 tablet by mouth Every 8 (Eight) Hours As Needed for Nausea or Vomiting.  Patient not taking: Reported on 11/16/2023 10/17/23   Dionicio Araiza MD   oxyCODONE-acetaminophen (PERCOCET)  MG per tablet Take 1 tablet by mouth Every 8 (Eight) Hours As Needed for Severe Pain.  Patient not taking: Reported on 11/16/2023 10/17/23   Dionicio Araiza MD       Allergies:  Aleve [naproxen] and Codeine    Social History:   Social History     Socioeconomic History    Marital status:    Tobacco Use    Smoking status: Former     Packs/day: 0.50     Years: 28.00     Additional pack years: 0.00     Total pack years: 14.00     Types: Cigarettes    Smokeless tobacco: Never   Vaping Use    Vaping Use: Every day    Substances: Nicotine   Substance and Sexual Activity    Alcohol use: No    Drug use: No    Sexual activity: Defer       Family History:   History reviewed. No pertinent family history.    REVIEW OF SYSTEMS:  14 point review of systems has been reviewed from the patient's emergency room visit, reviewed with the patient on today's date with no new changes.    PHYSICAL EXAM:      Physical Examination:  Vitals:   Vitals:    11/16/23 0603 11/16/23 0631 11/16/23 0659 11/16/23 0748   BP: 110/82 132/82 127/77 139/72   BP Location:    Left arm   Patient Position:    Sitting   Pulse: 60 60 59 60   Resp:   18 18   Temp:    97.7 °F (36.5 °C)   TempSrc:    Oral   SpO2: 95% 95% 99% 97%   Weight:       Height:         General:  Appears stated age, no distress.  Orientation:  Alert and oriented to time, place, and person.  Mood and Affect:  Cooperative and pleasant.  Gait:  Resting comfortably in bed.  Cardiovascular:  Symmetric 1-2 plus pulses in upper and lower extremities.  Lymph:  No cervical or inguinal lymphadenopathy noted.  Sensation:  Grossly intact to light touch.  DTR:  Normal, no pathologic reflexes.  Coordination/balance:   Normal    Musculoskeletal:  Right upper extremity exam:  There is no tenderness to palpation about the shoulder, elbow, wrist or hand.  Full motion.  Stability normal with provocative tests, 5/5 strength, and skin is normal.      Left upper extremity exam:  There is no tenderness to palpation about the shoulder, elbow, wrist or hand. Full motion.  Stability normal with provocative tests, 5/5 strength, and skin is normal.     Right lower extremity exam:  Healed incision overlying anterior shoulder. Erythema surrounding incision. No active drainage today.     Left lower extremity exam:  There is no tenderness to palpation about the hip, knee, ankle or foot.  Full motion.  Stability normal with provocative tests, 5/5 strength, and skin is normal.      DATA:    LAB RESULTS:      Lab 11/16/23  0428   WBC 11.97*   HEMOGLOBIN 11.3*   HEMATOCRIT 37.4   PLATELETS 410   NEUTROS ABS 8.41*   IMMATURE GRANS (ABS) 0.07*   LYMPHS ABS 1.88   MONOS ABS 1.25*   EOS ABS 0.32   MCV 94.2   SED RATE 49*   CRP 4.69*         Lab 11/16/23  0428   SODIUM 137   POTASSIUM 3.7   CHLORIDE 98   CO2 27.0   ANION GAP 12.0   BUN 16   CREATININE 1.00   EGFR 61.5   GLUCOSE 103*   CALCIUM 9.4                         Brief Urine Lab Results  (Last result in the past 365 days)        Color   Clarity   Blood   Leuk Est   Nitrite   Protein   CREAT   Urine HCG        10/10/23 1246 Yellow   Clear   Negative   Negative   Negative   Negative                 Microbiology Results (last 10 days)       ** No results found for the last 240 hours. **               ----------------------------------------------------------------------------------------------------------------------  I have reviewed the radiology images above and agree with the findings dictated below    Radiology: No radiology results for the last 3 days     ----------------------------------------------------------------------------------------------------------------------  Assessment:    1)  Post  Op Wound Infection     Plan:  1) IV Abx today. Will Re-eval tomorrow am to determine need for surgery.   2) Regular diet today, NPO after a light breakfast tomorrow 11/17/23.    Electronically signed by Jere Minor PA-C on 11/16/2023 at 08:45 CST

## 2023-11-16 NOTE — PLAN OF CARE
Goal Outcome Evaluation: patient received from ED with right upper ext infection,reddened and in sling from previous shoulder surgery. Patient is alert and oriented x 4. OLIVERIO Oquendo from Dr. Hardwick office at bedside.

## 2023-11-17 ENCOUNTER — ANESTHESIA EVENT (OUTPATIENT)
Dept: PERIOP | Facility: HOSPITAL | Age: 68
End: 2023-11-17
Payer: MEDICARE

## 2023-11-17 ENCOUNTER — ANESTHESIA (OUTPATIENT)
Dept: PERIOP | Facility: HOSPITAL | Age: 68
End: 2023-11-17
Payer: MEDICARE

## 2023-11-17 LAB
APPEARANCE FLD: ABNORMAL
COLOR FLD: ABNORMAL
GLUCOSE BLDC GLUCOMTR-MCNC: 128 MG/DL (ref 70–130)
KOH PREP NAIL: NORMAL
KOH PREP NAIL: NORMAL
LYMPHOCYTES NFR FLD MANUAL: 3 %
METHOD: ABNORMAL
MONOCYTES NFR FLD: 5 %
NEUTROPHILS NFR FLD MANUAL: 92 %
NUC CELL # FLD: ABNORMAL /MM3
RBC # FLD AUTO: ABNORMAL /MM3

## 2023-11-17 PROCEDURE — 25810000003 SODIUM CHLORIDE 0.9 % SOLUTION: Performed by: ORTHOPAEDIC SURGERY

## 2023-11-17 PROCEDURE — 87176 TISSUE HOMOGENIZATION CULTR: CPT | Performed by: ORTHOPAEDIC SURGERY

## 2023-11-17 PROCEDURE — 87205 SMEAR GRAM STAIN: CPT | Performed by: ORTHOPAEDIC SURGERY

## 2023-11-17 PROCEDURE — 87075 CULTR BACTERIA EXCEPT BLOOD: CPT | Performed by: ORTHOPAEDIC SURGERY

## 2023-11-17 PROCEDURE — 87070 CULTURE OTHR SPECIMN AEROBIC: CPT | Performed by: ORTHOPAEDIC SURGERY

## 2023-11-17 PROCEDURE — 25010000002 DROPERIDOL PER 5 MG: Performed by: ANESTHESIOLOGY

## 2023-11-17 PROCEDURE — 25010000002 DEXAMETHASONE PER 1 MG

## 2023-11-17 PROCEDURE — C1776 JOINT DEVICE (IMPLANTABLE): HCPCS | Performed by: ORTHOPAEDIC SURGERY

## 2023-11-17 PROCEDURE — 87116 MYCOBACTERIA CULTURE: CPT | Performed by: ORTHOPAEDIC SURGERY

## 2023-11-17 PROCEDURE — 25010000002 MORPHINE SULFATE (PF) 2 MG/ML SOLUTION: Performed by: PHYSICIAN ASSISTANT

## 2023-11-17 PROCEDURE — 25810000003 LACTATED RINGERS PER 1000 ML: Performed by: ANESTHESIOLOGY

## 2023-11-17 PROCEDURE — 25010000002 PROPOFOL 10 MG/ML EMULSION

## 2023-11-17 PROCEDURE — 0RPJ0JZ REMOVAL OF SYNTHETIC SUBSTITUTE FROM RIGHT SHOULDER JOINT, OPEN APPROACH: ICD-10-PCS | Performed by: ORTHOPAEDIC SURGERY

## 2023-11-17 PROCEDURE — 0JBD0ZZ EXCISION OF RIGHT UPPER ARM SUBCUTANEOUS TISSUE AND FASCIA, OPEN APPROACH: ICD-10-PCS | Performed by: ORTHOPAEDIC SURGERY

## 2023-11-17 PROCEDURE — 25010000002 ONDANSETRON PER 1 MG

## 2023-11-17 PROCEDURE — 25810000003 LACTATED RINGERS PER 1000 ML: Performed by: ORTHOPAEDIC SURGERY

## 2023-11-17 PROCEDURE — 25010000002 HYDROMORPHONE PER 4 MG: Performed by: ANESTHESIOLOGY

## 2023-11-17 PROCEDURE — 25010000002 VANCOMYCIN 10 G RECONSTITUTED SOLUTION: Performed by: ORTHOPAEDIC SURGERY

## 2023-11-17 PROCEDURE — 87102 FUNGUS ISOLATION CULTURE: CPT | Performed by: ORTHOPAEDIC SURGERY

## 2023-11-17 PROCEDURE — 25010000002 CEFAZOLIN PER 500 MG

## 2023-11-17 PROCEDURE — 0J9D0ZZ DRAINAGE OF RIGHT UPPER ARM SUBCUTANEOUS TISSUE AND FASCIA, OPEN APPROACH: ICD-10-PCS | Performed by: ORTHOPAEDIC SURGERY

## 2023-11-17 PROCEDURE — 0RUJ0JZ SUPPLEMENT RIGHT SHOULDER JOINT WITH SYNTHETIC SUBSTITUTE, OPEN APPROACH: ICD-10-PCS | Performed by: ORTHOPAEDIC SURGERY

## 2023-11-17 PROCEDURE — 88304 TISSUE EXAM BY PATHOLOGIST: CPT | Performed by: ORTHOPAEDIC SURGERY

## 2023-11-17 PROCEDURE — 87206 SMEAR FLUORESCENT/ACID STAI: CPT | Performed by: ORTHOPAEDIC SURGERY

## 2023-11-17 PROCEDURE — 25010000002 FENTANYL CITRATE (PF) 50 MCG/ML SOLUTION

## 2023-11-17 PROCEDURE — 82948 REAGENT STRIP/BLOOD GLUCOSE: CPT

## 2023-11-17 PROCEDURE — 87220 TISSUE EXAM FOR FUNGI: CPT | Performed by: ORTHOPAEDIC SURGERY

## 2023-11-17 DEVICE — IMPLANTABLE DEVICE: Type: IMPLANTABLE DEVICE | Site: SHOULDER | Status: FUNCTIONAL

## 2023-11-17 DEVICE — HEMOST ABS SURGICEL PWDR 3GM: Type: IMPLANTABLE DEVICE | Site: SHOULDER | Status: FUNCTIONAL

## 2023-11-17 RX ORDER — HYDROMORPHONE HYDROCHLORIDE 1 MG/ML
0.5 INJECTION, SOLUTION INTRAMUSCULAR; INTRAVENOUS; SUBCUTANEOUS
Status: DISCONTINUED | OUTPATIENT
Start: 2023-11-17 | End: 2023-11-17 | Stop reason: HOSPADM

## 2023-11-17 RX ORDER — SODIUM CHLORIDE, SODIUM LACTATE, POTASSIUM CHLORIDE, CALCIUM CHLORIDE 600; 310; 30; 20 MG/100ML; MG/100ML; MG/100ML; MG/100ML
100 INJECTION, SOLUTION INTRAVENOUS CONTINUOUS
Status: DISCONTINUED | OUTPATIENT
Start: 2023-11-17 | End: 2023-11-18

## 2023-11-17 RX ORDER — FLUMAZENIL 0.1 MG/ML
0.2 INJECTION INTRAVENOUS AS NEEDED
Status: DISCONTINUED | OUTPATIENT
Start: 2023-11-17 | End: 2023-11-17 | Stop reason: HOSPADM

## 2023-11-17 RX ORDER — DROPERIDOL 2.5 MG/ML
0.62 INJECTION, SOLUTION INTRAMUSCULAR; INTRAVENOUS ONCE AS NEEDED
Status: COMPLETED | OUTPATIENT
Start: 2023-11-17 | End: 2023-11-17

## 2023-11-17 RX ORDER — ONDANSETRON 4 MG/1
4 TABLET, FILM COATED ORAL EVERY 6 HOURS PRN
Status: DISCONTINUED | OUTPATIENT
Start: 2023-11-17 | End: 2023-11-21 | Stop reason: HOSPADM

## 2023-11-17 RX ORDER — LABETALOL HYDROCHLORIDE 5 MG/ML
5 INJECTION, SOLUTION INTRAVENOUS
Status: DISCONTINUED | OUTPATIENT
Start: 2023-11-17 | End: 2023-11-17 | Stop reason: HOSPADM

## 2023-11-17 RX ORDER — SODIUM CHLORIDE 9 MG/ML
40 INJECTION, SOLUTION INTRAVENOUS AS NEEDED
Status: DISCONTINUED | OUTPATIENT
Start: 2023-11-17 | End: 2023-11-17 | Stop reason: HOSPADM

## 2023-11-17 RX ORDER — BUPIVACAINE HCL/0.9 % NACL/PF 0.125 %
PLASTIC BAG, INJECTION (ML) EPIDURAL AS NEEDED
Status: DISCONTINUED | OUTPATIENT
Start: 2023-11-17 | End: 2023-11-17 | Stop reason: SURG

## 2023-11-17 RX ORDER — NALOXONE HCL 0.4 MG/ML
0.4 VIAL (ML) INJECTION
Status: DISCONTINUED | OUTPATIENT
Start: 2023-11-17 | End: 2023-11-21 | Stop reason: HOSPADM

## 2023-11-17 RX ORDER — ONDANSETRON 2 MG/ML
4 INJECTION INTRAMUSCULAR; INTRAVENOUS
Status: DISCONTINUED | OUTPATIENT
Start: 2023-11-17 | End: 2023-11-17 | Stop reason: HOSPADM

## 2023-11-17 RX ORDER — ONDANSETRON 2 MG/ML
4 INJECTION INTRAMUSCULAR; INTRAVENOUS EVERY 6 HOURS PRN
Status: DISCONTINUED | OUTPATIENT
Start: 2023-11-17 | End: 2023-11-21 | Stop reason: HOSPADM

## 2023-11-17 RX ORDER — SODIUM CHLORIDE 9 MG/ML
40 INJECTION, SOLUTION INTRAVENOUS AS NEEDED
Status: DISCONTINUED | OUTPATIENT
Start: 2023-11-17 | End: 2023-11-21 | Stop reason: HOSPADM

## 2023-11-17 RX ORDER — OXYCODONE AND ACETAMINOPHEN 7.5; 325 MG/1; MG/1
2 TABLET ORAL EVERY 4 HOURS PRN
Status: DISCONTINUED | OUTPATIENT
Start: 2023-11-17 | End: 2023-11-21 | Stop reason: HOSPADM

## 2023-11-17 RX ORDER — ROCURONIUM BROMIDE 10 MG/ML
INJECTION, SOLUTION INTRAVENOUS AS NEEDED
Status: DISCONTINUED | OUTPATIENT
Start: 2023-11-17 | End: 2023-11-17 | Stop reason: SURG

## 2023-11-17 RX ORDER — DIPHENHYDRAMINE HCL 25 MG
25 CAPSULE ORAL EVERY 6 HOURS PRN
Status: DISCONTINUED | OUTPATIENT
Start: 2023-11-17 | End: 2023-11-21 | Stop reason: HOSPADM

## 2023-11-17 RX ORDER — ACETAMINOPHEN 325 MG/1
650 TABLET ORAL EVERY 4 HOURS PRN
Status: DISCONTINUED | OUTPATIENT
Start: 2023-11-17 | End: 2023-11-21 | Stop reason: HOSPADM

## 2023-11-17 RX ORDER — LIDOCAINE HYDROCHLORIDE 20 MG/ML
INJECTION, SOLUTION EPIDURAL; INFILTRATION; INTRACAUDAL; PERINEURAL AS NEEDED
Status: DISCONTINUED | OUTPATIENT
Start: 2023-11-17 | End: 2023-11-17 | Stop reason: SURG

## 2023-11-17 RX ORDER — DIPHENHYDRAMINE HYDROCHLORIDE 50 MG/ML
25 INJECTION INTRAMUSCULAR; INTRAVENOUS EVERY 6 HOURS PRN
Status: DISCONTINUED | OUTPATIENT
Start: 2023-11-17 | End: 2023-11-21 | Stop reason: HOSPADM

## 2023-11-17 RX ORDER — PROMETHAZINE HYDROCHLORIDE 25 MG/1
12.5 TABLET ORAL EVERY 6 HOURS PRN
Status: DISCONTINUED | OUTPATIENT
Start: 2023-11-17 | End: 2023-11-21 | Stop reason: HOSPADM

## 2023-11-17 RX ORDER — DEXAMETHASONE SODIUM PHOSPHATE 4 MG/ML
INJECTION, SOLUTION INTRA-ARTICULAR; INTRALESIONAL; INTRAMUSCULAR; INTRAVENOUS; SOFT TISSUE AS NEEDED
Status: DISCONTINUED | OUTPATIENT
Start: 2023-11-17 | End: 2023-11-17 | Stop reason: SURG

## 2023-11-17 RX ORDER — SODIUM CHLORIDE 0.9 % (FLUSH) 0.9 %
10 SYRINGE (ML) INJECTION EVERY 12 HOURS SCHEDULED
Status: DISCONTINUED | OUTPATIENT
Start: 2023-11-17 | End: 2023-11-21 | Stop reason: HOSPADM

## 2023-11-17 RX ORDER — OXYCODONE AND ACETAMINOPHEN 10; 325 MG/1; MG/1
1 TABLET ORAL ONCE AS NEEDED
Status: COMPLETED | OUTPATIENT
Start: 2023-11-17 | End: 2023-11-17

## 2023-11-17 RX ORDER — SODIUM CHLORIDE 0.9 % (FLUSH) 0.9 %
3-10 SYRINGE (ML) INJECTION AS NEEDED
Status: DISCONTINUED | OUTPATIENT
Start: 2023-11-17 | End: 2023-11-17 | Stop reason: HOSPADM

## 2023-11-17 RX ORDER — KETAMINE HCL IN NACL, ISO-OSM 100MG/10ML
SYRINGE (ML) INJECTION AS NEEDED
Status: DISCONTINUED | OUTPATIENT
Start: 2023-11-17 | End: 2023-11-17 | Stop reason: SURG

## 2023-11-17 RX ORDER — SODIUM CHLORIDE 0.9 % (FLUSH) 0.9 %
3 SYRINGE (ML) INJECTION EVERY 12 HOURS SCHEDULED
Status: DISCONTINUED | OUTPATIENT
Start: 2023-11-17 | End: 2023-11-17 | Stop reason: HOSPADM

## 2023-11-17 RX ORDER — NEOSTIGMINE METHYLSULFATE 5 MG/5 ML
SYRINGE (ML) INTRAVENOUS AS NEEDED
Status: DISCONTINUED | OUTPATIENT
Start: 2023-11-17 | End: 2023-11-17 | Stop reason: SURG

## 2023-11-17 RX ORDER — ASPIRIN 81 MG/1
81 TABLET, CHEWABLE ORAL DAILY
Status: DISCONTINUED | OUTPATIENT
Start: 2023-11-18 | End: 2023-11-19

## 2023-11-17 RX ORDER — CEFAZOLIN SODIUM 1 G/3ML
INJECTION, POWDER, FOR SOLUTION INTRAMUSCULAR; INTRAVENOUS AS NEEDED
Status: DISCONTINUED | OUTPATIENT
Start: 2023-11-17 | End: 2023-11-17 | Stop reason: SURG

## 2023-11-17 RX ORDER — OXYCODONE AND ACETAMINOPHEN 10; 325 MG/1; MG/1
1 TABLET ORAL EVERY 4 HOURS PRN
Status: DISCONTINUED | OUTPATIENT
Start: 2023-11-17 | End: 2023-11-21 | Stop reason: HOSPADM

## 2023-11-17 RX ORDER — FENTANYL CITRATE 50 UG/ML
INJECTION, SOLUTION INTRAMUSCULAR; INTRAVENOUS AS NEEDED
Status: DISCONTINUED | OUTPATIENT
Start: 2023-11-17 | End: 2023-11-17 | Stop reason: SURG

## 2023-11-17 RX ORDER — MAGNESIUM HYDROXIDE 1200 MG/15ML
LIQUID ORAL AS NEEDED
Status: DISCONTINUED | OUTPATIENT
Start: 2023-11-17 | End: 2023-11-17 | Stop reason: HOSPADM

## 2023-11-17 RX ORDER — ASPIRIN 81 MG/1
81 TABLET, CHEWABLE ORAL ONCE
Status: COMPLETED | OUTPATIENT
Start: 2023-11-17 | End: 2023-11-17

## 2023-11-17 RX ORDER — ONDANSETRON 2 MG/ML
INJECTION INTRAMUSCULAR; INTRAVENOUS AS NEEDED
Status: DISCONTINUED | OUTPATIENT
Start: 2023-11-17 | End: 2023-11-17 | Stop reason: SURG

## 2023-11-17 RX ORDER — DOCUSATE SODIUM 100 MG/1
100 CAPSULE, LIQUID FILLED ORAL 2 TIMES DAILY PRN
Status: DISCONTINUED | OUTPATIENT
Start: 2023-11-17 | End: 2023-11-21 | Stop reason: HOSPADM

## 2023-11-17 RX ORDER — MIDAZOLAM HYDROCHLORIDE 1 MG/ML
1 INJECTION INTRAMUSCULAR; INTRAVENOUS
Status: DISCONTINUED | OUTPATIENT
Start: 2023-11-17 | End: 2023-11-17 | Stop reason: HOSPADM

## 2023-11-17 RX ORDER — FENTANYL CITRATE 50 UG/ML
25 INJECTION, SOLUTION INTRAMUSCULAR; INTRAVENOUS
Status: DISCONTINUED | OUTPATIENT
Start: 2023-11-17 | End: 2023-11-17 | Stop reason: HOSPADM

## 2023-11-17 RX ORDER — SODIUM CHLORIDE 0.9 % (FLUSH) 0.9 %
1-10 SYRINGE (ML) INJECTION AS NEEDED
Status: DISCONTINUED | OUTPATIENT
Start: 2023-11-17 | End: 2023-11-21 | Stop reason: HOSPADM

## 2023-11-17 RX ORDER — ACETAMINOPHEN 650 MG/1
650 SUPPOSITORY RECTAL EVERY 4 HOURS PRN
Status: DISCONTINUED | OUTPATIENT
Start: 2023-11-17 | End: 2023-11-21 | Stop reason: HOSPADM

## 2023-11-17 RX ORDER — NALOXONE HCL 0.4 MG/ML
0.04 VIAL (ML) INJECTION AS NEEDED
Status: DISCONTINUED | OUTPATIENT
Start: 2023-11-17 | End: 2023-11-17 | Stop reason: HOSPADM

## 2023-11-17 RX ORDER — PROMETHAZINE HYDROCHLORIDE 12.5 MG/1
12.5 SUPPOSITORY RECTAL EVERY 6 HOURS PRN
Status: DISCONTINUED | OUTPATIENT
Start: 2023-11-17 | End: 2023-11-21 | Stop reason: HOSPADM

## 2023-11-17 RX ORDER — PROPOFOL 10 MG/ML
VIAL (ML) INTRAVENOUS AS NEEDED
Status: DISCONTINUED | OUTPATIENT
Start: 2023-11-17 | End: 2023-11-17 | Stop reason: SURG

## 2023-11-17 RX ADMIN — VANCOMYCIN HYDROCHLORIDE 1500 MG: 10 INJECTION, POWDER, LYOPHILIZED, FOR SOLUTION INTRAVENOUS at 05:54

## 2023-11-17 RX ADMIN — DROPERIDOL 0.62 MG: 2.5 INJECTION, SOLUTION INTRAMUSCULAR; INTRAVENOUS at 18:46

## 2023-11-17 RX ADMIN — Medication 100 MCG: at 17:41

## 2023-11-17 RX ADMIN — Medication 50 MG: at 16:54

## 2023-11-17 RX ADMIN — DULOXETINE HYDROCHLORIDE 30 MG: 30 CAPSULE, DELAYED RELEASE ORAL at 21:18

## 2023-11-17 RX ADMIN — ROCURONIUM BROMIDE 50 MG: 10 INJECTION, SOLUTION INTRAVENOUS at 16:54

## 2023-11-17 RX ADMIN — Medication 3 MG: at 17:45

## 2023-11-17 RX ADMIN — ONDANSETRON 4 MG: 2 INJECTION INTRAMUSCULAR; INTRAVENOUS at 17:49

## 2023-11-17 RX ADMIN — ATORVASTATIN CALCIUM 40 MG: 40 TABLET ORAL at 21:18

## 2023-11-17 RX ADMIN — MORPHINE SULFATE 1 MG: 2 INJECTION, SOLUTION INTRAMUSCULAR; INTRAVENOUS at 10:17

## 2023-11-17 RX ADMIN — HYDROCODONE BITARTRATE AND ACETAMINOPHEN 1 TABLET: 7.5; 325 TABLET ORAL at 00:29

## 2023-11-17 RX ADMIN — Medication 200 MCG: at 17:55

## 2023-11-17 RX ADMIN — DEXAMETHASONE SODIUM PHOSPHATE 8 MG: 4 INJECTION, SOLUTION INTRA-ARTICULAR; INTRALESIONAL; INTRAMUSCULAR; INTRAVENOUS; SOFT TISSUE at 17:02

## 2023-11-17 RX ADMIN — MORPHINE SULFATE 1 MG: 2 INJECTION, SOLUTION INTRAMUSCULAR; INTRAVENOUS at 15:32

## 2023-11-17 RX ADMIN — OXYCODONE HYDROCHLORIDE AND ACETAMINOPHEN 1 TABLET: 10; 325 TABLET ORAL at 22:42

## 2023-11-17 RX ADMIN — CEFAZOLIN 1 G: 1 INJECTION, POWDER, FOR SOLUTION INTRAMUSCULAR; INTRAVENOUS at 17:24

## 2023-11-17 RX ADMIN — LEVOTHYROXINE SODIUM 100 MCG: 100 TABLET ORAL at 05:54

## 2023-11-17 RX ADMIN — SODIUM CHLORIDE, POTASSIUM CHLORIDE, SODIUM LACTATE AND CALCIUM CHLORIDE 100 ML/HR: 600; 310; 30; 20 INJECTION, SOLUTION INTRAVENOUS at 16:45

## 2023-11-17 RX ADMIN — HYDROCODONE BITARTRATE AND ACETAMINOPHEN 1 TABLET: 7.5; 325 TABLET ORAL at 04:56

## 2023-11-17 RX ADMIN — MONTELUKAST SODIUM 10 MG: 10 TABLET ORAL at 21:18

## 2023-11-17 RX ADMIN — ASPIRIN 81 MG: 81 TABLET, CHEWABLE ORAL at 16:45

## 2023-11-17 RX ADMIN — Medication 200 MCG: at 17:27

## 2023-11-17 RX ADMIN — DOCUSATE SODIUM 50 MG AND SENNOSIDES 8.6 MG 2 TABLET: 8.6; 5 TABLET, FILM COATED ORAL at 21:18

## 2023-11-17 RX ADMIN — GLYCOPYRROLATE 0.4 MG: 0.2 INJECTION INTRAMUSCULAR; INTRAVENOUS at 17:45

## 2023-11-17 RX ADMIN — FENTANYL CITRATE 100 MCG: 50 INJECTION, SOLUTION INTRAMUSCULAR; INTRAVENOUS at 17:08

## 2023-11-17 RX ADMIN — LIDOCAINE HYDROCHLORIDE 100 MG: 20 INJECTION, SOLUTION EPIDURAL; INFILTRATION; INTRACAUDAL; PERINEURAL at 16:54

## 2023-11-17 RX ADMIN — HYDROMORPHONE HYDROCHLORIDE 0.5 MG: 1 INJECTION, SOLUTION INTRAMUSCULAR; INTRAVENOUS; SUBCUTANEOUS at 18:29

## 2023-11-17 RX ADMIN — OXYCODONE HYDROCHLORIDE AND ACETAMINOPHEN 1 TABLET: 10; 325 TABLET ORAL at 18:36

## 2023-11-17 RX ADMIN — SODIUM CHLORIDE, POTASSIUM CHLORIDE, SODIUM LACTATE AND CALCIUM CHLORIDE 100 ML/HR: 600; 310; 30; 20 INJECTION, SOLUTION INTRAVENOUS at 21:19

## 2023-11-17 RX ADMIN — Medication 10 ML: at 21:19

## 2023-11-17 RX ADMIN — PANTOPRAZOLE SODIUM 40 MG: 40 TABLET, DELAYED RELEASE ORAL at 05:54

## 2023-11-17 RX ADMIN — LIDOCAINE HYDROCHLORIDE 100 MG: 20 INJECTION, SOLUTION EPIDURAL; INFILTRATION; INTRACAUDAL; PERINEURAL at 18:02

## 2023-11-17 RX ADMIN — PROPOFOL 200 MG: 10 INJECTION, EMULSION INTRAVENOUS at 16:54

## 2023-11-17 NOTE — PROGRESS NOTES
Labs reviewed    Gram stain with many WBC, no organisms    Cell count shows 58k WBC, 130 k RBC    I suspect this is a possible infected hematoma    Plan for I&D today, possible poly exchange.    NPO after light breakfast.    Electronically signed by Dionicio Araiza MD on 11/17/2023 at 07:16 CST

## 2023-11-17 NOTE — PROCEDURES
Patient seen and examined.  She developed a large hematoma postop after a reverse TSA.     Tolerating IV antibiotics    Fluctuant area anterior in mid incision.    Procedure note: After sterile prep, an 18 gauge needle was inserted into the area of fluctuance.  There was straw colored fluid that was evacuated, approximately 30 ml.  There was no purulence.  This fluid was collected and sent for gram stain/culture, cell count.    Will determine need for surgery based on labs and clinical appearance    Differential includes postop wound infection, hematoma with infection, vs. Resolving hematoma.    NPO after light breakfast.    Electronically signed by Dionicio Araiza MD on 11/16/2023 at 21:38 CST

## 2023-11-17 NOTE — ANESTHESIA PREPROCEDURE EVALUATION
Anesthesia Evaluation     Patient summary reviewed   history of anesthetic complications:  PONV  NPO Solid Status: > 8 hours             Airway   Mallampati: II  TM distance: >3 FB  Neck ROM: full  Dental      Pulmonary    (+) a smoker (vapes),  (-) COPD, asthma, sleep apnea  Cardiovascular   Exercise tolerance: good (4-7 METS)    (+) hypertension, CAD, cardiac stents (x3, 2019) more than 12 months ago , hyperlipidemia  (-) pacemaker, past MI, angina      Neuro/Psych  (-) seizures, TIA, CVA  GI/Hepatic/Renal/Endo    (+) obesity, GERD, renal disease- CRI, diabetes mellitus, thyroid problem hypothyroidism and thyroid cancer  (-) liver disease    Musculoskeletal     Abdominal    Substance History      OB/GYN          Other                      Anesthesia Plan    ASA 3 - emergent     general     (Asa preop)  intravenous induction     Anesthetic plan, risks, benefits, and alternatives have been provided, discussed and informed consent has been obtained with: patient.

## 2023-11-17 NOTE — PLAN OF CARE
Goal Outcome Evaluation:           Progress: improving  Outcome Evaluation: Pt is A&Ox4. VSS. RA. C/o pain relieved by PRN medications. Up ad milagro to BR. Sling in place to SONIDO. Last BM 11/16. IV L AC, flushed and saline locked. Tolerating regular diet. Family at bedside. Call light in reach. Safety maintained.

## 2023-11-17 NOTE — ANESTHESIA PROCEDURE NOTES
Airway  Urgency: elective    Date/Time: 11/17/2023 4:55 PM  Airway not difficult    General Information and Staff    Patient location during procedure: OR  CRNA/CAA: Jere Gonsales CRNA    Indications and Patient Condition    Preoxygenated: yes  Mask difficulty assessment: 1 - vent by mask    Final Airway Details  Final airway type: endotracheal airway      Successful airway: ETT  Cuffed: yes   Successful intubation technique: direct laryngoscopy  Endotracheal tube insertion site: oral  Blade: Monzon  Blade size: 3.5  ETT size (mm): 7.0  Cormack-Lehane Classification: grade I - full view of glottis  Placement verified by: chest auscultation   Cuff volume (mL): 7  Measured from: lips  ETT/EBT  to lips (cm): 21  Number of attempts at approach: 1  Assessment: lips, teeth, and gum same as pre-op and atraumatic intubation

## 2023-11-17 NOTE — PROGRESS NOTES
"Pharmacy Dosing Service  Pharmacokinetics  Vancomycin Initial Evaluation  Assessment/Action/Plan:  Loading dose?: No (Received a 1-time dose earlier today 11/16 06:00)  Current Order: Vancomycin 1500 mg IVPB every 24 hours  Current end date:11/21/23   Levels: None ordered at this time  Additional antimicrobial agent(s): None    Vancomycin dosage initiated based on population pharmacokinetic parameters (see below). Pharmacy will continue to follow daily and adjust dose accordingly.     Subjective:  Mayuri Almeida is a 68 y.o. female with a Vancomycin \"Pharmacy to Dose\" consult for the treatment of wound , day 1 of 5 of treatment (to begin tomorrow 11/17)    AUC Model Data:  Regimen: 1500 mg IV every 24 hours.  Start time: 19:54 on 11/16/2023  Exposure target: AUC24 (range)400-600 mg/L.hr   AUC24,ss: 538 mg/L.hr  PAUC*: 81 %  Ctrough,ss: 15.5 mg/L  Pconc*: 28 %  Tox.: 11 %    Objective:  Ht: 157.5 cm (62\"); Wt: 74.8 kg (164 lb 12.8 oz)  Estimated Creatinine Clearance: 51 mL/min (by C-G formula based on SCr of 1 mg/dL).   Creatinine   Date Value Ref Range Status   11/16/2023 1.00 0.57 - 1.00 mg/dL Final   10/10/2023 1.15 (H) 0.57 - 1.00 mg/dL Final   08/23/2023 0.93 0.57 - 1.00 mg/dL Final   04/20/2022 1.4 (H) 0.5 - 0.9 mg/dL Final   07/14/2021 1.2 (H) 0.5 - 0.9 mg/dL Final   01/21/2021 1.2 (H) 0.5 - 0.9 mg/dL Final      Lab Results   Component Value Date    WBC 11.97 (H) 11/16/2023    WBC 9.80 10/10/2023    WBC 9.93 08/23/2023      Baseline culture results:  Microbiology Results (last 10 days)       ** No results found for the last 240 hours. **            Rian White, PharmD  11/16/23 18:31 CST   "

## 2023-11-18 LAB
ANION GAP SERPL CALCULATED.3IONS-SCNC: 12 MMOL/L (ref 5–15)
BUN SERPL-MCNC: 10 MG/DL (ref 8–23)
BUN/CREAT SERPL: 10.6 (ref 7–25)
CALCIUM SPEC-SCNC: 8.5 MG/DL (ref 8.6–10.5)
CHLORIDE SERPL-SCNC: 98 MMOL/L (ref 98–107)
CO2 SERPL-SCNC: 27 MMOL/L (ref 22–29)
CREAT SERPL-MCNC: 0.94 MG/DL (ref 0.57–1)
EGFRCR SERPLBLD CKD-EPI 2021: 66.2 ML/MIN/1.73
GLUCOSE SERPL-MCNC: 196 MG/DL (ref 65–99)
HCT VFR BLD AUTO: 31.9 % (ref 34–46.6)
HGB BLD-MCNC: 9.8 G/DL (ref 12–15.9)
POTASSIUM SERPL-SCNC: 3.7 MMOL/L (ref 3.5–5.2)
SODIUM SERPL-SCNC: 137 MMOL/L (ref 136–145)

## 2023-11-18 PROCEDURE — 80048 BASIC METABOLIC PNL TOTAL CA: CPT | Performed by: ORTHOPAEDIC SURGERY

## 2023-11-18 PROCEDURE — 25810000003 SODIUM CHLORIDE 0.9 % SOLUTION: Performed by: ORTHOPAEDIC SURGERY

## 2023-11-18 PROCEDURE — 85014 HEMATOCRIT: CPT | Performed by: ORTHOPAEDIC SURGERY

## 2023-11-18 PROCEDURE — 85018 HEMOGLOBIN: CPT | Performed by: ORTHOPAEDIC SURGERY

## 2023-11-18 PROCEDURE — 25010000002 VANCOMYCIN 10 G RECONSTITUTED SOLUTION: Performed by: ORTHOPAEDIC SURGERY

## 2023-11-18 PROCEDURE — 25010000002 CEFEPIME PER 500 MG: Performed by: INTERNAL MEDICINE

## 2023-11-18 PROCEDURE — 97165 OT EVAL LOW COMPLEX 30 MIN: CPT

## 2023-11-18 PROCEDURE — 99223 1ST HOSP IP/OBS HIGH 75: CPT | Performed by: INTERNAL MEDICINE

## 2023-11-18 RX ORDER — TRIAMTERENE AND HYDROCHLOROTHIAZIDE 75; 50 MG/1; MG/1
1 TABLET ORAL DAILY
Status: DISCONTINUED | OUTPATIENT
Start: 2023-11-18 | End: 2023-11-21 | Stop reason: HOSPADM

## 2023-11-18 RX ADMIN — VANCOMYCIN HYDROCHLORIDE 1500 MG: 10 INJECTION, POWDER, LYOPHILIZED, FOR SOLUTION INTRAVENOUS at 05:56

## 2023-11-18 RX ADMIN — PANTOPRAZOLE SODIUM 40 MG: 40 TABLET, DELAYED RELEASE ORAL at 05:57

## 2023-11-18 RX ADMIN — CEFEPIME 2000 MG: 2 INJECTION, POWDER, FOR SOLUTION INTRAVENOUS at 12:07

## 2023-11-18 RX ADMIN — DOCUSATE SODIUM 50 MG AND SENNOSIDES 8.6 MG 2 TABLET: 8.6; 5 TABLET, FILM COATED ORAL at 20:15

## 2023-11-18 RX ADMIN — DULOXETINE HYDROCHLORIDE 30 MG: 30 CAPSULE, DELAYED RELEASE ORAL at 08:10

## 2023-11-18 RX ADMIN — OXYCODONE HYDROCHLORIDE AND ACETAMINOPHEN 1 TABLET: 10; 325 TABLET ORAL at 16:02

## 2023-11-18 RX ADMIN — OXYCODONE AND ACETAMINOPHEN 2 TABLET: 7.5; 325 TABLET ORAL at 20:16

## 2023-11-18 RX ADMIN — LOSARTAN POTASSIUM 25 MG: 50 TABLET, FILM COATED ORAL at 08:08

## 2023-11-18 RX ADMIN — AMLODIPINE BESYLATE 5 MG: 5 TABLET ORAL at 08:10

## 2023-11-18 RX ADMIN — Medication 10 ML: at 08:10

## 2023-11-18 RX ADMIN — CEFEPIME 2000 MG: 2 INJECTION, POWDER, FOR SOLUTION INTRAVENOUS at 20:16

## 2023-11-18 RX ADMIN — PROPRANOLOL HYDROCHLORIDE 80 MG: 80 CAPSULE, EXTENDED RELEASE ORAL at 08:10

## 2023-11-18 RX ADMIN — MONTELUKAST SODIUM 10 MG: 10 TABLET ORAL at 20:16

## 2023-11-18 RX ADMIN — LEVOTHYROXINE SODIUM 100 MCG: 100 TABLET ORAL at 05:57

## 2023-11-18 RX ADMIN — ATORVASTATIN CALCIUM 40 MG: 40 TABLET ORAL at 20:16

## 2023-11-18 RX ADMIN — Medication 10 ML: at 20:16

## 2023-11-18 RX ADMIN — TRIAMTERENE AND HYDROCHLOROTHIAZIDE 0.5 TABLET: 75; 50 TABLET ORAL at 20:16

## 2023-11-18 RX ADMIN — DULOXETINE HYDROCHLORIDE 30 MG: 30 CAPSULE, DELAYED RELEASE ORAL at 20:16

## 2023-11-18 RX ADMIN — OXYCODONE HYDROCHLORIDE AND ACETAMINOPHEN 1 TABLET: 10; 325 TABLET ORAL at 12:08

## 2023-11-18 RX ADMIN — DOCUSATE SODIUM 50 MG AND SENNOSIDES 8.6 MG 2 TABLET: 8.6; 5 TABLET, FILM COATED ORAL at 08:09

## 2023-11-18 RX ADMIN — OXYCODONE HYDROCHLORIDE AND ACETAMINOPHEN 1 TABLET: 10; 325 TABLET ORAL at 08:09

## 2023-11-18 RX ADMIN — ASPIRIN 81 MG: 81 TABLET, CHEWABLE ORAL at 08:09

## 2023-11-18 RX ADMIN — ACETAMINOPHEN 650 MG: 325 TABLET, FILM COATED ORAL at 19:23

## 2023-11-18 NOTE — PLAN OF CARE
Goal Outcome Evaluation:  Plan of Care Reviewed With: patient, spouse        Progress: no change  Outcome Evaluation: OT eval completed.  Pt alert and oriented x4.  Shoulder immobilizer donned on RUE.  Pt c/o minimal R shoulder pain.  She is typically independent with ADLs and mobility.  Spouse present for education.  Pt independent with mobility including transfers and ambulation.  SBA to don/doff shoulder immobilizer.  Pt was educated on pendulum exercises and performed these with minimal assist and verbal cues from OT while supporting self with LUE with foot board of bed.  Also completed distal RUE exercises including elbow flex/ext, forearm pron/sup, wrist flex/ext and digit flex/ext.  Pt understands ROM exercises and surgical precautions for post op period and was issued written protocol for TSR.  At this time she is independent with exercises with assist of spouse and is also independent with ADLs and mobility.  OT to sign off.  Pt is safe for discharge home with spouse when medically ready.      Anticipated Discharge Disposition (OT): home with assist

## 2023-11-18 NOTE — PROGRESS NOTES
Orthopedic Surgery Progress Note    Mayuri Almeida  11/18/2023      Subjective:     Systemic or Specific Complaints:   Pain improved postop  No acute events    Objective:     Patient Vitals for the past 24 hrs:   BP Temp Temp src Pulse Resp SpO2   11/18/23 0808 (P) 138/73 98.3 °F (36.8 °C) Oral (P) 72 20 98 %   11/18/23 0528 148/70 97.8 °F (36.6 °C) Oral 83 18 92 %   11/18/23 0027 162/98 98 °F (36.7 °C) Oral 76 18 95 %   11/17/23 1956 138/78 98.6 °F (37 °C) Oral 71 18 96 %   11/17/23 1929 163/72 98.2 °F (36.8 °C) Oral 60 18 91 %   11/17/23 1855 173/71 97.2 °F (36.2 °C) Temporal 52 13 99 %   11/17/23 1850 164/74 -- -- 57 18 100 %   11/17/23 1845 (!) 158/121 -- -- 56 15 100 %   11/17/23 1840 166/77 -- -- 63 20 99 %   11/17/23 1835 180/79 -- -- 66 22 96 %   11/17/23 1830 158/94 -- -- 70 19 99 %   11/17/23 1825 169/75 -- -- 67 20 99 %   11/17/23 1820 150/76 -- -- 65 16 100 %   11/17/23 1815 147/70 -- -- 62 17 100 %   11/17/23 1811 138/66 97.4 °F (36.3 °C) Temporal 62 22 100 %   11/17/23 1537 148/83 98.1 °F (36.7 °C) Oral 78 16 98 %       right upper  General: alert, appears stated age and cooperative   Wound: covered             Dressing: Clean, dry, intact   Extremity: Distal NVI           DVT Exam: No evidence of DVT                   Data Review:  Lab Results (last 24 hours)       Procedure Component Value Units Date/Time    Basic Metabolic Panel [[id number]]  (Abnormal) Collected: 11/18/23 0640    Specimen: Blood Updated: 11/18/23 0728     Glucose 196 mg/dL      BUN 10 mg/dL      Creatinine 0.94 mg/dL      Sodium 137 mmol/L      Potassium 3.7 mmol/L      Chloride 98 mmol/L      CO2 27.0 mmol/L      Calcium 8.5 mg/dL      BUN/Creatinine Ratio 10.6     Anion Gap 12.0 mmol/L      eGFR 66.2 mL/min/1.73     Narrative:      GFR Normal >60  Chronic Kidney Disease <60  Kidney Failure <15      Hemoglobin & Hematocrit, Blood [[id number]]  (Abnormal) Collected: 11/18/23 0640    Specimen: Blood Updated: 11/18/23 0708      Hemoglobin 9.8 g/dL      Hematocrit 31.9 %     Body Fluid Culture - Body Fluid, Shoulder, Right [[id number]] Collected: 11/17/23 1712    Specimen: Body Fluid from Shoulder, Right Updated: 11/18/23 0707     Body Fluid Culture No growth at less than 24 hours     Gram Stain Moderate (3+) WBCs seen      No organisms seen    Tissue / Bone Culture - Tissue, Shoulder, Right [[id number]] Collected: 11/17/23 1715    Specimen: Tissue from Shoulder, Right Updated: 11/18/23 0703     Gram Stain Many (4+) WBCs seen      Few (2+) Gram negative bacilli    POC Glucose Once [[id number]]  (Normal) Collected: 11/17/23 1824    Specimen: Blood Updated: 11/17/23 1835     Glucose 128 mg/dL      Comment: : [id number] Mcgee  SMeter ID: [id number]       KOH Prep - Tissue, Shoulder, Right [[id number]] Collected: 11/17/23 1715    Specimen: Tissue from Shoulder, Right Updated: 11/17/23 1827     KOH Prep No yeast or hyphal elements seen    KOH Prep - Body Fluid, Shoulder, Right [[id number]] Collected: 11/17/23 1712    Specimen: Body Fluid from Shoulder, Right Updated: 11/17/23 1801     KOH Prep No yeast or hyphal elements seen    Fungus Culture - Tissue, Shoulder, Right [[id number]] Collected: 11/17/23 1715    Specimen: Tissue from Shoulder, Right Updated: 11/17/23 1728    AFB Culture - Tissue, Shoulder, Right [[id number]] Collected: 11/17/23 1715    Specimen: Tissue from Shoulder, Right Updated: 11/17/23 1728    Anaerobic Culture - Tissue, Shoulder, Right [[id number]] Collected: 11/17/23 1715    Specimen: Tissue from Shoulder, Right Updated: 11/17/23 1728    Anaerobic Culture - Body Fluid, Shoulder, Right [[id number]] Collected: 11/17/23 1712    Specimen: Body Fluid from Shoulder, Right Updated: 11/17/23 1728    Fungus Culture - Body Fluid, Shoulder, Right [[id number]] Collected: 11/17/23 1712    Specimen: Body Fluid from Shoulder, Right Updated: 11/17/23 1728    AFB Culture - Body Fluid, Shoulder, Right [[id number]] Collected: 11/17/23 7185     Specimen: Body Fluid from Shoulder, Right Updated: 11/17/23 8439          Imaging Results (Last 24 Hours)       ** No results found for the last 24 hours. **            Assessment:   1 Day Post-Op  I&D, poly exchange R shoulder    Plan:     1) culture from yesterday with G (-) bacilli  2) ID consult, continue Iv antibiotics - expect PICC with 6 weeks abx  3) pain control, ice      Dionicio Araiza MD

## 2023-11-18 NOTE — THERAPY DISCHARGE NOTE
Acute Care - Occupational Therapy Initial Evaluation/Discharge  [facility]     Patient Name: Mayuri Almeida  : 1955  MRN: [id number]  Today's Date: 2023  Onset of Illness/Injury or Date of Surgery: 23  Date of Referral to OT: 23  Referring Physician: Dr. Araiza      Admit Date: 2023       ICD-10-CM ICD-9-CM   1. Abscess  L02.91 682.9   2. Infected hematoma  T14.8XXA 924.9    L08.9 686.9     Patient Active Problem List   Diagnosis    Traumatic complete tear of left rotator cuff    Acute UTI    Stage 3 chronic kidney disease    Nontraumatic complete tear of right rotator cuff    Right rotator cuff tear arthropathy    Post op infection    Wound infection     Past Medical History:   Diagnosis Date    Anxiety     Arthritis     Bilateral cataracts     EARLY STAGE    Bladder spasms     Cancer     thyroid    Coronary artery disease     Degenerative joint disease of low back     Diabetes mellitus     Disease of thyroid gland     Fibromyalgia     GERD (gastroesophageal reflux disease)     History of bad fall     w/ damage to Left shoulder.    History of detached retina repair     Hypertension     PONV (postoperative nausea and vomiting)     Psoriasis     Stage 3 chronic kidney disease      Past Surgical History:   Procedure Laterality Date    APPENDECTOMY      BICEPS TENDONESIS SUBPECTORALIS REPAIR Right 2022    Procedure: BICEPS TENODESIS/TENOTOMY;  Surgeon: Dionicio Araiza MD;  Location: [facility];  Service: Orthopedics;  Laterality: Right;    CARPAL TUNNEL RELEASE Bilateral     CORONARY STENT PLACEMENT  06/21/2019    x 3     FINGER/THUMB ARTHROPLASTY Bilateral     Trigger thumb release    HYSTERECTOMY      ORIF ANKLE FRACTURE Left     RETINAL DETACHMENT REPAIR Right     SHOULDER ARTHROSCOPY W/ ROTATOR CUFF REPAIR Left 2018    Procedure: LEFT SHOULDER ARTHROSCOPIC ROTATOR CUFF REPAIR, DEBRIDEMENT, SUBACROMIAL DECOMPRESSION, DISTAL CLAVICLE EXCISION BICEPS TENODESIS,  TENOTOMY - LEFT  ;  Surgeon: Dionicio Araiza MD;  Location:  [facility];  Service: Orthopedics    SHOULDER ARTHROSCOPY W/ ROTATOR CUFF REPAIR Right 1/11/2022    Procedure: RIGHT SHOULDER ROTATOR CUFF REPAIR, BICEPS TENODESIS/TENOTOMY;  Surgeon: Dionicio Araiza MD;  Location:  [facility];  Service: Orthopedics;  Laterality: Right;    THYROID CYST EXCISION      TOTAL HIP ARTHROPLASTY      2020    TOTAL SHOULDER ARTHROPLASTY W/ DISTAL CLAVICLE EXCISION Right 10/17/2023    Procedure: RIGHT REVERSE TOTAL SHOULDER ARTHROPLASTY;  Surgeon: Dionicio Araiza MD;  Location:  [facility];  Service: Orthopedics;  Laterality: Right;    WRIST TENDON REPAIR LENGTHENING Right        OT ASSESSMENT FLOWSHEET (last 12 hours)       OT Evaluation and Treatment       Row Name 11/18/23 1100                   OT Time and Intention    Subjective Information complains of;pain  -AC        Document Type discharge evaluation/summary  -AC        Mode of Treatment occupational therapy  -AC           General Information    Patient Profile Reviewed yes  -AC        Onset of Illness/Injury or Date of Surgery 11/17/23  -AC        Referring Physician Dr. Araiza  -AC        Prior Level of Function independent:;all household mobility;community mobility;gait;transfer;bed mobility;ADL's;home management;cooking;cleaning;driving;shopping  -AC        Pertinent History of Current Functional Problem R reverse TSR on 10/17/23 with subsequent redness, erythema; Dx: infected hematoma R shoulder s/p I&D with poly exchange on 11/17/23  -AC        Existing Precautions/Restrictions fall;right;shoulder;non-weight bearing  -AC        Risks Reviewed LOB;increased discomfort;change in vital signs;increased drainage;lines disloged;patient and family:  -AC        Benefits Reviewed improve function;increase independence;increase strength;increase balance;decrease pain;decrease risk of DVT;improve skin integrity;increase knowledge;patient and family:  -AC         Barriers to Rehab none identified  -           Living Environment    Current Living Arrangements home  -AC           Pain Assessment    Pain Location - Side/Orientation Right  -AC        Pain Location - shoulder  -AC        Pain Intervention(s) Repositioned;Rest;Ambulation/increased activity;Cold pack  -AC        Additional Documentation Pain Scale: FACES Pre/Post-Treatment (Group)  -           Pain Scale: FACES Pre/Post-Treatment    Pain: FACES Scale, Pretreatment 2-->hurts little bit  -AC        Posttreatment Pain Rating 2-->hurts little bit  -AC           Cognition    Orientation Status (Cognition) oriented x 4  -AC        Follows Commands (Cognition) WFL  -AC        Personal Safety Interventions fall prevention program maintained;gait belt;muscle strengthening facilitated;nonskid shoes/slippers when out of bed;supervised activity;toileting scheduled  -           Range of Motion Comprehensive    General Range of Motion --  -AC        Comment, General Range of Motion R elbow, forearm, wrist and hand intact AROM, LUE intact AROM  -AC           Strength Comprehensive (MMT)    Comment, General Manual Muscle Testing (MMT) Assessment RUE 3/5, LUE 4+/5 functionally  -           Activities of Daily Living    BADL Assessment/Intervention upper body dressing  -           Upper Body Dressing Assessment/Training    [place] Level (Upper Body Dressing) don;doff;standby assist  shoulder immobilizer  -        Position (Upper Body Dressing) edge of bed sitting;supported standing  -           Lower Body Dressing Assessment/Training    [place] Level (Lower Body Dressing) --  -        Position (Lower Body Dressing) --  -           BADL Safety/Performance    Impairments, BADL Safety/Performance pain;range of motion;strength  -           Bed Mobility    Bed Mobility --  -        Assistive Device (Bed Mobility) --  -AC        Comment, (Bed Mobility) up EOB  -           Functional Mobility    Functional  Mobility- Ind. Level independent  -AC        Functional Mobility- Device --  -AC        Functional Mobility- Comment in room, back to bed  -AC           Transfer Assessment/Treatment    Transfers sit-stand transfer;stand-sit transfer  -AC           Sit-Stand Transfer    Sit-Stand [place] (Transfers) independent  -AC           Stand-Sit Transfer    Stand-Sit [place] (Transfers) independent  -AC           Safety Issues, Functional Mobility    Impairments Affecting Function (Mobility) pain;range of motion (ROM);strength  -AC           Motor Skills    Therapeutic Exercise shoulder  -AC           Shoulder (Therapeutic Exercise)    Shoulder (Therapeutic Exercise) pendulum exercises  -AC        Shoulder Pendulum Exercises (Therapeutic Exercise) right;standing;15 seconds duration;needs assist to initiate movement  -AC           Balance    Balance Assessment sitting static balance;sitting dynamic balance;standing static balance;standing dynamic balance  -        Static Sitting Balance independent  -AC        Dynamic Sitting Balance independent  -AC        Position, Sitting Balance sitting edge of bed;unsupported  -AC        Static Standing Balance independent  -AC        Dynamic Standing Balance independent  -AC        Position/Device Used, Standing Balance supported  -           Wound 11/17/23 1710 Right anterior shoulder    Wound - Properties Group Placement Date: 11/17/23 -SA Placement Time: 1710  -SA Present on Original Admission: N  -SA Side: Right  -SA Orientation: anterior  -SA Location: shoulder  -SA    Retired Wound - Properties Group Placement Date: 11/17/23 -SA Placement Time: 1710 -SA Present on Original Admission: N  -SA Side: Right  -SA Orientation: anterior  -SA Location: shoulder  -SA    Retired Wound - Properties Group Date first assessed: 11/17/23 -SA Time first assessed: 1710 -SA Present on Original Admission: N  -SA Side: Right  -SA Location: shoulder  -SA       Plan of Care Review     Plan of Care Reviewed With patient;spouse  -AC        Progress no change  -AC        Outcome Evaluation OT eval completed.  Pt alert and oriented x4.  Shoulder immobilizer donned on RUE.  Pt c/o minimal R shoulder pain.  She is typically independent with ADLs and mobility.  Spouse present for education.  Pt independent with mobility including transfers and ambulation.  SBA to don/doff shoulder immobilizer.  Pt was educated on pendulum exercises and performed these with minimal assist and verbal cues from OT while supporting self with LUE with foot board of bed.  Also completed distal RUE exercises including elbow flex/ext, forearm pron/sup, wrist flex/ext and digit flex/ext.  Pt understands ROM exercises and surgical precautions for post op period and was issued written protocol for TSR.  At this time she is independent with exercises with assist of spouse and is also independent with ADLs and mobility.  OT to sign off.  Pt is safe for discharge home with spouse when medically ready.  -AC           Positioning and Restraints    Pre-Treatment Position in bed  -AC        Post Treatment Position bed  -AC        In Bed sitting EOB;call light within reach;encouraged to call for assist;with family/caregiver;side rails up x2;with brace  -AC           Therapy Assessment/Plan (OT)    Date of Referral to OT 11/17/23  -AC        OT Diagnosis decreased adl  -AC        Rehab Potential (OT) --  -AC        Criteria for Skilled Therapeutic Interventions Met (OT) no problems identified which require skilled intervention  -AC        Therapy Frequency (OT) evaluation only  -AC        Predicted Duration of Therapy Intervention (OT) 10 days  -AC        Activity Limitations Related to Problem List (OT) --  -AC        Planned Therapy Interventions (OT) --  -AC           OT Goals    Transfer Goal Selection (OT) --  -AC        Bathing Goal Selection (OT) --  -AC        Dressing Goal Selection (OT) --  -AC           Transfer Goal 1 (OT)     Activity/Assistive Device (Transfer Goal 1, OT) --  -AC        Time Frame (Transfer Goal 1, OT) --  -AC        Progress/Outcome (Transfer Goal 1, OT) --  -AC           Bathing Goal 1 (OT)    Activity/Device (Bathing Goal 1, OT) --  -AC        Time Frame (Bathing Goal 1, OT) --  -AC           Dressing Goal 1 (OT)    Activity/Device (Dressing Goal 1, OT) --  -AC        Time Frame (Dressing Goal 1, OT) --  -AC        Progress/Outcome (Dressing Goal 1, OT) --  -AC                  User Key  (r) = Recorded By, (t) = Taken By, (c) = Cosigned By      Initials Name Effective Dates    Chris Ayala, OTR/L, CNT 02/03/23 -     Amparo Johns, RN 06/16/21 -                     Occupational Therapy Education       Title: PT OT SLP Therapies (Done)       Topic: Occupational Therapy (Done)       Point: ADL training (Done)       Description:   Instruct learner(s) on proper safety adaptation and remediation techniques during self care or transfers.   Instruct in proper use of assistive devices.                  Learning Progress Summary             Patient Acceptance, E,TB,D, VU,DU by  at 11/18/2023 1210   Family Acceptance, E,TB,D, VU,DU by  at 11/18/2023 1210                         Point: Home exercise program (Done)       Description:   Instruct learner(s) on appropriate technique for monitoring, assisting and/or progressing therapeutic exercises/activities.                  Learning Progress Summary             Patient Acceptance, E,TB,D, VU,DU by  at 11/18/2023 1210   Family Acceptance, E,TB,D, VU,DU by  at 11/18/2023 1210                         Point: Precautions (Done)       Description:   Instruct learner(s) on prescribed precautions during self-care and functional transfers.                  Learning Progress Summary             Patient Acceptance, E,TB,D, VU,DU by  at 11/18/2023 1210   Family Acceptance, E,TB,D, VU,DU by  at 11/18/2023 1210                         Point: Body mechanics (Done)        Description:   Instruct learner(s) on proper positioning and spine alignment during self-care, functional mobility activities and/or exercises.                  Learning Progress Summary             Patient Acceptance, E,TB,D, VU,DU by  at 11/18/2023 1210   Family Acceptance, E,TB,D, VU,DU by  at 11/18/2023 1210                                         User Key       Initials Effective Dates Name Provider Type Discipline     02/03/23 -  Chris Stanley, OTR/L, CNT Occupational Therapist OT                    OT Recommendation and Plan  Therapy Frequency (OT): evaluation only  Plan of Care Review  Plan of Care Reviewed With: patient, spouse  Progress: no change  Outcome Evaluation: OT eval completed.  Pt alert and oriented x4.  Shoulder immobilizer donned on RUE.  Pt c/o minimal R shoulder pain.  She is typically independent with ADLs and mobility.  Spouse present for education.  Pt independent with mobility including transfers and ambulation.  SBA to don/doff shoulder immobilizer.  Pt was educated on pendulum exercises and performed these with minimal assist and verbal cues from OT while supporting self with LUE with foot board of bed.  Also completed distal RUE exercises including elbow flex/ext, forearm pron/sup, wrist flex/ext and digit flex/ext.  Pt understands ROM exercises and surgical precautions for post op period and was issued written protocol for TSR.  At this time she is independent with exercises with assist of spouse and is also independent with ADLs and mobility.  OT to sign off.  Pt is safe for discharge home with spouse when medically ready.  Plan of Care Reviewed With: patient, spouse  Outcome Evaluation: OT eval completed.  Pt alert and oriented x4.  Shoulder immobilizer donned on RUE.  Pt c/o minimal R shoulder pain.  She is typically independent with ADLs and mobility.  Spouse present for education.  Pt independent with mobility including transfers and ambulation.  SBA to don/doff shoulder  immobilizer.  Pt was educated on pendulum exercises and performed these with minimal assist and verbal cues from OT while supporting self with LUE with foot board of bed.  Also completed distal RUE exercises including elbow flex/ext, forearm pron/sup, wrist flex/ext and digit flex/ext.  Pt understands ROM exercises and surgical precautions for post op period and was issued written protocol for TSR.  At this time she is independent with exercises with assist of spouse and is also independent with ADLs and mobility.  OT to sign off.  Pt is safe for discharge home with spouse when medically ready.      OT Rehab Goals       Row Name 11/18/23 1100             Transfer Goal 1 (OT)    Activity/Assistive Device (Transfer Goal 1, OT) --  -AC      Time Frame (Transfer Goal 1, OT) --  -AC      Progress/Outcome (Transfer Goal 1, OT) --  -AC         Bathing Goal 1 (OT)    Activity/Device (Bathing Goal 1, OT) --  -AC      Time Frame (Bathing Goal 1, OT) --  -AC         Dressing Goal 1 (OT)    Activity/Device (Dressing Goal 1, OT) --  -AC      Time Frame (Dressing Goal 1, OT) --  -AC      Progress/Outcome (Dressing Goal 1, OT) --  -AC                User Key  (r) = Recorded By, (t) = Taken By, (c) = Cosigned By      Initials Name Provider Type Discipline    AC Chris Stanley, OTR/L, CNT Occupational Therapist OT                     Outcome Measures       Row Name 11/18/23 1100             How much help from another is currently needed...    Putting on and taking off regular lower body clothing? 4  -AC      Bathing (including washing, rinsing, and drying) 4  -AC      Toileting (which includes using toilet bed pan or urinal) 4  -AC      Putting on and taking off regular upper body clothing 3  -AC      Taking care of personal grooming (such as brushing teeth) 4  -AC      Eating meals 4  -AC      AM-PAC 6 Clicks Score (OT) 23  -AC         Functional Assessment    Outcome Measure Options AM-PAC 6 Clicks Daily Activity (OT)  -AC                 User Key  (r) = Recorded By, (t) = Taken By, (c) = Cosigned By      Initials Name Provider Type    Chris Ayala OTR/L, MARCIE Occupational Therapist                    Time Calculation:    Time Calculation- OT       Row Name 11/18/23 1210             Time Calculation- OT    OT Start Time 1100  -AC      OT Stop Time 1130  -AC      OT Time Calculation (min) 30 min  -AC      OT Received On 11/18/23  -AC                User Key  (r) = Recorded By, (t) = Taken By, (c) = Cosigned By      Initials Name Provider Type    Chris Ayala OTR/L, MARCIE Occupational Therapist                    Therapy Charges for Today       Code Description Service Date Service Provider Modifiers Qty    [id number] HC OT EVAL LOW COMPLEXITY 2 11/18/2023 Chris Stanley OTR/L, CNT GO 1                 OT Discharge Summary  Anticipated Discharge Disposition (OT): home with assist  Reason for Discharge: At baseline function  Outcomes Achieved: Other  Discharge Destination: Home with assist    LENORA [place]/L, CNT  11/18/2023

## 2023-11-18 NOTE — BRIEF OP NOTE
TOTAL SHOULDER REVERSE ARTHROPLASTY  Progress Note    Mayuri Almeida  11/17/2023    Pre-op Diagnosis:   Postop infection R shoulder       Post-Op Diagnosis Codes:     * Surgical wound infection [T81.49XA]    Procedure/CPT® Codes:  FL ARTHROTOMY GLENOHUMERAL JT EXPL/DRG/RMVL FB [27736]      Procedure(s):  I AND D POSSIBLE POLY EXCHANGE SHOULDER        SURGICAL APPROACH: Deltopectoral    SURGICAL TECHNIQUE: Peel off      Surgeon(s):  Dionicio Araiza MD    Anesthesia: General with Block    Staff:   Circulator: Amparo Parikh RN  Scrub Person: Jere Walton; Marilyn Mack; Kaleb Gutiérrez  Vendor Representative: Hebert Mathias         Estimated Blood Loss: <500ml    Urine Voided: * No values recorded between 11/17/2023  4:47 PM and 11/17/2023  6:09 PM *    Specimens:                Specimens       ID Source Type Tests Collected By Collected At Frozen?    1 Shoulder, Right Body Fluid ANAEROBIC CULTURE  FUNGAL CULTURE  GRAM STAIN - NO CULTURE (Canceled)  KOH PREP  BODY FLUID CULTURE  AFB CULTURE   Dionicio Araiza MD 11/17/23 1712     Description: fluid right shoulder    A Shoulder, Right Tissue ANAEROBIC CULTURE  FUNGAL CULTURE  KOH PREP  TISSUE / BONE CULTURE  TISSUE PATHOLOGY EXAM  AFB CULTURE   Dionicio Araiza MD 11/17/23 1715     Description: tissue right shoulder                  Drains: * No LDAs found *    Findings: see op note        Complications: none          Dionicio Araiza MD     Date: 11/17/2023  Time: 18:12 CST

## 2023-11-18 NOTE — OP NOTE
Patient Name: Rody  MRN: [id number]  : 1955        DATE of SURGERY: 2023    SURGEON: Dionicio Araiza MD    ASSISTANT: NONE        PREOPERATIVE DIAGNOSIS:  1) Infected hematoma, right shoulder  2) Status post right reverse total shoulder arthroplasty (10/17/23)     POSTOPERATIVE DIAGNOSIS:   1) Infected hematoma, right shoulder  2) Status post right reverse total shoulder arthroplasty (10/17/23)     PROCEDURE PERFORMED:  1) Incision and drainage of hematoma/infection, right shoulder  2) Polyethylene exchange, right reverse shoulder arthroplasty       IMPLANTS: + 6 polyethylene     ANESTHESIA USED: General endotrachial anesthesia, interscalene block     OPERATIVE INDICATIONS: This patient is a 69 YO female who underwent a reverse TSA on 10/13/23. She developed a large postoperative hematoma upon her one week follow up.  She developed increasing red and swelling approximately 10 days ago and took oral Amoxicillin at home that she had.  The on-call physician began Keflex on 23.  She was seen in clinic on 23 and started on Clindamycin.  She was admitted yesterday morning with signs of infection and Vancomycin was started.  I aspirated her shoulder last night and there was 58k white cells.  To date, there has been a negative gram stain and culture.  I felt it best to perform and exploration of the shoulder.  We discussed an I&D of his shoulder with possible poly exchange. She understood she may require removal of the prosthesis in the future if there was an infection that could not be eradicated.  Risks included, but were not limited to, that of anesthesia, bleeding, infection, pain, damage to local structures, postoperative dislocation, need for further surgery, failure of repair, stiffness, failure of implants, and loss of function.          ESTIMATED BLOOD LOSS: 150 mL     DRAINS: none     COMPLICATIONS: none     SPECIMENS: multiple superficial and deep     FINDINGS: see op note      PROCEDURE in DETAIL:  The patient was seen in the preoperative holding room, once again the informed consent was reviewed with the patient and signed.  The site of surgery was marked with the patient's agreement.  After being transported to the operating room, a timeout was performed identifying the correct patient as well as the operative site.  Dose appropriate IV antibiotics were given prior to incision.  The patient was positioned in the beach chair position, all bony prominences were protected and a sterile prep and drape was performed.  The surgical site was draped with ioban dressing.     Using the previous incision, a deltopectoral approach to the shoulder joint was utilized as soft tissue was dissected.  There was a significant amount of fluid, not necessarily purulent fluid present.  There was extensive necrotic tissue that was removed with a  rongeur.  Multiple specimens were obtained before antibiotics were given.     The previously placed polyethylene was removed.  9 liters of normal saline were used irrigate the joint and soft tissues.  A systematic excisional debridement of skin and subcutaneous tissue was performed with a scalpel.  A liter of Irrisept was used as final irrigation.     Trial polyethylenes were placed until a stable fit.  A new + 6 poly was impacted successfully and the shoulder reduced.  Bleeding was difficult to control and one vial of Surgicell powder was used to aide in hemostasis.      The wound was closed in layers.  The skin was closed with nylon.  A sterile dressing and sling were placed.  Counts were correct.     The patient was awakened by anesthesia, transported to the recovery room in stable condition.     POSTOPERATIVE PLAN:  1) Admit inpatient for pain control  2) follow up cultures, ID consult    Electronically signed by Dionicio Araiza MD on 11/17/2023 at 18:25 CST  2)

## 2023-11-18 NOTE — PLAN OF CARE
Goal Outcome Evaluation:  Plan of Care Reviewed With: spouse        Progress: no change   Pt alert and oriented x4. VSS. Pt c/o pain. PRN medications given with moderate relief of pain. LINDER. PPP. SCDs for VTE. , room air. NPO after a light breakfast this am for sx.  Skin intact. Voiding via bathroom. Up ad milagro. I/d done this afternoon.  at bedside. Call light within reach. Safety maintained. Plan of care continued. No neuro changes this shift.

## 2023-11-18 NOTE — PLAN OF CARE
Goal Outcome Evaluation:              Outcome Evaluation: (P) Patient alert and oriented x4. Shoulder stabalizer placed on right arm/shoulder. Patient reports burning and aching pain in the right shoulder. She is mostly independent.  at bedside. Patients vital signs have remained stable. patient is getting antibiotics to treat infection. Patient has a regular diet. patient remained safe throughout the day. call light answered.

## 2023-11-18 NOTE — PROGRESS NOTES
"Pharmacy Dosing Service  Automatic Renal Adjustment  Cefepime    Assessment/Action/Plan:  Based on prescribing information, Cefepime 2g IV Q8h (extended infusion) has been adjusted to Cefepime 2g IV Q12h (extended infusion). Pharmacy will continue to monitor and make further adjustment(s) accordingly.     Subjective:  Mayuri Almeida is a 68 y.o. female     Objective:  Ht: 157.5 cm (62\"); Wt: 74.8 kg (164 lb 12.8 oz)  Estimated Creatinine Clearance: 54.3 mL/min (by C-G formula based on SCr of 0.94 mg/dL).     Creatinine   Date Value Ref Range Status   11/18/2023 0.94 0.57 - 1.00 mg/dL Final   11/16/2023 1.00 0.57 - 1.00 mg/dL Final   10/10/2023 1.15 (H) 0.57 - 1.00 mg/dL Final   04/20/2022 1.4 (H) 0.5 - 0.9 mg/dL Final   07/14/2021 1.2 (H) 0.5 - 0.9 mg/dL Final   01/21/2021 1.2 (H) 0.5 - 0.9 mg/dL Final       Anatoliy Baker, PharmD  11/18/23 11:19 CST    "

## 2023-11-18 NOTE — PLAN OF CARE
Goal Outcome Evaluation:  Plan of Care Reviewed With: patient        Progress: improving  Outcome Evaluation: Pt is A&Ox4. VSS. RA. C/o pain relieved by PRN medications. Up ad milagro to BR. Sling in place to RUJOSUE. Last BM 11/16. IV L AC, IVF given per order. Tolerating regular diet. Family at bedside. Call light in reach. Safety maintained.

## 2023-11-19 LAB
ANION GAP SERPL CALCULATED.3IONS-SCNC: 11 MMOL/L (ref 5–15)
BACTERIA FLD CULT: ABNORMAL
BACTERIA SPEC AEROBE CULT: ABNORMAL
BUN SERPL-MCNC: 13 MG/DL (ref 8–23)
BUN/CREAT SERPL: 13.3 (ref 7–25)
CALCIUM SPEC-SCNC: 9 MG/DL (ref 8.6–10.5)
CHLORIDE SERPL-SCNC: 101 MMOL/L (ref 98–107)
CO2 SERPL-SCNC: 28 MMOL/L (ref 22–29)
CREAT SERPL-MCNC: 0.98 MG/DL (ref 0.57–1)
EGFRCR SERPLBLD CKD-EPI 2021: 63 ML/MIN/1.73
GLUCOSE SERPL-MCNC: 138 MG/DL (ref 65–99)
GRAM STN SPEC: ABNORMAL
POTASSIUM SERPL-SCNC: 3.4 MMOL/L (ref 3.5–5.2)
SODIUM SERPL-SCNC: 140 MMOL/L (ref 136–145)

## 2023-11-19 PROCEDURE — 99232 SBSQ HOSP IP/OBS MODERATE 35: CPT | Performed by: INTERNAL MEDICINE

## 2023-11-19 PROCEDURE — 80048 BASIC METABOLIC PNL TOTAL CA: CPT | Performed by: ORTHOPAEDIC SURGERY

## 2023-11-19 PROCEDURE — 25010000002 CEFEPIME PER 500 MG: Performed by: INTERNAL MEDICINE

## 2023-11-19 RX ORDER — PROPRANOLOL HYDROCHLORIDE 80 MG/1
80 CAPSULE, EXTENDED RELEASE ORAL NIGHTLY
Status: DISCONTINUED | OUTPATIENT
Start: 2023-11-19 | End: 2023-11-21 | Stop reason: HOSPADM

## 2023-11-19 RX ORDER — ASPIRIN 81 MG/1
81 TABLET, CHEWABLE ORAL NIGHTLY
Status: DISCONTINUED | OUTPATIENT
Start: 2023-11-19 | End: 2023-11-21 | Stop reason: HOSPADM

## 2023-11-19 RX ADMIN — OXYCODONE HYDROCHLORIDE AND ACETAMINOPHEN 1 TABLET: 10; 325 TABLET ORAL at 13:56

## 2023-11-19 RX ADMIN — DULOXETINE HYDROCHLORIDE 30 MG: 30 CAPSULE, DELAYED RELEASE ORAL at 20:49

## 2023-11-19 RX ADMIN — CEFEPIME 2000 MG: 2 INJECTION, POWDER, FOR SOLUTION INTRAVENOUS at 20:50

## 2023-11-19 RX ADMIN — DOCUSATE SODIUM 50 MG AND SENNOSIDES 8.6 MG 2 TABLET: 8.6; 5 TABLET, FILM COATED ORAL at 08:52

## 2023-11-19 RX ADMIN — DULOXETINE HYDROCHLORIDE 30 MG: 30 CAPSULE, DELAYED RELEASE ORAL at 08:54

## 2023-11-19 RX ADMIN — OXYCODONE HYDROCHLORIDE AND ACETAMINOPHEN 1 TABLET: 10; 325 TABLET ORAL at 19:03

## 2023-11-19 RX ADMIN — MONTELUKAST SODIUM 10 MG: 10 TABLET ORAL at 20:49

## 2023-11-19 RX ADMIN — LOSARTAN POTASSIUM 25 MG: 50 TABLET, FILM COATED ORAL at 08:54

## 2023-11-19 RX ADMIN — OXYCODONE HYDROCHLORIDE AND ACETAMINOPHEN 1 TABLET: 10; 325 TABLET ORAL at 08:52

## 2023-11-19 RX ADMIN — AMLODIPINE BESYLATE 5 MG: 5 TABLET ORAL at 08:54

## 2023-11-19 RX ADMIN — LEVOTHYROXINE SODIUM 100 MCG: 100 TABLET ORAL at 05:31

## 2023-11-19 RX ADMIN — BISACODYL 10 MG: 10 SUPPOSITORY RECTAL at 09:03

## 2023-11-19 RX ADMIN — DOCUSATE SODIUM 50 MG AND SENNOSIDES 8.6 MG 2 TABLET: 8.6; 5 TABLET, FILM COATED ORAL at 20:49

## 2023-11-19 RX ADMIN — ASPIRIN 81 MG: 81 TABLET, CHEWABLE ORAL at 20:49

## 2023-11-19 RX ADMIN — OXYCODONE AND ACETAMINOPHEN 2 TABLET: 7.5; 325 TABLET ORAL at 03:39

## 2023-11-19 RX ADMIN — Medication 10 ML: at 08:55

## 2023-11-19 RX ADMIN — OXYCODONE HYDROCHLORIDE AND ACETAMINOPHEN 1 TABLET: 10; 325 TABLET ORAL at 22:57

## 2023-11-19 RX ADMIN — Medication 10 ML: at 08:56

## 2023-11-19 RX ADMIN — PANTOPRAZOLE SODIUM 40 MG: 40 TABLET, DELAYED RELEASE ORAL at 05:31

## 2023-11-19 RX ADMIN — PROPRANOLOL HYDROCHLORIDE 80 MG: 80 CAPSULE, EXTENDED RELEASE ORAL at 20:49

## 2023-11-19 RX ADMIN — ATORVASTATIN CALCIUM 40 MG: 40 TABLET ORAL at 20:49

## 2023-11-19 RX ADMIN — CEFEPIME 2000 MG: 2 INJECTION, POWDER, FOR SOLUTION INTRAVENOUS at 08:55

## 2023-11-19 NOTE — PROGRESS NOTES
Orthopedic Surgery Progress Note    Mayuri Almeida  11/19/2023      Subjective:     Systemic or Specific Complaints:   Pain improved postop  No acute events    Objective:     Patient Vitals for the past 24 hrs:   BP Temp Temp src Pulse Resp SpO2   11/19/23 1503 140/87 -- Oral 69 20 100 %   11/19/23 1300 (P) 150/77 (P) 97.5 °F (36.4 °C) (P) Oral (P) 64 (P) 20 (P) 100 %   11/19/23 0706 161/77 97.5 °F (36.4 °C) Oral 62 16 100 %   11/18/23 1910 125/61 97.9 °F (36.6 °C) Oral 67 16 99 %       right upper  General: alert, appears stated age and cooperative   Wound: covered             Dressing: Clean, dry, intact   Extremity: Distal NVI           DVT Exam: No evidence of DVT                   Data Review:  Lab Results (last 24 hours)       Procedure Component Value Units Date/Time    Body Fluid Culture - Body Fluid, Shoulder, Right [[id number]]  (Abnormal) Collected: 11/17/23 1712    Specimen: Body Fluid from Shoulder, Right Updated: 11/19/23 0937     Body Fluid Culture Heavy growth (4+) Serratia marcescens     Gram Stain Moderate (3+) WBCs seen      No organisms seen    Narrative:      For MICs refer to body fluid culture Collected 11/16/2023 2248.    Tissue / Bone Culture - Tissue, Shoulder, Right [[id number]]  (Abnormal) Collected: 11/17/23 1715    Specimen: Tissue from Shoulder, Right Updated: 11/19/23 0937     Tissue Culture Heavy growth (4+) Serratia marcescens     Gram Stain Many (4+) WBCs seen      Few (2+) Gram negative bacilli    Narrative:      For MICs refer to body fluid culture Collected 11/16/2023 2248.      Body Fluid Culture - Body Fluid, Shoulder, Right [[id number]]  (Abnormal)  (Susceptibility) Collected: 11/16/23 2148    Specimen: Body Fluid from Shoulder, Right Updated: 11/19/23 0931     Body Fluid Culture Light growth (2+) Serratia marcescens     Comment: Pip/tazo to follow.        Gram Stain Many (4+) WBCs per low power field      No organisms seen    Susceptibility        Serratia marcescens       ELISSA (Preliminary)      Cefepime Susceptible      Ceftazidime Susceptible      Ceftriaxone Susceptible      Gentamicin Susceptible      Levofloxacin Susceptible      Trimethoprim + Sulfamethoxazole Susceptible                       Susceptibility Comments       Serratia marcescens    Cefazolin sensitivity will not be reported for Enterobacteriaceae in non-urine isolates. If cefazolin is preferred, please call the microbiology lab to request an E-test.  With the exception of urinary-sourced infections, aminoglycosides should not be used as monotherapy.               Basic Metabolic Panel [[id number]]  (Abnormal) Collected: 11/19/23 0329    Specimen: Blood Updated: 11/19/23 0416     Glucose 138 mg/dL      BUN 13 mg/dL      Creatinine 0.98 mg/dL      Sodium 140 mmol/L      Potassium 3.4 mmol/L      Chloride 101 mmol/L      CO2 28.0 mmol/L      Calcium 9.0 mg/dL      BUN/Creatinine Ratio 13.3     Anion Gap 11.0 mmol/L      eGFR 63.0 mL/min/1.73     Narrative:      GFR Normal >60  Chronic Kidney Disease <60  Kidney Failure <15            Imaging Results (Last 24 Hours)       ** No results found for the last 24 hours. **            Assessment:   2 Days Post-Op  I&D, poly exchange R shoulder    Plan:     1) culture from yesterday with G (-) bacilli  2) continue Iv antibiotics - expect PICC with 6 weeks abx  3) pain control, ice        Jere Minor PA-C

## 2023-11-19 NOTE — PROGRESS NOTES
INFECTIOUS DISEASES PROGRESS NOTE    Patient:  Mayuri Almeida  YOB: 1955  MRN: [id number]   Admit date: 2023   Admitting Physician: Dionicio Araiza MD  Primary Care Physician: Lincoln Johnosn MD    Chief Complaint: Right shoulder discomfort        Interval History: Patient reports her right shoulder is quite sore.  She has an ice pack on it currently.  She thinks she has had some bleeding and wanted me to check her bandage.        Allergies:   Allergies   Allergen Reactions    Aleve [Naproxen] Other (See Comments)     Pt has stage # 3 kidney disease.    Codeine Nausea And Vomiting       Current Scheduled Medications:   amLODIPine, 5 mg, Oral, Daily  aspirin, 81 mg, Oral, Daily  atorvastatin, 40 mg, Oral, Nightly  cefepime, 2,000 mg, Intravenous, Q12H  DULoxetine, 30 mg, Oral, BID  levothyroxine, 100 mcg, Oral, Q AM  losartan, 25 mg, Oral, Daily  montelukast, 10 mg, Oral, Nightly  Non-Formulary / Patient Supplied Medication, 75 mL, Oral, Daily  pantoprazole, 40 mg, Oral, Q AM  propranolol LA, 80 mg, Oral, Daily  senna-docusate sodium, 2 tablet, Oral, BID  sodium chloride, 10 mL, Intravenous, Q12H  sodium chloride, 10 mL, Intravenous, Q12H  triamterene-hydrochlorothiazide, 1 tablet, Oral, Daily      Current PRN Medications:    acetaminophen **OR** acetaminophen    senna-docusate sodium **AND** polyethylene glycol **AND** bisacodyl **AND** bisacodyl    diphenhydrAMINE **OR** diphenhydrAMINE    docusate sodium    magnesium hydroxide    [DISCONTINUED] Morphine **AND** naloxone    ondansetron **OR** ondansetron    oxyCODONE-acetaminophen    oxyCODONE-acetaminophen    promethazine **OR** promethazine    sodium chloride    sodium chloride    sodium chloride    sodium chloride            Objective     Vital Signs:  Temp (24hrs), Av °F (36.7 °C), Min:97.5 °F (36.4 °C), Max:98.5 °F (36.9 °C)      /77 Comment: nurse notified  Pulse 62   Temp 97.5 °F (36.4 °C) (Oral)   Resp 16    "Ht 157.5 cm (62\")   Wt 74.8 kg (164 lb 12.8 oz)   LMP  (LMP Unknown)   SpO2 100%   BMI 30.14 kg/m²         Physical Exam  General: The patient is lying in bed in no acute distress  Right shoulder dressing is clean dry and intact.  There does not appear to be sodium discoloration at the few areas of the bandage likely from old dried blood.  Ice pack was replaced on her anterior shoulder.  Neuro: Alert and oriented, speech clear          Results Review:    I reviewed the patient's new clinical results.    Lab Results:    CBC:   Lab Results   Lab 11/16/23 0428 11/18/23 0640   WBC 11.97*  --    HEMOGLOBIN 11.3* 9.8*   HEMATOCRIT 37.4 31.9*   PLATELETS 410  --         AutoDiff:   Lab Results   Lab 11/16/23 0428   NEUTROPHIL % 70.3   LYMPHOCYTE % 15.7*   MONOCYTES % 10.4   EOSINOPHIL % 2.7   BASOPHIL % 0.3   NEUTROS ABS 8.41*   LYMPHS ABS 1.88   MONOS ABS 1.25*   EOS ABS 0.32   BASOS ABS 0.04        Manual Diff:    Lab Results   Lab 11/16/23 0428   NEUTROS ABS 8.41*           CMP:   Lab Results   Lab 11/16/23 0428 11/18/23  0640 11/19/23  0329   SODIUM 137 137 140   POTASSIUM 3.7 3.7 3.4*   CHLORIDE 98 98 101   CO2 27.0 27.0 28.0   BUN 16 10 13   CREATININE 1.00 0.94 0.98   CALCIUM 9.4 8.5* 9.0   GLUCOSE 103* 196* 138*       Estimated Creatinine Clearance: 52 mL/min (by C-G formula based on SCr of 0.98 mg/dL).        C-Reactive Protein   Date Value Ref Range Status   11/16/2023 4.69 (H) 0.00 - 0.50 mg/dL Final   04/23/2019 0.53 (H) 0.00 - 0.50 mg/dL Final     Sed Rate   Date Value Ref Range Status   11/16/2023 49 (H) 0 - 30 mm/hr Final   04/23/2019 6 0 - 25 mm/Hr Final       Culture Results:    Microbiology Results (last 10 days)       Procedure Component Value - Date/Time    KOH Prep - Tissue, Shoulder, Right [[id number]] Collected: 11/17/23 1715    Lab Status: Final result Specimen: Tissue from Shoulder, Right Updated: 11/17/23 1827     KOH Prep No yeast or hyphal elements seen    Tissue / Bone Culture - Tissue, " Shoulder, Right [[id number]]  (Abnormal) Collected: 11/17/23 1715    Lab Status: Final result Specimen: Tissue from Shoulder, Right Updated: 11/19/23 0937     Tissue Culture Heavy growth (4+) Serratia marcescens     Gram Stain Many (4+) WBCs seen      Few (2+) Gram negative bacilli    Narrative:      For MICs refer to body fluid culture Collected 11/16/2023 2248.      AFB Culture - Tissue, Shoulder, Right [[id number]] Collected: 11/17/23 1715    Lab Status: Preliminary result Specimen: Tissue from Shoulder, Right Updated: 11/18/23 1418     AFB Stain No acid fast bacilli seen on direct smear    KOH Prep - Body Fluid, Shoulder, Right [[id number]] Collected: 11/17/23 1712    Lab Status: Final result Specimen: Body Fluid from Shoulder, Right Updated: 11/17/23 1801     KOH Prep No yeast or hyphal elements seen    Body Fluid Culture - Body Fluid, Shoulder, Right [[id number]]  (Abnormal) Collected: 11/17/23 1712    Lab Status: Final result Specimen: Body Fluid from Shoulder, Right Updated: 11/19/23 0937     Body Fluid Culture Heavy growth (4+) Serratia marcescens     Gram Stain Moderate (3+) WBCs seen      No organisms seen    Narrative:      For MICs refer to body fluid culture Collected 11/16/2023 2248.    AFB Culture - Body Fluid, Shoulder, Right [[id number]] Collected: 11/17/23 1712    Lab Status: Preliminary result Specimen: Body Fluid from Shoulder, Right Updated: 11/18/23 1417     AFB Stain No acid fast bacilli seen on direct smear    Body Fluid Culture - Body Fluid, Shoulder, Right [[id number]]  (Abnormal)  (Susceptibility) Collected: 11/16/23 2148    Lab Status: Preliminary result Specimen: Body Fluid from Shoulder, Right Updated: 11/19/23 0931     Body Fluid Culture Light growth (2+) Serratia marcescens     Comment: Pip/tazo to follow.        Gram Stain Many (4+) WBCs per low power field      No organisms seen    Susceptibility        Serratia marcescens      ELISSA (Preliminary)      Cefepime Susceptible      Ceftazidime  Susceptible      Ceftriaxone Susceptible      Gentamicin Susceptible      Levofloxacin Susceptible      Trimethoprim + Sulfamethoxazole Susceptible                       Susceptibility Comments       Serratia marcescens    Cefazolin sensitivity will not be reported for Enterobacteriaceae in non-urine isolates. If cefazolin is preferred, please call the microbiology lab to request an E-test.  With the exception of urinary-sourced infections, aminoglycosides should not be used as monotherapy.               MRSA Screen, PCR (Inpatient) - Swab, Nares [[id number]]  (Normal) Collected: 11/16/23 2143    Lab Status: Final result Specimen: Swab from Nares Updated: 11/16/23 2320     MRSA PCR No MRSA Detected    Narrative:      The negative predictive value of this diagnostic test is high and should only be used to consider de-escalating anti-MRSA therapy. A positive result may indicate colonization with MRSA and must be correlated clinically.                 Radiology:   Imaging Results (Last 72 Hours)       ** No results found for the last 72 hours. **                Active Hospital Problems    Diagnosis     **Post op infection     Wound infection        IMPRESSION:  Prosthetic joint infection with Serratia marcescens-patient is status post reverse right total shoulder October 17, 2023 then underwent incision and drainage of infected hematoma and polyethylene exchange November 17, 2023  Anemia-Postoperative blood loss  Type 2 diabetes mellitus-August 23, 2023 hemoglobin A1c was 6.6  Chronic kidney disease stage III-follows with nephrology as an outpatient      RECOMMENDATION:   Continue cefepime  No plans to reinstitute vancomycin  Discussed with patient briefly today about home health and that generally they will teach the patient or family member to administer the antibiotics and they generally only come to the house once a week for blood draws and PICC line dressing changes.  Explained to the patient we will work with case  management tomorrow as patient plans on being near the [facility] for her convalescence.  We will discuss further options for antibiotics tomorrow.  Serratia is one of the organisms that can develop resistance while on therapy with some cephalosporins we will see if lab can do any additional predictive testing  Elevated sed rate  Elevated CRP      Bonnie Bee MD  11/19/23  10:40 CST

## 2023-11-19 NOTE — PLAN OF CARE
Problem: Adult Inpatient Plan of Care  Goal: Plan of Care Review  11/19/2023 0516 by Brian Colorado, RN  Outcome: Ongoing, Progressing  Flowsheets (Taken 11/19/2023 0516)  Progress: no change  Plan of Care Reviewed With: patient  Outcome Evaluation: Pt is A&Ox4. PRn pain meds given with little relief. Up ad milagro. right arm in sling. Denies any N/T. dressing is CDI. VSS. Safety maintained.      Consent: Verbal consent was obtained and risks were reviewed including but not limited to scarring, infection, bleeding, scabbing, incomplete removal, and allergy to anesthesia.

## 2023-11-19 NOTE — PLAN OF CARE
Goal Outcome Evaluation:              Outcome Evaluation: (P) Patient is alert and oriented x4. Patient is up in room with no trouble. Patient is still needing to have a BM. Pain medication given 2x today. Ice applied to affected shoulder. Patient wearing right shoulder sling. Dressing dry and intact. Patient remained free from injury. IV antibiotics infused per order.

## 2023-11-20 LAB
ANION GAP SERPL CALCULATED.3IONS-SCNC: 10 MMOL/L (ref 5–15)
BUN SERPL-MCNC: 13 MG/DL (ref 8–23)
BUN/CREAT SERPL: 13.7 (ref 7–25)
CALCIUM SPEC-SCNC: 8.6 MG/DL (ref 8.6–10.5)
CHLORIDE SERPL-SCNC: 101 MMOL/L (ref 98–107)
CO2 SERPL-SCNC: 28 MMOL/L (ref 22–29)
CREAT SERPL-MCNC: 0.95 MG/DL (ref 0.57–1)
CRP SERPL-MCNC: 2.15 MG/DL (ref 0–0.5)
EGFRCR SERPLBLD CKD-EPI 2021: 65.4 ML/MIN/1.73
GLUCOSE SERPL-MCNC: 130 MG/DL (ref 65–99)
POTASSIUM SERPL-SCNC: 3.4 MMOL/L (ref 3.5–5.2)
SODIUM SERPL-SCNC: 139 MMOL/L (ref 136–145)

## 2023-11-20 PROCEDURE — 99232 SBSQ HOSP IP/OBS MODERATE 35: CPT | Performed by: INTERNAL MEDICINE

## 2023-11-20 PROCEDURE — 02HV33Z INSERTION OF INFUSION DEVICE INTO SUPERIOR VENA CAVA, PERCUTANEOUS APPROACH: ICD-10-PCS | Performed by: INTERNAL MEDICINE

## 2023-11-20 PROCEDURE — 25010000002 ERTAPENEM PER 500 MG: Performed by: INTERNAL MEDICINE

## 2023-11-20 PROCEDURE — C1751 CATH, INF, PER/CENT/MIDLINE: HCPCS

## 2023-11-20 PROCEDURE — 25010000002 CEFEPIME PER 500 MG: Performed by: INTERNAL MEDICINE

## 2023-11-20 PROCEDURE — 80048 BASIC METABOLIC PNL TOTAL CA: CPT | Performed by: ORTHOPAEDIC SURGERY

## 2023-11-20 PROCEDURE — 86140 C-REACTIVE PROTEIN: CPT | Performed by: ORTHOPAEDIC SURGERY

## 2023-11-20 RX ORDER — LIDOCAINE HYDROCHLORIDE 10 MG/ML
1 INJECTION, SOLUTION INFILTRATION; PERINEURAL ONCE
Status: COMPLETED | OUTPATIENT
Start: 2023-11-20 | End: 2023-11-20

## 2023-11-20 RX ORDER — SODIUM CHLORIDE 9 MG/ML
40 INJECTION, SOLUTION INTRAVENOUS AS NEEDED
Status: DISCONTINUED | OUTPATIENT
Start: 2023-11-20 | End: 2023-11-21 | Stop reason: HOSPADM

## 2023-11-20 RX ORDER — POTASSIUM CHLORIDE 750 MG/1
20 CAPSULE, EXTENDED RELEASE ORAL ONCE
Status: COMPLETED | OUTPATIENT
Start: 2023-11-20 | End: 2023-11-20

## 2023-11-20 RX ORDER — SODIUM CHLORIDE 0.9 % (FLUSH) 0.9 %
10 SYRINGE (ML) INJECTION AS NEEDED
Status: DISCONTINUED | OUTPATIENT
Start: 2023-11-20 | End: 2023-11-21 | Stop reason: HOSPADM

## 2023-11-20 RX ORDER — SODIUM CHLORIDE 0.9 % (FLUSH) 0.9 %
10 SYRINGE (ML) INJECTION EVERY 12 HOURS SCHEDULED
Status: DISCONTINUED | OUTPATIENT
Start: 2023-11-20 | End: 2023-11-21 | Stop reason: HOSPADM

## 2023-11-20 RX ORDER — SODIUM CHLORIDE 0.9 % (FLUSH) 0.9 %
20 SYRINGE (ML) INJECTION AS NEEDED
Status: DISCONTINUED | OUTPATIENT
Start: 2023-11-20 | End: 2023-11-21 | Stop reason: HOSPADM

## 2023-11-20 RX ORDER — POTASSIUM CHLORIDE 750 MG/1
20 CAPSULE, EXTENDED RELEASE ORAL DAILY
Status: DISCONTINUED | OUTPATIENT
Start: 2023-11-20 | End: 2023-11-20

## 2023-11-20 RX ADMIN — TRIAMTERENE AND HYDROCHLOROTHIAZIDE 1 TABLET: 75; 50 TABLET ORAL at 10:01

## 2023-11-20 RX ADMIN — DULOXETINE HYDROCHLORIDE 30 MG: 30 CAPSULE, DELAYED RELEASE ORAL at 09:39

## 2023-11-20 RX ADMIN — POTASSIUM CHLORIDE 20 MEQ: 750 CAPSULE, EXTENDED RELEASE ORAL at 11:37

## 2023-11-20 RX ADMIN — Medication 10 ML: at 20:22

## 2023-11-20 RX ADMIN — Medication 10 ML: at 09:40

## 2023-11-20 RX ADMIN — PROPRANOLOL HYDROCHLORIDE 80 MG: 80 CAPSULE, EXTENDED RELEASE ORAL at 20:20

## 2023-11-20 RX ADMIN — OXYCODONE AND ACETAMINOPHEN 2 TABLET: 7.5; 325 TABLET ORAL at 20:20

## 2023-11-20 RX ADMIN — LOSARTAN POTASSIUM 25 MG: 50 TABLET, FILM COATED ORAL at 09:39

## 2023-11-20 RX ADMIN — DOCUSATE SODIUM 50 MG AND SENNOSIDES 8.6 MG 2 TABLET: 8.6; 5 TABLET, FILM COATED ORAL at 20:20

## 2023-11-20 RX ADMIN — OXYCODONE HYDROCHLORIDE AND ACETAMINOPHEN 1 TABLET: 10; 325 TABLET ORAL at 10:54

## 2023-11-20 RX ADMIN — PANTOPRAZOLE SODIUM 40 MG: 40 TABLET, DELAYED RELEASE ORAL at 06:57

## 2023-11-20 RX ADMIN — OXYCODONE HYDROCHLORIDE AND ACETAMINOPHEN 1 TABLET: 10; 325 TABLET ORAL at 06:57

## 2023-11-20 RX ADMIN — Medication 10 ML: at 09:39

## 2023-11-20 RX ADMIN — ASPIRIN 81 MG: 81 TABLET, CHEWABLE ORAL at 20:20

## 2023-11-20 RX ADMIN — ERTAPENEM 1000 MG: 1 INJECTION INTRAMUSCULAR; INTRAVENOUS at 16:51

## 2023-11-20 RX ADMIN — LIDOCAINE HYDROCHLORIDE ANHYDROUS 1 ML: 10 INJECTION, SOLUTION INFILTRATION at 11:34

## 2023-11-20 RX ADMIN — OXYCODONE HYDROCHLORIDE AND ACETAMINOPHEN 1 TABLET: 10; 325 TABLET ORAL at 15:51

## 2023-11-20 RX ADMIN — Medication 10 ML: at 11:38

## 2023-11-20 RX ADMIN — DOCUSATE SODIUM 50 MG AND SENNOSIDES 8.6 MG 2 TABLET: 8.6; 5 TABLET, FILM COATED ORAL at 09:39

## 2023-11-20 RX ADMIN — MONTELUKAST SODIUM 10 MG: 10 TABLET ORAL at 20:20

## 2023-11-20 RX ADMIN — ATORVASTATIN CALCIUM 40 MG: 40 TABLET ORAL at 20:20

## 2023-11-20 RX ADMIN — LEVOTHYROXINE SODIUM 100 MCG: 100 TABLET ORAL at 06:57

## 2023-11-20 RX ADMIN — AMLODIPINE BESYLATE 5 MG: 5 TABLET ORAL at 09:39

## 2023-11-20 RX ADMIN — DULOXETINE HYDROCHLORIDE 30 MG: 30 CAPSULE, DELAYED RELEASE ORAL at 20:20

## 2023-11-20 RX ADMIN — CEFEPIME 2000 MG: 2 INJECTION, POWDER, FOR SOLUTION INTRAVENOUS at 09:38

## 2023-11-20 NOTE — PROGRESS NOTES
Orthopedic Surgery Progress Note    Mayuri Almeida  11/20/2023      Subjective:     Systemic or Specific Complaints:   Pain improved postop  No acute events    Objective:     Patient Vitals for the past 24 hrs:   BP Temp Temp src Pulse Resp SpO2   11/20/23 1700 123/61 98.4 °F (36.9 °C) Oral 69 18 96 %   11/20/23 0820 152/88 97.7 °F (36.5 °C) Oral 61 20 99 %   11/19/23 2256 117/74 97.7 °F (36.5 °C) Oral 68 18 98 %   11/19/23 2014 131/70 97.6 °F (36.4 °C) Oral 66 18 96 %       right upper  General: alert, appears stated age and cooperative   Wound: covered             Dressing: Clean, dry, intact   Extremity: Distal NVI           DVT Exam: No evidence of DVT                   Data Review:  Lab Results (last 24 hours)       Procedure Component Value Units Date/Time    AFB Culture - Body Fluid, Shoulder, Right [[id number]] Collected: 11/17/23 1712    Specimen: Body Fluid from Shoulder, Right Updated: 11/20/23 1337     AFB Stain No acid fast bacilli seen on direct smear      No acid fast bacilli seen on concentrated smear    AFB Culture - Tissue, Shoulder, Right [[id number]] Collected: 11/17/23 1715    Specimen: Tissue from Shoulder, Right Updated: 11/20/23 1337     AFB Stain No acid fast bacilli seen on direct smear      No acid fast bacilli seen on concentrated smear    Tissue Pathology Exam [[id number]] Collected: 11/17/23 1715    Specimen: Tissue from Shoulder, Right Updated: 11/20/23 0801    Body Fluid Culture - Body Fluid, Shoulder, Right [[id number]]  (Abnormal)  (Susceptibility) Collected: 11/16/23 2148    Specimen: Body Fluid from Shoulder, Right Updated: 11/20/23 0754     Body Fluid Culture Light growth (2+) Serratia marcescens     Gram Stain Many (4+) WBCs per low power field      No organisms seen    Susceptibility        Serratia marcescens      ELISSA      Cefepime Susceptible      Ceftazidime Susceptible      Ceftriaxone Susceptible      Gentamicin Susceptible      Levofloxacin Susceptible      Piperacillin +  Tazobactam Susceptible  [1]       Trimethoprim + Sulfamethoxazole Susceptible                   [1]  Appended report. These results have been appended to a previously preliminary verified report.               Susceptibility Comments       Serratia marcescens    Cefazolin sensitivity will not be reported for Enterobacteriaceae in non-urine isolates. If cefazolin is preferred, please call the microbiology lab to request an E-test.  With the exception of urinary-sourced infections, aminoglycosides should not be used as monotherapy.               Basic Metabolic Panel [[id number]]  (Abnormal) Collected: 11/20/23 0332    Specimen: Blood Updated: 11/20/23 0420     Glucose 130 mg/dL      BUN 13 mg/dL      Creatinine 0.95 mg/dL      Sodium 139 mmol/L      Potassium 3.4 mmol/L      Chloride 101 mmol/L      CO2 28.0 mmol/L      Calcium 8.6 mg/dL      BUN/Creatinine Ratio 13.7     Anion Gap 10.0 mmol/L      eGFR 65.4 mL/min/1.73     Narrative:      GFR Normal >60  Chronic Kidney Disease <60  Kidney Failure <15      C-reactive Protein [[id number]]  (Abnormal) Collected: 11/20/23 0332    Specimen: Blood Updated: 11/20/23 0420     C-Reactive Protein 2.15 mg/dL           Imaging Results (Last 24 Hours)       ** No results found for the last 24 hours. **            Assessment:   3 Days Post-Op  I&D, poly exchange R shoulder    Plan:     1) culture-Serratia marcescens   2) continue Iv antibiotics - expect PICC with 6 weeks abx  3) pain control, ice  4) Plan to D/C home tomorrow am following ABX Dose.  5) Dressing change today         Jere Minor PA-C

## 2023-11-20 NOTE — PLAN OF CARE
Goal Outcome Evaluation:      Patient a/o x4 with v/s/s this shift. Patient treated for pain per MAR. Patient would like physician to check on her surgical bandage and inspect the incision for changes in the a.m. as discussed. Physician notation regarding 6 wks of abx therapy after d/c. Patient and patient family eager for teaching for home abx infusion. Family remained at bedside overnight. Will update oncoming dayshift RN during bedside change of shift report as appropriate in the a.m.

## 2023-11-20 NOTE — PROGRESS NOTES
INFECTIOUS DISEASES PROGRESS NOTE    Patient:  Mayuri Almeida  YOB: 1955  MRN: [id number]   Admit date: 2023   Admitting Physician: Dionicio Araiza MD  Primary Care Physician: Lincoln Johnson MD    Chief Complaint: Right shoulder infection      Interval History: Patient reports that she is going to try to get get cleaned up a bit today.  She is somewhat concerned about some increase bleeding the superior part of her dressing.    Allergies:   Allergies   Allergen Reactions    Aleve [Naproxen] Other (See Comments)     Pt has stage # 3 kidney disease.    Codeine Nausea And Vomiting       Current Scheduled Medications:   amLODIPine, 5 mg, Oral, Daily  aspirin, 81 mg, Oral, Nightly  atorvastatin, 40 mg, Oral, Nightly  cefepime, 2,000 mg, Intravenous, Q12H  DULoxetine, 30 mg, Oral, BID  levothyroxine, 100 mcg, Oral, Q AM  losartan, 25 mg, Oral, Daily  montelukast, 10 mg, Oral, Nightly  Non-Formulary / Patient Supplied Medication, 75 mL, Oral, Daily  pantoprazole, 40 mg, Oral, Q AM  propranolol LA, 80 mg, Oral, Nightly  senna-docusate sodium, 2 tablet, Oral, BID  sodium chloride, 10 mL, Intravenous, Q12H  sodium chloride, 10 mL, Intravenous, Q12H  triamterene-hydrochlorothiazide, 1 tablet, Oral, Daily      Current PRN Medications:    acetaminophen **OR** acetaminophen    senna-docusate sodium **AND** polyethylene glycol **AND** bisacodyl **AND** bisacodyl    diphenhydrAMINE **OR** diphenhydrAMINE    docusate sodium    magnesium hydroxide    [DISCONTINUED] Morphine **AND** naloxone    ondansetron **OR** ondansetron    oxyCODONE-acetaminophen    oxyCODONE-acetaminophen    promethazine **OR** promethazine    sodium chloride    sodium chloride    sodium chloride    sodium chloride            Objective     Vital Signs:  Temp (24hrs), Av.6 °F (36.4 °C), Min:97.5 °F (36.4 °C), Max:97.7 °F (36.5 °C)      /88 (BP Location: Left arm, Patient Position: Sitting)   Pulse 61   Temp 97.7  "°F (36.5 °C) (Oral)   Resp 20   Ht 157.5 cm (62\")   Wt 74.8 kg (164 lb 12.8 oz)   LMP  (LMP Unknown)   SpO2 99%   BMI 30.14 kg/m²         Physical Exam    General: The patient is nontoxic-appearing actually ambulating in the room in no acute distress  HEENT: Sclera anicteric and noninjected  Respiratory: Effort even and unlabored  Right upper extremity immobilized.  Dressing is intact.  It does appear she has some more dark discoloration underneath the dressing superiorly.  There is no bright red blood, no evidence of active bleeding.    Results Review:    I reviewed the patient's new clinical results.    Lab Results:    CBC:   Lab Results   Lab 11/16/23 0428 11/18/23 0640   WBC 11.97*  --    HEMOGLOBIN 11.3* 9.8*   HEMATOCRIT 37.4 31.9*   PLATELETS 410  --         AutoDiff:   Lab Results   Lab 11/16/23 0428   NEUTROPHIL % 70.3   LYMPHOCYTE % 15.7*   MONOCYTES % 10.4   EOSINOPHIL % 2.7   BASOPHIL % 0.3   NEUTROS ABS 8.41*   LYMPHS ABS 1.88   MONOS ABS 1.25*   EOS ABS 0.32   BASOS ABS 0.04        Manual Diff:    Lab Results   Lab 11/16/23 0428   NEUTROS ABS 8.41*           CMP:   Lab Results   Lab 11/18/23  0640 11/19/23  0329 11/20/23  0332   SODIUM 137 140 139   POTASSIUM 3.7 3.4* 3.4*   CHLORIDE 98 101 101   CO2 27.0 28.0 28.0   BUN 10 13 13   CREATININE 0.94 0.98 0.95   CALCIUM 8.5* 9.0 8.6   GLUCOSE 196* 138* 130*       Estimated Creatinine Clearance: 53.7 mL/min (by C-G formula based on SCr of 0.95 mg/dL).        C-Reactive Protein   Date Value Ref Range Status   11/20/2023 2.15 (H) 0.00 - 0.50 mg/dL Final   11/16/2023 4.69 (H) 0.00 - 0.50 mg/dL Final   04/23/2019 0.53 (H) 0.00 - 0.50 mg/dL Final     Sed Rate   Date Value Ref Range Status   11/16/2023 49 (H) 0 - 30 mm/hr Final   04/23/2019 6 0 - 25 mm/Hr Final       Culture Results:    Microbiology Results (last 10 days)       Procedure Component Value - Date/Time    KOH Prep - Tissue, Shoulder, Right [[id number]] Collected: 11/17/23 1715    Lab " Status: Final result Specimen: Tissue from Shoulder, Right Updated: 11/17/23 1827     KOH Prep No yeast or hyphal elements seen    Tissue / Bone Culture - Tissue, Shoulder, Right [[id number]]  (Abnormal) Collected: 11/17/23 1715    Lab Status: Final result Specimen: Tissue from Shoulder, Right Updated: 11/19/23 0937     Tissue Culture Heavy growth (4+) Serratia marcescens     Gram Stain Many (4+) WBCs seen      Few (2+) Gram negative bacilli    Narrative:      For MICs refer to body fluid culture Collected 11/16/2023 2248.      AFB Culture - Tissue, Shoulder, Right [[id number]] Collected: 11/17/23 1715    Lab Status: Preliminary result Specimen: Tissue from Shoulder, Right Updated: 11/18/23 1418     AFB Stain No acid fast bacilli seen on direct smear    KOH Prep - Body Fluid, Shoulder, Right [[id number]] Collected: 11/17/23 1712    Lab Status: Final result Specimen: Body Fluid from Shoulder, Right Updated: 11/17/23 1801     KOH Prep No yeast or hyphal elements seen    Body Fluid Culture - Body Fluid, Shoulder, Right [[id number]]  (Abnormal) Collected: 11/17/23 1712    Lab Status: Final result Specimen: Body Fluid from Shoulder, Right Updated: 11/19/23 0937     Body Fluid Culture Heavy growth (4+) Serratia marcescens     Gram Stain Moderate (3+) WBCs seen      No organisms seen    Narrative:      For MICs refer to body fluid culture Collected 11/16/2023 2248.    AFB Culture - Body Fluid, Shoulder, Right [[id number]] Collected: 11/17/23 1712    Lab Status: Preliminary result Specimen: Body Fluid from Shoulder, Right Updated: 11/18/23 1417     AFB Stain No acid fast bacilli seen on direct smear    Body Fluid Culture - Body Fluid, Shoulder, Right [[id number]]  (Abnormal)  (Susceptibility) Collected: 11/16/23 2148    Lab Status: Final result Specimen: Body Fluid from Shoulder, Right Updated: 11/20/23 0754     Body Fluid Culture Light growth (2+) Serratia marcescens     Gram Stain Many (4+) WBCs per low power field      No  organisms seen    Susceptibility        Serratia marcescens      ELISSA      Cefepime Susceptible      Ceftazidime Susceptible      Ceftriaxone Susceptible      Gentamicin Susceptible      Levofloxacin Susceptible      Piperacillin + Tazobactam Susceptible  [1]       Trimethoprim + Sulfamethoxazole Susceptible                   [1]  Appended report. These results have been appended to a previously preliminary verified report.               Susceptibility Comments       Serratia marcescens    Cefazolin sensitivity will not be reported for Enterobacteriaceae in non-urine isolates. If cefazolin is preferred, please call the microbiology lab to request an E-test.  With the exception of urinary-sourced infections, aminoglycosides should not be used as monotherapy.               MRSA Screen, PCR (Inpatient) - Swab, Nares [[id number]]  (Normal) Collected: 11/16/23 2143    Lab Status: Final result Specimen: Swab from Nares Updated: 11/16/23 2320     MRSA PCR No MRSA Detected    Narrative:      The negative predictive value of this diagnostic test is high and should only be used to consider de-escalating anti-MRSA therapy. A positive result may indicate colonization with MRSA and must be correlated clinically.                 Radiology:   Imaging Results (Last 72 Hours)       ** No results found for the last 72 hours. **                Active Hospital Problems    Diagnosis     **Post op infection     Wound infection        IMPRESSION:  Prosthetic joint infection with Serratia marcescens-patient is status post reverse right total shoulder October 17, 2023 then underwent incision and drainage of infected hematoma and polyethylene exchange November 17, 2023  Anemia-Postoperative blood loss  Type 2 diabetes mellitus-August 23, 2023 hemoglobin A1c was 6.6  Chronic kidney disease stage III-follows with nephrology as an outpatient  Hypokalemia    RECOMMENDATION:   Discontinue cefepime  Start ertapenem  Discussed with patient and   that I have discussed with microbiology.  Although Serratia reports reveal it is pan susceptible, there is a risk with prolonged therapy for inducible resistance especially with third-generation cephalosporins.  Our lab and level does not test for inducible amp C beta-lactamase resistance, however, her local micro director is going to reach out to [facility] to see if they will test.  It is possible we can narrow patient's antibiotic therapy as an outpatient at that time.  Reviewed plans now for consulting vascular access for PICC line  Reviewed I will place order for case management to submit request for IV antibiotics for approval.  Elevated sed rate  Elevated CRP  We will give one-time dose of potassium      Below copy and pasted from inpatient consult to case management services  Ertapenem 1 g IV every 24 hours for 6 weeks with start date November 18, 2023 for infected right reverse total shoulder with Serratia marcescens.  CBC, CMP, CRP q. Monday and fax to [phone]  Patient will be living in [place] while receiving therapy.  Patient and  would like to talk to /    Bonnie Bee MD  11/20/23  10:01 CST

## 2023-11-20 NOTE — ANESTHESIA POSTPROCEDURE EVALUATION
"Patient: Mayuri Almeida    Procedure Summary       Date: 11/17/23 Room / Location:  [facility] OR 11 /  [facility] OR    Anesthesia Start: 1648 Anesthesia Stop: 1814    Procedure: I AND D POSSIBLE POLY EXCHANGE SHOULDER (Right: Shoulder) Diagnosis:       Surgical wound infection      (Postop infection R shoulder)    Surgeons: Dionicio Araiza MD Provider: Jere Gonsales CRNA    Anesthesia Type: general ASA Status: 3 - Emergent            Anesthesia Type: general    Vitals  Vitals Value Taken Time   /71 11/17/23 1855   Temp 97.2 °F (36.2 °C) 11/17/23 1855   Pulse 63 11/17/23 1858   Resp 13 11/17/23 1855   SpO2 95 % 11/17/23 1858   Vitals shown include unfiled device data.        Post Anesthesia Care and Evaluation    Patient location during evaluation: PACU  Patient participation: complete - patient participated  Level of consciousness: awake and alert  Pain management: adequate    Airway patency: patent  Anesthetic complications: No anesthetic complications    Cardiovascular status: acceptable  Respiratory status: acceptable  Hydration status: acceptable    Comments: Blood pressure 117/74, pulse 68, temperature 97.7 °F (36.5 °C), temperature source Oral, resp. rate 18, height 157.5 cm (62\"), weight 74.8 kg (164 lb 12.8 oz), SpO2 98%, not currently breastfeeding.    Pt discharged from PACU based on corrina score >8    "

## 2023-11-20 NOTE — CASE MANAGEMENT/SOCIAL WORK
Discharge Planning Assessment  [facility]     Patient Name: Mayuri Almeida  MRN: [id number]  Today's Date: 11/20/2023    Admit Date: 11/16/2023        Discharge Needs Assessment       Row Name 11/20/23 1356       Living Environment    People in Home spouse    Name(s) of People in Home Spouse - Sampson Almeida    Current Living Arrangements home    Potentially Unsafe Housing Conditions none    In the past 12 months has the electric, gas, oil, or water company threatened to shut off services in your home? No    Primary Care Provided by self;spouse/significant other    Provides Primary Care For no one    Family Caregiver if Needed spouse    Family Caregiver Names Sampson Almeida    Quality of Family Relationships supportive;helpful;involved    Able to Return to Prior Arrangements yes       Resource/Environmental Concerns    Resource/Environmental Concerns none    Transportation Concerns none       Food Insecurity    Within the past 12 months, you worried that your food would run out before you got the money to buy more. Never true    Within the past 12 months, the food you bought just didn't last and you didn't have money to get more. Never true       Transition Planning    Patient/Family Anticipates Transition to home with help/services;home with family    Patient/Family Anticipated Services at Transition home health care    Transportation Anticipated family or friend will provide       Discharge Needs Assessment    Readmission Within the Last 30 Days no previous admission in last 30 days    Equipment Currently Used at Home none    Concerns to be Addressed care coordination/care conferences;discharge planning    Anticipated Changes Related to Illness none    Equipment Needed After Discharge none    Outpatient/Agency/Support Group Needs homecare agency    Discharge Facility/Level of Care Needs home with home health    Provided Post Acute Provider List? Yes    Post Acute Provider List Home Health    Provided Post Acute  Provider Quality & Resource List? Yes    Post Acute Provider Quality and Resource List Home Health    Delivered To Patient;Support Person    Method of Delivery In person    Patient's Choice of Community Agency(s) [facility]    Discharge Coordination/Progress Patient plans to return home with her spouse and  services for home infusion.  Home infusion referral sent to [facility].  HH referral sent to [facility] [phone].  Awaiting acceptance for  services and outpatient infusion pricing.                   Discharge Plan    No documentation.                 Continued Care and Services - Admitted Since 11/16/2023       Home Medical Care       Service Provider Request Status Selected Services Address Phone Fax Patient Preferred    [facility] Pending - [facility] N[facility]A [address] [facility] [address] [phone] --                  Expected Discharge Date and Time       Expected Discharge Date Expected Discharge Time    Nov 20, 2023            Demographic Summary    No documentation.                  Functional Status    No documentation.                  Psychosocial    No documentation.                  Abuse/Neglect    No documentation.                  Legal    No documentation.                  Substance Abuse    No documentation.                  Patient Forms    No documentation.                     USHA HutchinsW

## 2023-11-20 NOTE — CASE MANAGEMENT/SOCIAL WORK
Continued Stay Note   [facility]     Patient Name: Mayuri Almeida  MRN: [id number]  Today's Date: 11/20/2023    Admit Date: 11/16/2023    Plan: Home with home infusion and [facility]   Discharge Plan       Row Name 11/20/23 1554       Plan    Plan Home with home infusion and [facility]    Plan Comments [facility]  has accepted patient and she is scheduled for Wednesday am.  [facility] is $252.04 per 7 days total = $1512.24.  Manjula with [facility] is reaching out to patient's spouse to discuss amount.  If patient and spouse are in agreement she can dc tomorrow after antibiotic received.                   Discharge Codes    No documentation.                 Expected Discharge Date and Time       Expected Discharge Date Expected Discharge Time    Nov 20, 2023               REILLY Hutchins

## 2023-11-20 NOTE — DISCHARGE PLACEMENT REQUEST
"To: [facility]    From: Lolis WILSON [phone]      Mayuri Almeida (68 y.o. Female)       Date of Birth   1955    Social Security Number       Address   [address] [facility] [address]    Home Phone   [phone]    MRN   [id number]       Congregational   Sabianism    Marital Status                               Admission Date   11/16/23    Admission Type   Emergency    Admitting Provider   Dionicio Araiza MD    Attending Provider   Dionicio Araiza MD    Department, Room/Bed   [facility] 3A, 328/1       Discharge Date       Discharge Disposition       Discharge Destination                                 Attending Provider: Dionicio Araiza MD    Allergies: Aleve [Naproxen], Codeine    Isolation: None   Infection: None   Code Status: CPR    Ht: 157.5 cm (62\")   Wt: 74.8 kg (164 lb 12.8 oz)    Admission Cmt: None   Principal Problem: Post op infection [T81.40XA]                   Active Insurance as of 11/16/2023       Primary Coverage       Payor Plan Insurance Group Employer/Plan Group    MEDICARE MEDICARE A & B        Payor Plan Address Payor Plan Phone Number Payor Plan Fax Number Effective Dates    [address] [phone]  7/1/2020 - None Entered    [facility] [id number]         Subscriber Name Subscriber Birth Date Member ID       MAYURI ALMEIDA 1955 [id number]               Secondary Coverage       Payor Plan Insurance Group Employer/Plan Group    MISC COMMERCIAL MISC COMMERCIAL PLAN G       Coverage Address Coverage Phone Number Coverage Fax Number Effective Dates    [address] [facility] [phone]  7/1/2020 - None Entered    [facility] [id number]         Subscriber Name Subscriber Birth Date Member ID       MAYURI ALMEIDA 1955 [id number]                     Emergency Contacts        (Rel.) Home Phone Work Phone Mobile Phone    EVARISTO ALMEIDA (Spouse) [phone] -- --                 History & Physical        Jere Minor, " ZOYA at 23 0845       Attestation signed by Dionicio Araiza MD at 23 5073    I have reviewed this documentation and agree.                  Orthopaedic Inpatient H&P  NAME:  Mayuri Almeida   : 1955  MRN: [id number]    2023  3:49 AM      CHIEF COMPLAINT:  right shoulder pain      HISTORY OF PRESENT ILLNESS:   The patient is a 68 y.o. female who underwent a Right Reverse TSA on 10/17/23. She developed redness and swelling about 10 days ago. She was started on Keflex on 23 and Clindamycin was added on 23. She presented to the ER with increased pain and redness over her surgical site.  Pain is located in the right shouler, rated a 3/5, dull and constant, worse with movement, better with rest and medication.  There are no associated symptoms.      Past Medical History:    Past Medical History:   Diagnosis Date    Anxiety     Arthritis     Bilateral cataracts     EARLY STAGE    Bladder spasms     Cancer     thyroid    Coronary artery disease     Degenerative joint disease of low back     Diabetes mellitus     Disease of thyroid gland     Fibromyalgia     GERD (gastroesophageal reflux disease)     History of bad fall     w/ damage to Left shoulder.    History of detached retina repair     Hypertension     PONV (postoperative nausea and vomiting)     Psoriasis     Stage 3 chronic kidney disease        Past Surgical History:    Past Surgical History:   Procedure Laterality Date    APPENDECTOMY      BICEPS TENDONESIS SUBPECTORALIS REPAIR Right 2022    Procedure: BICEPS TENODESIS/TENOTOMY;  Surgeon: Dionicio Araiza MD;  Location: [facility];  Service: Orthopedics;  Laterality: Right;    CARPAL TUNNEL RELEASE Bilateral     CORONARY STENT PLACEMENT  06/21/2019    x 3     FINGER/THUMB ARTHROPLASTY Bilateral     Trigger thumb release    HYSTERECTOMY      ORIF ANKLE FRACTURE Left     RETINAL DETACHMENT REPAIR Right     SHOULDER ARTHROSCOPY W/ ROTATOR CUFF REPAIR Left  12/4/2018    Procedure: LEFT SHOULDER ARTHROSCOPIC ROTATOR CUFF REPAIR, DEBRIDEMENT, SUBACROMIAL DECOMPRESSION, DISTAL CLAVICLE EXCISION BICEPS TENODESIS, TENOTOMY - LEFT  ;  Surgeon: Dionicio Araiza MD;  Location:  [facility];  Service: Orthopedics    SHOULDER ARTHROSCOPY W/ ROTATOR CUFF REPAIR Right 1/11/2022    Procedure: RIGHT SHOULDER ROTATOR CUFF REPAIR, BICEPS TENODESIS/TENOTOMY;  Surgeon: Dionicio Araiza MD;  Location:  [facility];  Service: Orthopedics;  Laterality: Right;    THYROID CYST EXCISION      TOTAL HIP ARTHROPLASTY      2020    TOTAL SHOULDER ARTHROPLASTY W/ DISTAL CLAVICLE EXCISION Right 10/17/2023    Procedure: RIGHT REVERSE TOTAL SHOULDER ARTHROPLASTY;  Surgeon: Dionicio Araiza MD;  Location:  [facility];  Service: Orthopedics;  Laterality: Right;    WRIST TENDON REPAIR LENGTHENING Right        Current Medications:   Prior to Admission medications    Medication Sig Start Date End Date Taking? Authorizing Provider   ALPRAZolam (XANAX) 0.25 MG tablet Take 1 tablet by mouth Every Night.   Yes Adeline Richter MD   amLODIPine (NORVASC) 5 MG tablet Take 1 tablet by mouth Daily. 6/22/19  Yes ProviderAdeline MD   aspirin 81 MG EC tablet Take 1 tablet by mouth Daily. 12/8/19  Yes Adeline Richter MD   atorvastatin (LIPITOR) 40 MG tablet Take 1 tablet by mouth Every Night. 6/22/19  Yes Adeline Richter MD   B Complex Vitamins (VITAMIN-B COMPLEX PO) Take 1 tablet by mouth Daily.   Yes Adeline Richter MD   celecoxib (CeleBREX) 200 MG capsule Take 1 capsule by mouth 2 (Two) Times a Day. 10/4/18  Yes Adeline Richter MD   Cholecalciferol 1000 units capsule Take 1 capsule by mouth Daily.   Yes Adeline Richter MD   clindamycin (CLEOCIN) 300 MG capsule Take 1 capsule by mouth 3 (Three) Times a Day.   Yes Adeline Richter MD   CRANBERRY EXTRACT PO Take 1 tablet by mouth Daily.   Yes Adeline Richter MD   cyclobenzaprine (FLEXERIL) 10 MG tablet Take  1 tablet by mouth Every Night.   Yes Adeline Richter MD   DULoxetine (CYMBALTA) 30 MG capsule Take 1 capsule by mouth 2 (Two) Times a Day.   Yes Adeline Richter MD   estradiol (ESTRACE) 0.1 MG/GM vaginal cream Insert 1 applicator into the vagina 3 (Three) Times a Week. 8/16/23  Yes Adeline Richter MD   estrogens, conjugated, (PREMARIN) 0.625 MG tablet Take 1 tablet by mouth Every Night.   Yes Adeline Richter MD   lansoprazole (PREVACID) 15 MG capsule Take 1 capsule by mouth Daily.   Yes Adeline Richter MD   levothyroxine (SYNTHROID, LEVOTHROID) 100 MCG tablet Take 1 tablet by mouth Every Morning.   Yes Adeline Richter MD   losartan (COZAAR) 25 MG tablet Take 1 tablet by mouth Daily.   Yes Adeline Richter MD   magnesium oxide (MAG-OX) 400 MG tablet Take 1 tablet by mouth 2 (Two) Times a Day.   Yes Adeline Richter MD   metFORMIN (GLUCOPHAGE) 500 MG tablet Take 1 tablet by mouth 2 (Two) Times a Day With Meals. 8/31/23  Yes Adeline Richter MD   montelukast (SINGULAIR) 10 MG tablet Take 1 tablet by mouth Every Night.   Yes Adeline Richter MD   Potassium Chloride (KLOR-CON 10 PO) Take 10 mcg by mouth 3 (Three) Times a Day.   Yes Adeline Richter MD   propranolol LA (INDERAL LA) 80 MG 24 hr capsule Take 1 capsule by mouth Daily.   Yes Adeline Richter MD   traMADol (ULTRAM) 50 MG tablet Take 1 tablet by mouth Every 8 (Eight) Hours As Needed for Moderate Pain. 10/23/23  Yes Adeline Richter MD   triamterene-hydrochlorothiazide (MAXZIDE) 75-50 MG per tablet Take 1 tablet by mouth Daily. 3/15/19  Yes Adeline Richter MD   Vibegron 75 MG tablet Take 1 tablet by mouth Daily.   Yes Adeline Richter MD   MAGNESIUM CITRATE PO Take 1 tablet by mouth 2 (Two) Times a Day.  11/16/23 Yes Adeline Richter MD   cefdinir (OMNICEF) 300 MG capsule Take 1 capsule every 12 hours by oral route as needed.  Patient not taking: Reported on 11/16/2023     Provider, MD Adeline   cephalexin (KEFLEX) 500 MG capsule Take 1 capsule by mouth Every 12 (Twelve) Hours.  Patient not taking: Reported on 11/16/2023 11/11/23   Adeline Richter MD   ondansetron (Zofran) 4 MG tablet Take 1 tablet by mouth Every 8 (Eight) Hours As Needed for Nausea or Vomiting.  Patient not taking: Reported on 11/16/2023 10/17/23   Dionicio Araiza MD   oxyCODONE-acetaminophen (PERCOCET)  MG per tablet Take 1 tablet by mouth Every 8 (Eight) Hours As Needed for Severe Pain.  Patient not taking: Reported on 11/16/2023 10/17/23   Dionicio Araiza MD       Allergies:  Aleve [naproxen] and Codeine    Social History:   Social History     Socioeconomic History    Marital status:    Tobacco Use    Smoking status: Former     Packs/day: 0.50     Years: 28.00     Additional pack years: 0.00     Total pack years: 14.00     Types: Cigarettes    Smokeless tobacco: Never   Vaping Use    Vaping Use: Every day    Substances: Nicotine   Substance and Sexual Activity    Alcohol use: No    Drug use: No    Sexual activity: Defer       Family History:   History reviewed. No pertinent family history.    REVIEW OF SYSTEMS:  14 point review of systems has been reviewed from the patient's emergency room visit, reviewed with the patient on today's date with no new changes.    PHYSICAL EXAM:      Physical Examination:  Vitals:   Vitals:    11/16/23 0603 11/16/23 0631 11/16/23 0659 11/16/23 0748   BP: 110/82 132/82 127/77 139/72   BP Location:    Left arm   Patient Position:    Sitting   Pulse: 60 60 59 60   Resp:   18 18   Temp:    97.7 °F (36.5 °C)   TempSrc:    Oral   SpO2: 95% 95% 99% 97%   Weight:       Height:         General:  Appears stated age, no distress.  Orientation:  Alert and oriented to time, place, and person.  Mood and Affect:  Cooperative and pleasant.  Gait:  Resting comfortably in bed.  Cardiovascular:  Symmetric 1-2 plus pulses in upper and lower extremities.  Lymph:  No  cervical or inguinal lymphadenopathy noted.  Sensation:  Grossly intact to light touch.  DTR:  Normal, no pathologic reflexes.  Coordination/balance:  Normal    Musculoskeletal:  Right upper extremity exam:  There is no tenderness to palpation about the shoulder, elbow, wrist or hand.  Full motion.  Stability normal with provocative tests, 5/5 strength, and skin is normal.      Left upper extremity exam:  There is no tenderness to palpation about the shoulder, elbow, wrist or hand. Full motion.  Stability normal with provocative tests, 5/5 strength, and skin is normal.     Right lower extremity exam:  Healed incision overlying anterior shoulder. Erythema surrounding incision. No active drainage today.     Left lower extremity exam:  There is no tenderness to palpation about the hip, knee, ankle or foot.  Full motion.  Stability normal with provocative tests, 5/5 strength, and skin is normal.      DATA:    LAB RESULTS:      Lab 11/16/23 0428   WBC 11.97*   HEMOGLOBIN 11.3*   HEMATOCRIT 37.4   PLATELETS 410   NEUTROS ABS 8.41*   IMMATURE GRANS (ABS) 0.07*   LYMPHS ABS 1.88   MONOS ABS 1.25*   EOS ABS 0.32   MCV 94.2   SED RATE 49*   CRP 4.69*         Lab 11/16/23  0428   SODIUM 137   POTASSIUM 3.7   CHLORIDE 98   CO2 27.0   ANION GAP 12.0   BUN 16   CREATININE 1.00   EGFR 61.5   GLUCOSE 103*   CALCIUM 9.4                         Brief Urine Lab Results  (Last result in the past 365 days)        Color   Clarity   Blood   Leuk Est   Nitrite   Protein   CREAT   Urine HCG        10/10/23 1246 Yellow   Clear   Negative   Negative   Negative   Negative                 Microbiology Results (last 10 days)       ** No results found for the last 240 hours. **               ----------------------------------------------------------------------------------------------------------------------  I have reviewed the radiology images above and agree with the findings dictated below    Radiology: No radiology results for the last 3  days     ----------------------------------------------------------------------------------------------------------------------  Assessment:    1)  Post Op Wound Infection     Plan:  1) IV Abx today. Will Re-eval tomorrow am to determine need for surgery.   2) Regular diet today, NPO after a light breakfast tomorrow 11/17/23.    Electronically signed by Jere Minor PA-C on 11/16/2023 at 08:45 CST      Electronically signed by Dionicio Araiza MD at 11/16/23 2134          Physician Progress Notes (most recent note)        Bonnie Bee MD at 11/20/23 1001          INFECTIOUS DISEASES PROGRESS NOTE    Patient:  Mayuri Almeida  YOB: 1955  MRN: [id number]   Admit date: 11/16/2023   Admitting Physician: Dionicio Araiza MD  Primary Care Physician: Lincoln Johnson MD    Chief Complaint: Right shoulder infection      Interval History: Patient reports that she is going to try to get get cleaned up a bit today.  She is somewhat concerned about some increase bleeding the superior part of her dressing.    Allergies:   Allergies   Allergen Reactions    Aleve [Naproxen] Other (See Comments)     Pt has stage # 3 kidney disease.    Codeine Nausea And Vomiting       Current Scheduled Medications:   amLODIPine, 5 mg, Oral, Daily  aspirin, 81 mg, Oral, Nightly  atorvastatin, 40 mg, Oral, Nightly  cefepime, 2,000 mg, Intravenous, Q12H  DULoxetine, 30 mg, Oral, BID  levothyroxine, 100 mcg, Oral, Q AM  losartan, 25 mg, Oral, Daily  montelukast, 10 mg, Oral, Nightly  Non-Formulary / Patient Supplied Medication, 75 mL, Oral, Daily  pantoprazole, 40 mg, Oral, Q AM  propranolol LA, 80 mg, Oral, Nightly  senna-docusate sodium, 2 tablet, Oral, BID  sodium chloride, 10 mL, Intravenous, Q12H  sodium chloride, 10 mL, Intravenous, Q12H  triamterene-hydrochlorothiazide, 1 tablet, Oral, Daily      Current PRN Medications:    acetaminophen **OR** acetaminophen    senna-docusate sodium **AND**  "polyethylene glycol **AND** bisacodyl **AND** bisacodyl    diphenhydrAMINE **OR** diphenhydrAMINE    docusate sodium    magnesium hydroxide    [DISCONTINUED] Morphine **AND** naloxone    ondansetron **OR** ondansetron    oxyCODONE-acetaminophen    oxyCODONE-acetaminophen    promethazine **OR** promethazine    sodium chloride    sodium chloride    sodium chloride    sodium chloride            Objective     Vital Signs:  Temp (24hrs), Av.6 °F (36.4 °C), Min:97.5 °F (36.4 °C), Max:97.7 °F (36.5 °C)      /88 (BP Location: Left arm, Patient Position: Sitting)   Pulse 61   Temp 97.7 °F (36.5 °C) (Oral)   Resp 20   Ht 157.5 cm (62\")   Wt 74.8 kg (164 lb 12.8 oz)   LMP  (LMP Unknown)   SpO2 99%   BMI 30.14 kg/m²         Physical Exam    General: The patient is nontoxic-appearing actually ambulating in the room in no acute distress  HEENT: Sclera anicteric and noninjected  Respiratory: Effort even and unlabored  Right upper extremity immobilized.  Dressing is intact.  It does appear she has some more dark discoloration underneath the dressing superiorly.  There is no bright red blood, no evidence of active bleeding.    Results Review:    I reviewed the patient's new clinical results.    Lab Results:    CBC:   Lab Results   Lab 2340   WBC 11.97*  --    HEMOGLOBIN 11.3* 9.8*   HEMATOCRIT 37.4 31.9*   PLATELETS 410  --         AutoDiff:   Lab Results   Lab 23   NEUTROPHIL % 70.3   LYMPHOCYTE % 15.7*   MONOCYTES % 10.4   EOSINOPHIL % 2.7   BASOPHIL % 0.3   NEUTROS ABS 8.41*   LYMPHS ABS 1.88   MONOS ABS 1.25*   EOS ABS 0.32   BASOS ABS 0.04        Manual Diff:    Lab Results   Lab 23   NEUTROS ABS 8.41*           CMP:   Lab Results   Lab 23  0640 23  0329 23  0332   SODIUM 137 140 139   POTASSIUM 3.7 3.4* 3.4*   CHLORIDE 98 101 101   CO2 27.0 28.0 28.0   BUN 10 13 13   CREATININE 0.94 0.98 0.95   CALCIUM 8.5* 9.0 8.6   GLUCOSE 196* 138* 130* "       Estimated Creatinine Clearance: 53.7 mL/min (by C-G formula based on SCr of 0.95 mg/dL).        C-Reactive Protein   Date Value Ref Range Status   11/20/2023 2.15 (H) 0.00 - 0.50 mg/dL Final   11/16/2023 4.69 (H) 0.00 - 0.50 mg/dL Final   04/23/2019 0.53 (H) 0.00 - 0.50 mg/dL Final     Sed Rate   Date Value Ref Range Status   11/16/2023 49 (H) 0 - 30 mm/hr Final   04/23/2019 6 0 - 25 mm/Hr Final       Culture Results:    Microbiology Results (last 10 days)       Procedure Component Value - Date/Time    KOH Prep - Tissue, Shoulder, Right [[id number]] Collected: 11/17/23 1715    Lab Status: Final result Specimen: Tissue from Shoulder, Right Updated: 11/17/23 1827     KOH Prep No yeast or hyphal elements seen    Tissue / Bone Culture - Tissue, Shoulder, Right [[id number]]  (Abnormal) Collected: 11/17/23 1715    Lab Status: Final result Specimen: Tissue from Shoulder, Right Updated: 11/19/23 0937     Tissue Culture Heavy growth (4+) Serratia marcescens     Gram Stain Many (4+) WBCs seen      Few (2+) Gram negative bacilli    Narrative:      For MICs refer to body fluid culture Collected 11/16/2023 2248.      AFB Culture - Tissue, Shoulder, Right [[id number]] Collected: 11/17/23 1715    Lab Status: Preliminary result Specimen: Tissue from Shoulder, Right Updated: 11/18/23 1418     AFB Stain No acid fast bacilli seen on direct smear    KOH Prep - Body Fluid, Shoulder, Right [[id number]] Collected: 11/17/23 1712    Lab Status: Final result Specimen: Body Fluid from Shoulder, Right Updated: 11/17/23 1801     KOH Prep No yeast or hyphal elements seen    Body Fluid Culture - Body Fluid, Shoulder, Right [[id number]]  (Abnormal) Collected: 11/17/23 1712    Lab Status: Final result Specimen: Body Fluid from Shoulder, Right Updated: 11/19/23 0937     Body Fluid Culture Heavy growth (4+) Serratia marcescens     Gram Stain Moderate (3+) WBCs seen      No organisms seen    Narrative:      For MICs refer to body fluid culture  Collected 11/16/2023 2248.    AFB Culture - Body Fluid, Shoulder, Right [[id number]] Collected: 11/17/23 1712    Lab Status: Preliminary result Specimen: Body Fluid from Shoulder, Right Updated: 11/18/23 1417     AFB Stain No acid fast bacilli seen on direct smear    Body Fluid Culture - Body Fluid, Shoulder, Right [[id number]]  (Abnormal)  (Susceptibility) Collected: 11/16/23 2148    Lab Status: Final result Specimen: Body Fluid from Shoulder, Right Updated: 11/20/23 0754     Body Fluid Culture Light growth (2+) Serratia marcescens     Gram Stain Many (4+) WBCs per low power field      No organisms seen    Susceptibility        Serratia marcescens      ELISSA      Cefepime Susceptible      Ceftazidime Susceptible      Ceftriaxone Susceptible      Gentamicin Susceptible      Levofloxacin Susceptible      Piperacillin + Tazobactam Susceptible  [1]       Trimethoprim + Sulfamethoxazole Susceptible                   [1]  Appended report. These results have been appended to a previously preliminary verified report.               Susceptibility Comments       Serratia marcescens    Cefazolin sensitivity will not be reported for Enterobacteriaceae in non-urine isolates. If cefazolin is preferred, please call the microbiology lab to request an E-test.  With the exception of urinary-sourced infections, aminoglycosides should not be used as monotherapy.               MRSA Screen, PCR (Inpatient) - Swab, Nares [[id number]]  (Normal) Collected: 11/16/23 2143    Lab Status: Final result Specimen: Swab from Nares Updated: 11/16/23 2320     MRSA PCR No MRSA Detected    Narrative:      The negative predictive value of this diagnostic test is high and should only be used to consider de-escalating anti-MRSA therapy. A positive result may indicate colonization with MRSA and must be correlated clinically.                 Radiology:   Imaging Results (Last 72 Hours)       ** No results found for the last 72 hours. **                Active  Hospital Problems    Diagnosis     **Post op infection     Wound infection        IMPRESSION:  Prosthetic joint infection with Serratia marcescens-patient is status post reverse right total shoulder October 17, 2023 then underwent incision and drainage of infected hematoma and polyethylene exchange November 17, 2023  Anemia-Postoperative blood loss  Type 2 diabetes mellitus-August 23, 2023 hemoglobin A1c was 6.6  Chronic kidney disease stage III-follows with nephrology as an outpatient  Hypokalemia    RECOMMENDATION:   Discontinue cefepime  Start ertapenem  Discussed with patient and  that I have discussed with microbiology.  Although Serratia reports reveal it is pan susceptible, there is a risk with prolonged therapy for inducible resistance especially with third-generation cephalosporins.  Our lab and level does not test for inducible amp C beta-lactamase resistance, however, her local micro director is going to reach out to [facility] to see if they will test.  It is possible we can narrow patient's antibiotic therapy as an outpatient at that time.  Reviewed plans now for consulting vascular access for PICC line  Reviewed I will place order for case management to submit request for IV antibiotics for approval.  Elevated sed rate  Elevated CRP  We will give one-time dose of potassium      Below copy and pasted from inpatient consult to case management services  Ertapenem 1 g IV every 24 hours for 6 weeks with start date November 18, 2023 for infected right reverse total shoulder with Serratia marcescens.  CBC, CMP, CRP q. Monday and fax to [phone]  Patient will be living in [place] while receiving therapy.  Patient and  would like to talk to /    Bonnie Bee MD  11/20/23  10:01 CST        Electronically signed by Bonnie Bee MD at 11/20/23 1031          Consult Notes (most recent note)        Bonnie Bee MD at 11/18/23 1047           INFECTIOUS DISEASES CONSULT NOTE    Patient:  Mayuri Almeida 68 y.o. female  ROOM # 328/1  YOB: 1955  MRN: [id number]  [facility]:  [id number]  Admit date: 11/16/2023   Admitting Physician: Dionicio Araiza MD  Primary Care Physician: Lincoln Johnson MD  REFERRING PROVIDER: No ref. provider found    Consults    REASON FOR CONSULTATION : Shoulder infection      HISTORY OF PRESENT ILLNESS: Patient is a pleasant 68-year-old female who underwent right reverse total shoulder arthroplasty October 17, 2023.  Somewhat difficult to get timeline from patient and her , however, he indicates she developed hematoma 2 to 3 days after surgery.  He also indicates she developed an additional area of swelling felt to be hematoma as well.  At some point she developed some angry red appearance.    Initially they stated she had not been on any antibiotics but as we discussed history more, he noted that they called the on-call physician over 1 weekend and she was placed on cephalexin.  She did not followed up with orthopedic office and was given clindamycin November 13.    Her  stated he noted that it was not improving and actually worsening and presented to the emergency department on November 16.    Patient was taken to surgery yesterday per Dr. Araiza.  She underwent incision and drainage of hematoma/infection right shoulder and polyethylene exchange.    It appears patient received vancomycin perioperatively.      Past Medical History:   Diagnosis Date    Anxiety     Arthritis     Bilateral cataracts     EARLY STAGE    Bladder spasms     Cancer     thyroid    Coronary artery disease     Degenerative joint disease of low back     Diabetes mellitus     Disease of thyroid gland     Fibromyalgia     GERD (gastroesophageal reflux disease)     History of bad fall     w/ damage to Left shoulder.    History of detached retina repair     Hypertension     PONV (postoperative nausea and vomiting)      Psoriasis     Stage 3 chronic kidney disease      Past Surgical History:   Procedure Laterality Date    APPENDECTOMY      BICEPS TENDONESIS SUBPECTORALIS REPAIR Right 1/11/2022    Procedure: BICEPS TENODESIS/TENOTOMY;  Surgeon: Dionicio Araiza MD;  Location:  [facility];  Service: Orthopedics;  Laterality: Right;    CARPAL TUNNEL RELEASE Bilateral     CORONARY STENT PLACEMENT  06/21/2019    x 3     FINGER/THUMB ARTHROPLASTY Bilateral     Trigger thumb release    HYSTERECTOMY      ORIF ANKLE FRACTURE Left     RETINAL DETACHMENT REPAIR Right     SHOULDER ARTHROSCOPY W/ ROTATOR CUFF REPAIR Left 12/4/2018    Procedure: LEFT SHOULDER ARTHROSCOPIC ROTATOR CUFF REPAIR, DEBRIDEMENT, SUBACROMIAL DECOMPRESSION, DISTAL CLAVICLE EXCISION BICEPS TENODESIS, TENOTOMY - LEFT  ;  Surgeon: Dionicio Araiza MD;  Location:  [facility];  Service: Orthopedics    SHOULDER ARTHROSCOPY W/ ROTATOR CUFF REPAIR Right 1/11/2022    Procedure: RIGHT SHOULDER ROTATOR CUFF REPAIR, BICEPS TENODESIS/TENOTOMY;  Surgeon: Dionicio Araiza MD;  Location:  [facility];  Service: Orthopedics;  Laterality: Right;    THYROID CYST EXCISION      TOTAL HIP ARTHROPLASTY      2020    TOTAL SHOULDER ARTHROPLASTY W/ DISTAL CLAVICLE EXCISION Right 10/17/2023    Procedure: RIGHT REVERSE TOTAL SHOULDER ARTHROPLASTY;  Surgeon: Dionicio Araiza MD;  Location:  [facility];  Service: Orthopedics;  Laterality: Right;    WRIST TENDON REPAIR LENGTHENING Right      History reviewed. No pertinent family history.  Social History     Socioeconomic History    Marital status:    Tobacco Use    Smoking status: Former     Packs/day: 0.50     Years: 28.00     Additional pack years: 0.00     Total pack years: 14.00     Types: Cigarettes    Smokeless tobacco: Never   Vaping Use    Vaping Use: Every day    Substances: Nicotine   Substance and Sexual Activity    Alcohol use: No    Drug use: No    Sexual activity: Defer       Current Scheduled Medications:  "  amLODIPine, 5 mg, Oral, Daily  aspirin, 81 mg, Oral, Daily  atorvastatin, 40 mg, Oral, Nightly  DULoxetine, 30 mg, Oral, BID  levothyroxine, 100 mcg, Oral, Q AM  losartan, 25 mg, Oral, Daily  montelukast, 10 mg, Oral, Nightly  pantoprazole, 40 mg, Oral, Q AM  propranolol LA, 80 mg, Oral, Daily  senna-docusate sodium, 2 tablet, Oral, BID  sodium chloride, 10 mL, Intravenous, Q12H  sodium chloride, 10 mL, Intravenous, Q12H  vancomycin, 1,500 mg, Intravenous, Q24H              Current PRN Medications:    acetaminophen **OR** acetaminophen    senna-docusate sodium **AND** polyethylene glycol **AND** bisacodyl **AND** bisacodyl    diphenhydrAMINE **OR** diphenhydrAMINE    docusate sodium    magnesium hydroxide    Morphine **AND** naloxone    Morphine **AND** naloxone    ondansetron **OR** ondansetron    oxyCODONE-acetaminophen    oxyCODONE-acetaminophen    promethazine **OR** promethazine    sodium chloride    sodium chloride    sodium chloride    sodium chloride      lactated ringers, 100 mL/hr, Last Rate: 100 mL/hr (23)  lactated ringers, 100 mL/hr, Last Rate: 100 mL/hr (23)             Allergies   Allergen Reactions    Aleve [Naproxen] Other (See Comments)     Pt has stage # 3 kidney disease.    Codeine Nausea And Vomiting           Vital Signs:  BP (P) 138/73   Pulse (P) 72   Temp 98.3 °F (36.8 °C) (Oral)   Resp 20   Ht 157.5 cm (62\")   Wt 74.8 kg (164 lb 12.8 oz)   LMP  (LMP Unknown)   SpO2 98%   BMI 30.14 kg/m²   Temp (24hrs), Av °F (36.7 °C), Min:97.2 °F (36.2 °C), Max:98.6 °F (37 °C)      Physical Exam  General: Patient is sitting up in bed in no acute distress.  Her  is at bedside.  Respiratory: Effort even and unlabored, she was not conversationally dyspneic  Right shoulder with postoperative dressing clean dry and intact.  Right shoulder sling/immobilizer in place        Results Review:    I reviewed the patient's new clinical results.    Lab Results:    CBC:   Lab " Results   Lab 11/16/23 0428 11/18/23 0640   WBC 11.97*  --    HEMOGLOBIN 11.3* 9.8*   HEMATOCRIT 37.4 31.9*   PLATELETS 410  --         AutoDiff:   Lab Results   Lab 11/16/23 0428   NEUTROPHIL % 70.3   LYMPHOCYTE % 15.7*   MONOCYTES % 10.4   EOSINOPHIL % 2.7   BASOPHIL % 0.3   NEUTROS ABS 8.41*   LYMPHS ABS 1.88   MONOS ABS 1.25*   EOS ABS 0.32   BASOS ABS 0.04        Manual Diff:    Lab Results   Lab 11/16/23 0428   NEUTROS ABS 8.41*        CMP:   Lab Results   Lab 11/16/23 0428 11/18/23 0640   SODIUM 137 137   POTASSIUM 3.7 3.7   CHLORIDE 98 98   CO2 27.0 27.0   BUN 16 10   CREATININE 1.00 0.94   CALCIUM 9.4 8.5*   GLUCOSE 103* 196*       Lab Results (last 72 hours)       Procedure Component Value Units Date/Time    Basic Metabolic Panel [[id number]]  (Abnormal) Collected: 11/18/23 0640    Specimen: Blood Updated: 11/18/23 0728     Glucose 196 mg/dL      BUN 10 mg/dL      Creatinine 0.94 mg/dL      Sodium 137 mmol/L      Potassium 3.7 mmol/L      Chloride 98 mmol/L      CO2 27.0 mmol/L      Calcium 8.5 mg/dL      BUN/Creatinine Ratio 10.6     Anion Gap 12.0 mmol/L      eGFR 66.2 mL/min/1.73     Narrative:      GFR Normal >60  Chronic Kidney Disease <60  Kidney Failure <15      Hemoglobin & Hematocrit, Blood [[id number]]  (Abnormal) Collected: 11/18/23 0640    Specimen: Blood Updated: 11/18/23 0708     Hemoglobin 9.8 g/dL      Hematocrit 31.9 %     Body Fluid Culture - Body Fluid, Shoulder, Right [[id number]] Collected: 11/17/23 1712    Specimen: Body Fluid from Shoulder, Right Updated: 11/18/23 0707     Body Fluid Culture No growth at less than 24 hours     Gram Stain Moderate (3+) WBCs seen      No organisms seen    Tissue / Bone Culture - Tissue, Shoulder, Right [[id number]] Collected: 11/17/23 1715    Specimen: Tissue from Shoulder, Right Updated: 11/18/23 0703     Gram Stain Many (4+) WBCs seen      Few (2+) Gram negative bacilli    POC Glucose Once [[id number]]  (Normal) Collected: 11/17/23 1824    Specimen:  Blood Updated: 11/17/23 1835     Glucose 128 mg/dL      Comment: : [id number] Zabrina Mcgee  SMeter ID: [id number]       KOH Prep - Tissue, Shoulder, Right [[id number]] Collected: 11/17/23 1715    Specimen: Tissue from Shoulder, Right Updated: 11/17/23 1827     KOH Prep No yeast or hyphal elements seen    KOH Prep - Body Fluid, Shoulder, Right [[id number]] Collected: 11/17/23 1712    Specimen: Body Fluid from Shoulder, Right Updated: 11/17/23 1801     KOH Prep No yeast or hyphal elements seen    Fungus Culture - Tissue, Shoulder, Right [[id number]] Collected: 11/17/23 1715    Specimen: Tissue from Shoulder, Right Updated: 11/17/23 1728    AFB Culture - Tissue, Shoulder, Right [[id number]] Collected: 11/17/23 1715    Specimen: Tissue from Shoulder, Right Updated: 11/17/23 1728    Anaerobic Culture - Tissue, Shoulder, Right [[id number]] Collected: 11/17/23 1715    Specimen: Tissue from Shoulder, Right Updated: 11/17/23 1728    Anaerobic Culture - Body Fluid, Shoulder, Right [[id number]] Collected: 11/17/23 1712    Specimen: Body Fluid from Shoulder, Right Updated: 11/17/23 1728    Fungus Culture - Body Fluid, Shoulder, Right [[id number]] Collected: 11/17/23 1712    Specimen: Body Fluid from Shoulder, Right Updated: 11/17/23 1728    AFB Culture - Body Fluid, Shoulder, Right [[id number]] Collected: 11/17/23 1712    Specimen: Body Fluid from Shoulder, Right Updated: 11/17/23 1728    Body Fluid Culture - Body Fluid, Shoulder, Right [[id number]] Collected: 11/16/23 2148    Specimen: Body Fluid from Shoulder, Right Updated: 11/17/23 0558     Gram Stain Many (4+) WBCs per low power field      No organisms seen    Body Fluid Cell Count With Differential - Body Fluid, Shoulder, Right [[id number]]  (Abnormal) Collected: 11/16/23 2148    Specimen: Body Fluid from Shoulder, Right Updated: 11/17/23 0010    Narrative:      The following orders were created for panel order Body Fluid Cell Count With Differential - Body Fluid, Shoulder,  Right.  Procedure                               Abnormality         Status                     ---------                               -----------         ------                     Body fluid cell count - ...[[id number]]  Abnormal            Final result               Body fluid differential ...[[id number]]                      Final result                 Please view results for these tests on the individual orders.    Body fluid cell count - Body Fluid, Shoulder, Right [[id number]]  (Abnormal) Collected: 11/16/23 2148    Specimen: Body Fluid from Shoulder, Right Updated: 11/17/23 0010     Color, Fluid Brown     Appearance, Fluid Cloudy     RBC, Fluid 130,000 /mm3      Nucleated Cells, Fluid 58,140 /mm3      Method: Automated Sysmex XN Method    Body fluid differential - Body Fluid, Shoulder, Right [[id number]] Collected: 11/16/23 2148    Specimen: Body Fluid from Shoulder, Right Updated: 11/17/23 0010     Neutrophils, Fluid 92 %      Lymphocytes, Fluid 3 %      Monocytes, Fluid 5 %     MRSA Screen, PCR (Inpatient) - Swab, Nares [[id number]]  (Normal) Collected: 11/16/23 2143    Specimen: Swab from Nares Updated: 11/16/23 2320     MRSA PCR No MRSA Detected    Narrative:      The negative predictive value of this diagnostic test is high and should only be used to consider de-escalating anti-MRSA therapy. A positive result may indicate colonization with MRSA and must be correlated clinically.    POC Glucose Once [[id number]]  (Normal) Collected: 11/16/23 1715    Specimen: Blood Updated: 11/16/23 1726     Glucose 119 mg/dL      Comment: : [id number] Veliz KierstonMeter ID: [id number]       Basic Metabolic Panel [[id number]]  (Abnormal) Collected: 11/16/23 0428    Specimen: Blood from Arm, Left Updated: 11/16/23 0458     Glucose 103 mg/dL      BUN 16 mg/dL      Creatinine 1.00 mg/dL      Sodium 137 mmol/L      Potassium 3.7 mmol/L      Comment: Slight hemolysis detected by analyzer. Result may be falsely elevated.         Chloride 98 mmol/L      CO2 27.0 mmol/L      Calcium 9.4 mg/dL      BUN/Creatinine Ratio 16.0     Anion Gap 12.0 mmol/L      eGFR 61.5 mL/min/1.73     Narrative:      GFR Normal >60  Chronic Kidney Disease <60  Kidney Failure <15      C-reactive Protein [[id number]]  (Abnormal) Collected: 11/16/23 0428    Specimen: Blood from Arm, Left Updated: 11/16/23 0457     C-Reactive Protein 4.69 mg/dL     Sedimentation Rate [[id number]]  (Abnormal) Collected: 11/16/23 0428    Specimen: Blood from Arm, Left Updated: 11/16/23 0452     Sed Rate 49 mm/hr     CBC & Differential [[id number]]  (Abnormal) Collected: 11/16/23 0428    Specimen: Blood from Arm, Left Updated: 11/16/23 0440    Narrative:      The following orders were created for panel order CBC & Differential.  Procedure                               Abnormality         Status                     ---------                               -----------         ------                     CBC Auto Differential[[id number]]        Abnormal            Final result                 Please view results for these tests on the individual orders.    CBC Auto Differential [[id number]]  (Abnormal) Collected: 11/16/23 0428    Specimen: Blood from Arm, Left Updated: 11/16/23 0440     WBC 11.97 10*3/mm3      RBC 3.97 10*6/mm3      Hemoglobin 11.3 g/dL      Hematocrit 37.4 %      MCV 94.2 fL      MCH 28.5 pg      MCHC 30.2 g/dL      RDW 12.8 %      RDW-SD 44.5 fl      MPV 8.6 fL      Platelets 410 10*3/mm3      Neutrophil % 70.3 %      Lymphocyte % 15.7 %      Monocyte % 10.4 %      Eosinophil % 2.7 %      Basophil % 0.3 %      Immature Grans % 0.6 %      Neutrophils, Absolute 8.41 10*3/mm3      Lymphocytes, Absolute 1.88 10*3/mm3      Monocytes, Absolute 1.25 10*3/mm3      Eosinophils, Absolute 0.32 10*3/mm3      Basophils, Absolute 0.04 10*3/mm3      Immature Grans, Absolute 0.07 10*3/mm3      nRBC 0.0 /100 WBC             Estimated Creatinine Clearance: 54.3 mL/min (by C-G formula based on  SCr of 0.94 mg/dL).    Culture Results:    Microbiology Results (last 10 days)       Procedure Component Value - Date/Time    KOH Prep - Tissue, Shoulder, Right [[id number]] Collected: 11/17/23 1715    Lab Status: Final result Specimen: Tissue from Shoulder, Right Updated: 11/17/23 1827     KOH Prep No yeast or hyphal elements seen    Tissue / Bone Culture - Tissue, Shoulder, Right [[id number]] Collected: 11/17/23 1715    Lab Status: Preliminary result Specimen: Tissue from Shoulder, Right Updated: 11/18/23 0703     Gram Stain Many (4+) WBCs seen      Few (2+) Gram negative bacilli    KOH Prep - Body Fluid, Shoulder, Right [[id number]] Collected: 11/17/23 1712    Lab Status: Final result Specimen: Body Fluid from Shoulder, Right Updated: 11/17/23 1801     KOH Prep No yeast or hyphal elements seen    Body Fluid Culture - Body Fluid, Shoulder, Right [[id number]] Collected: 11/17/23 1712    Lab Status: Preliminary result Specimen: Body Fluid from Shoulder, Right Updated: 11/18/23 0707     Body Fluid Culture No growth at less than 24 hours     Gram Stain Moderate (3+) WBCs seen      No organisms seen    Body Fluid Culture - Body Fluid, Shoulder, Right [[id number]] Collected: 11/16/23 2148    Lab Status: Preliminary result Specimen: Body Fluid from Shoulder, Right Updated: 11/17/23 0558     Gram Stain Many (4+) WBCs per low power field      No organisms seen    MRSA Screen, PCR (Inpatient) - Swab, Nares [[id number]]  (Normal) Collected: 11/16/23 2143    Lab Status: Final result Specimen: Swab from Nares Updated: 11/16/23 2320     MRSA PCR No MRSA Detected    Narrative:      The negative predictive value of this diagnostic test is high and should only be used to consider de-escalating anti-MRSA therapy. A positive result may indicate colonization with MRSA and must be correlated clinically.               Radiology:   Imaging Results (Last 72 Hours)       ** No results found for the last 72 hours. 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  Patient status post incision and drainage of hematoma/infection and polyethylene exchange November 17, 2023.  A threat to bodily function.  Surgical cultures pending.  Gram stain on 1 tissue specimen with gram-negative bacilli  Type 2 diabetes mellitus-hemoglobin A1c 6.6 on August 23, 2023  Chronic kidney disease stage III-follows with Dr. Ac      RECOMMENDATION:   Start cefepime given gram-negative bacilli on Gram stain  Discontinue vancomycin in patient without gram-positive cocci suggestive of resistant gram-positive at this time in fact that she has chronic kidney disease  We will continue to monitor Gram stain and cultures very closely and reinstitute vancomycin if any gram-positive cocci reported  Postop wound management per Dr. Araiza  Elevated sed rate  Elevated CRP    Reviewed Dr. Araiza's H&P and operative note    Bonnie Bee MD  11/18/23  10:46 CST          Electronically signed by Bonnie Bee MD at 11/18/23 1205       Case Management  Consult [[id number]] (Order [id number])  Order  Date: 11/20/2023 Department: [address] Ordering/Authorizing: Bonnie Bee MD     Standing Order Information    Remaining Occurrences Interval Last Released     0/1 Once 11/20/2023              Order History  Inpatient  Date/Time Action Taken User Additional Information   11/20/23 1027 Sign Bonnie Bee MD    11/20/23 1027 Release Instance Bonnie Bee MD (auto-released) Released Order:[id number]   11/20/23 1042 Acknowledge Deidra Monsalve LPN New Order     Order Details    Frequency Duration Priority Order Class   Once 1  occurrence Routine Hospital Performed       Comments    Ertapenem 1 g IV every 24 hours for 6 weeks with start date November 18, 2023 for infected right reverse  total shoulder with Serratia marcescens.  CBC, CMP, CRP q. Monday and fax to [phone]  Patient will be living in [place] while receiving therapy.  Patient and  would like to talk to /              Order Questions    Question Answer   Is discharge planning needed? If yes, who is requesting discharge planning? Provider   Reason for Consult Home IV antibiotics              Source Order Set / Preference List    Preference List   [facility] IP GENERAL CONSULTS [[id number]]                 Consult Order Info    ID Description Priority Start Date Start Time   [id number] Case Management  Consult Routine 11/20/2023 10:27 AM   Provider Specialty Referred to   ______________________________________ _____________________________________                           Acknowledgement Info    For At Acknowledged By Acknowledged On   Placing Order 11/20/23 1027 Deidra Monsalve LPN 11/20/23 1042             Reprint Order Requisition    Case Management  Consult (Order #[id number]) on 11/20/23       Encounter    View Encounter                  Order Provider Info        Office phone Pager E-mail   Ordering User  Bonnie Bee MD  [phone] -- --   Authorizing Provider  Bonnie Bee MD  [phone] -- --   Attending Provider  Dionicio Araiza MD  [phone] -- --     Tracking Reports    Cosign Tracking Order Transmittal Tracking

## 2023-11-21 ENCOUNTER — TELEPHONE (OUTPATIENT)
Dept: INTERNAL MEDICINE | Age: 68
End: 2023-11-21

## 2023-11-21 ENCOUNTER — READMISSION MANAGEMENT (OUTPATIENT)
Dept: CALL CENTER | Facility: HOSPITAL | Age: 68
End: 2023-11-21
Payer: MEDICARE

## 2023-11-21 VITALS
OXYGEN SATURATION: 98 % | HEART RATE: 57 BPM | DIASTOLIC BLOOD PRESSURE: 61 MMHG | TEMPERATURE: 97.3 F | HEIGHT: 62 IN | BODY MASS INDEX: 30.33 KG/M2 | RESPIRATION RATE: 16 BRPM | SYSTOLIC BLOOD PRESSURE: 125 MMHG | WEIGHT: 164.8 LBS

## 2023-11-21 LAB
BACTERIA FLD CULT: ABNORMAL
CYTO UR: NORMAL
GRAM STN SPEC: ABNORMAL
GRAM STN SPEC: ABNORMAL
HOLD SPECIMEN: NORMAL
LAB AP CASE REPORT: NORMAL
Lab: NORMAL
PATH REPORT.FINAL DX SPEC: NORMAL
PATH REPORT.GROSS SPEC: NORMAL

## 2023-11-21 PROCEDURE — 99231 SBSQ HOSP IP/OBS SF/LOW 25: CPT | Performed by: INTERNAL MEDICINE

## 2023-11-21 PROCEDURE — 25010000002 ERTAPENEM PER 500 MG: Performed by: INTERNAL MEDICINE

## 2023-11-21 RX ADMIN — OXYCODONE HYDROCHLORIDE AND ACETAMINOPHEN 1 TABLET: 10; 325 TABLET ORAL at 16:02

## 2023-11-21 RX ADMIN — DOCUSATE SODIUM 50 MG AND SENNOSIDES 8.6 MG 2 TABLET: 8.6; 5 TABLET, FILM COATED ORAL at 09:18

## 2023-11-21 RX ADMIN — Medication 10 ML: at 09:21

## 2023-11-21 RX ADMIN — AMLODIPINE BESYLATE 5 MG: 5 TABLET ORAL at 09:18

## 2023-11-21 RX ADMIN — TRIAMTERENE AND HYDROCHLOROTHIAZIDE 1 TABLET: 75; 50 TABLET ORAL at 09:19

## 2023-11-21 RX ADMIN — PANTOPRAZOLE SODIUM 40 MG: 40 TABLET, DELAYED RELEASE ORAL at 06:23

## 2023-11-21 RX ADMIN — Medication 10 ML: at 09:19

## 2023-11-21 RX ADMIN — ERTAPENEM 1000 MG: 1 INJECTION INTRAMUSCULAR; INTRAVENOUS at 12:03

## 2023-11-21 RX ADMIN — OXYCODONE HYDROCHLORIDE AND ACETAMINOPHEN 1 TABLET: 10; 325 TABLET ORAL at 12:19

## 2023-11-21 RX ADMIN — LEVOTHYROXINE SODIUM 100 MCG: 100 TABLET ORAL at 06:23

## 2023-11-21 RX ADMIN — DULOXETINE HYDROCHLORIDE 30 MG: 30 CAPSULE, DELAYED RELEASE ORAL at 09:18

## 2023-11-21 RX ADMIN — LOSARTAN POTASSIUM 25 MG: 50 TABLET, FILM COATED ORAL at 09:18

## 2023-11-21 RX ADMIN — OXYCODONE HYDROCHLORIDE AND ACETAMINOPHEN 1 TABLET: 10; 325 TABLET ORAL at 06:25

## 2023-11-21 NOTE — PROGRESS NOTES
INFECTIOUS DISEASES PROGRESS NOTE    Patient:  Mayuri Almeida  YOB: 1955  MRN: [id number]   Admit date: 2023   Admitting Physician: Dionicio Araiza MD  Primary Care Physician: Lincoln Johnson MD    Chief Complaint: Right shoulder infection      Interval History: Patient hoping for discharge today.  She said ZOYA Oquendo changed her dressing yesterday.  Her  asked about dressing changes at home and I told him that should be in his discharge paperwork from [facility].      Allergies:   Allergies   Allergen Reactions    Aleve [Naproxen] Other (See Comments)     Pt has stage # 3 kidney disease.    Codeine Nausea And Vomiting       Current Scheduled Medications:   amLODIPine, 5 mg, Oral, Daily  aspirin, 81 mg, Oral, Nightly  atorvastatin, 40 mg, Oral, Nightly  DULoxetine, 30 mg, Oral, BID  ertapenem, 1,000 mg, Intravenous, Q24H  levothyroxine, 100 mcg, Oral, Q AM  losartan, 25 mg, Oral, Daily  montelukast, 10 mg, Oral, Nightly  Non-Formulary / Patient Supplied Medication, 75 mL, Oral, Daily  pantoprazole, 40 mg, Oral, Q AM  propranolol LA, 80 mg, Oral, Nightly  senna-docusate sodium, 2 tablet, Oral, BID  sodium chloride, 10 mL, Intravenous, Q12H  sodium chloride, 10 mL, Intravenous, Q12H  sodium chloride, 10 mL, Intravenous, Q12H  triamterene-hydrochlorothiazide, 1 tablet, Oral, Daily      Current PRN Medications:    acetaminophen **OR** acetaminophen    senna-docusate sodium **AND** polyethylene glycol **AND** bisacodyl **AND** bisacodyl    diphenhydrAMINE **OR** diphenhydrAMINE    docusate sodium    magnesium hydroxide    [DISCONTINUED] Morphine **AND** naloxone    ondansetron **OR** ondansetron    oxyCODONE-acetaminophen    oxyCODONE-acetaminophen    promethazine **OR** promethazine    sodium chloride    sodium chloride    sodium chloride    sodium chloride    sodium chloride    sodium chloride    sodium chloride            Objective     Vital Signs:  Temp (24hrs), Av.8 °F  "(36.6 °C), Min:97.3 °F (36.3 °C), Max:98.4 °F (36.9 °C)      /61 (BP Location: Left arm, Patient Position: Sitting)   Pulse 57   Temp 97.3 °F (36.3 °C) (Oral)   Resp 16   Ht 157.5 cm (62\")   Wt 74.8 kg (164 lb 12.8 oz)   LMP  (LMP Unknown)   SpO2 98%   BMI 30.14 kg/m²         Physical Exam    General: Patient is nontoxic-appearing sitting up at bedside eating breakfast  Respiratory: Effort even and unlabored  Left upper extremity PICC line dressing clean dry and intact  Right shoulder immobilized.  Dressing in place over incision.  Some dark presumably dried blood at edges.  The dressing change was timed for 7:00 this morning      Results Review:    I reviewed the patient's new clinical results.    Lab Results:    CBC:   Lab Results   Lab 11/16/23 0428 11/18/23  0640   WBC 11.97*  --    HEMOGLOBIN 11.3* 9.8*   HEMATOCRIT 37.4 31.9*   PLATELETS 410  --         AutoDiff:   Lab Results   Lab 11/16/23 0428   NEUTROPHIL % 70.3   LYMPHOCYTE % 15.7*   MONOCYTES % 10.4   EOSINOPHIL % 2.7   BASOPHIL % 0.3   NEUTROS ABS 8.41*   LYMPHS ABS 1.88   MONOS ABS 1.25*   EOS ABS 0.32   BASOS ABS 0.04        Manual Diff:    Lab Results   Lab 11/16/23 0428   NEUTROS ABS 8.41*           CMP:   Lab Results   Lab 11/18/23  0640 11/19/23  0329 11/20/23  0332   SODIUM 137 140 139   POTASSIUM 3.7 3.4* 3.4*   CHLORIDE 98 101 101   CO2 27.0 28.0 28.0   BUN 10 13 13   CREATININE 0.94 0.98 0.95   CALCIUM 8.5* 9.0 8.6   GLUCOSE 196* 138* 130*       Estimated Creatinine Clearance: 53.7 mL/min (by C-G formula based on SCr of 0.95 mg/dL).        C-Reactive Protein   Date Value Ref Range Status   11/20/2023 2.15 (H) 0.00 - 0.50 mg/dL Final   11/16/2023 4.69 (H) 0.00 - 0.50 mg/dL Final   04/23/2019 0.53 (H) 0.00 - 0.50 mg/dL Final     Sed Rate   Date Value Ref Range Status   11/16/2023 49 (H) 0 - 30 mm/hr Final   04/23/2019 6 0 - 25 mm/Hr Final       Culture Results:    Microbiology Results (last 10 days)       Procedure Component " Value - Date/Time    Anaerobic Culture - Tissue, Shoulder, Right [[id number]]  (Normal) Collected: 11/17/23 1715    Lab Status: Preliminary result Specimen: Tissue from Shoulder, Right Updated: 11/21/23 0753     Anaerobic Culture No anaerobes isolated at 3 days    KOH Prep - Tissue, Shoulder, Right [[id number]] Collected: 11/17/23 1715    Lab Status: Final result Specimen: Tissue from Shoulder, Right Updated: 11/17/23 1827     KOH Prep No yeast or hyphal elements seen    Tissue / Bone Culture - Tissue, Shoulder, Right [[id number]]  (Abnormal) Collected: 11/17/23 1715    Lab Status: Final result Specimen: Tissue from Shoulder, Right Updated: 11/19/23 0937     Tissue Culture Heavy growth (4+) Serratia marcescens     Gram Stain Many (4+) WBCs seen      Few (2+) Gram negative bacilli    Narrative:      For MICs refer to body fluid culture Collected 11/16/2023 2248.      AFB Culture - Tissue, Shoulder, Right [[id number]] Collected: 11/17/23 1715    Lab Status: Preliminary result Specimen: Tissue from Shoulder, Right Updated: 11/20/23 1337     AFB Stain No acid fast bacilli seen on direct smear      No acid fast bacilli seen on concentrated smear    Anaerobic Culture - Body Fluid, Shoulder, Right [[id number]]  (Normal) Collected: 11/17/23 1712    Lab Status: Preliminary result Specimen: Body Fluid from Shoulder, Right Updated: 11/21/23 0752     Anaerobic Culture No anaerobes isolated at 3 days    KOH Prep - Body Fluid, Shoulder, Right [[id number]] Collected: 11/17/23 1712    Lab Status: Final result Specimen: Body Fluid from Shoulder, Right Updated: 11/17/23 1801     KOH Prep No yeast or hyphal elements seen    Body Fluid Culture - Body Fluid, Shoulder, Right [[id number]]  (Abnormal) Collected: 11/17/23 1712    Lab Status: Final result Specimen: Body Fluid from Shoulder, Right Updated: 11/19/23 0937     Body Fluid Culture Heavy growth (4+) Serratia marcescens     Gram Stain Moderate (3+) WBCs seen      No organisms seen     Narrative:      For MICs refer to body fluid culture Collected 11/16/2023 2248.    AFB Culture - Body Fluid, Shoulder, Right [[id number]] Collected: 11/17/23 1712    Lab Status: Preliminary result Specimen: Body Fluid from Shoulder, Right Updated: 11/20/23 1337     AFB Stain No acid fast bacilli seen on direct smear      No acid fast bacilli seen on concentrated smear    Body Fluid Culture - Body Fluid, Shoulder, Right [[id number]]  (Abnormal)  (Susceptibility) Collected: 11/16/23 2148    Lab Status: Final result Specimen: Body Fluid from Shoulder, Right Updated: 11/20/23 0754     Body Fluid Culture Light growth (2+) Serratia marcescens     Gram Stain Many (4+) WBCs per low power field      No organisms seen    Susceptibility        Serratia marcescens      ELISSA      Cefepime Susceptible      Ceftazidime Susceptible      Ceftriaxone Susceptible      Gentamicin Susceptible      Levofloxacin Susceptible      Piperacillin + Tazobactam Susceptible  [1]       Trimethoprim + Sulfamethoxazole Susceptible                   [1]  Appended report. These results have been appended to a previously preliminary verified report.               Susceptibility Comments       Serratia marcescens    Cefazolin sensitivity will not be reported for Enterobacteriaceae in non-urine isolates. If cefazolin is preferred, please call the microbiology lab to request an E-test.  With the exception of urinary-sourced infections, aminoglycosides should not be used as monotherapy.               MRSA Screen, PCR (Inpatient) - Swab, Nares [[id number]]  (Normal) Collected: 11/16/23 2143    Lab Status: Final result Specimen: Swab from Nares Updated: 11/16/23 2320     MRSA PCR No MRSA Detected    Narrative:      The negative predictive value of this diagnostic test is high and should only be used to consider de-escalating anti-MRSA therapy. A positive result may indicate colonization with MRSA and must be correlated clinically.                 Radiology:    Imaging Results (Last 72 Hours)       ** No results found for the last 72 hours. **                Active Hospital Problems    Diagnosis     **Post op infection     Wound infection        IMPRESSION:  Prosthetic joint infection with Serratia marcescens-patient is status post reverse right total shoulder October 17, 2023 then underwent incision and drainage of infected hematoma and polyethylene exchange November 17, 2023  Anemia-Postoperative blood loss  Type 2 diabetes mellitus-August 23, 2023 hemoglobin A1c was 6.6  Chronic kidney disease stage III-follows with nephrology as an outpatient  Hypokalemia-received supplementation yesterday.      RECOMMENDATION:   Continue ertapenem  Discussed with patient and  that I have discussed with microbiology.   Elevated CRP  Elevated CRP  Order for micro to report carbapenem susceptibility for Serratia      Below copy and pasted from inpatient consult to case management services  Ertapenem 1 g IV every 24 hours for 6 weeks with start date November 18, 2023 for infected right reverse total shoulder with Serratia marcescens.  CBC, CMP, CRP q. Monday and fax to [phone]  [facility] home health  [facility]      Bonnie Bee MD  11/21/23  08:14 CST

## 2023-11-21 NOTE — DISCHARGE PLACEMENT REQUEST
"To: [facility]    From: Lolis WILSON [phone]      Mayuri Almeida (68 y.o. Female)       Date of Birth   1955    Social Security Number       Address   [address] [facility] [address]    Home Phone   [phone]    MRN   [id number]       Mormonism   Latter-day    Marital Status                               Admission Date   11/16/23    Admission Type   Emergency    Admitting Provider   Dionicio Araiza MD    Attending Provider   Dionicio Araiza MD    Department, Room/Bed   [facility] 3A, 328/1       Discharge Date       Discharge Disposition   Home or Self Care    Discharge Destination                                 Attending Provider: Dionicio Araiza MD    Allergies: Aleve [Naproxen], Codeine    Isolation: None   Infection: None   Code Status: CPR    Ht: 157.5 cm (62\")   Wt: 74.8 kg (164 lb 12.8 oz)    Admission Cmt: None   Principal Problem: Post op infection [T81.40XA]                   Active Insurance as of 11/16/2023       Primary Coverage       Payor Plan Insurance Group Employer/Plan Group    MEDICARE MEDICARE A & B        Payor Plan Address Payor Plan Phone Number Payor Plan Fax Number Effective Dates    [address] [phone]  7/1/2020 - None Entered    [facility] [id number]         Subscriber Name Subscriber Birth Date Member ID       MAYURI ALMEIDA 1955 [id number]               Secondary Coverage       Payor Plan Insurance Group Employer/Plan Group    MISC COMMERCIAL MISC COMMERCIAL PLAN G       Coverage Address Coverage Phone Number Coverage Fax Number Effective Dates    [address] [facility] [phone]  7/1/2020 - None Entered    [facility] [id number]         Subscriber Name Subscriber Birth Date Member ID       MAYURI ALMEIDA 1955 [id number]                     Emergency Contacts        (Rel.) Home Phone Work Phone Mobile Phone    EVARISTO ALMEIDA (Spouse) [phone] -- --                 Discharge Summary    "     Jere Minor PA-C at 23 0818          Orthopaedic Surgery Discharge Summary  Jere Minor PA-C      NAME: Mayuri Almeida  : 1955  MRN: [id number]      Admission Date: 2023    Discharge Date: 2023    Admission Diagnosis: Post op infection [T81.40XA]  Wound infection [T14.8XXA, L08.9]    Discharge Diagnoses: Same    Procedures: I AND D POSSIBLE POLY EXCHANGE SHOULDER (Right)    Consultations: ID    Reason for Admission:  This patient is a 69 YO female who underwent a reverse TSA on 10/13/23. She developed a large postoperative hematoma upon her one week follow up.  She developed increasing red and swelling approximately 10 days ago and took oral Amoxicillin at home that she had.  The on-call physician began Keflex on 23.  She was seen in clinic on 23 and started on Clindamycin.  She was admitted yesterday morning with signs of infection and Vancomycin was started.  I aspirated her shoulder last night and there was 58k white cells.  To date, there has been a negative gram stain and culture.  I felt it best to perform and exploration of the shoulder.  We discussed an I&D of his shoulder with possible poly exchange. She understood she may require removal of the prosthesis in the future if there was an infection that could not be eradicated.     Hospital Course:  The patient was admitted with the above named diagnosis, surgery was performed and tolerated the procedure well.  At the time of discharge, the patient was afebrile, vitals stable, pain was controlled with oral medication, they were tolerating a by mouth diet, and voiding appropriately. Physical therapy and occupational therapy were consulted. Given these findings they were deemed suitable to be discharged.    Disposition: Home    Activity: NWB right upper    Wound Instructions: see postop instructions    Diet: regular diet    Resume home meds: Per AVS    Prescriptions for:  Percocet 7.5    Return to Clinic: 2  weeks    Xrays: Yes    Jere Minor PA-C    Electronically signed by Jere Minor PA-C at 11/21/23 0819

## 2023-11-21 NOTE — NURSING NOTE
Pt discharged home with spouse. Instructed pt and family proper care of picc line. Instructed pt non weight bearing on rt arm. Educated pt and family to monitor s/s of infection and when to seek medical attention. Provided pt with supplies to care for picc line.

## 2023-11-21 NOTE — CASE MANAGEMENT/SOCIAL WORK
Continued Stay Note   [place]     Patient Name: Mayuri Almeida  MRN: [id number]  Today's Date: 11/21/2023    Admit Date: 11/16/2023    Plan: Home with home infusion and [facility]    Discharge Plan       Row Name 11/21/23 0935       Plan    Plan Home with home infusion and [facility]     Plan Comments SW has informed Manjula at [facility] of patient's discharge as well as [facility] of dc.  DC summary faxed to [facility].    Final Discharge Disposition Code 06 - home with home health care                   Discharge Codes    No documentation.                 Expected Discharge Date and Time       Expected Discharge Date Expected Discharge Time    Nov 21, 2023               REILLY Hutchins

## 2023-11-21 NOTE — PLAN OF CARE
Goal Outcome Evaluation:              Outcome Evaluation: Pt A&Ox4, able to make needs known. Up ad milagro to toilet, voiding without difficulty. On RA saturating in high 90s. Medications given per orders, PRN Percocet given with positive effect. PICC to AMBAR CDI. Safety maintained, call light within reach, spouse at bedside.

## 2023-11-21 NOTE — PLAN OF CARE
Goal Outcome Evaluation:  Plan of Care Reviewed With: patient        Progress: no change     Pt alert and oriented x4. VSS. Pt c/o shoulder pain. PRN medications given with some relief of symptoms. LINDER. PPP. SCD's for VTE. , room air. Tolerating prescribed diet. Incision site, CDI. Voiding via bathroom. Up ad milagro. Picc line placed today for home antibiotic therapy. Call light within reach. Safety maintained. Plan of care continued. No NV changes this shift.

## 2023-11-21 NOTE — TELEPHONE ENCOUNTER
I called the patient 's  to offer Miss Nohemy Morales an appointment for 11/27/23 at 8:20 for a HFU with Dr. Khang Tinoco. Mr. Nohemy Morales asked someone to call them back next week after [address] [place] [address],[address] has came to schedule the [address] [place] [address] Dr appointment. I let them know I would call back next week.

## 2023-11-21 NOTE — DISCHARGE SUMMARY
Orthopaedic Surgery Discharge Summary  Jere Minor PA-C      NAME: Mayuri Almeida  : 1955  MRN: [id number]      Admission Date: 2023    Discharge Date: 2023    Admission Diagnosis: Post op infection [T81.40XA]  Wound infection [T14.8XXA, L08.9]    Discharge Diagnoses: Same    Procedures: I AND D POSSIBLE POLY EXCHANGE SHOULDER (Right)    Consultations: ID    Reason for Admission:  This patient is a 67 YO female who underwent a reverse TSA on 10/13/23. She developed a large postoperative hematoma upon her one week follow up.  She developed increasing red and swelling approximately 10 days ago and took oral Amoxicillin at home that she had.  The on-call physician began Keflex on 23.  She was seen in clinic on 23 and started on Clindamycin.  She was admitted yesterday morning with signs of infection and Vancomycin was started.  I aspirated her shoulder last night and there was 58k white cells.  To date, there has been a negative gram stain and culture.  I felt it best to perform and exploration of the shoulder.  We discussed an I&D of his shoulder with possible poly exchange. She understood she may require removal of the prosthesis in the future if there was an infection that could not be eradicated.     Hospital Course:  The patient was admitted with the above named diagnosis, surgery was performed and tolerated the procedure well.  At the time of discharge, the patient was afebrile, vitals stable, pain was controlled with oral medication, they were tolerating a by mouth diet, and voiding appropriately. Physical therapy and occupational therapy were consulted. Given these findings they were deemed suitable to be discharged.    Disposition: Home    Activity: NWB right upper    Wound Instructions: see postop instructions    Diet: regular diet    Resume home meds: Per AVS    Prescriptions for:  Percocet 7.5    Return to Clinic: 2 weeks    Xrays: Yes    Jere Minor PA-C

## 2023-11-22 NOTE — OUTREACH NOTE
Prep Survey      Flowsheet Row Responses   [facility] facility patient discharged from? [place]   Is LACE score < 7 ? No   Eligibility Readm Mgmt   Discharge diagnosis Post op infection   Does the patient have one of the following disease processes/diagnoses(primary or secondary)? General Surgery   Does the patient have Home health ordered? Yes   What is the Home health agency?  [facility]   Is there a DME ordered? No   Medication alerts for this patient see AVS   General alerts for this patient I AND D POSSIBLE POLY EXCHANGE SHOULDER   Prep survey completed? Yes            Andra PICHARDO - Registered Nurse

## 2023-11-23 LAB
BACTERIA SPEC ANAEROBE CULT: NORMAL
BACTERIA SPEC ANAEROBE CULT: NORMAL

## 2023-11-24 LAB
FUNGUS WND CULT: NORMAL
FUNGUS WND CULT: NORMAL
MYCOBACTERIUM SPEC CULT: NORMAL
MYCOBACTERIUM SPEC CULT: NORMAL
NIGHT BLUE STAIN TISS: NORMAL

## 2023-11-28 ENCOUNTER — READMISSION MANAGEMENT (OUTPATIENT)
Dept: CALL CENTER | Facility: HOSPITAL | Age: 68
End: 2023-11-28
Payer: MEDICARE

## 2023-11-28 PROBLEM — T84.59XA PROSTHETIC SHOULDER INFECTION, INITIAL ENCOUNTER: Status: ACTIVE | Noted: 2023-11-28

## 2023-11-28 PROBLEM — Z96.619 PROSTHETIC SHOULDER INFECTION, INITIAL ENCOUNTER: Status: ACTIVE | Noted: 2023-11-28

## 2023-11-28 NOTE — OUTREACH NOTE
"General Surgery Week 1 Survey      Flowsheet Row Responses   [facility] patient discharged from? [place]   Does the patient have one of the following disease processes/diagnoses(primary or secondary)? General Surgery   Week 1 attempt successful? Yes   Call start time 1248   Call end time 1253   General alerts for this patient I AND D POSSIBLE POLY EXCHANGE SHOULDER   Medication alerts for this patient Pt receiving IV ATBs once daily   Meds reviewed with patient/caregiver? Yes   Does the patient have all medications related to this admission filled (includes all antibiotics, pain medications, etc.) N/A   Is the patient taking all medications as directed (includes completed medication regime)? Yes   Does the patient have a follow up appointment scheduled with their surgeon? Yes   Has the patient kept scheduled appointments due by today? N/A   Comments ID on 12/8/23   What is the Home health agency?  [facility]   Has home health visited the patient within 72 hours of discharge? Yes   Psychosocial issues? No   Comments Picc line in place for IV ATBs   Did the patient receive a copy of their discharge instructions? Yes   Nursing interventions Reviewed instructions with patient   What is the patient's perception of their health status since discharge? Same  [Pt reports she \"stitches are oozing.\"  Reports dark, coagulated blood draining from site, denies fever.  She is planning to call surgeon to provide an update today.]   Nursing interventions Nurse provided patient education   Is the patient /caregiver able to teach back basic post-op care? Keep incision areas clean,dry and protected, Lifting as instructed by MD in discharge instructions, No tub bath, swimming, or hot tub until instructed by MD   Is the patient/caregiver able to teach back signs and symptoms of incisional infection? Increased drainage or bleeding, Incisional warmth, Increased redness, swelling or pain at the incisonal site, Pus or " odor from incision, Fever  [reviewed with concerns as noted]   Is the patient/caregiver able to teach back steps to recovery at home? Rest and rebuild strength, gradually increase activity   Is the patient/caregiver able to teach back the hierarchy of who to call/visit for symptoms/problems? PCP, Specialist, Home health nurse, Urgent Care, ED, 911 Yes   Week 1 call completed? Yes   Call end time 1253            JOY Giles Registered Nurse

## 2023-11-29 NOTE — PROGRESS NOTES
"Enter Query Response Below      Query Response: Prosthetic joint infection             If applicable, please update the problem list.   Patient: Mayuri Almeida        : 1955  Account: [id number]           Admit Date: 2023        How to Respond to this query:       a. Click New Note     b. Answer query within the yellow box.                c. Update the Problem List, if applicable.      If you have any questions about this query contact me at: [phone]     :     68-year-old patient s/p total shoulder arthroplasty on 10/13/2023 presented on  with swelling and redness of surgical wound. Per Op Note, patient had infected hematoma. Infectious Disease Notes include \"Prosthetic joint infection with Serratia marcescens\". Brief Op Note includes \"Surgical wound infection\".  Patient underwent polyethylene exchange and excisional debridement on . Culture of right shoulder tissue obtained  resulted as \"Heavy growth (4+) Serratia marcescens\".   Treatment has included Cefepime IV, Vancomycin IV, Invanz IV, PICC line placement, and discharged on Invanz to continue 6 weeks.   After study, can the condition being treated be clarified as:    Prosthetic joint infection  Post-op wound infection   Infected hematoma   Prosthetic joint infection, post-op wound infection, and infected hematoma.  Other, please specify: _______  Unable to clinically determine     By submitting this query, we are merely seeking further clarification of documentation to accurately reflect all conditions that you are monitoring, evaluating, treating or that extend the hospitalization or utilize additional resources of care. Please utilize your independent clinical judgment when addressing the question(s) above.     This query and your response, once completed, will be entered into the legal medical record.    Sincerely,  Emily Oliveros RN, BSN, CCDS  Clinical Documentation Integrity Program "   [email or web address]

## 2023-12-04 ENCOUNTER — TELEPHONE (OUTPATIENT)
Dept: INTERNAL MEDICINE | Age: 68
End: 2023-12-04

## 2023-12-04 NOTE — TELEPHONE ENCOUNTER
Care Transitions Initial Follow Up Call    Outreach made within 2 business days of discharge: Yes    Patient: Adriana Perales Patient : 1955   MRN: [id number]  Reason for Admission: Post-Op Infection   Discharge Date:  23       Spoke with: Leobardo Valdovinos    Discharge department/facility: [facility] Interactive Patient Contact:  Was patient able to fill all prescriptions: Yes  Was patient instructed to bring all medications to the follow-up visit: Yes  Is patient taking all medications as directed in the discharge summary?  Yes  Does patient understand their discharge instructions: Yes  Does patient have questions or concerns that need addressed prior to 7-14 day follow up office visit: no    Scheduled appointment with PCP within 7-14 days    Follow Up  Future Appointments   Date Time Provider [address] [place] [address]   2024  3:00 PM [facility] MAMMO RM 2 [facility] WOMENS [facility] Women's   3/1/2024 11:40 AM MD WALTER Holguin [facility]   2024  2:00 PM GHASSAN Hurst - CNP OB/GYN [id number], [place]

## 2023-12-07 NOTE — PROGRESS NOTES
"[facility] [place]ious Diseases    Patient:  Mayuri Almeida 68 y.o. female  YOB: 1955  MRN: [id number]  Primary Care Physician: Lincoln Johnson MD  REFERRING PROVIDER: Dionicio Araiza, *    Chief Complaint Prosthetic Joint Infection (Right shoulder, with complaints of pain and still has drainage, but overall improved)        History of Present Illness  Mayuri Almeida presents to [facility] INFECTIOUS DISEASES for follow-up after hospitalization for infected right total shoulder with Serratia.    Patient underwent irrigation and debridement, explant of infected shoulder and placement of antibiotic spacer per Dr. Dionicio Araiza     Patient last saw Dionicio Araiza MD on 11-  Next appointment is on next Friday  Patient last saw Paluino Johnson MD was in August  Next appointment is on today at 11 AM     Patient and  said ortho was concerned that it was still \"weeping \"- saw Dr Araiza and ZOYA Oquendo.  They inquired as to when it should quit draining.    Patient has a dressing on she says she is changing it daily.  It has not been bloody, no clots, not purulent.    No fever or chills   No rash   \" A little bit of diarrhea\"'  >>> going 2 times a day-described as loose stool.  Not watery.  No associated abdominal cramps        Current Outpatient Medications on File Prior to Visit   Medication Sig    ALPRAZolam (XANAX) 0.25 MG tablet Take 1 tablet by mouth Every Night.    amLODIPine (NORVASC) 5 MG tablet Take 1 tablet by mouth Daily.    aspirin 81 MG EC tablet Take 1 tablet by mouth Daily.    atorvastatin (LIPITOR) 40 MG tablet Take 1 tablet by mouth Every Night.    B Complex Vitamins (VITAMIN-B COMPLEX PO) Take 1 tablet by mouth Daily.    Cholecalciferol 1000 units capsule Take 1 capsule by mouth Daily.    CRANBERRY EXTRACT PO Take 1 tablet by mouth Daily.    cyclobenzaprine (FLEXERIL) 10 MG tablet Take 1 tablet by mouth Every Night.    DULoxetine (CYMBALTA) 30 MG " capsule Take 1 capsule by mouth 2 (Two) Times a Day.    ertapenem 1,000 mg in sodium chloride 0.9 % 50 mL IVPB Infuse 1,000 mg into a venous catheter Daily.    estradiol (ESTRACE) 0.1 MG/GM vaginal cream Insert 1 applicator into the vagina 3 (Three) Times a Week.    estrogens, conjugated, (PREMARIN) 0.625 MG tablet Take 1 tablet by mouth Every Night.    lansoprazole (PREVACID) 15 MG capsule Take 1 capsule by mouth Daily.    levothyroxine (SYNTHROID, LEVOTHROID) 100 MCG tablet Take 1 tablet by mouth Every Morning.    losartan (COZAAR) 25 MG tablet Take 1 tablet by mouth Daily.    magnesium oxide (MAG-OX) 400 MG tablet Take 1 tablet by mouth 2 (Two) Times a Day.    metFORMIN (GLUCOPHAGE) 500 MG tablet Take 1 tablet by mouth 2 (Two) Times a Day With Meals.    montelukast (SINGULAIR) 10 MG tablet Take 1 tablet by mouth Every Night.    oxyCODONE-acetaminophen (PERCOCET) 5-325 MG per tablet Take 1 tablet by mouth Every 8 (Eight) Hours As Needed.    Potassium Chloride (KLOR-CON 10 PO) Take 10 mcg by mouth 3 (Three) Times a Day.    propranolol LA (INDERAL LA) 80 MG 24 hr capsule Take 1 capsule by mouth Daily.    triamterene-hydrochlorothiazide (MAXZIDE) 75-50 MG per tablet Take 1 tablet by mouth Daily.    Vibegron 75 MG tablet Take 1 tablet by mouth Daily.    [DISCONTINUED] clindamycin (CLEOCIN) 300 MG capsule Take 1 capsule by mouth 3 (Three) Times a Day.     No current facility-administered medications on file prior to visit.     Allergies   Allergen Reactions    Aleve [Naproxen] Other (See Comments)     Pt has stage # 3 kidney disease.    Codeine Nausea And Vomiting     Social History     Socioeconomic History    Marital status:    Tobacco Use    Smoking status: Former     Packs/day: 0.50     Years: 28.00     Additional pack years: 0.00     Total pack years: 14.00     Types: Cigarettes     Passive exposure: Never    Smokeless tobacco: Never   Vaping Use    Vaping Use: Every day    Substances: Nicotine, Flavoring  "   Devices: Disposable, Pre-filled or refillable cartridge   Substance and Sexual Activity    Alcohol use: No    Drug use: No    Sexual activity: Defer       Venous Access Review  Line/IV site: PICC upper arm left, condition patent and no redness    Antimicrobial Review  Currently on antibiotics/antifungals: yes  Start Date of Therapy: ertapenem 11/18/2023  If therapy completed, date complete: NA - should complete 12/30/2023    Objective   Vital Signs:  /77   Pulse 66   Temp 98.3 °F (36.8 °C) (Temporal)   Ht 177.8 cm (70\")   Wt 73.9 kg (163 lb)   SpO2 100%   BMI 23.39 kg/m²   Estimated body mass index is 23.39 kg/m² as calculated from the following:    Height as of this encounter: 177.8 cm (70\").    Weight as of this encounter: 73.9 kg (163 lb).           Physical Exam  General: Patient is nontoxic-appearing sitting in the exam room in no acute distress.  Her  is in the room as well  HEENT: Sclera anicteric and noninjected  Respiratory: Effort even unlabored  Right shoulder dressing is in place.  This was removed.  Incision is intact with the exception of 1 small opening at the distal portion of the incision.  There is scant amount of essentially serous fluid on the dressing.  Patient has no cellulitis.  She does have some tenderness the distal portion of the incision.  No fluctuance.  Left upper extremity PICC line dressing clean dry and intact.      DATA REVIEWED:              SCANNED - LABS (11/27/2023 00:00)       >>>>> Reviewed CRP, CBC, creatinine with patient and      Assessment and Plan   Diagnoses and all orders for this visit:    1. Prosthetic shoulder infection, subsequent encounter (Primary)    2. Elevated C-reactive protein (CRP)    3. Serratia marcescens infection          Patient Instructions   Keep appointment with Dr Araiza  Continue ertapenem daily  Call if any increase in loose stool, any rash or new symptoms          Follow Up   Return in about 22 days (around " 12/30/2023).  Patient was given instructions and counseling regarding her condition or for health maintenance advice. Please see specific information pulled into the AVS if appropriate.     >>>>> Will plan to check CRP likely weekly x 3 after completes 6 weeks of IV antibiotic course.

## 2023-12-08 ENCOUNTER — READMISSION MANAGEMENT (OUTPATIENT)
Dept: CALL CENTER | Facility: HOSPITAL | Age: 68
End: 2023-12-08
Payer: MEDICARE

## 2023-12-08 ENCOUNTER — OFFICE VISIT (OUTPATIENT)
Age: 68
End: 2023-12-08
Payer: MEDICARE

## 2023-12-08 VITALS
WEIGHT: 163 LBS | DIASTOLIC BLOOD PRESSURE: 77 MMHG | HEIGHT: 70 IN | BODY MASS INDEX: 23.34 KG/M2 | HEART RATE: 66 BPM | OXYGEN SATURATION: 100 % | TEMPERATURE: 98.3 F | SYSTOLIC BLOOD PRESSURE: 131 MMHG

## 2023-12-08 DIAGNOSIS — A48.8 SERRATIA MARCESCENS INFECTION: ICD-10-CM

## 2023-12-08 DIAGNOSIS — T84.59XD PROSTHETIC SHOULDER INFECTION, SUBSEQUENT ENCOUNTER: Primary | ICD-10-CM

## 2023-12-08 DIAGNOSIS — R79.82 ELEVATED C-REACTIVE PROTEIN (CRP): ICD-10-CM

## 2023-12-08 DIAGNOSIS — Z96.619 PROSTHETIC SHOULDER INFECTION, SUBSEQUENT ENCOUNTER: Primary | ICD-10-CM

## 2023-12-08 RX ORDER — OXYCODONE HYDROCHLORIDE AND ACETAMINOPHEN 5; 325 MG/1; MG/1
1 TABLET ORAL EVERY 8 HOURS PRN
COMMUNITY
Start: 2023-12-02

## 2023-12-08 NOTE — OUTREACH NOTE
General Surgery Week 2 Survey      Flowsheet Row Responses   [facility] patient discharged from? [place]   Does the patient have one of the following disease processes/diagnoses(primary or secondary)? General Surgery   Week 2 attempt successful? No   Unsuccessful attempts Attempt 1  [All numbers listed were attempted-no answer]            Janell H - Registered Nurse

## 2023-12-08 NOTE — PATIENT INSTRUCTIONS
Keep appointment with Dr Araiza  Continue ertapenem daily  Call if any increase in loose stool, any rash or new symptoms

## 2023-12-11 ENCOUNTER — READMISSION MANAGEMENT (OUTPATIENT)
Dept: CALL CENTER | Facility: HOSPITAL | Age: 68
End: 2023-12-11
Payer: MEDICARE

## 2023-12-11 NOTE — OUTREACH NOTE
General Surgery Week 2 Survey      Flowsheet Row Responses   [facility] patient discharged from? [place]   Does the patient have one of the following disease processes/diagnoses(primary or secondary)? General Surgery   Week 2 attempt successful? Yes   Call start time 1801   Call end time 1806   Discharge diagnosis Post op infection   Is the patient taking all medications as directed (includes completed medication regime)? Yes   Medication comments Still taking IV antibiotics   Does the patient have a follow up appointment scheduled with their surgeon? Yes   Has the patient kept scheduled appointments due by today? Yes   What is the Home health agency?  [facility]   Has home health visited the patient within 72 hours of discharge? Yes   Psychosocial issues? No   Did the patient receive a copy of their discharge instructions? Yes   Nursing interventions Reviewed instructions with patient   What is the patient's perception of their health status since discharge? Improving   Is the patient/caregiver able to teach back signs and symptoms of incisional infection? Increased drainage or bleeding, Incisional warmth, Increased redness, swelling or pain at the incisonal site, Pus or odor from incision, Fever   Is the patient/caregiver able to teach back the hierarchy of who to call/visit for symptoms/problems? PCP, Specialist, Home health nurse, Urgent Care, ED, 911 Yes   Additional teach back comments States she is doing well.  States she is still getting IV antibiotics.  Has weekly labs done and sent to infectious disease and the infection is clearing up.   Week 2 call completed? Yes   Graduated Yes   Call end time 1806            Deidre COLUNGA - Licensed Nurse

## 2023-12-12 RX ORDER — ESTRADIOL 0.1 MG/G
CREAM VAGINAL
Qty: 43 G | Refills: 0 | Status: SHIPPED | OUTPATIENT
Start: 2023-12-12

## 2023-12-20 NOTE — PROGRESS NOTES
[facility] – [place] - Infectious Diseases    Patient:  Mayuri Almeida 68 y.o. female  YOB: 1955  MRN: [id number]  Primary Care Physician: Lincoln Johnson MD  REFERRING PROVIDER: Dionicio Araiza, *    Chief Complaint prosthetic joint infection  right shoulder  (No complaints. It was draining it stopped last Wed 12/13/2023)      History of Present Illness  Mayuri Almeida presents to [facility] INFECTIOUS DISEASES for follow-up of prosthetic joint infection right shoulder.    Patient and  note that drainage recently stopped.  Shortly after that they noticed some increased erythema and some tenderness inferior to the incision.  Her  did note it was warmer previously and he does not feel like there is still fever there in that spot.    Patient last saw Dionicio Araiza MD ( [facility])  on 12/15/2023  Next appointment in couple weeks    Patient last saw Paulino Johnson MD12/8/2023  Next appointment is 3/2024    Current Outpatient Medications on File Prior to Visit   Medication Sig    ALPRAZolam (XANAX) 0.25 MG tablet Take 1 tablet by mouth Every Night.    amLODIPine (NORVASC) 5 MG tablet Take 1 tablet by mouth Daily.    aspirin 81 MG EC tablet Take 1 tablet by mouth Daily.    atorvastatin (LIPITOR) 40 MG tablet Take 1 tablet by mouth Every Night.    B Complex Vitamins (VITAMIN-B COMPLEX PO) Take 1 tablet by mouth Daily.    Cholecalciferol 1000 units capsule Take 1 capsule by mouth Daily.    CRANBERRY EXTRACT PO Take 1 tablet by mouth Daily.    cyclobenzaprine (FLEXERIL) 10 MG tablet Take 1 tablet by mouth Every Night.    DULoxetine (CYMBALTA) 30 MG capsule Take 1 capsule by mouth 2 (Two) Times a Day.    ertapenem 1,000 mg in sodium chloride 0.9 % 50 mL IVPB Infuse 1,000 mg into a venous catheter Daily.    estradiol (ESTRACE) 0.1 MG/GM vaginal cream Insert 1 applicator into the vagina 3 (Three) Times a Week.    estrogens, conjugated, (PREMARIN) 0.625 MG tablet Take 1  tablet by mouth Every Night.    lansoprazole (PREVACID) 15 MG capsule Take 1 capsule by mouth Daily.    levothyroxine (SYNTHROID, LEVOTHROID) 100 MCG tablet Take 1 tablet by mouth Every Morning.    losartan (COZAAR) 25 MG tablet Take 1 tablet by mouth Daily.    magnesium oxide (MAG-OX) 400 MG tablet Take 1 tablet by mouth 2 (Two) Times a Day.    metFORMIN (GLUCOPHAGE) 500 MG tablet Take 1 tablet by mouth 2 (Two) Times a Day With Meals.    montelukast (SINGULAIR) 10 MG tablet Take 1 tablet by mouth Every Night.    Potassium Chloride (KLOR-CON 10 PO) Take 10 mcg by mouth 3 (Three) Times a Day.    propranolol LA (INDERAL LA) 80 MG 24 hr capsule Take 1 capsule by mouth Daily.    triamterene-hydrochlorothiazide (MAXZIDE) 75-50 MG per tablet Take 1 tablet by mouth Daily.    Vibegron 75 MG tablet Take 1 tablet by mouth Daily.    oxyCODONE-acetaminophen (PERCOCET) 5-325 MG per tablet Take 1 tablet by mouth Every 8 (Eight) Hours As Needed.     No current facility-administered medications on file prior to visit.     Allergies   Allergen Reactions    Aleve [Naproxen] Other (See Comments)     Pt has stage # 3 kidney disease.    Codeine Nausea And Vomiting     Social History     Socioeconomic History    Marital status:    Tobacco Use    Smoking status: Former     Packs/day: 0.50     Years: 28.00     Additional pack years: 0.00     Total pack years: 14.00     Types: Cigarettes     Passive exposure: Never    Smokeless tobacco: Never   Vaping Use    Vaping Use: Every day    Substances: Nicotine, Flavoring    Devices: Disposable, Pre-filled or refillable cartridge   Substance and Sexual Activity    Alcohol use: No    Drug use: No    Sexual activity: Defer       Venous Access Review  Line/IV site: PICC upper arm left, condition no redness    Antimicrobial Review  Currently on antibiotics/antifungals: yes  Start Date of Therapy: ertapenem 11/18/2023  If therapy completed, date complete:  12/30/2023      Objective   Vital  "Signs:  /86 (BP Location: Left leg, Patient Position: Sitting, Cuff Size: Adult)   Pulse 60   Temp 97.9 °F (36.6 °C) (Temporal)   Ht 154.9 cm (61\")   Wt 74.9 kg (165 lb 3.2 oz)   SpO2 96%   BMI 31.21 kg/m²   Estimated body mass index is 31.21 kg/m² as calculated from the following:    Height as of this encounter: 154.9 cm (61\").    Weight as of this encounter: 74.9 kg (165 lb 3.2 oz).           Physical Exam   General: Patient's nontoxic-appearing sitting in the exam room in no acute distress  HEENT sclera anicteric and noninjected  Respiratory: Effort even and unlabored  Right shoulder: Incision is clean dry and intact.  There is some pale tan appearance around edges of the incision and is slightly more erythematous, albeit quite pale inferior to the incision.  No induration, no ballotable fluid.    DATA REVIEWED:  The following data was reviewed by: Bonnie Bee MD on 12/22/2023:          Collected on 11/16/2023                 Assessment and Plan   Diagnoses and all orders for this visit:    1. Prosthetic shoulder infection, subsequent encounter (Primary)    2. Elevated C-reactive protein (CRP)    3. Serratia marcescens infection          Patient Instructions   Keep follow up with TJ Caldera or Dr. Araiza  Continue IV Ertapenem through 12/30/2023   Come to office to get picc line discontinue on 1/2/2024 ( please schedule @ 11am with ND)           Follow Up   No follow-ups on file.  Patient was given instructions and counseling regarding her condition or for health maintenance advice. Please see specific information pulled into the AVS if appropriate.         "

## 2023-12-22 ENCOUNTER — OFFICE VISIT (OUTPATIENT)
Age: 68
End: 2023-12-22
Payer: MEDICARE

## 2023-12-22 VITALS
OXYGEN SATURATION: 96 % | HEART RATE: 60 BPM | HEIGHT: 61 IN | TEMPERATURE: 97.9 F | DIASTOLIC BLOOD PRESSURE: 86 MMHG | BODY MASS INDEX: 31.19 KG/M2 | SYSTOLIC BLOOD PRESSURE: 158 MMHG | WEIGHT: 165.2 LBS

## 2023-12-22 DIAGNOSIS — T84.59XD PROSTHETIC SHOULDER INFECTION, SUBSEQUENT ENCOUNTER: Primary | ICD-10-CM

## 2023-12-22 DIAGNOSIS — Z96.619 PROSTHETIC SHOULDER INFECTION, SUBSEQUENT ENCOUNTER: Primary | ICD-10-CM

## 2023-12-22 DIAGNOSIS — A48.8 SERRATIA MARCESCENS INFECTION: ICD-10-CM

## 2023-12-22 DIAGNOSIS — R79.82 ELEVATED C-REACTIVE PROTEIN (CRP): ICD-10-CM

## 2023-12-22 LAB
FUNGUS WND CULT: NORMAL
MYCOBACTERIUM SPEC CULT: NORMAL
MYCOBACTERIUM SPEC CULT: NORMAL
NIGHT BLUE STAIN TISS: NORMAL

## 2023-12-22 NOTE — PATIENT INSTRUCTIONS
Keep follow up with TJ Caldera or Dr. Araiza  Continue IV Ertapenem through 12/30/2023   Come to office to get picc line discontinue on 1/2/2024 ( please schedule @ 11am with ND)

## 2023-12-26 LAB
BASOPHILS ABSOLUTE: NORMAL
BASOPHILS RELATIVE PERCENT: NORMAL
EOSINOPHILS ABSOLUTE: NORMAL
EOSINOPHILS RELATIVE PERCENT: NORMAL
HCT VFR BLD CALC: NORMAL %
HEMOGLOBIN: NORMAL
LYMPHOCYTES ABSOLUTE: NORMAL
LYMPHOCYTES RELATIVE PERCENT: NORMAL
MCH RBC QN AUTO: NORMAL PG
MCHC RBC AUTO-ENTMCNC: NORMAL G/DL
MCV RBC AUTO: NORMAL FL
MONOCYTES ABSOLUTE: NORMAL
MONOCYTES RELATIVE PERCENT: NORMAL
NEUTROPHILS ABSOLUTE: NORMAL
NEUTROPHILS RELATIVE PERCENT: NORMAL
PLATELET # BLD: NORMAL 10*3/UL
PMV BLD AUTO: NORMAL FL
RBC # BLD: NORMAL 10*6/UL
WBC # BLD: NORMAL 10*3/UL

## 2024-01-03 ENCOUNTER — TELEPHONE (OUTPATIENT)
Age: 69
End: 2024-01-03
Payer: MEDICARE

## 2024-01-03 DIAGNOSIS — A48.8 SERRATIA MARCESCENS INFECTION: ICD-10-CM

## 2024-01-03 DIAGNOSIS — T84.59XD PROSTHETIC SHOULDER INFECTION, SUBSEQUENT ENCOUNTER: ICD-10-CM

## 2024-01-03 DIAGNOSIS — R79.82 ELEVATED C-REACTIVE PROTEIN (CRP): ICD-10-CM

## 2024-01-03 DIAGNOSIS — Z96.619 PROSTHETIC SHOULDER INFECTION, SUBSEQUENT ENCOUNTER: ICD-10-CM

## 2024-01-03 DIAGNOSIS — F41.1 GENERALIZED ANXIETY DISORDER: ICD-10-CM

## 2024-01-03 NOTE — TELEPHONE ENCOUNTER
I called patient re: increase in CRP this week.  I spoke with her  Sampson with patient on speaker phone in the background.  I explained her CRP went up this week.  Dr. Farias is asking if she has any new problems, ie: cough or concern for COVID?  Patient said no other concerns, no sinus trouble, no arthritis flare-up, just some sneezing per patient report.  I told her them Dr. Farias would like to hold off pulling her PICC, so I will cancel her 1 PM appt with me today.  She would like home health to draw another CRP today.  Sampson said home health just changed her PICC dressing yesterday.  He said they have enough flushes to flush daily for now.  I told them Dr. Farias is considering starting an oral antibiotic, Levaquin, but wants to check her current medications to make sure no drug drug interactions.  Sampson and patient went over her current list and confirmed her current meds.  I told them I will give orders to home health and I will be in touch with them.        I called [facility] and informed Jessica that her PICC line will not be pulled in office today.  We have postponed PICC DC for now due to increase in CRP.  I asked Jessica if a nurse can go and draw another CRP today.  Jessica said they can draw CRP tomorrow.  I told her that will be fine.  Dr. Farias notified CRP will be drawn tomorrow.

## 2024-01-03 NOTE — TELEPHONE ENCOUNTER
Glendy @ [facility]   ( [place]) called to inform office of lab values CRP 4.3 and WBC 14.1 I told her that I would ND know.

## 2024-01-03 NOTE — TELEPHONE ENCOUNTER
Lilian RADHA Bustillo called to request a refill on her medication.      Last office visit : 8/31/2023   Next office visit : 3/1/2024     Last UDS:   Amphetamine Screen, Urine   Date Value Ref Range Status   10/18/2022 NEG  Final     Barbiturate Screen, Urine   Date Value Ref Range Status   10/18/2022 NEG  Final     Benzodiazepine Screen, Urine   Date Value Ref Range Status   10/18/2022 NEG  Final     Buprenorphine Urine   Date Value Ref Range Status   10/18/2022 NEG  Final     Cocaine Metabolite Screen, Urine   Date Value Ref Range Status   10/18/2022 NEG  Final     Gabapentin Screen, Urine   Date Value Ref Range Status   10/18/2022 NEG  Final     MDMA, Urine   Date Value Ref Range Status   10/18/2022 NEG  Final     Methamphetamine, Urine   Date Value Ref Range Status   10/18/2022 NEG  Final     Opiate Scrn, Ur   Date Value Ref Range Status   10/18/2022 NEG  Final     Oxycodone Screen, Ur   Date Value Ref Range Status   10/18/2022 NEG  Final     PCP Screen, Urine   Date Value Ref Range Status   10/18/2022 NEG  Final     Propoxyphene Screen, Urine   Date Value Ref Range Status   10/18/2022 NEG  Final     THC Screen, Urine   Date Value Ref Range Status   10/18/2022 NEG  Final     Tricyclic Antidepressants, Urine   Date Value Ref Range Status   10/18/2022 NEG  Final       Last Kun: 10/11/23  Medication Contract: 10/22   Last Fill: 9/18/23    Requested Prescriptions     Pending Prescriptions Disp Refills    ALPRAZolam (XANAX) 0.25 MG tablet [Pharmacy Med Name: ALPRAZolam 0.25 MG Oral Tablet] 90 tablet 0     Sig: TAKE 1 TABLET BY MOUTH NIGHTLY AS NEEDED FOR ANXIETY         Please approve or refuse this medication.   Kimmie Guerin MA

## 2024-01-03 NOTE — TELEPHONE ENCOUNTER
I called patient and her  Sampson answered.  He said her home health nurse moved her schedule and was able to draw CRP today.  I told him I will let them know once results are reviewed and I will be in touch with them.

## 2024-01-04 ENCOUNTER — TELEPHONE (OUTPATIENT)
Age: 69
End: 2024-01-04
Payer: MEDICARE

## 2024-01-04 RX ORDER — ALPRAZOLAM 0.25 MG/1
TABLET ORAL
Qty: 90 TABLET | Refills: 0 | Status: SHIPPED | OUTPATIENT
Start: 2024-01-04 | End: 2024-02-03

## 2024-01-04 NOTE — TELEPHONE ENCOUNTER
"I called patient informed her of CRP results 4.3, same as the day before.  I asked if any change in her shoulder, ie: increased redness, swelling, drainage, tenderness, etc?  She said the shoulder has never been completely back to normal as far as red discoloration, but she hasn't noticed any new or worsening redness.  She has a small area that's a little swollen, but per her report, \"it's been like that the whole time.\"  She reports she did have drainage just a few weeks ago, but no drainage. Currently.  No increased warmth or tenderness of the shoulder.  I told her Dr. Farias would like to repeat CRP again on Monday and see how she's doing.  I asked if she minds keeping her PICC a little longer.  She said that is OK with her.  She will continue to flush PICC daily while not in use.  I told her I will update home health and be in touch with her on Monday.  I asked her to call us sooner if she has any concerns.        I called [facility] and gave orders to Jessica to please have her nurse drawn CRP and change PICC dressing on Monday.  I will update them again once results from Monday are reviewed.  Patient is aware of this plan.        "

## 2024-01-04 NOTE — TELEPHONE ENCOUNTER
Patient just completed 6 weeks of IV ertapenem for Serratia infection of total shoulder arthroplasty.  CRP had improved but not quite normalized.  Follow-up CRP just a few days after stopping was back up over 4.  See communication entries per STANTON nAna.  I communicated this to Dr. Araiza.  Current plan will be to follow-up CRP and if continues to increase or if there are any concerns for recurrence involving shoulder such as erythema, drainage, swelling, tenderness, etc., will start oral antibiotic therapy.  Ideally, if patient agreeable, plan to use levofloxacin given its high oral bioavailability.  October, 2023 EKG with QTc 422/ajf

## 2024-01-05 ENCOUNTER — TELEPHONE (OUTPATIENT)
Age: 69
End: 2024-01-05
Payer: MEDICARE

## 2024-01-05 DIAGNOSIS — T84.59XD PROSTHETIC SHOULDER INFECTION, SUBSEQUENT ENCOUNTER: Primary | ICD-10-CM

## 2024-01-05 DIAGNOSIS — Z96.619 PROSTHETIC SHOULDER INFECTION, SUBSEQUENT ENCOUNTER: Primary | ICD-10-CM

## 2024-01-05 DIAGNOSIS — A48.8 SERRATIA MARCESCENS INFECTION: ICD-10-CM

## 2024-01-05 DIAGNOSIS — R79.82 ELEVATED C-REACTIVE PROTEIN (CRP): ICD-10-CM

## 2024-01-05 RX ORDER — LEVOFLOXACIN 750 MG/1
750 TABLET, FILM COATED ORAL DAILY
Qty: 30 TABLET | Refills: 0 | Status: SHIPPED | OUTPATIENT
Start: 2024-01-05 | End: 2024-02-04

## 2024-01-05 NOTE — TELEPHONE ENCOUNTER
I received a call from patient's  Sampson with patient on speaker phone.  He was not present for yesterday's phone call when I spoke with patient.  I told him I reviewed her CRP results with her and asked questions about her shoulder.  If she was not having any concerns regarding the shoulder, Dr. Farias recommended holding off antibiotics and rechecking CRP on Monday.  Patient  told me she was comfortable maintaining her PICC line a few more days.  Sampson confirmed this and said she has flushes and PICC line supplies.  He also confirmed no change in her shoulder.  Patient said she still has the same redness or discoloration she described yesterday.  She denies any fever, chills, increased tenderness of the shoulder, or swelling.  He is concerned that her CRP is almost as high as it was initially in the hospital. (11/16/23 CRP was 4.69 (0-0.50)). He is also concerned the CRP went up so quickly only after stopping the IV antibiotic a few days.  I told him Dr. Farias is aware of the CRP levels and the timeline.  They are both concerned that her infection will recur quickly and they want to avoid another surgery if possible.  They would both feel more comfortable if she could go ahead and begin an oral antibiotic.  I told them I will discuss with Dr. Farias and return their call.      I called patient back to let her know Dr. Farias said OK to start levofloxacin 750 mg p.o. daily.  Unclear if CRP elevation so quickly after stopping ertapenem is related to shoulder, however will monitor on oral levofloxacin.  Okay to prescribe 30 days with no refills.  Please make sure I see her in follow-up before she completes that course.  No answer.      Patient's  Sampson returned my call and was informed Dr. Farias said OK to start levofloxacin 750 mg p.o. daily.  Unclear if CRP elevation so quickly after stopping ertapenem is related to shoulder, however will monitor on oral levofloxacin.  Okay to prescribe 30 days  with no refills.  Please make sure I see her in follow-up before she completes that course.  She has follow-up with Dr. Farias on 1/30/24.  I asked if they cancelled her appt with Dr. Araiza.  Sampson said yes, she was scheduled the to see him the same day I was supposed to pull her PICC line.  Since we didn't DC the PICC, they cancelled her appt with Teodora.  Sampson assured me he will reschedule her appt.  He said to send prescription to [facility].  eRx sent.      I called Sampson back and told him to make sure she doesn't take levofloxacin within 3 hours of her magnesium oxide because it will interfere with the absorption of the antibiotic.

## 2024-01-09 ENCOUNTER — TELEPHONE (OUTPATIENT)
Age: 69
End: 2024-01-09
Payer: MEDICARE

## 2024-01-09 NOTE — TELEPHONE ENCOUNTER
Glendy @ [facility] called to report CRP 5 (0-0.7) I told her that I would let STANTON Anna know.      3:13pm   Patient called left message asking for return phone call regarding lab results. Sent message to ND.

## 2024-01-09 NOTE — TELEPHONE ENCOUNTER
I called patient and spoke with Sampson.  I informed him of CRP results.  He is not aware if Dr. Johnson ordered CMP or CBC.  She is taking oral levofloxacin and is not having any problems with nausea or diarrhea.  I encouraged hydration and told him we will check CRP and BMP next week.  He doesn't think home health can DC her PICC line.  Sampson is going to call [facility] to reschedule her appt.  He will call me and let me know when, so I can DC her PICC the same day.  He will continue to maintain PICC until then.

## 2024-01-10 ENCOUNTER — CLINICAL SUPPORT (OUTPATIENT)
Age: 69
End: 2024-01-10
Payer: MEDICARE

## 2024-01-10 ENCOUNTER — TELEPHONE (OUTPATIENT)
Age: 69
End: 2024-01-10
Payer: MEDICARE

## 2024-01-10 DIAGNOSIS — A48.8 SERRATIA MARCESCENS INFECTION: ICD-10-CM

## 2024-01-10 DIAGNOSIS — Z96.619 PROSTHETIC SHOULDER INFECTION, SUBSEQUENT ENCOUNTER: Primary | ICD-10-CM

## 2024-01-10 DIAGNOSIS — T84.59XD PROSTHETIC SHOULDER INFECTION, SUBSEQUENT ENCOUNTER: Primary | ICD-10-CM

## 2024-01-10 NOTE — PROGRESS NOTES
[facility] [place] [facility] - [facility]ious Diseases    Patient:  Mayuri Almeida 68 y.o. female  YOB: 1955  MRN: [id number]  Primary Care Physician: Lincoln Johnson MD  REFERRING PROVIDER: Dionicio Araiza, *    Chief Complaint Discontinue PICC line          Venous Access Review  Line/IV site: PICC upper arm left, condition patent, no redness    She saw Dr. Araiza today and will see him again next week.      Antimicrobial Review  Currently on antibiotics/antifungals: Yes  Start Date of Therapy: 11- - 12-: ertapenem                                       01- - present: levofloxacin 750 MG PO daily   If therapy completed, date complete: NA -  current supply ends 02-      Left arm PICC removed without difficulty with tip intact and measured 45 cm.  Patient tolerated without problems.     Dr. Farias has ordered CRP and BMP for next week.  I will see if [facility] will draw those labs on Monday of next week.      She will keep her follow-up scheduled with Dr. Farais on 1/30/24 at 1:30 PM.

## 2024-01-10 NOTE — TELEPHONE ENCOUNTER
I called [facility] and informed her patient came by our office today and her PICC was removed.  OK to discharge patient from homecare services.  I asked if they could draw labs on Monday 1/15/24 before she is discharged.  Jessica said the nurse will draw BMP and CRP on Monday then discharge her from home health on Wednesday.

## 2024-01-12 ENCOUNTER — TELEPHONE (OUTPATIENT)
Age: 69
End: 2024-01-12
Payer: MEDICARE

## 2024-01-12 DIAGNOSIS — R79.82 ELEVATED C-REACTIVE PROTEIN (CRP): ICD-10-CM

## 2024-01-12 DIAGNOSIS — T84.59XD PROSTHETIC SHOULDER INFECTION, SUBSEQUENT ENCOUNTER: ICD-10-CM

## 2024-01-12 DIAGNOSIS — Z96.619 PROSTHETIC SHOULDER INFECTION, SUBSEQUENT ENCOUNTER: ICD-10-CM

## 2024-01-12 DIAGNOSIS — A48.8 SERRATIA MARCESCENS INFECTION: ICD-10-CM

## 2024-01-12 NOTE — TELEPHONE ENCOUNTER
Mr. Almeida called and gave the fax # for labs for the week of 1/22/24 and 1/29/24.  [facility] has orders to draw labs on 1/15/24 and they will discharge her on 1/17/24.  She has follow-up with Dr. Farias on 1/30/24.  Orders for BMP and CRP on 1/22 and 1/29 faxed to [phone].

## 2024-01-17 ENCOUNTER — TELEPHONE (OUTPATIENT)
Age: 69
End: 2024-01-17
Payer: MEDICARE

## 2024-01-17 NOTE — TELEPHONE ENCOUNTER
Mr. Almeida called and left a voicemail today at 1050 stating she didn't have her labs drawn as planned on Monday due to weather.  They are in town today and would like to know where he can take her for labs.  He asked me to call him back.      I called Mr. Almeida back and they were almost home from [place].  I told him he could have taken her to any [facility] facility.  Since they are almost home, he can take her to the lab we previously discussed and the orders have been faxed.  He thanked me for calling.

## 2024-01-24 ENCOUNTER — PATIENT MESSAGE (OUTPATIENT)
Dept: PRIMARY CARE CLINIC | Age: 69
End: 2024-01-24

## 2024-01-24 DIAGNOSIS — J30.1 CHRONIC SEASONAL ALLERGIC RHINITIS DUE TO POLLEN: ICD-10-CM

## 2024-01-24 DIAGNOSIS — N95.1 SYMPTOMS, SUCH AS FLUSHING, SLEEPLESSNESS, HEADACHE, LACK OF CONCENTRATION, ASSOCIATED WITH THE MENOPAUSE: ICD-10-CM

## 2024-01-25 RX ORDER — MONTELUKAST SODIUM 10 MG/1
TABLET ORAL
Qty: 90 TABLET | Refills: 3 | Status: SHIPPED | OUTPATIENT
Start: 2024-01-25

## 2024-01-25 NOTE — TELEPHONE ENCOUNTER
From: Lilian Bustillo  To: Dr. Bashir Shah  Sent: 1/24/2024 10:51 AM CST  Subject: refill for Premarin    Need a refill 30 day supply of Premarin 0.625 once a day. My mail order [facility] failed to send it out and I am completely out. Send to [address] dr. Danyel rousseau Thank you, if any questions please call me at [phone]

## 2024-01-29 ENCOUNTER — TELEPHONE (OUTPATIENT)
Age: 69
End: 2024-01-29
Payer: MEDICARE

## 2024-01-29 NOTE — TELEPHONE ENCOUNTER
I called patient and spoke with her  Sampson.  She is taking levofloxacin daily.  She is not taking ibuprofen.  No new medications reported.  I asked if staying hydrated?  She is trying, per Sampson.  No complaints with her shoulder.  The redness is almost completely gone.  Dr. Farias said to discontinue oral levofloxacin.  She has follow-up tomorrow at 1330 via My Chart.  I reviewed her current medications with Sampson.

## 2024-01-30 ENCOUNTER — TELEMEDICINE (OUTPATIENT)
Age: 69
End: 2024-01-30
Payer: MEDICARE

## 2024-01-30 DIAGNOSIS — R79.89 ELEVATED SERUM CREATININE: ICD-10-CM

## 2024-01-30 DIAGNOSIS — T84.59XD PROSTHETIC SHOULDER INFECTION, SUBSEQUENT ENCOUNTER: Primary | ICD-10-CM

## 2024-01-30 DIAGNOSIS — R79.82 ELEVATED C-REACTIVE PROTEIN (CRP): ICD-10-CM

## 2024-01-30 DIAGNOSIS — Z96.619 PROSTHETIC SHOULDER INFECTION, SUBSEQUENT ENCOUNTER: Primary | ICD-10-CM

## 2024-01-30 DIAGNOSIS — A48.8 SERRATIA MARCESCENS INFECTION: ICD-10-CM

## 2024-01-30 RX ORDER — TRAMADOL HYDROCHLORIDE 50 MG/1
1 TABLET ORAL 3 TIMES DAILY
COMMUNITY
Start: 2024-01-15

## 2024-01-30 RX ORDER — PROPRANOLOL HYDROCHLORIDE 80 MG/1
1 CAPSULE, EXTENDED RELEASE ORAL DAILY
COMMUNITY
Start: 2023-08-11

## 2024-01-30 RX ORDER — MONTELUKAST SODIUM 10 MG/1
1 TABLET ORAL NIGHTLY
COMMUNITY
Start: 2024-01-25

## 2024-01-30 RX ORDER — ALPRAZOLAM 0.25 MG/1
1 TABLET ORAL NIGHTLY PRN
COMMUNITY
Start: 2024-01-04 | End: 2024-02-04

## 2024-01-30 NOTE — PROGRESS NOTES
"[facility] [place]    Patient:  Mayuri Almeida 68 y.o. female  YOB: 1955  MRN: [id number]  Primary Care Physician: Lincoln Johnson MD  REFERRING PROVIDER: Dionicio Araiza, *    Chief Complaint No chief complaint on file.      History of Present Illness  Mayuri Almeida presents to [facility] INFECTIOUS DISEASES  Patient last saw Dionicio Araiza MD on 1/17/2024  Next appointment is on 2/5/2024    Patient has appointment with Paulino Johnson MD on  end of Feb    Sees Dr Ac nephrology - end of feb     Patient was placed on oral levofloxacin after completing IV antibiotic therapy.  She had an increase in her CRP that was concerning but repeat was much improved.  She said she is doing \"much better since I got the call about the labs and got to get off that awful antibiotic\".  When I asked her what was going on with the antibiotic she said she had nausea and diarrhea.  I asked if she had notified our office and she had not.  She said that she got some nausea medication from Dr. Araiza.    Currently she is having no diarrhea and she said she finally took Imodium.  She did not have any fever or abdominal cramps with the diarrhea.  She said that she thinks her creatinine was elevated because of diarrhea and that she was \"not able to drink enough to keep up\".    Right shoulder pain is better    Hoping to be released for PT    Had Left hip replacement in [place] >>> having a \" twinge\" there      Below from [facility]:  PICC was DC'd in office on 1/10/24    Orders were given to home health to draw BMP and CRP on Monday 1/15/24.  I was told home health cannot draw labs the same visit as discharge, so home health was planning to discharge him from their services on Wed 1/17/24.      On 1/12/24 I faxed orders for BMP and CRP on 1/22/24 and 1/29/24 to [phone] per Sampson's request.  (Patient's )    Labs were not drawn the week of 1/15/24 due to weather.  Sampson " called on 1/17/23 and left a voicemail stating they are in [place] and need to know where she can have labs drawn in [place]?  By the time I called him back, they had already left town.  Sampson said he will take her to the lab we previously discussed and has faxed orders there on file.  Per Sampson, she had labs drawn on Thursday 1/18/24.  We have not been able to locate those results.      Labs collected 1/25/24 updated through outside source were reviewed.            Current Outpatient Medications on File Prior to Visit   Medication Sig    ALPRAZolam (XANAX) 0.25 MG tablet Take 1 tablet by mouth Every Night.    ALPRAZolam (XANAX) 0.25 MG tablet Take 1 tablet by mouth At Night As Needed.    amLODIPine (NORVASC) 5 MG tablet Take 1 tablet by mouth Daily.    aspirin 81 MG EC tablet Take 1 tablet by mouth Daily.    atorvastatin (LIPITOR) 40 MG tablet Take 1 tablet by mouth Every Night.    B Complex Vitamins (VITAMIN-B COMPLEX PO) Take 1 tablet by mouth Daily.    Cholecalciferol 1000 units capsule Take 1 capsule by mouth Daily.    CRANBERRY EXTRACT PO Take 1 tablet by mouth Daily.    cyclobenzaprine (FLEXERIL) 10 MG tablet Take 1 tablet by mouth Every Night.    DULoxetine (CYMBALTA) 30 MG capsule Take 1 capsule by mouth 2 (Two) Times a Day.    estradiol (ESTRACE) 0.1 MG/GM vaginal cream Insert 1 applicator into the vagina 3 (Three) Times a Week.    estrogens, conjugated, (PREMARIN) 0.625 MG tablet Take 1 tablet by mouth Every Night.    estrogens, conjugated, (PREMARIN) 0.625 MG tablet Take 1 tablet by mouth Daily.    levothyroxine (SYNTHROID, LEVOTHROID) 100 MCG tablet Take 1 tablet by mouth Every Morning.    losartan (COZAAR) 25 MG tablet Take 1 tablet by mouth Daily.    magnesium oxide (MAG-OX) 400 MG tablet Take 1 tablet by mouth 2 (Two) Times a Day.    metFORMIN (GLUCOPHAGE) 500 MG tablet Take 1 tablet by mouth 2 (Two) Times a Day With Meals.    montelukast (SINGULAIR) 10 MG tablet Take 1 tablet by mouth Every Night.     "montelukast (SINGULAIR) 10 MG tablet Take 1 tablet by mouth Every Night.    oxyCODONE-acetaminophen (PERCOCET) 5-325 MG per tablet Take 1 tablet by mouth Every 8 (Eight) Hours As Needed.    Potassium Chloride (KLOR-CON 10 PO) Take 10 mcg by mouth 3 (Three) Times a Day.    propranolol LA (INDERAL LA) 80 MG 24 hr capsule Take 1 capsule by mouth Daily.    propranolol LA (INDERAL LA) 80 MG 24 hr capsule Take 1 capsule by mouth Daily.    traMADol (ULTRAM) 50 MG tablet Take 1 tablet by mouth 3 times a day.    triamterene-hydrochlorothiazide (MAXZIDE) 75-50 MG per tablet Take 1 tablet by mouth Daily.    Vibegron 75 MG tablet Take 1 tablet by mouth Daily.    lansoprazole (PREVACID) 15 MG capsule Take 1 capsule by mouth Daily.     No current facility-administered medications on file prior to visit.     Allergies   Allergen Reactions    Aleve [Naproxen] Other (See Comments)     Pt has stage # 3 kidney disease.    Codeine Nausea And Vomiting      Social History     Socioeconomic History    Marital status:    Tobacco Use    Smoking status: Former     Packs/day: 0.50     Years: 28.00     Additional pack years: 0.00     Total pack years: 14.00     Types: Cigarettes     Passive exposure: Never    Smokeless tobacco: Never   Vaping Use    Vaping Use: Every day    Substances: Nicotine, Flavoring    Devices: Disposable, Pre-filled or refillable cartridge   Substance and Sexual Activity    Alcohol use: No    Drug use: No    Sexual activity: Defer       Venous Access Review  Line/IV site: No current IV Access    Antimicrobial Review  Currently on antibiotics/antifungals: no  Start Date of Therapy: 11- - 12-: ertapenem   01- - 01-: levofloxacin 750 MG PO daily    If therapy completed, date complete: 01-    Objective   Vital Signs:  There were no vitals taken for this visit.  Estimated body mass index is 31.21 kg/m² as calculated from the following:    Height as of 12/22/23: 154.9 cm (61\").    " Weight as of 12/22/23: 74.9 kg (165 lb 3.2 oz).           Physical Exam     General: Patient is nontoxic-appearing sitting in home evaluated through MyChart video visit  Respiratory: Effort even and unlabored, she was not conversationally dyspneic  Right shoulder difficult to assess via video visit.  Incision appeared to be intact.  There did appear to be some discoloration along the incision line.  Psych: Pleasant cooperative      DATA REVIEWED:  The following data was reviewed by: Bonnie Bee MD on 01/30/2024:              Reviewed note: PROGRESS NOTES - SCAN - ORTHOPEDIC INST/JACOB ATKINSON MD - 1/17/24 (01/17/2024)      Assessment and Plan   Diagnoses and all orders for this visit:    1. Prosthetic shoulder infection, subsequent encounter (Primary)  -     C-reactive Protein; Future  -     C-reactive Protein; Standing    2. Elevated C-reactive protein (CRP)    3. Serratia marcescens infection    4. Elevated serum creatinine  Comments:  Patient with recent nausea and diarrhea          Patient Instructions   Increase hydration  Call if diarrhea recurs  CRP this Thursday to make sure remains low   Resume weekly CRP next Tues          Follow Up   Return for Video visit - 4 weeks.  Patient was given instructions and counseling regarding her condition or for health maintenance advice. Please see specific information pulled into the AVS if appropriate.     Mode of Visit: Video  Location of patient: home  Location of provider: [facility]  You have chosen to receive care through a telehealth visit.  The patient has signed the video visit consent form.  The visit included audio and video interaction. No technical issues occurred during this visit.         >>>>[facility] in [place] for future labs  get CRP on thursday   Then follow ing Tuesdays for 3 weeks

## 2024-01-31 ENCOUNTER — TELEPHONE (OUTPATIENT)
Age: 69
End: 2024-01-31
Payer: MEDICARE

## 2024-01-31 NOTE — TELEPHONE ENCOUNTER
I called patient and left a voicemail lab orders will be faxed to [facility] lab (same as before) for labs this Thursday and the next 3 Tuesdays.  Also, her next appt with Dr. Farias is scheduled in 4 weeks on Tues 2/27/24 at 1415 via My Chart telehealth.  Please call if any questions.

## 2024-01-31 NOTE — PATIENT INSTRUCTIONS
Increase hydration  Call if diarrhea recurs  CRP this Thursday to make sure remains low   Resume weekly CRP next Tues

## 2024-02-03 DIAGNOSIS — F41.1 GENERALIZED ANXIETY DISORDER: ICD-10-CM

## 2024-02-05 RX ORDER — ALPRAZOLAM 0.25 MG/1
TABLET ORAL
Qty: 90 TABLET | Refills: 0 | OUTPATIENT
Start: 2024-02-05 | End: 2024-03-06

## 2024-02-28 DIAGNOSIS — E11.9 TYPE 2 DIABETES MELLITUS WITHOUT COMPLICATION, WITHOUT LONG-TERM CURRENT USE OF INSULIN (HCC): ICD-10-CM

## 2024-02-28 DIAGNOSIS — M54.50 CHRONIC MIDLINE LOW BACK PAIN WITHOUT SCIATICA: ICD-10-CM

## 2024-02-28 DIAGNOSIS — G89.29 CHRONIC MIDLINE LOW BACK PAIN WITHOUT SCIATICA: ICD-10-CM

## 2024-02-29 RX ORDER — CELECOXIB 200 MG/1
CAPSULE ORAL
Qty: 180 CAPSULE | Refills: 3 | Status: SHIPPED | OUTPATIENT
Start: 2024-02-29

## 2024-03-01 ENCOUNTER — HOSPITAL ENCOUNTER (OUTPATIENT)
Dept: WOMENS IMAGING | Age: 69
Discharge: HOME | End: 2024-03-01
Payer: MEDICARE

## 2024-03-01 ENCOUNTER — OFFICE VISIT (OUTPATIENT)
Dept: PRIMARY CARE CLINIC | Age: 69
End: 2024-03-01

## 2024-03-01 VITALS
BODY MASS INDEX: 28.9 KG/M2 | OXYGEN SATURATION: 98 % | TEMPERATURE: 97.8 F | HEART RATE: 67 BPM | DIASTOLIC BLOOD PRESSURE: 86 MMHG | SYSTOLIC BLOOD PRESSURE: 116 MMHG | WEIGHT: 158 LBS

## 2024-03-01 DIAGNOSIS — F41.1 GENERALIZED ANXIETY DISORDER: ICD-10-CM

## 2024-03-01 DIAGNOSIS — Z12.31 SCREENING MAMMOGRAM FOR BREAST CANCER: ICD-10-CM

## 2024-03-01 DIAGNOSIS — I10 ESSENTIAL (PRIMARY) HYPERTENSION: Primary | ICD-10-CM

## 2024-03-01 DIAGNOSIS — N18.31 STAGE 3A CHRONIC KIDNEY DISEASE (HCC): ICD-10-CM

## 2024-03-01 DIAGNOSIS — Z51.81 MEDICATION MONITORING ENCOUNTER: ICD-10-CM

## 2024-03-01 DIAGNOSIS — E11.9 TYPE 2 DIABETES MELLITUS WITHOUT COMPLICATION, WITHOUT LONG-TERM CURRENT USE OF INSULIN (HCC): ICD-10-CM

## 2024-03-01 DIAGNOSIS — E78.00 HYPERCHOLESTEROLEMIA: ICD-10-CM

## 2024-03-01 DIAGNOSIS — M79.7 FIBROMYALGIA: ICD-10-CM

## 2024-03-01 PROCEDURE — 77063 BREAST TOMOSYNTHESIS BI: CPT

## 2024-03-01 ASSESSMENT — PATIENT HEALTH QUESTIONNAIRE - PHQ9
SUM OF ALL RESPONSES TO PHQ QUESTIONS 1-9: 0
2. FEELING DOWN, DEPRESSED OR HOPELESS: 0
1. LITTLE INTEREST OR PLEASURE IN DOING THINGS: 0
SUM OF ALL RESPONSES TO PHQ QUESTIONS 1-9: 0
SUM OF ALL RESPONSES TO PHQ9 QUESTIONS 1 & 2: 0

## 2024-03-01 NOTE — PROGRESS NOTES
FINGER TRIGGER RELEASE      HYSTERECTOMY (CERVIX STATUS UNKNOWN)      ARIANA with BSO, fibroids    THYROIDECTOMY      carcinoma    US BREAST FINE NEEDLE ASPIRATION        Family History   Problem Relation Age of Onset    High Blood Pressure Mother     Diabetes Mother     Stroke Mother     High Cholesterol Mother     High Blood Pressure Father     Heart Disease Father     High Cholesterol Father     Cancer Father 65        prostate    High Blood Pressure Sister     High Blood Pressure Brother     Cancer Brother         prostate    Cancer Brother         prostate    Breast Cancer Maternal Aunt 55        breast    Breast Cancer Maternal Cousin 62        breast    Breast Cancer Niece     Social History     Tobacco Use    Smoking status: Every Day     Current packs/day: 1.00     Average packs/day: 1 pack/day for 25.0 years (25.0 ttl pk-yrs)     Types: Cigarettes    Smokeless tobacco: Never   Substance Use Topics    Alcohol use: No     Alcohol/week: 0.0 standard drinks of alcohol        _______________________________________________________________    Note dictated using Dragon Dictation software  Sometimes this dictation software makes erroneous transcriptions.

## 2024-03-04 ENCOUNTER — TELEPHONE (OUTPATIENT)
Dept: OBGYN CLINIC | Age: 69
End: 2024-03-04

## 2024-03-14 DIAGNOSIS — N95.1 SYMPTOMS, SUCH AS FLUSHING, SLEEPLESSNESS, HEADACHE, LACK OF CONCENTRATION, ASSOCIATED WITH THE MENOPAUSE: ICD-10-CM

## 2024-03-14 DIAGNOSIS — F41.1 GENERALIZED ANXIETY DISORDER: ICD-10-CM

## 2024-03-15 DIAGNOSIS — Z78.0 POSTMENOPAUSAL: Primary | ICD-10-CM

## 2024-03-15 RX ORDER — ALPRAZOLAM 0.25 MG/1
TABLET ORAL
Qty: 90 TABLET | Refills: 0 | Status: SHIPPED | OUTPATIENT
Start: 2024-04-05 | End: 2024-05-05

## 2024-03-15 NOTE — TELEPHONE ENCOUNTER
Patient called back and asked that a bone density order be placed in her chart so she can get that scheduled. Thank you.

## 2024-03-15 NOTE — TELEPHONE ENCOUNTER
Lilian GARCIA Bustillo called to request a refill on her medication.      Last office visit : 3/1/2024   Next office visit : 9/6/2024     Last UDS:   Amphetamine Screen, Urine   Date Value Ref Range Status   03/01/2024 neg  Final     Barbiturate Screen, Urine   Date Value Ref Range Status   03/01/2024 neg  Final     Benzodiazepine Screen, Urine   Date Value Ref Range Status   03/01/2024 pos  Final     Buprenorphine Urine   Date Value Ref Range Status   03/01/2024 neg  Final     Cocaine Metabolite Screen, Urine   Date Value Ref Range Status   03/01/2024 neg  Final     Gabapentin Screen, Urine   Date Value Ref Range Status   10/18/2022 NEG  Final     MDMA, Urine   Date Value Ref Range Status   03/01/2024 neg  Final     Methamphetamine, Urine   Date Value Ref Range Status   03/01/2024 neg  Final     Opiate Scrn, Ur   Date Value Ref Range Status   03/01/2024 neg  Final     Oxycodone Screen, Ur   Date Value Ref Range Status   03/01/2024 neg  Final     PCP Screen, Urine   Date Value Ref Range Status   03/01/2024 neg  Final     Propoxyphene Screen, Urine   Date Value Ref Range Status   03/01/2024 neg  Final     THC Screen, Urine   Date Value Ref Range Status   03/01/2024 neg  Final     Tricyclic Antidepressants, Urine   Date Value Ref Range Status   03/01/2024 neg  Final       Last Kun: 02/05/2024  Medication Contract: 03/04/2024   Last Fill: 01/05/2024    Requested Prescriptions     Pending Prescriptions Disp Refills    estrogens, conjugated, (PREMARIN) 0.625 MG tablet [Pharmacy Med Name: Premarin 0.625 MG Oral Tablet] 30 tablet 0     Sig: Take 1 tablet by mouth once daily    ALPRAZolam (XANAX) 0.25 MG tablet [Pharmacy Med Name: ALPRAZolam 0.25 MG Oral Tablet] 90 tablet 0     Sig: TAKE 1 TABLET BY MOUTH NIGHTLY AS NEEDED FOR ANXIETY     Refill not due until 04/05/2024    Please approve or refuse this medication.   Mili Oleary LPN

## 2024-03-19 ENCOUNTER — TELEPHONE (OUTPATIENT)
Age: 69
End: 2024-03-19
Payer: MEDICARE

## 2024-03-19 ENCOUNTER — TELEMEDICINE (OUTPATIENT)
Age: 69
End: 2024-03-19
Payer: MEDICARE

## 2024-03-19 DIAGNOSIS — A48.8 SERRATIA MARCESCENS INFECTION: ICD-10-CM

## 2024-03-19 DIAGNOSIS — Z96.619 PROSTHETIC SHOULDER INFECTION, SUBSEQUENT ENCOUNTER: Primary | ICD-10-CM

## 2024-03-19 DIAGNOSIS — T84.59XD PROSTHETIC SHOULDER INFECTION, SUBSEQUENT ENCOUNTER: Primary | ICD-10-CM

## 2024-03-19 DIAGNOSIS — R79.82 ELEVATED C-REACTIVE PROTEIN (CRP): ICD-10-CM

## 2024-03-19 RX ORDER — SEMAGLUTIDE 0.68 MG/ML
0.5 INJECTION, SOLUTION SUBCUTANEOUS WEEKLY
COMMUNITY
Start: 2024-03-01

## 2024-03-19 NOTE — TELEPHONE ENCOUNTER
Called Dr Araiza's office spoke with Aixa I asked that she fax over the most recent office note. She stated that she would

## 2024-04-05 DIAGNOSIS — I12.9 BENIGN HYPERTENSIVE KIDNEY DISEASE WITH CHRONIC KIDNEY DISEASE STAGE I THROUGH STAGE IV, OR UNSPECIFIED: ICD-10-CM

## 2024-04-05 RX ORDER — LOSARTAN POTASSIUM 25 MG/1
TABLET ORAL
Qty: 90 TABLET | Refills: 3 | Status: SHIPPED | OUTPATIENT
Start: 2024-04-05

## 2024-04-13 DIAGNOSIS — N95.1 SYMPTOMS, SUCH AS FLUSHING, SLEEPLESSNESS, HEADACHE, LACK OF CONCENTRATION, ASSOCIATED WITH THE MENOPAUSE: ICD-10-CM

## 2024-04-22 DIAGNOSIS — N95.1 SYMPTOMS, SUCH AS FLUSHING, SLEEPLESSNESS, HEADACHE, LACK OF CONCENTRATION, ASSOCIATED WITH THE MENOPAUSE: ICD-10-CM

## 2024-04-30 ENCOUNTER — HOSPITAL ENCOUNTER (OUTPATIENT)
Dept: WOMENS IMAGING | Age: 69
Discharge: HOME | End: 2024-04-30
Payer: MEDICARE

## 2024-04-30 DIAGNOSIS — Z78.0 POSTMENOPAUSAL: ICD-10-CM

## 2024-04-30 PROCEDURE — 77080 DXA BONE DENSITY AXIAL: CPT

## 2024-05-01 DIAGNOSIS — F41.1 GENERALIZED ANXIETY DISORDER: ICD-10-CM

## 2024-05-01 NOTE — TELEPHONE ENCOUNTER
Lilian RADHA Bustillo called to request a refill on her medication.      Last office visit : 3/1/2024   Next office visit : 9/6/2024     Last UDS:   Amphetamine Screen, Urine   Date Value Ref Range Status   03/01/2024 neg  Final     Barbiturate Screen, Urine   Date Value Ref Range Status   03/01/2024 neg  Final     Benzodiazepine Screen, Urine   Date Value Ref Range Status   03/01/2024 pos  Final     Buprenorphine Urine   Date Value Ref Range Status   03/01/2024 neg  Final     Cocaine Metabolite Screen, Urine   Date Value Ref Range Status   03/01/2024 neg  Final     Gabapentin Screen, Urine   Date Value Ref Range Status   10/18/2022 NEG  Final     MDMA, Urine   Date Value Ref Range Status   03/01/2024 neg  Final     Opiate Scrn, Ur   Date Value Ref Range Status   03/01/2024 neg  Final     Oxycodone Screen, Ur   Date Value Ref Range Status   03/01/2024 neg  Final     PCP Screen, Urine   Date Value Ref Range Status   03/01/2024 neg  Final     Propoxyphene Screen, Urine   Date Value Ref Range Status   03/01/2024 neg  Final     THC Screen, Urine   Date Value Ref Range Status   03/01/2024 neg  Final     Tricyclic Antidepressants, Urine   Date Value Ref Range Status   03/01/2024 neg  Final       Last Kun: 2/5/24  Medication Contract: 3/24   Last Fill: 4/5/24    Requested Prescriptions     Pending Prescriptions Disp Refills    ALPRAZolam (XANAX) 0.25 MG tablet [Pharmacy Med Name: ALPRAZolam 0.25 MG Oral Tablet] 90 tablet 0     Sig: TAKE 1 TABLET BY MOUTH NIGHTLY AS NEEDED FOR ANXIETY         Please approve or refuse this medication.   Kimmie Guerin MA

## 2024-05-02 DIAGNOSIS — N95.1 SYMPTOMS, SUCH AS FLUSHING, SLEEPLESSNESS, HEADACHE, LACK OF CONCENTRATION, ASSOCIATED WITH THE MENOPAUSE: ICD-10-CM

## 2024-05-02 RX ORDER — ALPRAZOLAM 0.25 MG/1
TABLET ORAL
Qty: 90 TABLET | Refills: 0 | Status: SHIPPED | OUTPATIENT
Start: 2024-05-02 | End: 2024-06-01

## 2024-05-07 DIAGNOSIS — N95.1 SYMPTOMS, SUCH AS FLUSHING, SLEEPLESSNESS, HEADACHE, LACK OF CONCENTRATION, ASSOCIATED WITH THE MENOPAUSE: ICD-10-CM

## 2024-07-11 ENCOUNTER — E-VISIT (OUTPATIENT)
Dept: PRIMARY CARE CLINIC | Age: 69
End: 2024-07-11

## 2024-07-11 DIAGNOSIS — R35.0 URINARY FREQUENCY: ICD-10-CM

## 2024-07-11 DIAGNOSIS — R30.0 DYSURIA: Primary | ICD-10-CM

## 2024-07-12 RX ORDER — CEFDINIR 300 MG/1
300 CAPSULE ORAL 2 TIMES DAILY
Qty: 14 CAPSULE | Refills: 0 | Status: SHIPPED | OUTPATIENT
Start: 2024-07-12 | End: 2024-07-19

## 2024-08-05 DIAGNOSIS — I10 ESSENTIAL (PRIMARY) HYPERTENSION: ICD-10-CM

## 2024-08-05 DIAGNOSIS — I12.9 BENIGN HYPERTENSIVE KIDNEY DISEASE WITH CHRONIC KIDNEY DISEASE STAGE I THROUGH STAGE IV, OR UNSPECIFIED: ICD-10-CM

## 2024-08-05 RX ORDER — AMLODIPINE BESYLATE 5 MG/1
TABLET ORAL
Qty: 90 TABLET | Refills: 1 | Status: SHIPPED | OUTPATIENT
Start: 2024-08-05

## 2024-08-05 RX ORDER — TRIAMTERENE AND HYDROCHLOROTHIAZIDE 75; 50 MG/1; MG/1
TABLET ORAL
Qty: 90 TABLET | Refills: 1 | Status: SHIPPED | OUTPATIENT
Start: 2024-08-05

## 2024-08-05 RX ORDER — PROPRANOLOL HYDROCHLORIDE 80 MG/1
CAPSULE, EXTENDED RELEASE ORAL
Qty: 90 CAPSULE | Refills: 1 | Status: SHIPPED | OUTPATIENT
Start: 2024-08-05

## 2024-08-07 DIAGNOSIS — F41.1 GENERALIZED ANXIETY DISORDER: ICD-10-CM

## 2024-08-07 DIAGNOSIS — E11.9 TYPE 2 DIABETES MELLITUS WITHOUT COMPLICATION, WITHOUT LONG-TERM CURRENT USE OF INSULIN (HCC): ICD-10-CM

## 2024-08-08 RX ORDER — CYCLOBENZAPRINE HCL 10 MG
TABLET ORAL
Qty: 90 TABLET | Refills: 0 | OUTPATIENT
Start: 2024-08-08

## 2024-08-08 RX ORDER — ALPRAZOLAM 0.25 MG/1
TABLET ORAL
Qty: 90 TABLET | Refills: 0 | Status: SHIPPED | OUTPATIENT
Start: 2024-08-08 | End: 2024-09-07

## 2024-08-08 NOTE — TELEPHONE ENCOUNTER
Lilian Bustillo called to request a refill on her medication.      Last office visit : 7/11/2024   Next office visit : 9/13/2024     Last UDS:   Benzodiazepine Screen, Urine   Date Value Ref Range Status   03/01/2024 pos  Final     Buprenorphine Urine   Date Value Ref Range Status   03/01/2024 neg  Final     Cocaine Metabolite Screen, Urine   Date Value Ref Range Status   03/01/2024 neg  Final     Gabapentin Screen, Urine   Date Value Ref Range Status   10/18/2022 NEG  Final     Oxycodone Screen, Ur   Date Value Ref Range Status   03/01/2024 neg  Final     Propoxyphene Screen, Urine   Date Value Ref Range Status   03/01/2024 neg  Final     THC Screen, Urine   Date Value Ref Range Status   03/01/2024 neg  Final     Tricyclic Antidepressants, Urine   Date Value Ref Range Status   03/01/2024 neg  Final       Last Kun: today  Medication Contract: 03/04/24   Last Fill: 05/02/24    Requested Prescriptions     Pending Prescriptions Disp Refills    cyclobenzaprine (FLEXERIL) 10 MG tablet [Pharmacy Med Name: Cyclobenzaprine HCl 10 MG Oral Tablet] 90 tablet 0     Sig: Take 1 tablet by mouth once daily    ALPRAZolam (XANAX) 0.25 MG tablet [Pharmacy Med Name: ALPRAZOLAM 0.25MG   TAB] 90 tablet 0     Sig: TAKE 1 TABLET BY MOUTH NIGHTLY AS NEEDED FOR ANXIETY    metFORMIN (GLUCOPHAGE) 500 MG tablet [Pharmacy Med Name: metFORMIN HCl 500 MG Oral Tablet] 180 tablet 0     Sig: TAKE 1 TABLET BY MOUTH TWICE DAILY WITH MEALS         Please approve or refuse this medication.   Debora Hinojosa LPN

## 2024-08-23 RX ORDER — POTASSIUM CHLORIDE 750 MG/1
TABLET, EXTENDED RELEASE ORAL
Qty: 270 TABLET | Refills: 1 | Status: SHIPPED | OUTPATIENT
Start: 2024-08-23

## 2024-08-29 ENCOUNTER — OFFICE VISIT (OUTPATIENT)
Dept: OBGYN CLINIC | Age: 69
End: 2024-08-29
Payer: MEDICARE

## 2024-08-29 VITALS
BODY MASS INDEX: 24.87 KG/M2 | WEIGHT: 136 LBS | HEART RATE: 68 BPM | DIASTOLIC BLOOD PRESSURE: 82 MMHG | SYSTOLIC BLOOD PRESSURE: 129 MMHG

## 2024-08-29 DIAGNOSIS — N95.2 VAGINAL ATROPHY: Primary | ICD-10-CM

## 2024-08-29 PROCEDURE — 1090F PRES/ABSN URINE INCON ASSESS: CPT | Performed by: NURSE PRACTITIONER

## 2024-08-29 PROCEDURE — 99213 OFFICE O/P EST LOW 20 MIN: CPT | Performed by: NURSE PRACTITIONER

## 2024-08-29 PROCEDURE — 3074F SYST BP LT 130 MM HG: CPT | Performed by: NURSE PRACTITIONER

## 2024-08-29 PROCEDURE — 3017F COLORECTAL CA SCREEN DOC REV: CPT | Performed by: NURSE PRACTITIONER

## 2024-08-29 PROCEDURE — 4004F PT TOBACCO SCREEN RCVD TLK: CPT | Performed by: NURSE PRACTITIONER

## 2024-08-29 PROCEDURE — G8399 PT W/DXA RESULTS DOCUMENT: HCPCS | Performed by: NURSE PRACTITIONER

## 2024-08-29 PROCEDURE — G8427 DOCREV CUR MEDS BY ELIG CLIN: HCPCS | Performed by: NURSE PRACTITIONER

## 2024-08-29 PROCEDURE — 1123F ACP DISCUSS/DSCN MKR DOCD: CPT | Performed by: NURSE PRACTITIONER

## 2024-08-29 PROCEDURE — 3079F DIAST BP 80-89 MM HG: CPT | Performed by: NURSE PRACTITIONER

## 2024-08-29 PROCEDURE — G8420 CALC BMI NORM PARAMETERS: HCPCS | Performed by: NURSE PRACTITIONER

## 2024-08-29 RX ORDER — ESTRADIOL 0.1 MG/G
1 CREAM VAGINAL
Qty: 42.5 G | Refills: 3 | Status: SHIPPED | OUTPATIENT
Start: 2024-08-30

## 2024-08-29 ASSESSMENT — ENCOUNTER SYMPTOMS
CONSTIPATION: 0
ALLERGIC/IMMUNOLOGIC NEGATIVE: 1
DIARRHEA: 0
GASTROINTESTINAL NEGATIVE: 1
EYES NEGATIVE: 1
RESPIRATORY NEGATIVE: 1

## 2024-08-29 NOTE — PROGRESS NOTES
Lilian Bustillo is a 69 y.o. female who presents today for her medical conditions/ complaints as noted below. Lilian Bustillo is c/o of Medication Refill        HPI  Pt presents for f/u on vaginal atrophy. Has been using vaginal estrogen with good relief of symptoms. Current with screening mammogram and DEXA. Not as sexually active anymore. The cream helps with bladder control and UTI prevention.     No LMP recorded (lmp unknown). Patient has had a hysterectomy.      Past Medical History:   Diagnosis Date    Allergic rhinitis     Anxiety     Back pain     DDD (degenerative disc disease)     Hypertension     Hyperthyroidism     Kidney disease     Stage 3- Dr Beck    Osteopenia      Past Surgical History:   Procedure Laterality Date    ANKLE SURGERY      APPENDECTOMY      BREAST BIOPSY  2013    left breast mammotome biopsy    BREAST SURGERY Right     right excisional biopsy - cyst    CARPAL TUNNEL RELEASE      COLONOSCOPY      Dr. Knox in [place]    FINGER TRIGGER RELEASE      HYSTERECTOMY (CERVIX STATUS UNKNOWN)      ARIANA with BSO, fibroids    SHOULDER SURGERY Right     10/2023    THYROIDECTOMY      carcinoma    US BREAST FINE NEEDLE ASPIRATION       Family History   Problem Relation Age of Onset    High Blood Pressure Mother     Diabetes Mother     Stroke Mother     High Cholesterol Mother     High Blood Pressure Father     Heart Disease Father     High Cholesterol Father     Cancer Father 65        prostate    High Blood Pressure Sister     High Blood Pressure Brother     Cancer Brother         prostate    Cancer Brother         prostate    Breast Cancer Maternal Aunt 55        breast    Breast Cancer Maternal Cousin 62        breast    Breast Cancer Niece      Social History     Tobacco Use    Smoking status: Every Day     Current packs/day: 1.00     Average packs/day: 1 pack/day for 25.0 years (25.0 ttl pk-yrs)     Types: Cigarettes    Smokeless tobacco: Never   Substance Use Topics

## 2024-08-29 NOTE — PROGRESS NOTES
Pt is here for her f/u on her estrace and premarin states does not need a refill at this time. She has had her mammogram and bone density.

## 2024-09-11 SDOH — HEALTH STABILITY: PHYSICAL HEALTH: ON AVERAGE, HOW MANY DAYS PER WEEK DO YOU ENGAGE IN MODERATE TO STRENUOUS EXERCISE (LIKE A BRISK WALK)?: 1 DAY

## 2024-09-11 SDOH — ECONOMIC STABILITY: FOOD INSECURITY: WITHIN THE PAST 12 MONTHS, YOU WORRIED THAT YOUR FOOD WOULD RUN OUT BEFORE YOU GOT MONEY TO BUY MORE.: SOMETIMES TRUE

## 2024-09-11 SDOH — ECONOMIC STABILITY: INCOME INSECURITY: HOW HARD IS IT FOR YOU TO PAY FOR THE VERY BASICS LIKE FOOD, HOUSING, MEDICAL CARE, AND HEATING?: NOT VERY HARD

## 2024-09-11 SDOH — ECONOMIC STABILITY: FOOD INSECURITY: WITHIN THE PAST 12 MONTHS, THE FOOD YOU BOUGHT JUST DIDN'T LAST AND YOU DIDN'T HAVE MONEY TO GET MORE.: NEVER TRUE

## 2024-09-11 SDOH — HEALTH STABILITY: PHYSICAL HEALTH: ON AVERAGE, HOW MANY MINUTES DO YOU ENGAGE IN EXERCISE AT THIS LEVEL?: 10 MIN

## 2024-09-11 ASSESSMENT — PATIENT HEALTH QUESTIONNAIRE - PHQ9
SUM OF ALL RESPONSES TO PHQ QUESTIONS 1-9: 0
2. FEELING DOWN, DEPRESSED OR HOPELESS: NOT AT ALL
SUM OF ALL RESPONSES TO PHQ QUESTIONS 1-9: 0
1. LITTLE INTEREST OR PLEASURE IN DOING THINGS: NOT AT ALL
SUM OF ALL RESPONSES TO PHQ QUESTIONS 1-9: 0
SUM OF ALL RESPONSES TO PHQ QUESTIONS 1-9: 0
SUM OF ALL RESPONSES TO PHQ9 QUESTIONS 1 & 2: 0

## 2024-09-11 ASSESSMENT — LIFESTYLE VARIABLES
HOW OFTEN DO YOU HAVE A DRINK CONTAINING ALCOHOL: 1
HOW MANY STANDARD DRINKS CONTAINING ALCOHOL DO YOU HAVE ON A TYPICAL DAY: PATIENT DOES NOT DRINK
HOW MANY STANDARD DRINKS CONTAINING ALCOHOL DO YOU HAVE ON A TYPICAL DAY: 0
HOW OFTEN DO YOU HAVE SIX OR MORE DRINKS ON ONE OCCASION: 1
HOW OFTEN DO YOU HAVE A DRINK CONTAINING ALCOHOL: NEVER

## 2024-09-13 ENCOUNTER — OFFICE VISIT (OUTPATIENT)
Dept: PRIMARY CARE CLINIC | Age: 69
End: 2024-09-13

## 2024-09-13 VITALS
TEMPERATURE: 97.6 F | HEART RATE: 68 BPM | OXYGEN SATURATION: 96 % | HEIGHT: 62 IN | WEIGHT: 135 LBS | BODY MASS INDEX: 24.84 KG/M2 | DIASTOLIC BLOOD PRESSURE: 74 MMHG | SYSTOLIC BLOOD PRESSURE: 104 MMHG

## 2024-09-13 DIAGNOSIS — Z00.00 ANNUAL PHYSICAL EXAM: Primary | ICD-10-CM

## 2024-09-13 DIAGNOSIS — F41.1 GENERALIZED ANXIETY DISORDER: ICD-10-CM

## 2024-09-13 DIAGNOSIS — E11.9 TYPE 2 DIABETES MELLITUS WITHOUT COMPLICATION, WITHOUT LONG-TERM CURRENT USE OF INSULIN (HCC): ICD-10-CM

## 2024-09-13 DIAGNOSIS — I10 ESSENTIAL (PRIMARY) HYPERTENSION: ICD-10-CM

## 2024-09-13 DIAGNOSIS — N18.31 STAGE 3A CHRONIC KIDNEY DISEASE (HCC): ICD-10-CM

## 2024-09-13 DIAGNOSIS — E06.3 HYPOTHYROIDISM DUE TO HASHIMOTO'S THYROIDITIS: ICD-10-CM

## 2024-09-13 DIAGNOSIS — J30.89 CHRONIC NONSEASONAL ALLERGIC RHINITIS DUE TO POLLEN: ICD-10-CM

## 2024-09-13 DIAGNOSIS — N95.9 POSTMENOPAUSAL SYMPTOMS: ICD-10-CM

## 2024-09-13 DIAGNOSIS — E03.8 HYPOTHYROIDISM DUE TO HASHIMOTO'S THYROIDITIS: ICD-10-CM

## 2024-09-13 DIAGNOSIS — M79.7 FIBROMYALGIA: ICD-10-CM

## 2024-09-13 DIAGNOSIS — E78.00 HYPERCHOLESTEROLEMIA: ICD-10-CM

## 2024-09-13 RX ORDER — SEMAGLUTIDE 1.34 MG/ML
1 INJECTION, SOLUTION SUBCUTANEOUS WEEKLY
Qty: 9 ML | Refills: 5 | Status: SHIPPED | OUTPATIENT
Start: 2024-09-13

## 2024-09-18 ENCOUNTER — TELEPHONE (OUTPATIENT)
Dept: PRIMARY CARE CLINIC | Age: 69
End: 2024-09-18

## 2024-10-02 DIAGNOSIS — N95.1 SYMPTOMS, SUCH AS FLUSHING, SLEEPLESSNESS, HEADACHE, LACK OF CONCENTRATION, ASSOCIATED WITH THE MENOPAUSE: ICD-10-CM

## 2024-10-03 RX ORDER — CYCLOBENZAPRINE HCL 10 MG
TABLET ORAL
Qty: 90 TABLET | Refills: 1 | Status: SHIPPED | OUTPATIENT
Start: 2024-10-03

## 2024-10-03 RX ORDER — CONJUGATED ESTROGENS 0.62 MG/1
0.62 TABLET, FILM COATED ORAL DAILY
Qty: 90 TABLET | Refills: 1 | Status: SHIPPED | OUTPATIENT
Start: 2024-10-03

## 2024-11-04 RX ORDER — FLUCONAZOLE 150 MG/1
150 TABLET ORAL
Qty: 2 TABLET | Refills: 0 | Status: SHIPPED | OUTPATIENT
Start: 2024-11-04 | End: 2024-11-10

## 2024-11-09 DIAGNOSIS — F41.1 GENERALIZED ANXIETY DISORDER: ICD-10-CM

## 2024-11-11 RX ORDER — ALPRAZOLAM 0.25 MG/1
TABLET ORAL
Qty: 90 TABLET | Refills: 5 | Status: SHIPPED | OUTPATIENT
Start: 2024-11-11 | End: 2024-12-11

## 2024-11-11 NOTE — TELEPHONE ENCOUNTER
Lilian Bustillo called to request a refill on her medication.      Last office visit : 9/13/2024   Next office visit : 3/11/2025     Last UDS:   Benzodiazepine Screen, Urine   Date Value Ref Range Status   03/01/2024 pos  Final     Buprenorphine Urine   Date Value Ref Range Status   03/01/2024 neg  Final     Cocaine Metabolite Screen, Urine   Date Value Ref Range Status   03/01/2024 neg  Final     Gabapentin Screen, Urine   Date Value Ref Range Status   10/18/2022 NEG  Final     Oxycodone Screen, Ur   Date Value Ref Range Status   03/01/2024 neg  Final     Propoxyphene Screen, Urine   Date Value Ref Range Status   03/01/2024 neg  Final     THC Screen, Urine   Date Value Ref Range Status   03/01/2024 neg  Final     Tricyclic Antidepressants, Urine   Date Value Ref Range Status   03/01/2024 neg  Final       Last Knu: today  Medication Contract: 03/04/24   Last Fill: 08/08/24    Requested Prescriptions     Pending Prescriptions Disp Refills    ALPRAZolam (XANAX) 0.25 MG tablet [Pharmacy Med Name: ALPRAZOLAM 0.25MG   TAB] 90 tablet 0     Sig: TAKE 1 TABLET BY MOUTH AT NIGHT AS NEEDED FOR ANXIETY         Please approve or refuse this medication.   Debora Hinojosa LPN

## 2024-12-03 DIAGNOSIS — E11.9 TYPE 2 DIABETES MELLITUS WITHOUT COMPLICATION, WITHOUT LONG-TERM CURRENT USE OF INSULIN (HCC): ICD-10-CM

## 2025-01-20 DIAGNOSIS — J30.1 CHRONIC SEASONAL ALLERGIC RHINITIS DUE TO POLLEN: ICD-10-CM

## 2025-01-20 RX ORDER — MONTELUKAST SODIUM 10 MG/1
TABLET ORAL
Qty: 90 TABLET | Refills: 1 | Status: SHIPPED | OUTPATIENT
Start: 2025-01-20

## 2025-02-03 DIAGNOSIS — I10 ESSENTIAL (PRIMARY) HYPERTENSION: ICD-10-CM

## 2025-02-03 DIAGNOSIS — I12.9 BENIGN HYPERTENSIVE KIDNEY DISEASE WITH CHRONIC KIDNEY DISEASE STAGE I THROUGH STAGE IV, OR UNSPECIFIED: ICD-10-CM

## 2025-02-03 RX ORDER — AMLODIPINE BESYLATE 5 MG/1
TABLET ORAL
Qty: 90 TABLET | Refills: 1 | Status: SHIPPED | OUTPATIENT
Start: 2025-02-03

## 2025-02-03 RX ORDER — PROPRANOLOL HYDROCHLORIDE 80 MG/1
CAPSULE, EXTENDED RELEASE ORAL
Qty: 90 CAPSULE | Refills: 1 | Status: SHIPPED | OUTPATIENT
Start: 2025-02-03

## 2025-02-03 RX ORDER — TRIAMTERENE AND HYDROCHLOROTHIAZIDE 75; 50 MG/1; MG/1
TABLET ORAL
Qty: 90 TABLET | Refills: 1 | Status: SHIPPED | OUTPATIENT
Start: 2025-02-03

## 2025-02-11 RX ORDER — ESTRADIOL 0.1 MG/G
CREAM VAGINAL
Qty: 42.5 G | Refills: 2 | Status: SHIPPED | OUTPATIENT
Start: 2025-02-11

## 2025-02-19 RX ORDER — POTASSIUM CHLORIDE 750 MG/1
TABLET, EXTENDED RELEASE ORAL
Qty: 270 TABLET | Refills: 0 | Status: SHIPPED | OUTPATIENT
Start: 2025-02-19

## 2025-02-24 DIAGNOSIS — G89.29 CHRONIC MIDLINE LOW BACK PAIN WITHOUT SCIATICA: ICD-10-CM

## 2025-02-24 DIAGNOSIS — M54.50 CHRONIC MIDLINE LOW BACK PAIN WITHOUT SCIATICA: ICD-10-CM

## 2025-02-24 RX ORDER — CELECOXIB 200 MG/1
CAPSULE ORAL
Qty: 180 CAPSULE | Refills: 1 | Status: SHIPPED | OUTPATIENT
Start: 2025-02-24

## 2025-03-05 LAB — HBA1C MFR BLD HPLC: 5.4 %

## 2025-03-07 DIAGNOSIS — N95.1 SYMPTOMS, SUCH AS FLUSHING, SLEEPLESSNESS, HEADACHE, LACK OF CONCENTRATION, ASSOCIATED WITH THE MENOPAUSE: ICD-10-CM

## 2025-03-07 DIAGNOSIS — E11.9 TYPE 2 DIABETES MELLITUS WITHOUT COMPLICATION, WITHOUT LONG-TERM CURRENT USE OF INSULIN (HCC): ICD-10-CM

## 2025-03-07 RX ORDER — CONJUGATED ESTROGENS 0.62 MG/1
0.62 TABLET, FILM COATED ORAL DAILY
Qty: 90 TABLET | Refills: 1 | Status: SHIPPED | OUTPATIENT
Start: 2025-03-07

## 2025-03-08 SDOH — ECONOMIC STABILITY: FOOD INSECURITY: WITHIN THE PAST 12 MONTHS, THE FOOD YOU BOUGHT JUST DIDN'T LAST AND YOU DIDN'T HAVE MONEY TO GET MORE.: NEVER TRUE

## 2025-03-08 SDOH — ECONOMIC STABILITY: INCOME INSECURITY: IN THE LAST 12 MONTHS, WAS THERE A TIME WHEN YOU WERE NOT ABLE TO PAY THE MORTGAGE OR RENT ON TIME?: NO

## 2025-03-08 SDOH — ECONOMIC STABILITY: FOOD INSECURITY: WITHIN THE PAST 12 MONTHS, YOU WORRIED THAT YOUR FOOD WOULD RUN OUT BEFORE YOU GOT MONEY TO BUY MORE.: NEVER TRUE

## 2025-03-08 SDOH — ECONOMIC STABILITY: TRANSPORTATION INSECURITY
IN THE PAST 12 MONTHS, HAS THE LACK OF TRANSPORTATION KEPT YOU FROM MEDICAL APPOINTMENTS OR FROM GETTING MEDICATIONS?: NO

## 2025-03-08 ASSESSMENT — PATIENT HEALTH QUESTIONNAIRE - PHQ9
1. LITTLE INTEREST OR PLEASURE IN DOING THINGS: NOT AT ALL
SUM OF ALL RESPONSES TO PHQ QUESTIONS 1-9: 0
2. FEELING DOWN, DEPRESSED OR HOPELESS: NOT AT ALL
2. FEELING DOWN, DEPRESSED OR HOPELESS: NOT AT ALL
1. LITTLE INTEREST OR PLEASURE IN DOING THINGS: NOT AT ALL
SUM OF ALL RESPONSES TO PHQ QUESTIONS 1-9: 0
SUM OF ALL RESPONSES TO PHQ QUESTIONS 1-9: 0
SUM OF ALL RESPONSES TO PHQ9 QUESTIONS 1 & 2: 0
SUM OF ALL RESPONSES TO PHQ QUESTIONS 1-9: 0

## 2025-03-11 ENCOUNTER — OFFICE VISIT (OUTPATIENT)
Dept: PRIMARY CARE CLINIC | Age: 70
End: 2025-03-11
Payer: MEDICARE

## 2025-03-11 VITALS
BODY MASS INDEX: 22.86 KG/M2 | SYSTOLIC BLOOD PRESSURE: 106 MMHG | DIASTOLIC BLOOD PRESSURE: 76 MMHG | HEART RATE: 86 BPM | TEMPERATURE: 97.7 F | WEIGHT: 125 LBS

## 2025-03-11 DIAGNOSIS — Z51.81 MEDICATION MONITORING ENCOUNTER: ICD-10-CM

## 2025-03-11 DIAGNOSIS — M54.50 CHRONIC MIDLINE LOW BACK PAIN WITHOUT SCIATICA: ICD-10-CM

## 2025-03-11 DIAGNOSIS — I10 ESSENTIAL (PRIMARY) HYPERTENSION: Primary | ICD-10-CM

## 2025-03-11 DIAGNOSIS — J30.89 CHRONIC NONSEASONAL ALLERGIC RHINITIS DUE TO POLLEN: ICD-10-CM

## 2025-03-11 DIAGNOSIS — E11.22 TYPE 2 DIABETES MELLITUS WITH STAGE 3A CHRONIC KIDNEY DISEASE, WITHOUT LONG-TERM CURRENT USE OF INSULIN (HCC): ICD-10-CM

## 2025-03-11 DIAGNOSIS — N18.31 TYPE 2 DIABETES MELLITUS WITH STAGE 3A CHRONIC KIDNEY DISEASE, WITHOUT LONG-TERM CURRENT USE OF INSULIN (HCC): ICD-10-CM

## 2025-03-11 DIAGNOSIS — G89.29 CHRONIC MIDLINE LOW BACK PAIN WITHOUT SCIATICA: ICD-10-CM

## 2025-03-11 DIAGNOSIS — E11.9 TYPE 2 DIABETES MELLITUS WITHOUT COMPLICATION, WITHOUT LONG-TERM CURRENT USE OF INSULIN (HCC): ICD-10-CM

## 2025-03-11 DIAGNOSIS — E78.01 FAMILIAL HYPERCHOLESTEROLEMIA: ICD-10-CM

## 2025-03-11 DIAGNOSIS — N18.31 STAGE 3A CHRONIC KIDNEY DISEASE (HCC): ICD-10-CM

## 2025-03-11 DIAGNOSIS — E06.3 HYPOTHYROIDISM DUE TO HASHIMOTO'S THYROIDITIS: ICD-10-CM

## 2025-03-11 DIAGNOSIS — K21.9 GASTROESOPHAGEAL REFLUX DISEASE WITHOUT ESOPHAGITIS: ICD-10-CM

## 2025-03-11 LAB
ALCOHOL URINE: NORMAL
AMPHETAMINE SCREEN URINE: NORMAL
BARBITURATE SCREEN URINE: NORMAL
BENZODIAZEPINE SCREEN, URINE: NORMAL
BUPRENORPHINE URINE: NORMAL
COCAINE METABOLITE SCREEN URINE: NORMAL
FENTANYL SCREEN, URINE: NORMAL
GABAPENTIN SCREEN, URINE: NORMAL
MDMA, URINE: NORMAL
METHADONE SCREEN, URINE: NORMAL
METHAMPHETAMINE, URINE: NORMAL
OPIATE SCREEN URINE: NORMAL
OXYCODONE SCREEN URINE: NORMAL
PHENCYCLIDINE SCREEN URINE: NORMAL
PROPOXYPHENE SCREEN, URINE: NORMAL
SYNTHETIC CANNABINOIDS(K2) SCREEN, URINE: NORMAL
THC SCREEN, URINE: NORMAL
TRAMADOL SCREEN URINE: NORMAL
TRICYCLIC ANTIDEPRESSANTS, UR: NORMAL

## 2025-03-11 PROCEDURE — 80305 DRUG TEST PRSMV DIR OPT OBS: CPT | Performed by: FAMILY MEDICINE

## 2025-03-11 PROCEDURE — 3017F COLORECTAL CA SCREEN DOC REV: CPT | Performed by: FAMILY MEDICINE

## 2025-03-11 PROCEDURE — 3046F HEMOGLOBIN A1C LEVEL >9.0%: CPT | Performed by: FAMILY MEDICINE

## 2025-03-11 PROCEDURE — 3078F DIAST BP <80 MM HG: CPT | Performed by: FAMILY MEDICINE

## 2025-03-11 PROCEDURE — G2211 COMPLEX E/M VISIT ADD ON: HCPCS | Performed by: FAMILY MEDICINE

## 2025-03-11 PROCEDURE — G8420 CALC BMI NORM PARAMETERS: HCPCS | Performed by: FAMILY MEDICINE

## 2025-03-11 PROCEDURE — 1159F MED LIST DOCD IN RCRD: CPT | Performed by: FAMILY MEDICINE

## 2025-03-11 PROCEDURE — 1090F PRES/ABSN URINE INCON ASSESS: CPT | Performed by: FAMILY MEDICINE

## 2025-03-11 PROCEDURE — 1123F ACP DISCUSS/DSCN MKR DOCD: CPT | Performed by: FAMILY MEDICINE

## 2025-03-11 PROCEDURE — G8399 PT W/DXA RESULTS DOCUMENT: HCPCS | Performed by: FAMILY MEDICINE

## 2025-03-11 PROCEDURE — 99214 OFFICE O/P EST MOD 30 MIN: CPT | Performed by: FAMILY MEDICINE

## 2025-03-11 PROCEDURE — 4004F PT TOBACCO SCREEN RCVD TLK: CPT | Performed by: FAMILY MEDICINE

## 2025-03-11 PROCEDURE — G8427 DOCREV CUR MEDS BY ELIG CLIN: HCPCS | Performed by: FAMILY MEDICINE

## 2025-03-11 PROCEDURE — 3044F HG A1C LEVEL LT 7.0%: CPT | Performed by: FAMILY MEDICINE

## 2025-03-11 PROCEDURE — 3074F SYST BP LT 130 MM HG: CPT | Performed by: FAMILY MEDICINE

## 2025-03-11 PROCEDURE — 2022F DILAT RTA XM EVC RTNOPTHY: CPT | Performed by: FAMILY MEDICINE

## 2025-03-11 NOTE — PROGRESS NOTES
Tobacco Use    Smoking status: Every Day     Current packs/day: 1.00     Average packs/day: 1 pack/day for 25.0 years (25.0 ttl pk-yrs)     Types: Cigarettes    Smokeless tobacco: Never   Substance Use Topics    Alcohol use: No     Alcohol/week: 0.0 standard drinks of alcohol        _______________________________________________________________    Note dictated using Dragon Dictation software  Sometimes this dictation software makes erroneous transcriptions.

## 2025-03-31 DIAGNOSIS — I12.9 BENIGN HYPERTENSIVE KIDNEY DISEASE WITH CHRONIC KIDNEY DISEASE STAGE I THROUGH STAGE IV, OR UNSPECIFIED: ICD-10-CM

## 2025-03-31 RX ORDER — LOSARTAN POTASSIUM 25 MG/1
25 TABLET ORAL DAILY
Qty: 90 TABLET | Refills: 1 | Status: SHIPPED | OUTPATIENT
Start: 2025-03-31

## 2025-04-30 RX ORDER — CYCLOBENZAPRINE HCL 10 MG
10 TABLET ORAL DAILY
Qty: 90 TABLET | Refills: 1 | Status: SHIPPED | OUTPATIENT
Start: 2025-04-30

## 2025-05-01 ENCOUNTER — TRANSCRIBE ORDERS (OUTPATIENT)
Dept: ADMINISTRATIVE | Facility: HOSPITAL | Age: 70
End: 2025-05-01
Payer: MEDICARE

## 2025-05-01 DIAGNOSIS — M47.812 CERVICAL SPONDYLOSIS WITHOUT MYELOPATHY: Primary | ICD-10-CM

## 2025-05-12 ENCOUNTER — TRANSCRIBE ORDERS (OUTPATIENT)
Dept: ADMINISTRATIVE | Facility: HOSPITAL | Age: 70
End: 2025-05-12
Payer: MEDICARE

## 2025-05-12 DIAGNOSIS — M47.812 CERVICAL SPONDYLOSIS: Primary | ICD-10-CM

## 2025-05-20 RX ORDER — POTASSIUM CHLORIDE 750 MG/1
10 TABLET, EXTENDED RELEASE ORAL 3 TIMES DAILY
Qty: 270 TABLET | Refills: 1 | Status: SHIPPED | OUTPATIENT
Start: 2025-05-20

## 2025-06-04 ENCOUNTER — HOSPITAL ENCOUNTER (OUTPATIENT)
Dept: MRI IMAGING | Facility: HOSPITAL | Age: 70
Discharge: HOME | End: 2025-06-04
Admitting: NURSE PRACTITIONER
Payer: MEDICARE

## 2025-06-04 DIAGNOSIS — M47.812 CERVICAL SPONDYLOSIS: ICD-10-CM

## 2025-06-04 PROCEDURE — 72141 MRI NECK SPINE W/O DYE: CPT

## 2025-06-06 ENCOUNTER — TRANSCRIBE ORDERS (OUTPATIENT)
Dept: ADMINISTRATIVE | Facility: HOSPITAL | Age: 70
End: 2025-06-06
Payer: MEDICARE

## 2025-06-06 DIAGNOSIS — M47.816 LUMBAR SPONDYLOSIS: Primary | ICD-10-CM

## 2025-07-07 DIAGNOSIS — F41.1 GENERALIZED ANXIETY DISORDER: ICD-10-CM

## 2025-07-08 RX ORDER — ALPRAZOLAM 0.25 MG
TABLET ORAL
Qty: 30 TABLET | Refills: 0 | Status: SHIPPED | OUTPATIENT
Start: 2025-07-08 | End: 2025-08-07

## 2025-07-08 NOTE — TELEPHONE ENCOUNTER
Provider needs to review PDMP    PDMP Monitoring:    Last PDMP [place] as Reviewed ([place]):  Review User Review Instant Review Result          Last filled date: 11/11/25  Last office visit for requested medication : 03/11/25    Next office visit : 9/16/2025     Last UDS:   Benzodiazepine Screen, Urine   Date Value Ref Range Status   03/11/2025 pos  Final     Buprenorphine Urine   Date Value Ref Range Status   03/11/2025 neg  Final     Cocaine Metabolite Screen, Urine   Date Value Ref Range Status   03/11/2025 neg  Final     Gabapentin Screen, Urine   Date Value Ref Range Status   10/18/2022 NEG  Final     Oxycodone Screen, Ur   Date Value Ref Range Status   03/11/2025 neg  Final     Propoxyphene Screen, Urine   Date Value Ref Range Status   03/11/2025 neg  Final     THC Screen, Urine   Date Value Ref Range Status   03/11/2025 neg  Final     Tricyclic Antidepressants, Urine   Date Value Ref Range Status   03/11/2025 pos  Final       Medication Contract and Consent for Opioid Use Documents Filed       Patient Documents       Type of Document Status Date Received Received By Description    Medication Contract Received 4/17/2018  3:13 PM AMY HCARLES Medication Contract for Xanax 04/17/2018    Medication Contract Received 3/4/2024  9:30 AM YAJAIRA CUENCA xanax contract    Medication Contract Received 3/11/2025  2:34 PM YAJAIRA CUENCA xabarbx contract                    Requested Prescriptions     Pending Prescriptions Disp Refills    ALPRAZolam (XANAX) 0.25 MG tablet [Pharmacy Med Name: ALPRAZOLAM 0.25MG   TAB] 90 tablet 0     Sig: TAKE 1 TABLET BY MOUTH AT NIGHT AS NEEDED FOR ANXIETY         Please approve or refuse this medication.   Yajaira Cuenca LPN

## 2025-07-17 DIAGNOSIS — J30.1 CHRONIC SEASONAL ALLERGIC RHINITIS DUE TO POLLEN: ICD-10-CM

## 2025-07-17 RX ORDER — MONTELUKAST SODIUM 10 MG/1
10 TABLET ORAL NIGHTLY
Qty: 90 TABLET | Refills: 1 | Status: SHIPPED | OUTPATIENT
Start: 2025-07-17

## 2025-07-17 NOTE — TELEPHONE ENCOUNTER
Lilian RADHA Bustillo called to request a refill on her medication.      Last office visit : 3/11/2025   Next office visit : 9/16/2025     Requested Prescriptions     Signed Prescriptions Disp Refills    montelukast (SINGULAIR) 10 MG tablet 90 tablet 1     Sig: TAKE 1 TABLET EVERY NIGHT     Authorizing Provider: MINGO BURCH     Ordering User: KOLTON SILVA LPN

## 2025-08-01 DIAGNOSIS — I12.9 BENIGN HYPERTENSIVE KIDNEY DISEASE WITH CHRONIC KIDNEY DISEASE STAGE I THROUGH STAGE IV, OR UNSPECIFIED: ICD-10-CM

## 2025-08-01 DIAGNOSIS — I10 ESSENTIAL (PRIMARY) HYPERTENSION: ICD-10-CM

## 2025-08-01 RX ORDER — AMLODIPINE BESYLATE 5 MG/1
5 TABLET ORAL DAILY
Qty: 90 TABLET | Refills: 3 | Status: SHIPPED | OUTPATIENT
Start: 2025-08-01

## 2025-08-01 RX ORDER — TRIAMTERENE AND HYDROCHLOROTHIAZIDE 75; 50 MG/1; MG/1
1 TABLET ORAL DAILY
Qty: 90 TABLET | Refills: 3 | Status: SHIPPED | OUTPATIENT
Start: 2025-08-01

## 2025-08-01 RX ORDER — PROPRANOLOL HYDROCHLORIDE 80 MG/1
80 CAPSULE, EXTENDED RELEASE ORAL DAILY
Qty: 90 CAPSULE | Refills: 3 | Status: SHIPPED | OUTPATIENT
Start: 2025-08-01

## 2025-08-03 DIAGNOSIS — N95.1 SYMPTOMS, SUCH AS FLUSHING, SLEEPLESSNESS, HEADACHE, LACK OF CONCENTRATION, ASSOCIATED WITH THE MENOPAUSE: ICD-10-CM

## 2025-08-04 RX ORDER — CONJUGATED ESTROGENS 0.62 MG/1
0.62 TABLET, FILM COATED ORAL DAILY
Qty: 90 TABLET | Refills: 1 | Status: SHIPPED | OUTPATIENT
Start: 2025-08-04

## 2025-08-05 DIAGNOSIS — F41.1 GENERALIZED ANXIETY DISORDER: ICD-10-CM

## 2025-08-06 ENCOUNTER — HOSPITAL ENCOUNTER (OUTPATIENT)
Dept: MRI IMAGING | Facility: HOSPITAL | Age: 70
Discharge: HOME | End: 2025-08-06
Admitting: NURSE PRACTITIONER
Payer: MEDICARE

## 2025-08-06 DIAGNOSIS — M47.816 LUMBAR SPONDYLOSIS: ICD-10-CM

## 2025-08-06 PROCEDURE — 72148 MRI LUMBAR SPINE W/O DYE: CPT

## 2025-08-07 RX ORDER — ALPRAZOLAM 0.25 MG
TABLET ORAL
Qty: 30 TABLET | Refills: 0 | Status: SHIPPED | OUTPATIENT
Start: 2025-08-07 | End: 2025-09-06

## 2025-08-22 DIAGNOSIS — G89.29 CHRONIC MIDLINE LOW BACK PAIN WITHOUT SCIATICA: ICD-10-CM

## 2025-08-22 DIAGNOSIS — M54.50 CHRONIC MIDLINE LOW BACK PAIN WITHOUT SCIATICA: ICD-10-CM

## 2025-08-22 RX ORDER — CELECOXIB 200 MG/1
200 CAPSULE ORAL 2 TIMES DAILY
Qty: 180 CAPSULE | Refills: 0 | Status: SHIPPED | OUTPATIENT
Start: 2025-08-22

## (undated) DEVICE — SUT MONOCRYL PLS ANTIB UND 3/0  PS1 27IN

## (undated) DEVICE — Device

## (undated) DEVICE — BNDG ELAS W/CLIP 6IN 10YD LF STRL

## (undated) DEVICE — 3M™ IOBAN™ 2 ANTIMICROBIAL INCISE DRAPE 6650EZ: Brand: IOBAN™ 2

## (undated) DEVICE — HANDPIECE SET WITH HIGH FLOW TIP AND SUCTION TUBE: Brand: INTERPULSE

## (undated) DEVICE — OPTIFOAM GENTLE SA, POSTOP, 4X8: Brand: MEDLINE

## (undated) DEVICE — BNDG ELAS ECON W/CLIP 3IN 5YD LF STRL

## (undated) DEVICE — DRP SURG U/DRP INVISISHIELD PA 48X52IN

## (undated) DEVICE — INTEGRATED CASSETTE TUBING, DO NOT USE IF PACKAGE IS DAMAGED: Brand: CROSSFLOW

## (undated) DEVICE — SUCTION MAT (LOW PROFILE), 50X34: Brand: NEPTUNE

## (undated) DEVICE — BIPOLAR SEALER 23-112-1 AQM 6.0: Brand: AQUAMANTYS™

## (undated) DEVICE — DRSNG SURESITE WNDW 4X4.5

## (undated) DEVICE — GLV SURG SENSICARE PI ORTHO SZ8 LF STRL

## (undated) DEVICE — PAD,EYE,1-5/8X2 5/8,STERILE,LF,1/PK: Brand: MEDLINE

## (undated) DEVICE — 3M™ STERI-STRIP™ REINFORCED ADHESIVE SKIN CLOSURES, R1547, 1/2 IN X 4 IN (12 MM X 100 MM), 6 STRIPS/ENVELOPE: Brand: 3M™ STERI-STRIP™

## (undated) DEVICE — BUR BRL FORMLA 6FLUT 5MM

## (undated) DEVICE — ANTIBACTERIAL UNDYED BRAIDED (POLYGLACTIN 910), SYNTHETIC ABSORBABLE SUTURE: Brand: COATED VICRYL

## (undated) DEVICE — DISPOSABLE TOURNIQUET CUFF SINGLE BLADDER, SINGLE PORT AND QUICK CONNECT CONNECTOR: Brand: COLOR CUFF

## (undated) DEVICE — TRAP FLD MINIVAC MEGADYNE 100ML

## (undated) DEVICE — SUT ETHIB 5 V37 30IN MB66G

## (undated) DEVICE — 3M™ STERI-DRAPE™ U-DRAPE 1015: Brand: STERI-DRAPE™

## (undated) DEVICE — SHOULDER STABILIZATION KIT,                                    DISPOSABLE 12 PER BOX

## (undated) DEVICE — INTENDED FOR TISSUE SEPARATION, AND OTHER PROCEDURES THAT REQUIRE A SHARP SURGICAL BLADE TO PUNCTURE OR CUT.: Brand: BARD-PARKER ® STAINLESS STEEL BLADES

## (undated) DEVICE — SPNG GZ WOVN 4X4IN 12PLY 10/BX STRL

## (undated) DEVICE — PK SHLDR 30

## (undated) DEVICE — [TOMCAT CUTTER, ARTHROSCOPIC SHAVER BLADE,  DO NOT RESTERILIZE,  DO NOT USE IF PACKAGE IS DAMAGED,  KEEP DRY,  KEEP AWAY FROM SUNLIGHT]: Brand: FORMULA

## (undated) DEVICE — SPONGE,LAP,12"X12",XR,ST,5/PK,40PK/CS: Brand: MEDLINE

## (undated) DEVICE — DRAPE,SHOULDER,BEACH CHAIR,STERILE: Brand: MEDLINE

## (undated) DEVICE — PAD,ABDOMINAL,8"X10",ST,LF: Brand: MEDLINE

## (undated) DEVICE — 4-PORT MANIFOLD: Brand: NEPTUNE 2

## (undated) DEVICE — UNDERCAST PADDING: Brand: DEROYAL

## (undated) DEVICE — GLV SURG DERMASSURE GRN LF PF 8.0

## (undated) DEVICE — TBG PENCL TELESCP MEGADYNE SMOKE EVAC 10FT

## (undated) DEVICE — CVR UNIV C/ARM

## (undated) DEVICE — T-MAX DISPOSABLE FACE MASK 8 PER BOX

## (undated) DEVICE — DUAL CUT SAGITTAL BLADE

## (undated) DEVICE — BNDG GZ SOF-FORM CONFRM 2X75IN LF STRL

## (undated) DEVICE — ELECTRD NDL EDGE/INSUL/PFTE.787MM 2.84IN

## (undated) DEVICE — SYR LUERLOK 50ML

## (undated) DEVICE — CANN TWST IN W/NOSQUIRT CAP 7IDX7CM

## (undated) DEVICE — SYR LUERLOK 20CC BX/50

## (undated) DEVICE — ARM SLING II: Brand: DEROYAL

## (undated) DEVICE — SPNG GZ STRL 2S 4X4 12PLY

## (undated) DEVICE — 450 ML BOTTLE OF 0.05% CHLORHEXIDINE GLUCONATE IN 99.95% STERILE WATER FOR IRRIGATION, USP AND APPLICATOR.: Brand: IRRISEPT ANTIMICROBIAL WOUND LAVAGE

## (undated) DEVICE — ADHS SKIN PREMIERPRO EXOFIN TOPICAL HI/VISC .5ML

## (undated) DEVICE — 3M™ IOBAN™ 2 ANTIMICROBIAL INCISE DRAPE 6651EZ: Brand: IOBAN™ 2

## (undated) DEVICE — PROXIMATE RH ROTATING HEAD SKIN STAPLERS (35 REGULAR) CONTAINS 35 STAINLESS STEEL STAPLES: Brand: PROXIMATE

## (undated) DEVICE — TP NDL SCORPION MULTIFIRE

## (undated) DEVICE — GLV SURG TRIUMPH GREEN W/ALOE PF LTX 8 STRL

## (undated) DEVICE — BAPTIST TURNOVER KIT: Brand: MEDLINE INDUSTRIES, INC.

## (undated) DEVICE — PROB ABLAT SERFAS ENERGY MAX 90S 4

## (undated) DEVICE — PK EXTRM 30

## (undated) DEVICE — SUT ETHLN 3/0 FS1 30IN 669H

## (undated) DEVICE — 3.0MM PRECISION ROUND

## (undated) DEVICE — PAD CAST SPECIST 3IN NS

## (undated) DEVICE — 3M™ STERI-DRAPE™ INSTRUMENT POUCH 1018: Brand: STERI-DRAPE™

## (undated) DEVICE — DRSNG BRDR MEPILEXLITE SLFADHR SIL 2X5

## (undated) DEVICE — GLV SURG BIOGEL LTX PF 8

## (undated) DEVICE — TOWEL,OR,DSP,ST,BLUE,STD,4/PK,20PK/CS: Brand: MEDLINE

## (undated) DEVICE — PK TURNOVER RM ADV

## (undated) DEVICE — DRSNG GZ CURAD XEROFORM NONADHS 5X9IN STRL

## (undated) DEVICE — PREP SOL POVIDONE/IODINE BT 4OZ

## (undated) DEVICE — CVR BRD ARM 13X30

## (undated) DEVICE — CONN FLX BREATHE CIRCT

## (undated) DEVICE — ST PIN FIX TEMP UNIVERS REVERS W/OSTEO/GUIDE/PIN 2.4MM STRL